# Patient Record
Sex: MALE | Race: OTHER | NOT HISPANIC OR LATINO | URBAN - METROPOLITAN AREA
[De-identification: names, ages, dates, MRNs, and addresses within clinical notes are randomized per-mention and may not be internally consistent; named-entity substitution may affect disease eponyms.]

---

## 2020-01-01 ENCOUNTER — INPATIENT (INPATIENT)
Facility: HOSPITAL | Age: 70
LOS: 26 days | Discharge: HOME | DRG: 207 | End: 2020-05-08
Attending: HOSPITALIST | Admitting: HOSPITALIST
Payer: MEDICARE

## 2020-01-01 ENCOUNTER — INPATIENT (INPATIENT)
Facility: HOSPITAL | Age: 70
LOS: 6 days | Discharge: ANOTHER HOSPITAL | DRG: 949 | End: 2020-05-15
Attending: PHYSICAL MEDICINE & REHABILITATION | Admitting: PHYSICAL MEDICINE & REHABILITATION
Payer: MEDICARE

## 2020-01-01 ENCOUNTER — INPATIENT (INPATIENT)
Facility: HOSPITAL | Age: 70
LOS: 0 days | DRG: 208 | End: 2020-05-16
Attending: INTERNAL MEDICINE | Admitting: FAMILY MEDICINE
Payer: MEDICARE

## 2020-01-01 VITALS
RESPIRATION RATE: 16 BRPM | SYSTOLIC BLOOD PRESSURE: 135 MMHG | TEMPERATURE: 98 F | HEART RATE: 92 BPM | DIASTOLIC BLOOD PRESSURE: 85 MMHG

## 2020-01-01 VITALS
DIASTOLIC BLOOD PRESSURE: 64 MMHG | SYSTOLIC BLOOD PRESSURE: 148 MMHG | RESPIRATION RATE: 18 BRPM | HEART RATE: 75 BPM | TEMPERATURE: 98 F | OXYGEN SATURATION: 96 %

## 2020-01-01 VITALS
HEART RATE: 69 BPM | SYSTOLIC BLOOD PRESSURE: 190 MMHG | RESPIRATION RATE: 17 BRPM | DIASTOLIC BLOOD PRESSURE: 82 MMHG | TEMPERATURE: 98 F

## 2020-01-01 VITALS
TEMPERATURE: 99 F | WEIGHT: 145.06 LBS | DIASTOLIC BLOOD PRESSURE: 78 MMHG | HEART RATE: 89 BPM | HEIGHT: 66 IN | RESPIRATION RATE: 28 BRPM | OXYGEN SATURATION: 99 % | SYSTOLIC BLOOD PRESSURE: 139 MMHG

## 2020-01-01 VITALS — SYSTOLIC BLOOD PRESSURE: 148 MMHG | HEART RATE: 88 BPM | DIASTOLIC BLOOD PRESSURE: 79 MMHG

## 2020-01-01 VITALS — HEART RATE: 36 BPM | SYSTOLIC BLOOD PRESSURE: 76 MMHG | DIASTOLIC BLOOD PRESSURE: 43 MMHG

## 2020-01-01 DIAGNOSIS — J96.91 RESPIRATORY FAILURE, UNSPECIFIED WITH HYPOXIA: ICD-10-CM

## 2020-01-01 DIAGNOSIS — N40.0 BENIGN PROSTATIC HYPERPLASIA WITHOUT LOWER URINARY TRACT SYMPTOMS: ICD-10-CM

## 2020-01-01 DIAGNOSIS — U07.1 COVID-19: ICD-10-CM

## 2020-01-01 DIAGNOSIS — J18.9 PNEUMONIA, UNSPECIFIED ORGANISM: ICD-10-CM

## 2020-01-01 DIAGNOSIS — Z29.9 ENCOUNTER FOR PROPHYLACTIC MEASURES, UNSPECIFIED: ICD-10-CM

## 2020-01-01 DIAGNOSIS — I10 ESSENTIAL (PRIMARY) HYPERTENSION: ICD-10-CM

## 2020-01-01 DIAGNOSIS — E11.9 TYPE 2 DIABETES MELLITUS WITHOUT COMPLICATIONS: ICD-10-CM

## 2020-01-01 LAB
-  AMIKACIN: SIGNIFICANT CHANGE UP
-  AZTREONAM: SIGNIFICANT CHANGE UP
-  CEFEPIME: SIGNIFICANT CHANGE UP
-  CEFTAZIDIME: SIGNIFICANT CHANGE UP
-  CIPROFLOXACIN: SIGNIFICANT CHANGE UP
-  GENTAMICIN: SIGNIFICANT CHANGE UP
-  IMIPENEM: SIGNIFICANT CHANGE UP
-  LEVOFLOXACIN: SIGNIFICANT CHANGE UP
-  MEROPENEM: SIGNIFICANT CHANGE UP
-  PIPERACILLIN/TAZOBACTAM: SIGNIFICANT CHANGE UP
-  TOBRAMYCIN: SIGNIFICANT CHANGE UP
A-TUMOR NECROSIS FACT SERPL-MCNC: <5 PG/ML — SIGNIFICANT CHANGE UP
ALBUMIN SERPL ELPH-MCNC: 2.1 G/DL — LOW (ref 3.3–5.2)
ALBUMIN SERPL ELPH-MCNC: 2.2 G/DL — LOW (ref 3.3–5)
ALBUMIN SERPL ELPH-MCNC: 2.4 G/DL — LOW (ref 3.3–5)
ALBUMIN SERPL ELPH-MCNC: 2.4 G/DL — LOW (ref 3.3–5)
ALBUMIN SERPL ELPH-MCNC: 2.4 G/DL — LOW (ref 3.3–5.2)
ALBUMIN SERPL ELPH-MCNC: 2.5 G/DL — LOW (ref 3.3–5.2)
ALBUMIN SERPL ELPH-MCNC: 2.5 G/DL — LOW (ref 3.3–5.2)
ALBUMIN SERPL ELPH-MCNC: 2.6 G/DL — LOW (ref 3.3–5.2)
ALBUMIN SERPL ELPH-MCNC: 2.7 G/DL — LOW (ref 3.3–5.2)
ALBUMIN SERPL ELPH-MCNC: 2.7 G/DL — LOW (ref 3.3–5.2)
ALBUMIN SERPL ELPH-MCNC: 2.8 G/DL — LOW (ref 3.3–5.2)
ALBUMIN SERPL ELPH-MCNC: 2.9 G/DL — LOW (ref 3.3–5.2)
ALBUMIN SERPL ELPH-MCNC: 2.9 G/DL — LOW (ref 3.3–5.2)
ALBUMIN SERPL ELPH-MCNC: 3 G/DL — LOW (ref 3.3–5.2)
ALBUMIN SERPL ELPH-MCNC: 3 G/DL — LOW (ref 3.3–5.2)
ALP SERPL-CCNC: 133 U/L — HIGH (ref 40–120)
ALP SERPL-CCNC: 135 U/L — HIGH (ref 40–120)
ALP SERPL-CCNC: 135 U/L — HIGH (ref 40–120)
ALP SERPL-CCNC: 137 U/L — HIGH (ref 40–120)
ALP SERPL-CCNC: 147 U/L — HIGH (ref 40–120)
ALP SERPL-CCNC: 152 U/L — HIGH (ref 40–120)
ALP SERPL-CCNC: 152 U/L — HIGH (ref 40–120)
ALP SERPL-CCNC: 161 U/L — HIGH (ref 40–120)
ALP SERPL-CCNC: 163 U/L — HIGH (ref 40–120)
ALP SERPL-CCNC: 176 U/L — HIGH (ref 40–120)
ALP SERPL-CCNC: 184 U/L — HIGH (ref 40–120)
ALP SERPL-CCNC: 185 U/L — HIGH (ref 40–120)
ALP SERPL-CCNC: 187 U/L — HIGH (ref 40–120)
ALP SERPL-CCNC: 188 U/L — HIGH (ref 40–120)
ALP SERPL-CCNC: 192 U/L — HIGH (ref 40–120)
ALP SERPL-CCNC: 197 U/L — HIGH (ref 40–120)
ALP SERPL-CCNC: 254 U/L — HIGH (ref 40–120)
ALT FLD-CCNC: 15 U/L — SIGNIFICANT CHANGE UP
ALT FLD-CCNC: 168 U/L — HIGH
ALT FLD-CCNC: 18 U/L — SIGNIFICANT CHANGE UP
ALT FLD-CCNC: 19 U/L — SIGNIFICANT CHANGE UP
ALT FLD-CCNC: 19 U/L — SIGNIFICANT CHANGE UP
ALT FLD-CCNC: 20 U/L — SIGNIFICANT CHANGE UP
ALT FLD-CCNC: 22 U/L — SIGNIFICANT CHANGE UP
ALT FLD-CCNC: 23 U/L — SIGNIFICANT CHANGE UP
ALT FLD-CCNC: 33 U/L — SIGNIFICANT CHANGE UP
ALT FLD-CCNC: 37 U/L — SIGNIFICANT CHANGE UP (ref 10–45)
ALT FLD-CCNC: 47 U/L — HIGH
ALT FLD-CCNC: 52 U/L — HIGH (ref 10–45)
ALT FLD-CCNC: 58 U/L — HIGH
ALT FLD-CCNC: 58 U/L — HIGH
ALT FLD-CCNC: 62 U/L — HIGH
ALT FLD-CCNC: 75 U/L — HIGH
ALT FLD-CCNC: 80 U/L — HIGH (ref 10–45)
ANION GAP SERPL CALC-SCNC: 11 MMOL/L — SIGNIFICANT CHANGE UP (ref 5–17)
ANION GAP SERPL CALC-SCNC: 11 MMOL/L — SIGNIFICANT CHANGE UP (ref 5–17)
ANION GAP SERPL CALC-SCNC: 12 MMOL/L — SIGNIFICANT CHANGE UP (ref 5–17)
ANION GAP SERPL CALC-SCNC: 13 MMOL/L — SIGNIFICANT CHANGE UP (ref 5–17)
ANION GAP SERPL CALC-SCNC: 14 MMOL/L — SIGNIFICANT CHANGE UP (ref 5–17)
ANION GAP SERPL CALC-SCNC: 15 MMOL/L — SIGNIFICANT CHANGE UP (ref 5–17)
ANION GAP SERPL CALC-SCNC: 18 MMOL/L — HIGH (ref 5–17)
ANION GAP SERPL CALC-SCNC: 19 MMOL/L — HIGH (ref 5–17)
ANION GAP SERPL CALC-SCNC: 4 MMOL/L — LOW (ref 5–17)
ANION GAP SERPL CALC-SCNC: 5 MMOL/L — SIGNIFICANT CHANGE UP (ref 5–17)
ANION GAP SERPL CALC-SCNC: 6 MMOL/L — SIGNIFICANT CHANGE UP (ref 5–17)
ANION GAP SERPL CALC-SCNC: 9 MMOL/L — SIGNIFICANT CHANGE UP (ref 5–17)
ANISOCYTOSIS BLD QL: SLIGHT — SIGNIFICANT CHANGE UP
APTT BLD: 30.4 SEC — SIGNIFICANT CHANGE UP (ref 27.5–36.3)
AST SERPL-CCNC: 160 U/L — HIGH
AST SERPL-CCNC: 19 U/L — SIGNIFICANT CHANGE UP
AST SERPL-CCNC: 21 U/L — SIGNIFICANT CHANGE UP
AST SERPL-CCNC: 23 U/L — SIGNIFICANT CHANGE UP
AST SERPL-CCNC: 25 U/L — SIGNIFICANT CHANGE UP
AST SERPL-CCNC: 25 U/L — SIGNIFICANT CHANGE UP
AST SERPL-CCNC: 27 U/L — SIGNIFICANT CHANGE UP (ref 10–40)
AST SERPL-CCNC: 28 U/L — SIGNIFICANT CHANGE UP
AST SERPL-CCNC: 29 U/L — SIGNIFICANT CHANGE UP
AST SERPL-CCNC: 29 U/L — SIGNIFICANT CHANGE UP
AST SERPL-CCNC: 30 U/L — SIGNIFICANT CHANGE UP (ref 10–40)
AST SERPL-CCNC: 31 U/L — SIGNIFICANT CHANGE UP (ref 10–40)
AST SERPL-CCNC: 43 U/L — HIGH
AST SERPL-CCNC: 51 U/L — HIGH
AST SERPL-CCNC: 59 U/L — HIGH
AST SERPL-CCNC: 63 U/L — HIGH
AST SERPL-CCNC: 79 U/L — HIGH
BASE EXCESS BLDA CALC-SCNC: -1.8 MMOL/L — SIGNIFICANT CHANGE UP (ref -2–2)
BASE EXCESS BLDA CALC-SCNC: 1.2 MMOL/L — SIGNIFICANT CHANGE UP (ref -2–2)
BASE EXCESS BLDA CALC-SCNC: 5.2 MMOL/L — HIGH (ref -3–3)
BASE EXCESS BLDV CALC-SCNC: -2.5 MMOL/L — LOW (ref -2–2)
BASOPHILS # BLD AUTO: 0 K/UL — SIGNIFICANT CHANGE UP (ref 0–0.2)
BASOPHILS # BLD AUTO: 0.01 K/UL — SIGNIFICANT CHANGE UP (ref 0–0.2)
BASOPHILS # BLD AUTO: 0.02 K/UL — SIGNIFICANT CHANGE UP (ref 0–0.2)
BASOPHILS # BLD AUTO: 0.03 K/UL — SIGNIFICANT CHANGE UP (ref 0–0.2)
BASOPHILS # BLD AUTO: 0.03 K/UL — SIGNIFICANT CHANGE UP (ref 0–0.2)
BASOPHILS # BLD AUTO: 0.04 K/UL — SIGNIFICANT CHANGE UP (ref 0–0.2)
BASOPHILS # BLD AUTO: 0.05 K/UL — SIGNIFICANT CHANGE UP (ref 0–0.2)
BASOPHILS # BLD AUTO: 0.05 K/UL — SIGNIFICANT CHANGE UP (ref 0–0.2)
BASOPHILS # BLD AUTO: 0.1 K/UL — SIGNIFICANT CHANGE UP (ref 0–0.2)
BASOPHILS NFR BLD AUTO: 0 % — SIGNIFICANT CHANGE UP (ref 0–2)
BASOPHILS NFR BLD AUTO: 0.1 % — SIGNIFICANT CHANGE UP (ref 0–2)
BASOPHILS NFR BLD AUTO: 0.2 % — SIGNIFICANT CHANGE UP (ref 0–2)
BASOPHILS NFR BLD AUTO: 0.3 % — SIGNIFICANT CHANGE UP (ref 0–2)
BASOPHILS NFR BLD AUTO: 0.4 % — SIGNIFICANT CHANGE UP (ref 0–2)
BASOPHILS NFR BLD AUTO: 0.5 % — SIGNIFICANT CHANGE UP (ref 0–2)
BASOPHILS NFR BLD AUTO: 0.9 % — SIGNIFICANT CHANGE UP (ref 0–2)
BASOPHILS NFR BLD AUTO: 0.9 % — SIGNIFICANT CHANGE UP (ref 0–2)
BILIRUB DIRECT SERPL-MCNC: 0.1 MG/DL — SIGNIFICANT CHANGE UP (ref 0–0.3)
BILIRUB INDIRECT FLD-MCNC: 0.1 MG/DL — LOW (ref 0.2–1)
BILIRUB SERPL-MCNC: 0.2 MG/DL — LOW (ref 0.4–2)
BILIRUB SERPL-MCNC: 0.3 MG/DL — LOW (ref 0.4–2)
BILIRUB SERPL-MCNC: 0.3 MG/DL — SIGNIFICANT CHANGE UP (ref 0.2–1.2)
BILIRUB SERPL-MCNC: 0.4 MG/DL — SIGNIFICANT CHANGE UP (ref 0.4–2)
BILIRUB SERPL-MCNC: 0.5 MG/DL — SIGNIFICANT CHANGE UP (ref 0.2–1.2)
BILIRUB SERPL-MCNC: 0.5 MG/DL — SIGNIFICANT CHANGE UP (ref 0.4–2)
BILIRUB SERPL-MCNC: 0.8 MG/DL — SIGNIFICANT CHANGE UP (ref 0.2–1.2)
BILIRUB SERPL-MCNC: <0.2 MG/DL — LOW (ref 0.4–2)
BLOOD GAS COMMENTS ARTERIAL: SIGNIFICANT CHANGE UP
BUN SERPL-MCNC: 15 MG/DL — SIGNIFICANT CHANGE UP (ref 7–23)
BUN SERPL-MCNC: 15 MG/DL — SIGNIFICANT CHANGE UP (ref 7–23)
BUN SERPL-MCNC: 17 MG/DL — SIGNIFICANT CHANGE UP (ref 7–23)
BUN SERPL-MCNC: 19 MG/DL — SIGNIFICANT CHANGE UP (ref 7–23)
BUN SERPL-MCNC: 19 MG/DL — SIGNIFICANT CHANGE UP (ref 8–20)
BUN SERPL-MCNC: 22 MG/DL — HIGH (ref 8–20)
BUN SERPL-MCNC: 26 MG/DL — HIGH (ref 8–20)
BUN SERPL-MCNC: 29 MG/DL — HIGH (ref 8–20)
BUN SERPL-MCNC: 30 MG/DL — HIGH (ref 8–20)
BUN SERPL-MCNC: 31 MG/DL — HIGH (ref 8–20)
BUN SERPL-MCNC: 33 MG/DL — HIGH (ref 8–20)
BUN SERPL-MCNC: 33 MG/DL — HIGH (ref 8–20)
BUN SERPL-MCNC: 34 MG/DL — HIGH (ref 8–20)
BUN SERPL-MCNC: 38 MG/DL — HIGH (ref 8–20)
BUN SERPL-MCNC: 38 MG/DL — HIGH (ref 8–20)
BUN SERPL-MCNC: 39 MG/DL — HIGH (ref 8–20)
BUN SERPL-MCNC: 40 MG/DL — HIGH (ref 8–20)
BUN SERPL-MCNC: 41 MG/DL — HIGH (ref 8–20)
BUN SERPL-MCNC: 42 MG/DL — HIGH (ref 8–20)
BUN SERPL-MCNC: 44 MG/DL — HIGH (ref 8–20)
BUN SERPL-MCNC: 45 MG/DL — HIGH (ref 8–20)
CALCIUM SERPL-MCNC: 7.1 MG/DL — LOW (ref 8.6–10.2)
CALCIUM SERPL-MCNC: 7.3 MG/DL — LOW (ref 8.6–10.2)
CALCIUM SERPL-MCNC: 7.5 MG/DL — LOW (ref 8.6–10.2)
CALCIUM SERPL-MCNC: 7.6 MG/DL — LOW (ref 8.6–10.2)
CALCIUM SERPL-MCNC: 7.7 MG/DL — LOW (ref 8.6–10.2)
CALCIUM SERPL-MCNC: 8 MG/DL — LOW (ref 8.6–10.2)
CALCIUM SERPL-MCNC: 8 MG/DL — LOW (ref 8.6–10.2)
CALCIUM SERPL-MCNC: 8.1 MG/DL — LOW (ref 8.6–10.2)
CALCIUM SERPL-MCNC: 8.1 MG/DL — LOW (ref 8.6–10.2)
CALCIUM SERPL-MCNC: 8.2 MG/DL — LOW (ref 8.6–10.2)
CALCIUM SERPL-MCNC: 8.2 MG/DL — LOW (ref 8.6–10.2)
CALCIUM SERPL-MCNC: 8.3 MG/DL — LOW (ref 8.4–10.5)
CALCIUM SERPL-MCNC: 8.3 MG/DL — LOW (ref 8.6–10.2)
CALCIUM SERPL-MCNC: 8.3 MG/DL — LOW (ref 8.6–10.2)
CALCIUM SERPL-MCNC: 8.4 MG/DL — LOW (ref 8.6–10.2)
CALCIUM SERPL-MCNC: 8.5 MG/DL — LOW (ref 8.6–10.2)
CALCIUM SERPL-MCNC: 8.5 MG/DL — SIGNIFICANT CHANGE UP (ref 8.4–10.5)
CALCIUM SERPL-MCNC: 8.6 MG/DL — SIGNIFICANT CHANGE UP (ref 8.4–10.5)
CALCIUM SERPL-MCNC: 8.6 MG/DL — SIGNIFICANT CHANGE UP (ref 8.4–10.5)
CALCIUM SERPL-MCNC: 8.6 MG/DL — SIGNIFICANT CHANGE UP (ref 8.6–10.2)
CALCIUM SERPL-MCNC: 8.7 MG/DL — SIGNIFICANT CHANGE UP (ref 8.6–10.2)
CALCIUM SERPL-MCNC: 9 MG/DL — SIGNIFICANT CHANGE UP (ref 8.6–10.2)
CHLORIDE SERPL-SCNC: 101 MMOL/L — SIGNIFICANT CHANGE UP (ref 96–108)
CHLORIDE SERPL-SCNC: 101 MMOL/L — SIGNIFICANT CHANGE UP (ref 96–108)
CHLORIDE SERPL-SCNC: 101 MMOL/L — SIGNIFICANT CHANGE UP (ref 98–107)
CHLORIDE SERPL-SCNC: 101 MMOL/L — SIGNIFICANT CHANGE UP (ref 98–107)
CHLORIDE SERPL-SCNC: 102 MMOL/L — SIGNIFICANT CHANGE UP (ref 98–107)
CHLORIDE SERPL-SCNC: 103 MMOL/L — SIGNIFICANT CHANGE UP (ref 98–107)
CHLORIDE SERPL-SCNC: 104 MMOL/L — SIGNIFICANT CHANGE UP (ref 98–107)
CHLORIDE SERPL-SCNC: 105 MMOL/L — SIGNIFICANT CHANGE UP (ref 98–107)
CHLORIDE SERPL-SCNC: 106 MMOL/L — SIGNIFICANT CHANGE UP (ref 98–107)
CHLORIDE SERPL-SCNC: 107 MMOL/L — SIGNIFICANT CHANGE UP (ref 98–107)
CHLORIDE SERPL-SCNC: 108 MMOL/L — HIGH (ref 98–107)
CHLORIDE SERPL-SCNC: 108 MMOL/L — HIGH (ref 98–107)
CHLORIDE SERPL-SCNC: 93 MMOL/L — LOW (ref 98–107)
CHLORIDE SERPL-SCNC: 94 MMOL/L — LOW (ref 98–107)
CHLORIDE SERPL-SCNC: 95 MMOL/L — LOW (ref 98–107)
CHLORIDE SERPL-SCNC: 96 MMOL/L — SIGNIFICANT CHANGE UP (ref 96–108)
CHLORIDE SERPL-SCNC: 97 MMOL/L — LOW (ref 98–107)
CHLORIDE SERPL-SCNC: 99 MMOL/L — SIGNIFICANT CHANGE UP (ref 96–108)
CHLORIDE SERPL-SCNC: 99 MMOL/L — SIGNIFICANT CHANGE UP (ref 98–107)
CK SERPL-CCNC: 16 U/L — LOW (ref 30–200)
CK SERPL-CCNC: 60 U/L — SIGNIFICANT CHANGE UP (ref 30–200)
CO2 BLDA-SCNC: 31 MMOL/L — HIGH (ref 22–30)
CO2 SERPL-SCNC: 18 MMOL/L — LOW (ref 22–29)
CO2 SERPL-SCNC: 19 MMOL/L — LOW (ref 22–29)
CO2 SERPL-SCNC: 19 MMOL/L — LOW (ref 22–29)
CO2 SERPL-SCNC: 20 MMOL/L — LOW (ref 22–29)
CO2 SERPL-SCNC: 20 MMOL/L — LOW (ref 22–29)
CO2 SERPL-SCNC: 21 MMOL/L — LOW (ref 22–29)
CO2 SERPL-SCNC: 22 MMOL/L — SIGNIFICANT CHANGE UP (ref 22–29)
CO2 SERPL-SCNC: 24 MMOL/L — SIGNIFICANT CHANGE UP (ref 22–29)
CO2 SERPL-SCNC: 26 MMOL/L — SIGNIFICANT CHANGE UP (ref 22–29)
CO2 SERPL-SCNC: 26 MMOL/L — SIGNIFICANT CHANGE UP (ref 22–31)
CO2 SERPL-SCNC: 27 MMOL/L — SIGNIFICANT CHANGE UP (ref 22–29)
CO2 SERPL-SCNC: 28 MMOL/L — SIGNIFICANT CHANGE UP (ref 22–29)
CO2 SERPL-SCNC: 28 MMOL/L — SIGNIFICANT CHANGE UP (ref 22–29)
CO2 SERPL-SCNC: 30 MMOL/L — HIGH (ref 22–29)
CO2 SERPL-SCNC: 31 MMOL/L — HIGH (ref 22–29)
CO2 SERPL-SCNC: 31 MMOL/L — SIGNIFICANT CHANGE UP (ref 22–31)
CO2 SERPL-SCNC: 32 MMOL/L — HIGH (ref 22–31)
CO2 SERPL-SCNC: 32 MMOL/L — HIGH (ref 22–31)
CO2 SERPL-SCNC: 33 MMOL/L — HIGH (ref 22–29)
CO2 SERPL-SCNC: 33 MMOL/L — HIGH (ref 22–29)
CREAT SERPL-MCNC: 0.65 MG/DL — SIGNIFICANT CHANGE UP (ref 0.5–1.3)
CREAT SERPL-MCNC: 0.68 MG/DL — SIGNIFICANT CHANGE UP (ref 0.5–1.3)
CREAT SERPL-MCNC: 0.73 MG/DL — SIGNIFICANT CHANGE UP (ref 0.5–1.3)
CREAT SERPL-MCNC: 0.74 MG/DL — SIGNIFICANT CHANGE UP (ref 0.5–1.3)
CREAT SERPL-MCNC: 0.76 MG/DL — SIGNIFICANT CHANGE UP (ref 0.5–1.3)
CREAT SERPL-MCNC: 0.77 MG/DL — SIGNIFICANT CHANGE UP (ref 0.5–1.3)
CREAT SERPL-MCNC: 0.78 MG/DL — SIGNIFICANT CHANGE UP (ref 0.5–1.3)
CREAT SERPL-MCNC: 0.81 MG/DL — SIGNIFICANT CHANGE UP (ref 0.5–1.3)
CREAT SERPL-MCNC: 0.82 MG/DL — SIGNIFICANT CHANGE UP (ref 0.5–1.3)
CREAT SERPL-MCNC: 0.83 MG/DL — SIGNIFICANT CHANGE UP (ref 0.5–1.3)
CREAT SERPL-MCNC: 0.85 MG/DL — SIGNIFICANT CHANGE UP (ref 0.5–1.3)
CREAT SERPL-MCNC: 0.88 MG/DL — SIGNIFICANT CHANGE UP (ref 0.5–1.3)
CREAT SERPL-MCNC: 0.9 MG/DL — SIGNIFICANT CHANGE UP (ref 0.5–1.3)
CREAT SERPL-MCNC: 0.91 MG/DL — SIGNIFICANT CHANGE UP (ref 0.5–1.3)
CREAT SERPL-MCNC: 0.91 MG/DL — SIGNIFICANT CHANGE UP (ref 0.5–1.3)
CREAT SERPL-MCNC: 0.92 MG/DL — SIGNIFICANT CHANGE UP (ref 0.5–1.3)
CREAT SERPL-MCNC: 0.92 MG/DL — SIGNIFICANT CHANGE UP (ref 0.5–1.3)
CREAT SERPL-MCNC: 0.97 MG/DL — SIGNIFICANT CHANGE UP (ref 0.5–1.3)
CREAT SERPL-MCNC: 1 MG/DL — SIGNIFICANT CHANGE UP (ref 0.5–1.3)
CREAT SERPL-MCNC: 1.03 MG/DL — SIGNIFICANT CHANGE UP (ref 0.5–1.3)
CREAT SERPL-MCNC: 1.04 MG/DL — SIGNIFICANT CHANGE UP (ref 0.5–1.3)
CREAT SERPL-MCNC: 1.09 MG/DL — SIGNIFICANT CHANGE UP (ref 0.5–1.3)
CREAT SERPL-MCNC: 1.1 MG/DL — SIGNIFICANT CHANGE UP (ref 0.5–1.3)
CREAT SERPL-MCNC: 1.12 MG/DL — SIGNIFICANT CHANGE UP (ref 0.5–1.3)
CREAT SERPL-MCNC: 1.13 MG/DL — SIGNIFICANT CHANGE UP (ref 0.5–1.3)
CREAT SERPL-MCNC: 1.18 MG/DL — SIGNIFICANT CHANGE UP (ref 0.5–1.3)
CREAT SERPL-MCNC: 1.21 MG/DL — SIGNIFICANT CHANGE UP (ref 0.5–1.3)
CREAT SERPL-MCNC: 1.22 MG/DL — SIGNIFICANT CHANGE UP (ref 0.5–1.3)
CREAT SERPL-MCNC: 1.26 MG/DL — SIGNIFICANT CHANGE UP (ref 0.5–1.3)
CREAT SERPL-MCNC: 1.31 MG/DL — HIGH (ref 0.5–1.3)
CREAT SERPL-MCNC: 1.33 MG/DL — HIGH (ref 0.5–1.3)
CREAT SERPL-MCNC: 1.66 MG/DL — HIGH (ref 0.5–1.3)
CRP SERPL-MCNC: 0.41 MG/DL — HIGH (ref 0–0.4)
CRP SERPL-MCNC: 0.43 MG/DL — HIGH (ref 0–0.4)
CRP SERPL-MCNC: 0.47 MG/DL — HIGH (ref 0–0.4)
CRP SERPL-MCNC: 0.66 MG/DL — HIGH (ref 0–0.4)
CRP SERPL-MCNC: 0.74 MG/DL — HIGH (ref 0–0.4)
CRP SERPL-MCNC: 0.79 MG/DL — HIGH (ref 0–0.4)
CRP SERPL-MCNC: 1.84 MG/DL — HIGH (ref 0–0.4)
CRP SERPL-MCNC: 1.84 MG/DL — HIGH (ref 0–0.4)
CRP SERPL-MCNC: 14.43 MG/DL — HIGH (ref 0–0.4)
CRP SERPL-MCNC: 18.96 MG/DL — HIGH (ref 0–0.4)
CRP SERPL-MCNC: 2 MG/DL — HIGH (ref 0–0.4)
CRP SERPL-MCNC: 2.04 MG/DL — HIGH (ref 0–0.4)
CRP SERPL-MCNC: 26.57 MG/DL — HIGH (ref 0–0.4)
CRP SERPL-MCNC: 4.41 MG/DL — HIGH (ref 0–0.4)
CRP SERPL-MCNC: 8.85 MG/DL — HIGH (ref 0–0.4)
CRP SERPL-MCNC: 9.42 MG/DL — HIGH (ref 0–0.4)
CRP SERPL-MCNC: <0.4 MG/DL — SIGNIFICANT CHANGE UP (ref 0–0.4)
CULTURE RESULTS: SIGNIFICANT CHANGE UP
CULTURE RESULTS: SIGNIFICANT CHANGE UP
D DIMER BLD IA.RAPID-MCNC: 289 NG/ML DDU — HIGH
D DIMER BLD IA.RAPID-MCNC: 391 NG/ML DDU — HIGH
D DIMER BLD IA.RAPID-MCNC: 416 NG/ML DDU — HIGH
D DIMER BLD IA.RAPID-MCNC: 4397 NG/ML DDU — HIGH
D DIMER BLD IA.RAPID-MCNC: 447 NG/ML DDU — HIGH
D DIMER BLD IA.RAPID-MCNC: 468 NG/ML DDU — HIGH
D DIMER BLD IA.RAPID-MCNC: 525 NG/ML DDU — HIGH
D DIMER BLD IA.RAPID-MCNC: 528 NG/ML DDU — HIGH
D DIMER BLD IA.RAPID-MCNC: 6116 NG/ML DDU — HIGH
D DIMER BLD IA.RAPID-MCNC: 679 NG/ML DDU — HIGH
D DIMER BLD IA.RAPID-MCNC: 725 NG/ML DDU — HIGH
D DIMER BLD IA.RAPID-MCNC: 744 NG/ML DDU — HIGH
D DIMER BLD IA.RAPID-MCNC: 7943 NG/ML DDU — HIGH
D DIMER BLD IA.RAPID-MCNC: HIGH NG/ML DDU
D DIMER BLD IA.RAPID-MCNC: HIGH NG/ML DDU
ELLIPTOCYTES BLD QL SMEAR: SLIGHT — SIGNIFICANT CHANGE UP
EOSINOPHIL # BLD AUTO: 0 K/UL — SIGNIFICANT CHANGE UP (ref 0–0.5)
EOSINOPHIL # BLD AUTO: 0.04 K/UL — SIGNIFICANT CHANGE UP (ref 0–0.5)
EOSINOPHIL # BLD AUTO: 0.05 K/UL — SIGNIFICANT CHANGE UP (ref 0–0.5)
EOSINOPHIL # BLD AUTO: 0.08 K/UL — SIGNIFICANT CHANGE UP (ref 0–0.5)
EOSINOPHIL # BLD AUTO: 0.08 K/UL — SIGNIFICANT CHANGE UP (ref 0–0.5)
EOSINOPHIL # BLD AUTO: 0.1 K/UL — SIGNIFICANT CHANGE UP (ref 0–0.5)
EOSINOPHIL # BLD AUTO: 0.1 K/UL — SIGNIFICANT CHANGE UP (ref 0–0.5)
EOSINOPHIL # BLD AUTO: 0.11 K/UL — SIGNIFICANT CHANGE UP (ref 0–0.5)
EOSINOPHIL # BLD AUTO: 0.12 K/UL — SIGNIFICANT CHANGE UP (ref 0–0.5)
EOSINOPHIL # BLD AUTO: 0.12 K/UL — SIGNIFICANT CHANGE UP (ref 0–0.5)
EOSINOPHIL # BLD AUTO: 0.13 K/UL — SIGNIFICANT CHANGE UP (ref 0–0.5)
EOSINOPHIL # BLD AUTO: 0.16 K/UL — SIGNIFICANT CHANGE UP (ref 0–0.5)
EOSINOPHIL # BLD AUTO: 0.17 K/UL — SIGNIFICANT CHANGE UP (ref 0–0.5)
EOSINOPHIL # BLD AUTO: 0.18 K/UL — SIGNIFICANT CHANGE UP (ref 0–0.5)
EOSINOPHIL # BLD AUTO: 0.2 K/UL — SIGNIFICANT CHANGE UP (ref 0–0.5)
EOSINOPHIL # BLD AUTO: 0.25 K/UL — SIGNIFICANT CHANGE UP (ref 0–0.5)
EOSINOPHIL # BLD AUTO: 0.26 K/UL — SIGNIFICANT CHANGE UP (ref 0–0.5)
EOSINOPHIL # BLD AUTO: 0.38 K/UL — SIGNIFICANT CHANGE UP (ref 0–0.5)
EOSINOPHIL # BLD AUTO: 0.59 K/UL — HIGH (ref 0–0.5)
EOSINOPHIL # BLD AUTO: 0.71 K/UL — HIGH (ref 0–0.5)
EOSINOPHIL # BLD AUTO: 0.79 K/UL — HIGH (ref 0–0.5)
EOSINOPHIL NFR BLD AUTO: 0 % — SIGNIFICANT CHANGE UP (ref 0–6)
EOSINOPHIL NFR BLD AUTO: 0.3 % — SIGNIFICANT CHANGE UP (ref 0–6)
EOSINOPHIL NFR BLD AUTO: 0.6 % — SIGNIFICANT CHANGE UP (ref 0–6)
EOSINOPHIL NFR BLD AUTO: 0.8 % — SIGNIFICANT CHANGE UP (ref 0–6)
EOSINOPHIL NFR BLD AUTO: 0.9 % — SIGNIFICANT CHANGE UP (ref 0–6)
EOSINOPHIL NFR BLD AUTO: 0.9 % — SIGNIFICANT CHANGE UP (ref 0–6)
EOSINOPHIL NFR BLD AUTO: 1 % — SIGNIFICANT CHANGE UP (ref 0–6)
EOSINOPHIL NFR BLD AUTO: 1.1 % — SIGNIFICANT CHANGE UP (ref 0–6)
EOSINOPHIL NFR BLD AUTO: 1.1 % — SIGNIFICANT CHANGE UP (ref 0–6)
EOSINOPHIL NFR BLD AUTO: 1.5 % — SIGNIFICANT CHANGE UP (ref 0–6)
EOSINOPHIL NFR BLD AUTO: 1.7 % — SIGNIFICANT CHANGE UP (ref 0–6)
EOSINOPHIL NFR BLD AUTO: 1.8 % — SIGNIFICANT CHANGE UP (ref 0–6)
EOSINOPHIL NFR BLD AUTO: 1.8 % — SIGNIFICANT CHANGE UP (ref 0–6)
EOSINOPHIL NFR BLD AUTO: 1.9 % — SIGNIFICANT CHANGE UP (ref 0–6)
EOSINOPHIL NFR BLD AUTO: 1.9 % — SIGNIFICANT CHANGE UP (ref 0–6)
EOSINOPHIL NFR BLD AUTO: 10.8 % — HIGH (ref 0–6)
EOSINOPHIL NFR BLD AUTO: 13.6 % — HIGH (ref 0–6)
EOSINOPHIL NFR BLD AUTO: 2.1 % — SIGNIFICANT CHANGE UP (ref 0–6)
EOSINOPHIL NFR BLD AUTO: 2.2 % — SIGNIFICANT CHANGE UP (ref 0–6)
EOSINOPHIL NFR BLD AUTO: 3 % — SIGNIFICANT CHANGE UP (ref 0–6)
EOSINOPHIL NFR BLD AUTO: 3.9 % — SIGNIFICANT CHANGE UP (ref 0–6)
EOSINOPHIL NFR BLD AUTO: 5.1 % — SIGNIFICANT CHANGE UP (ref 0–6)
EOSINOPHIL NFR BLD AUTO: 7.4 % — HIGH (ref 0–6)
ERYTHROCYTE [SEDIMENTATION RATE] IN BLOOD: 21 MM/HR — HIGH (ref 0–20)
ERYTHROCYTE [SEDIMENTATION RATE] IN BLOOD: 23 MM/HR — HIGH (ref 0–20)
ERYTHROCYTE [SEDIMENTATION RATE] IN BLOOD: 27 MM/HR — HIGH (ref 0–20)
ERYTHROCYTE [SEDIMENTATION RATE] IN BLOOD: 28 MM/HR — HIGH (ref 0–20)
ERYTHROCYTE [SEDIMENTATION RATE] IN BLOOD: 31 MM/HR — HIGH (ref 0–20)
ERYTHROCYTE [SEDIMENTATION RATE] IN BLOOD: 36 MM/HR — HIGH (ref 0–20)
ERYTHROCYTE [SEDIMENTATION RATE] IN BLOOD: 39 MM/HR — HIGH (ref 0–20)
ERYTHROCYTE [SEDIMENTATION RATE] IN BLOOD: 43 MM/HR — HIGH (ref 0–20)
ERYTHROCYTE [SEDIMENTATION RATE] IN BLOOD: 54 MM/HR — HIGH (ref 0–20)
ERYTHROCYTE [SEDIMENTATION RATE] IN BLOOD: 55 MM/HR — HIGH (ref 0–20)
ERYTHROCYTE [SEDIMENTATION RATE] IN BLOOD: 55 MM/HR — HIGH (ref 0–20)
ERYTHROCYTE [SEDIMENTATION RATE] IN BLOOD: 56 MM/HR — HIGH (ref 0–20)
FERRITIN SERPL-MCNC: 208 NG/ML — SIGNIFICANT CHANGE UP (ref 30–400)
FERRITIN SERPL-MCNC: 262 NG/ML — SIGNIFICANT CHANGE UP (ref 30–400)
FERRITIN SERPL-MCNC: 282 NG/ML — SIGNIFICANT CHANGE UP (ref 30–400)
FERRITIN SERPL-MCNC: 294 NG/ML — SIGNIFICANT CHANGE UP (ref 30–400)
FERRITIN SERPL-MCNC: 298 NG/ML — SIGNIFICANT CHANGE UP (ref 30–400)
FERRITIN SERPL-MCNC: 316 NG/ML — SIGNIFICANT CHANGE UP (ref 30–400)
FERRITIN SERPL-MCNC: 326 NG/ML — SIGNIFICANT CHANGE UP (ref 30–400)
FERRITIN SERPL-MCNC: 336 NG/ML — SIGNIFICANT CHANGE UP (ref 30–400)
FERRITIN SERPL-MCNC: 370 NG/ML — SIGNIFICANT CHANGE UP (ref 30–400)
FERRITIN SERPL-MCNC: 371 NG/ML — SIGNIFICANT CHANGE UP (ref 30–400)
FERRITIN SERPL-MCNC: 384 NG/ML — SIGNIFICANT CHANGE UP (ref 30–400)
FERRITIN SERPL-MCNC: 384 NG/ML — SIGNIFICANT CHANGE UP (ref 30–400)
FERRITIN SERPL-MCNC: 385 NG/ML — SIGNIFICANT CHANGE UP (ref 30–400)
FERRITIN SERPL-MCNC: 409 NG/ML — HIGH (ref 30–400)
FERRITIN SERPL-MCNC: 464 NG/ML — HIGH (ref 30–400)
FERRITIN SERPL-MCNC: 520 NG/ML — HIGH (ref 30–400)
FIBRINOGEN PPP-MCNC: 676 MG/DL — HIGH (ref 300–520)
GAMMA INTERFERON BACKGROUND BLD IA-ACNC: 0.01 IU/ML — SIGNIFICANT CHANGE UP
GAS PNL BLDA: SIGNIFICANT CHANGE UP
GAS PNL BLDV: SIGNIFICANT CHANGE UP
GIANT PLATELETS BLD QL SMEAR: PRESENT — SIGNIFICANT CHANGE UP
GLUCOSE BLDC GLUCOMTR-MCNC: 106 MG/DL — HIGH (ref 70–99)
GLUCOSE BLDC GLUCOMTR-MCNC: 106 MG/DL — HIGH (ref 70–99)
GLUCOSE BLDC GLUCOMTR-MCNC: 110 MG/DL — HIGH (ref 70–99)
GLUCOSE BLDC GLUCOMTR-MCNC: 110 MG/DL — HIGH (ref 70–99)
GLUCOSE BLDC GLUCOMTR-MCNC: 111 MG/DL — HIGH (ref 70–99)
GLUCOSE BLDC GLUCOMTR-MCNC: 112 MG/DL — HIGH (ref 70–99)
GLUCOSE BLDC GLUCOMTR-MCNC: 114 MG/DL — HIGH (ref 70–99)
GLUCOSE BLDC GLUCOMTR-MCNC: 114 MG/DL — HIGH (ref 70–99)
GLUCOSE BLDC GLUCOMTR-MCNC: 116 MG/DL — HIGH (ref 70–99)
GLUCOSE BLDC GLUCOMTR-MCNC: 119 MG/DL — HIGH (ref 70–99)
GLUCOSE BLDC GLUCOMTR-MCNC: 122 MG/DL — HIGH (ref 70–99)
GLUCOSE BLDC GLUCOMTR-MCNC: 125 MG/DL — HIGH (ref 70–99)
GLUCOSE BLDC GLUCOMTR-MCNC: 126 MG/DL — HIGH (ref 70–99)
GLUCOSE BLDC GLUCOMTR-MCNC: 126 MG/DL — HIGH (ref 70–99)
GLUCOSE BLDC GLUCOMTR-MCNC: 129 MG/DL — HIGH (ref 70–99)
GLUCOSE BLDC GLUCOMTR-MCNC: 130 MG/DL — HIGH (ref 70–99)
GLUCOSE BLDC GLUCOMTR-MCNC: 131 MG/DL — HIGH (ref 70–99)
GLUCOSE BLDC GLUCOMTR-MCNC: 131 MG/DL — HIGH (ref 70–99)
GLUCOSE BLDC GLUCOMTR-MCNC: 133 MG/DL — HIGH (ref 70–99)
GLUCOSE BLDC GLUCOMTR-MCNC: 138 MG/DL — HIGH (ref 70–99)
GLUCOSE BLDC GLUCOMTR-MCNC: 139 MG/DL — HIGH (ref 70–99)
GLUCOSE BLDC GLUCOMTR-MCNC: 141 MG/DL — HIGH (ref 70–99)
GLUCOSE BLDC GLUCOMTR-MCNC: 142 MG/DL — HIGH (ref 70–99)
GLUCOSE BLDC GLUCOMTR-MCNC: 142 MG/DL — HIGH (ref 70–99)
GLUCOSE BLDC GLUCOMTR-MCNC: 144 MG/DL — HIGH (ref 70–99)
GLUCOSE BLDC GLUCOMTR-MCNC: 145 MG/DL — HIGH (ref 70–99)
GLUCOSE BLDC GLUCOMTR-MCNC: 148 MG/DL — HIGH (ref 70–99)
GLUCOSE BLDC GLUCOMTR-MCNC: 149 MG/DL — HIGH (ref 70–99)
GLUCOSE BLDC GLUCOMTR-MCNC: 150 MG/DL — HIGH (ref 70–99)
GLUCOSE BLDC GLUCOMTR-MCNC: 150 MG/DL — HIGH (ref 70–99)
GLUCOSE BLDC GLUCOMTR-MCNC: 151 MG/DL — HIGH (ref 70–99)
GLUCOSE BLDC GLUCOMTR-MCNC: 153 MG/DL — HIGH (ref 70–99)
GLUCOSE BLDC GLUCOMTR-MCNC: 153 MG/DL — HIGH (ref 70–99)
GLUCOSE BLDC GLUCOMTR-MCNC: 159 MG/DL — HIGH (ref 70–99)
GLUCOSE BLDC GLUCOMTR-MCNC: 159 MG/DL — HIGH (ref 70–99)
GLUCOSE BLDC GLUCOMTR-MCNC: 161 MG/DL — HIGH (ref 70–99)
GLUCOSE BLDC GLUCOMTR-MCNC: 161 MG/DL — HIGH (ref 70–99)
GLUCOSE BLDC GLUCOMTR-MCNC: 162 MG/DL — HIGH (ref 70–99)
GLUCOSE BLDC GLUCOMTR-MCNC: 162 MG/DL — HIGH (ref 70–99)
GLUCOSE BLDC GLUCOMTR-MCNC: 163 MG/DL — HIGH (ref 70–99)
GLUCOSE BLDC GLUCOMTR-MCNC: 164 MG/DL — HIGH (ref 70–99)
GLUCOSE BLDC GLUCOMTR-MCNC: 169 MG/DL — HIGH (ref 70–99)
GLUCOSE BLDC GLUCOMTR-MCNC: 170 MG/DL — HIGH (ref 70–99)
GLUCOSE BLDC GLUCOMTR-MCNC: 172 MG/DL — HIGH (ref 70–99)
GLUCOSE BLDC GLUCOMTR-MCNC: 173 MG/DL — HIGH (ref 70–99)
GLUCOSE BLDC GLUCOMTR-MCNC: 174 MG/DL — HIGH (ref 70–99)
GLUCOSE BLDC GLUCOMTR-MCNC: 175 MG/DL — HIGH (ref 70–99)
GLUCOSE BLDC GLUCOMTR-MCNC: 176 MG/DL — HIGH (ref 70–99)
GLUCOSE BLDC GLUCOMTR-MCNC: 178 MG/DL — HIGH (ref 70–99)
GLUCOSE BLDC GLUCOMTR-MCNC: 178 MG/DL — HIGH (ref 70–99)
GLUCOSE BLDC GLUCOMTR-MCNC: 180 MG/DL — HIGH (ref 70–99)
GLUCOSE BLDC GLUCOMTR-MCNC: 181 MG/DL — HIGH (ref 70–99)
GLUCOSE BLDC GLUCOMTR-MCNC: 183 MG/DL — HIGH (ref 70–99)
GLUCOSE BLDC GLUCOMTR-MCNC: 183 MG/DL — HIGH (ref 70–99)
GLUCOSE BLDC GLUCOMTR-MCNC: 184 MG/DL — HIGH (ref 70–99)
GLUCOSE BLDC GLUCOMTR-MCNC: 185 MG/DL — HIGH (ref 70–99)
GLUCOSE BLDC GLUCOMTR-MCNC: 187 MG/DL — HIGH (ref 70–99)
GLUCOSE BLDC GLUCOMTR-MCNC: 188 MG/DL — HIGH (ref 70–99)
GLUCOSE BLDC GLUCOMTR-MCNC: 189 MG/DL — HIGH (ref 70–99)
GLUCOSE BLDC GLUCOMTR-MCNC: 189 MG/DL — HIGH (ref 70–99)
GLUCOSE BLDC GLUCOMTR-MCNC: 190 MG/DL — HIGH (ref 70–99)
GLUCOSE BLDC GLUCOMTR-MCNC: 191 MG/DL — HIGH (ref 70–99)
GLUCOSE BLDC GLUCOMTR-MCNC: 192 MG/DL — HIGH (ref 70–99)
GLUCOSE BLDC GLUCOMTR-MCNC: 198 MG/DL — HIGH (ref 70–99)
GLUCOSE BLDC GLUCOMTR-MCNC: 200 MG/DL — HIGH (ref 70–99)
GLUCOSE BLDC GLUCOMTR-MCNC: 202 MG/DL — HIGH (ref 70–99)
GLUCOSE BLDC GLUCOMTR-MCNC: 205 MG/DL — HIGH (ref 70–99)
GLUCOSE BLDC GLUCOMTR-MCNC: 208 MG/DL — HIGH (ref 70–99)
GLUCOSE BLDC GLUCOMTR-MCNC: 211 MG/DL — HIGH (ref 70–99)
GLUCOSE BLDC GLUCOMTR-MCNC: 211 MG/DL — HIGH (ref 70–99)
GLUCOSE BLDC GLUCOMTR-MCNC: 216 MG/DL — HIGH (ref 70–99)
GLUCOSE BLDC GLUCOMTR-MCNC: 230 MG/DL — HIGH (ref 70–99)
GLUCOSE BLDC GLUCOMTR-MCNC: 233 MG/DL — HIGH (ref 70–99)
GLUCOSE BLDC GLUCOMTR-MCNC: 235 MG/DL — HIGH (ref 70–99)
GLUCOSE BLDC GLUCOMTR-MCNC: 235 MG/DL — HIGH (ref 70–99)
GLUCOSE BLDC GLUCOMTR-MCNC: 236 MG/DL — HIGH (ref 70–99)
GLUCOSE BLDC GLUCOMTR-MCNC: 238 MG/DL — HIGH (ref 70–99)
GLUCOSE BLDC GLUCOMTR-MCNC: 239 MG/DL — HIGH (ref 70–99)
GLUCOSE BLDC GLUCOMTR-MCNC: 241 MG/DL — HIGH (ref 70–99)
GLUCOSE BLDC GLUCOMTR-MCNC: 243 MG/DL — HIGH (ref 70–99)
GLUCOSE BLDC GLUCOMTR-MCNC: 246 MG/DL — HIGH (ref 70–99)
GLUCOSE BLDC GLUCOMTR-MCNC: 251 MG/DL — HIGH (ref 70–99)
GLUCOSE BLDC GLUCOMTR-MCNC: 252 MG/DL — HIGH (ref 70–99)
GLUCOSE BLDC GLUCOMTR-MCNC: 257 MG/DL — HIGH (ref 70–99)
GLUCOSE BLDC GLUCOMTR-MCNC: 260 MG/DL — HIGH (ref 70–99)
GLUCOSE BLDC GLUCOMTR-MCNC: 261 MG/DL — HIGH (ref 70–99)
GLUCOSE BLDC GLUCOMTR-MCNC: 273 MG/DL — HIGH (ref 70–99)
GLUCOSE BLDC GLUCOMTR-MCNC: 276 MG/DL — HIGH (ref 70–99)
GLUCOSE BLDC GLUCOMTR-MCNC: 278 MG/DL — HIGH (ref 70–99)
GLUCOSE BLDC GLUCOMTR-MCNC: 282 MG/DL — HIGH (ref 70–99)
GLUCOSE BLDC GLUCOMTR-MCNC: 299 MG/DL — HIGH (ref 70–99)
GLUCOSE BLDC GLUCOMTR-MCNC: 300 MG/DL — HIGH (ref 70–99)
GLUCOSE BLDC GLUCOMTR-MCNC: 310 MG/DL — HIGH (ref 70–99)
GLUCOSE BLDC GLUCOMTR-MCNC: 310 MG/DL — HIGH (ref 70–99)
GLUCOSE BLDC GLUCOMTR-MCNC: 311 MG/DL — HIGH (ref 70–99)
GLUCOSE BLDC GLUCOMTR-MCNC: 316 MG/DL — HIGH (ref 70–99)
GLUCOSE BLDC GLUCOMTR-MCNC: 335 MG/DL — HIGH (ref 70–99)
GLUCOSE BLDC GLUCOMTR-MCNC: 360 MG/DL — HIGH (ref 70–99)
GLUCOSE BLDC GLUCOMTR-MCNC: 370 MG/DL — HIGH (ref 70–99)
GLUCOSE BLDC GLUCOMTR-MCNC: 371 MG/DL — HIGH (ref 70–99)
GLUCOSE BLDC GLUCOMTR-MCNC: 373 MG/DL — HIGH (ref 70–99)
GLUCOSE BLDC GLUCOMTR-MCNC: 381 MG/DL — HIGH (ref 70–99)
GLUCOSE BLDC GLUCOMTR-MCNC: 397 MG/DL — HIGH (ref 70–99)
GLUCOSE BLDC GLUCOMTR-MCNC: 404 MG/DL — HIGH (ref 70–99)
GLUCOSE BLDC GLUCOMTR-MCNC: 418 MG/DL — HIGH (ref 70–99)
GLUCOSE BLDC GLUCOMTR-MCNC: 47 MG/DL — LOW (ref 70–99)
GLUCOSE BLDC GLUCOMTR-MCNC: 506 MG/DL — CRITICAL HIGH (ref 70–99)
GLUCOSE BLDC GLUCOMTR-MCNC: 56 MG/DL — LOW (ref 70–99)
GLUCOSE BLDC GLUCOMTR-MCNC: 58 MG/DL — LOW (ref 70–99)
GLUCOSE BLDC GLUCOMTR-MCNC: 59 MG/DL — LOW (ref 70–99)
GLUCOSE BLDC GLUCOMTR-MCNC: 63 MG/DL — LOW (ref 70–99)
GLUCOSE BLDC GLUCOMTR-MCNC: 64 MG/DL — LOW (ref 70–99)
GLUCOSE BLDC GLUCOMTR-MCNC: 64 MG/DL — LOW (ref 70–99)
GLUCOSE BLDC GLUCOMTR-MCNC: 67 MG/DL — LOW (ref 70–99)
GLUCOSE BLDC GLUCOMTR-MCNC: 69 MG/DL — LOW (ref 70–99)
GLUCOSE BLDC GLUCOMTR-MCNC: 70 MG/DL — SIGNIFICANT CHANGE UP (ref 70–99)
GLUCOSE BLDC GLUCOMTR-MCNC: 78 MG/DL — SIGNIFICANT CHANGE UP (ref 70–99)
GLUCOSE BLDC GLUCOMTR-MCNC: 79 MG/DL — SIGNIFICANT CHANGE UP (ref 70–99)
GLUCOSE BLDC GLUCOMTR-MCNC: 79 MG/DL — SIGNIFICANT CHANGE UP (ref 70–99)
GLUCOSE BLDC GLUCOMTR-MCNC: 81 MG/DL — SIGNIFICANT CHANGE UP (ref 70–99)
GLUCOSE BLDC GLUCOMTR-MCNC: 82 MG/DL — SIGNIFICANT CHANGE UP (ref 70–99)
GLUCOSE BLDC GLUCOMTR-MCNC: 82 MG/DL — SIGNIFICANT CHANGE UP (ref 70–99)
GLUCOSE BLDC GLUCOMTR-MCNC: 83 MG/DL — SIGNIFICANT CHANGE UP (ref 70–99)
GLUCOSE BLDC GLUCOMTR-MCNC: 84 MG/DL — SIGNIFICANT CHANGE UP (ref 70–99)
GLUCOSE BLDC GLUCOMTR-MCNC: 85 MG/DL — SIGNIFICANT CHANGE UP (ref 70–99)
GLUCOSE BLDC GLUCOMTR-MCNC: 86 MG/DL — SIGNIFICANT CHANGE UP (ref 70–99)
GLUCOSE BLDC GLUCOMTR-MCNC: 87 MG/DL — SIGNIFICANT CHANGE UP (ref 70–99)
GLUCOSE BLDC GLUCOMTR-MCNC: 92 MG/DL — SIGNIFICANT CHANGE UP (ref 70–99)
GLUCOSE BLDC GLUCOMTR-MCNC: 95 MG/DL — SIGNIFICANT CHANGE UP (ref 70–99)
GLUCOSE BLDC GLUCOMTR-MCNC: 96 MG/DL — SIGNIFICANT CHANGE UP (ref 70–99)
GLUCOSE SERPL-MCNC: 113 MG/DL — HIGH (ref 70–99)
GLUCOSE SERPL-MCNC: 119 MG/DL — HIGH (ref 70–99)
GLUCOSE SERPL-MCNC: 121 MG/DL — HIGH (ref 70–99)
GLUCOSE SERPL-MCNC: 127 MG/DL — HIGH (ref 70–99)
GLUCOSE SERPL-MCNC: 133 MG/DL — HIGH (ref 70–99)
GLUCOSE SERPL-MCNC: 135 MG/DL — HIGH (ref 70–99)
GLUCOSE SERPL-MCNC: 138 MG/DL — HIGH (ref 70–99)
GLUCOSE SERPL-MCNC: 142 MG/DL — HIGH (ref 70–99)
GLUCOSE SERPL-MCNC: 149 MG/DL — HIGH (ref 70–99)
GLUCOSE SERPL-MCNC: 156 MG/DL — HIGH (ref 70–99)
GLUCOSE SERPL-MCNC: 157 MG/DL — HIGH (ref 70–99)
GLUCOSE SERPL-MCNC: 172 MG/DL — HIGH (ref 70–99)
GLUCOSE SERPL-MCNC: 182 MG/DL — HIGH (ref 70–99)
GLUCOSE SERPL-MCNC: 184 MG/DL — HIGH (ref 70–99)
GLUCOSE SERPL-MCNC: 186 MG/DL — HIGH (ref 70–99)
GLUCOSE SERPL-MCNC: 195 MG/DL — HIGH (ref 70–99)
GLUCOSE SERPL-MCNC: 198 MG/DL — HIGH (ref 70–99)
GLUCOSE SERPL-MCNC: 210 MG/DL — HIGH (ref 70–99)
GLUCOSE SERPL-MCNC: 211 MG/DL — HIGH (ref 70–99)
GLUCOSE SERPL-MCNC: 262 MG/DL — HIGH (ref 70–99)
GLUCOSE SERPL-MCNC: 286 MG/DL — HIGH (ref 70–99)
GLUCOSE SERPL-MCNC: 345 MG/DL — HIGH (ref 70–99)
GLUCOSE SERPL-MCNC: 363 MG/DL — HIGH (ref 70–99)
GLUCOSE SERPL-MCNC: 368 MG/DL — HIGH (ref 70–99)
GLUCOSE SERPL-MCNC: 442 MG/DL — HIGH (ref 70–99)
GLUCOSE SERPL-MCNC: 59 MG/DL — LOW (ref 70–99)
GLUCOSE SERPL-MCNC: 67 MG/DL — LOW (ref 70–99)
GLUCOSE SERPL-MCNC: 78 MG/DL — SIGNIFICANT CHANGE UP (ref 70–99)
GLUCOSE SERPL-MCNC: 80 MG/DL — SIGNIFICANT CHANGE UP (ref 70–99)
GLUCOSE SERPL-MCNC: 81 MG/DL — SIGNIFICANT CHANGE UP (ref 70–99)
GLUCOSE SERPL-MCNC: 87 MG/DL — SIGNIFICANT CHANGE UP (ref 70–99)
GLUCOSE SERPL-MCNC: 91 MG/DL — SIGNIFICANT CHANGE UP (ref 70–99)
GRAM STN FLD: SIGNIFICANT CHANGE UP
HAV IGM SER-ACNC: SIGNIFICANT CHANGE UP
HBA1C BLD-MCNC: 11.7 % — HIGH (ref 4–5.6)
HBV CORE IGM SER-ACNC: SIGNIFICANT CHANGE UP
HBV SURFACE AG SER-ACNC: SIGNIFICANT CHANGE UP
HCO3 BLDA-SCNC: 23 MMOL/L — SIGNIFICANT CHANGE UP (ref 20–26)
HCO3 BLDA-SCNC: 24 MMOL/L — SIGNIFICANT CHANGE UP (ref 20–26)
HCO3 BLDA-SCNC: 25 MMOL/L — SIGNIFICANT CHANGE UP (ref 20–26)
HCO3 BLDA-SCNC: 26 MMOL/L — SIGNIFICANT CHANGE UP (ref 20–26)
HCO3 BLDA-SCNC: 29 MMOL/L — HIGH (ref 20–26)
HCO3 BLDA-SCNC: 30 MMOL/L — HIGH (ref 20–26)
HCO3 BLDV-SCNC: 22 MMOL/L — SIGNIFICANT CHANGE UP (ref 20–26)
HCT VFR BLD CALC: 27.5 % — LOW (ref 39–50)
HCT VFR BLD CALC: 29.2 % — LOW (ref 39–50)
HCT VFR BLD CALC: 29.4 % — LOW (ref 39–50)
HCT VFR BLD CALC: 30.1 % — LOW (ref 39–50)
HCT VFR BLD CALC: 30.2 % — LOW (ref 39–50)
HCT VFR BLD CALC: 30.6 % — LOW (ref 39–50)
HCT VFR BLD CALC: 30.7 % — LOW (ref 39–50)
HCT VFR BLD CALC: 30.7 % — LOW (ref 39–50)
HCT VFR BLD CALC: 31 % — LOW (ref 39–50)
HCT VFR BLD CALC: 31.1 % — LOW (ref 39–50)
HCT VFR BLD CALC: 32.5 % — LOW (ref 39–50)
HCT VFR BLD CALC: 32.5 % — LOW (ref 39–50)
HCT VFR BLD CALC: 32.9 % — LOW (ref 39–50)
HCT VFR BLD CALC: 32.9 % — LOW (ref 39–50)
HCT VFR BLD CALC: 33 % — LOW (ref 39–50)
HCT VFR BLD CALC: 33.4 % — LOW (ref 39–50)
HCT VFR BLD CALC: 33.5 % — LOW (ref 39–50)
HCT VFR BLD CALC: 33.6 % — LOW (ref 39–50)
HCT VFR BLD CALC: 33.8 % — LOW (ref 39–50)
HCT VFR BLD CALC: 34 % — LOW (ref 39–50)
HCT VFR BLD CALC: 34.4 % — LOW (ref 39–50)
HCT VFR BLD CALC: 35.2 % — LOW (ref 39–50)
HCT VFR BLD CALC: 35.8 % — LOW (ref 39–50)
HCT VFR BLD CALC: 36.3 % — LOW (ref 39–50)
HCT VFR BLD CALC: 36.9 % — LOW (ref 39–50)
HCT VFR BLD CALC: 37.2 % — LOW (ref 39–50)
HCT VFR BLD CALC: 37.6 % — LOW (ref 39–50)
HCT VFR BLD CALC: 37.8 % — LOW (ref 39–50)
HCT VFR BLD CALC: 39.4 % — SIGNIFICANT CHANGE UP (ref 39–50)
HCT VFR BLD CALC: 39.4 % — SIGNIFICANT CHANGE UP (ref 39–50)
HCV AB S/CO SERPL IA: 0.28 S/CO — SIGNIFICANT CHANGE UP (ref 0–0.99)
HCV AB S/CO SERPL IA: 0.43 S/CO — SIGNIFICANT CHANGE UP (ref 0–0.99)
HCV AB SERPL-IMP: SIGNIFICANT CHANGE UP
HCV AB SERPL-IMP: SIGNIFICANT CHANGE UP
HGB BLD-MCNC: 10 G/DL — LOW (ref 13–17)
HGB BLD-MCNC: 10.1 G/DL — LOW (ref 13–17)
HGB BLD-MCNC: 10.2 G/DL — LOW (ref 13–17)
HGB BLD-MCNC: 10.4 G/DL — LOW (ref 13–17)
HGB BLD-MCNC: 10.4 G/DL — LOW (ref 13–17)
HGB BLD-MCNC: 10.5 G/DL — LOW (ref 13–17)
HGB BLD-MCNC: 10.6 G/DL — LOW (ref 13–17)
HGB BLD-MCNC: 10.6 G/DL — LOW (ref 13–17)
HGB BLD-MCNC: 10.8 G/DL — LOW (ref 13–17)
HGB BLD-MCNC: 10.9 G/DL — LOW (ref 13–17)
HGB BLD-MCNC: 11 G/DL — LOW (ref 13–17)
HGB BLD-MCNC: 11 G/DL — LOW (ref 13–17)
HGB BLD-MCNC: 11.3 G/DL — LOW (ref 13–17)
HGB BLD-MCNC: 11.4 G/DL — LOW (ref 13–17)
HGB BLD-MCNC: 11.9 G/DL — LOW (ref 13–17)
HGB BLD-MCNC: 12 G/DL — LOW (ref 13–17)
HGB BLD-MCNC: 12.6 G/DL — LOW (ref 13–17)
HGB BLD-MCNC: 12.9 G/DL — LOW (ref 13–17)
HGB BLD-MCNC: 13.5 G/DL — SIGNIFICANT CHANGE UP (ref 13–17)
HGB BLD-MCNC: 9.1 G/DL — LOW (ref 13–17)
HGB BLD-MCNC: 9.6 G/DL — LOW (ref 13–17)
HGB BLD-MCNC: 9.6 G/DL — LOW (ref 13–17)
HGB BLD-MCNC: 9.7 G/DL — LOW (ref 13–17)
HGB BLD-MCNC: 9.8 G/DL — LOW (ref 13–17)
HOROWITZ INDEX BLDA+IHG-RTO: 0.5 — SIGNIFICANT CHANGE UP
HOROWITZ INDEX BLDA+IHG-RTO: SIGNIFICANT CHANGE UP
HYPOCHROMIA BLD QL: SLIGHT — SIGNIFICANT CHANGE UP
IL10 SERPL-MCNC: 8 PG/ML — SIGNIFICANT CHANGE UP
IL12 SERPL-MCNC: <5 PG/ML — SIGNIFICANT CHANGE UP
IL13 SERPL-MCNC: <5 PG/ML — SIGNIFICANT CHANGE UP
IL2 SERPL-MCNC: 1213 PG/ML — HIGH
IL2 SERPL-MCNC: <5 PG/ML — SIGNIFICANT CHANGE UP
IL4 SERPL-MCNC: <5 PG/ML — SIGNIFICANT CHANGE UP
IL6 SERPL-MCNC: 146 PG/ML — HIGH
IL8 SERPL-MCNC: <5 PG/ML — SIGNIFICANT CHANGE UP
IMM GRANULOCYTES NFR BLD AUTO: 0.2 % — SIGNIFICANT CHANGE UP (ref 0–1.5)
IMM GRANULOCYTES NFR BLD AUTO: 0.3 % — SIGNIFICANT CHANGE UP (ref 0–1.5)
IMM GRANULOCYTES NFR BLD AUTO: 0.4 % — SIGNIFICANT CHANGE UP (ref 0–1.5)
IMM GRANULOCYTES NFR BLD AUTO: 0.5 % — SIGNIFICANT CHANGE UP (ref 0–1.5)
IMM GRANULOCYTES NFR BLD AUTO: 0.6 % — SIGNIFICANT CHANGE UP (ref 0–1.5)
IMM GRANULOCYTES NFR BLD AUTO: 0.6 % — SIGNIFICANT CHANGE UP (ref 0–1.5)
IMM GRANULOCYTES NFR BLD AUTO: 0.7 % — SIGNIFICANT CHANGE UP (ref 0–1.5)
IMM GRANULOCYTES NFR BLD AUTO: 0.8 % — SIGNIFICANT CHANGE UP (ref 0–1.5)
IMM GRANULOCYTES NFR BLD AUTO: 0.9 % — SIGNIFICANT CHANGE UP (ref 0–1.5)
IMM GRANULOCYTES NFR BLD AUTO: 0.9 % — SIGNIFICANT CHANGE UP (ref 0–1.5)
IMM GRANULOCYTES NFR BLD AUTO: 1 % — SIGNIFICANT CHANGE UP (ref 0–1.5)
IMM GRANULOCYTES NFR BLD AUTO: 1.1 % — SIGNIFICANT CHANGE UP (ref 0–1.5)
IMM GRANULOCYTES NFR BLD AUTO: 1.5 % — SIGNIFICANT CHANGE UP (ref 0–1.5)
IMM GRANULOCYTES NFR BLD AUTO: 1.7 % — HIGH (ref 0–1.5)
IMM GRANULOCYTES NFR BLD AUTO: 1.8 % — HIGH (ref 0–1.5)
IMM GRANULOCYTES NFR BLD AUTO: 1.8 % — HIGH (ref 0–1.5)
INR BLD: 1.04 RATIO — SIGNIFICANT CHANGE UP (ref 0.88–1.16)
INTERFERON GAMMA: <5 PG/ML — SIGNIFICANT CHANGE UP
INTERLEUKIN 1 BETA: <5 PG/ML — SIGNIFICANT CHANGE UP
INTERLEUKIN 17: <5 PG/ML — SIGNIFICANT CHANGE UP
INTERLEUKIN 5: <5 PG/ML — SIGNIFICANT CHANGE UP
LACTATE BLDV-MCNC: 1.5 MMOL/L — SIGNIFICANT CHANGE UP (ref 0.5–2)
LDH SERPL L TO P-CCNC: 240 U/L — HIGH (ref 98–192)
LDH SERPL L TO P-CCNC: 262 U/L — HIGH (ref 98–192)
LDH SERPL L TO P-CCNC: 286 U/L — HIGH (ref 98–192)
LDH SERPL L TO P-CCNC: 288 U/L — HIGH (ref 98–192)
LDH SERPL L TO P-CCNC: 290 U/L — HIGH (ref 98–192)
LDH SERPL L TO P-CCNC: 296 U/L — HIGH (ref 98–192)
LDH SERPL L TO P-CCNC: 312 U/L — HIGH (ref 98–192)
LDH SERPL L TO P-CCNC: 313 U/L — HIGH (ref 98–192)
LDH SERPL L TO P-CCNC: 315 U/L — HIGH (ref 98–192)
LDH SERPL L TO P-CCNC: 321 U/L — HIGH (ref 98–192)
LDH SERPL L TO P-CCNC: 322 U/L — HIGH (ref 98–192)
LDH SERPL L TO P-CCNC: 327 U/L — HIGH (ref 98–192)
LDH SERPL L TO P-CCNC: 330 U/L — HIGH (ref 98–192)
LDH SERPL L TO P-CCNC: 351 U/L — HIGH (ref 98–192)
LDH SERPL L TO P-CCNC: 393 U/L — HIGH (ref 50–242)
LYMPHOCYTES # BLD AUTO: 0.18 K/UL — LOW (ref 1–3.3)
LYMPHOCYTES # BLD AUTO: 0.47 K/UL — LOW (ref 1–3.3)
LYMPHOCYTES # BLD AUTO: 0.49 K/UL — LOW (ref 1–3.3)
LYMPHOCYTES # BLD AUTO: 0.5 K/UL — LOW (ref 1–3.3)
LYMPHOCYTES # BLD AUTO: 0.53 K/UL — LOW (ref 1–3.3)
LYMPHOCYTES # BLD AUTO: 0.55 K/UL — LOW (ref 1–3.3)
LYMPHOCYTES # BLD AUTO: 0.55 K/UL — LOW (ref 1–3.3)
LYMPHOCYTES # BLD AUTO: 0.56 K/UL — LOW (ref 1–3.3)
LYMPHOCYTES # BLD AUTO: 0.57 K/UL — LOW (ref 1–3.3)
LYMPHOCYTES # BLD AUTO: 0.6 K/UL — LOW (ref 1–3.3)
LYMPHOCYTES # BLD AUTO: 0.64 K/UL — LOW (ref 1–3.3)
LYMPHOCYTES # BLD AUTO: 0.68 K/UL — LOW (ref 1–3.3)
LYMPHOCYTES # BLD AUTO: 0.7 K/UL — LOW (ref 1–3.3)
LYMPHOCYTES # BLD AUTO: 0.71 K/UL — LOW (ref 1–3.3)
LYMPHOCYTES # BLD AUTO: 0.72 K/UL — LOW (ref 1–3.3)
LYMPHOCYTES # BLD AUTO: 0.76 K/UL — LOW (ref 1–3.3)
LYMPHOCYTES # BLD AUTO: 0.84 K/UL — LOW (ref 1–3.3)
LYMPHOCYTES # BLD AUTO: 0.9 K/UL — LOW (ref 1–3.3)
LYMPHOCYTES # BLD AUTO: 0.91 K/UL — LOW (ref 1–3.3)
LYMPHOCYTES # BLD AUTO: 0.95 K/UL — LOW (ref 1–3.3)
LYMPHOCYTES # BLD AUTO: 1.03 K/UL — SIGNIFICANT CHANGE UP (ref 1–3.3)
LYMPHOCYTES # BLD AUTO: 1.2 K/UL — SIGNIFICANT CHANGE UP (ref 1–3.3)
LYMPHOCYTES # BLD AUTO: 1.43 K/UL — SIGNIFICANT CHANGE UP (ref 1–3.3)
LYMPHOCYTES # BLD AUTO: 1.46 K/UL — SIGNIFICANT CHANGE UP (ref 1–3.3)
LYMPHOCYTES # BLD AUTO: 1.47 K/UL — SIGNIFICANT CHANGE UP (ref 1–3.3)
LYMPHOCYTES # BLD AUTO: 1.7 % — LOW (ref 13–44)
LYMPHOCYTES # BLD AUTO: 10.5 % — LOW (ref 13–44)
LYMPHOCYTES # BLD AUTO: 12.3 % — LOW (ref 13–44)
LYMPHOCYTES # BLD AUTO: 13.4 % — SIGNIFICANT CHANGE UP (ref 13–44)
LYMPHOCYTES # BLD AUTO: 14 % — SIGNIFICANT CHANGE UP (ref 13–44)
LYMPHOCYTES # BLD AUTO: 15 % — SIGNIFICANT CHANGE UP (ref 13–44)
LYMPHOCYTES # BLD AUTO: 15.5 % — SIGNIFICANT CHANGE UP (ref 13–44)
LYMPHOCYTES # BLD AUTO: 19.4 % — SIGNIFICANT CHANGE UP (ref 13–44)
LYMPHOCYTES # BLD AUTO: 22.4 % — SIGNIFICANT CHANGE UP (ref 13–44)
LYMPHOCYTES # BLD AUTO: 3.5 % — LOW (ref 13–44)
LYMPHOCYTES # BLD AUTO: 4.5 % — LOW (ref 13–44)
LYMPHOCYTES # BLD AUTO: 5.1 % — LOW (ref 13–44)
LYMPHOCYTES # BLD AUTO: 5.5 % — LOW (ref 13–44)
LYMPHOCYTES # BLD AUTO: 6.2 % — LOW (ref 13–44)
LYMPHOCYTES # BLD AUTO: 6.3 % — LOW (ref 13–44)
LYMPHOCYTES # BLD AUTO: 6.6 % — LOW (ref 13–44)
LYMPHOCYTES # BLD AUTO: 6.9 % — LOW (ref 13–44)
LYMPHOCYTES # BLD AUTO: 7.3 % — LOW (ref 13–44)
LYMPHOCYTES # BLD AUTO: 7.6 % — LOW (ref 13–44)
LYMPHOCYTES # BLD AUTO: 7.6 % — LOW (ref 13–44)
LYMPHOCYTES # BLD AUTO: 7.7 % — LOW (ref 13–44)
LYMPHOCYTES # BLD AUTO: 7.8 % — LOW (ref 13–44)
LYMPHOCYTES # BLD AUTO: 8.4 % — LOW (ref 13–44)
LYMPHOCYTES # BLD AUTO: 8.4 % — LOW (ref 13–44)
LYMPHOCYTES # BLD AUTO: 8.7 % — LOW (ref 13–44)
LYMPHOCYTES # BLD AUTO: 9.2 % — LOW (ref 13–44)
LYMPHOCYTES # BLD AUTO: 9.7 % — LOW (ref 13–44)
M TB IFN-G BLD-IMP: ABNORMAL
M TB IFN-G CD4+ BCKGRND COR BLD-ACNC: 0.03 IU/ML — SIGNIFICANT CHANGE UP
M TB IFN-G CD4+CD8+ BCKGRND COR BLD-ACNC: 0.02 IU/ML — SIGNIFICANT CHANGE UP
MACROCYTES BLD QL: SLIGHT — SIGNIFICANT CHANGE UP
MAGNESIUM SERPL-MCNC: 1.7 MG/DL — LOW (ref 1.8–2.6)
MAGNESIUM SERPL-MCNC: 1.8 MG/DL — SIGNIFICANT CHANGE UP (ref 1.6–2.6)
MAGNESIUM SERPL-MCNC: 2 MG/DL — SIGNIFICANT CHANGE UP (ref 1.6–2.6)
MAGNESIUM SERPL-MCNC: 2 MG/DL — SIGNIFICANT CHANGE UP (ref 1.8–2.6)
MAGNESIUM SERPL-MCNC: 2.1 MG/DL — SIGNIFICANT CHANGE UP (ref 1.6–2.6)
MAGNESIUM SERPL-MCNC: 2.2 MG/DL — SIGNIFICANT CHANGE UP (ref 1.6–2.6)
MAGNESIUM SERPL-MCNC: 2.3 MG/DL — SIGNIFICANT CHANGE UP (ref 1.6–2.6)
MAGNESIUM SERPL-MCNC: 2.4 MG/DL — SIGNIFICANT CHANGE UP (ref 1.6–2.6)
MAGNESIUM SERPL-MCNC: 2.4 MG/DL — SIGNIFICANT CHANGE UP (ref 1.6–2.6)
MAGNESIUM SERPL-MCNC: 2.5 MG/DL — SIGNIFICANT CHANGE UP (ref 1.8–2.6)
MAGNESIUM SERPL-MCNC: 2.7 MG/DL — HIGH (ref 1.6–2.6)
MANUAL SMEAR VERIFICATION: SIGNIFICANT CHANGE UP
MANUAL SMEAR VERIFICATION: SIGNIFICANT CHANGE UP
MCHC RBC-ENTMCNC: 29.3 PG — SIGNIFICANT CHANGE UP (ref 27–34)
MCHC RBC-ENTMCNC: 29.4 PG — SIGNIFICANT CHANGE UP (ref 27–34)
MCHC RBC-ENTMCNC: 29.4 PG — SIGNIFICANT CHANGE UP (ref 27–34)
MCHC RBC-ENTMCNC: 29.5 PG — SIGNIFICANT CHANGE UP (ref 27–34)
MCHC RBC-ENTMCNC: 29.6 PG — SIGNIFICANT CHANGE UP (ref 27–34)
MCHC RBC-ENTMCNC: 29.7 PG — SIGNIFICANT CHANGE UP (ref 27–34)
MCHC RBC-ENTMCNC: 29.8 PG — SIGNIFICANT CHANGE UP (ref 27–34)
MCHC RBC-ENTMCNC: 29.9 PG — SIGNIFICANT CHANGE UP (ref 27–34)
MCHC RBC-ENTMCNC: 30 PG — SIGNIFICANT CHANGE UP (ref 27–34)
MCHC RBC-ENTMCNC: 30.1 PG — SIGNIFICANT CHANGE UP (ref 27–34)
MCHC RBC-ENTMCNC: 30.3 PG — SIGNIFICANT CHANGE UP (ref 27–34)
MCHC RBC-ENTMCNC: 31.2 GM/DL — LOW (ref 32–36)
MCHC RBC-ENTMCNC: 31.3 GM/DL — LOW (ref 32–36)
MCHC RBC-ENTMCNC: 31.6 GM/DL — LOW (ref 32–36)
MCHC RBC-ENTMCNC: 31.8 GM/DL — LOW (ref 32–36)
MCHC RBC-ENTMCNC: 32 GM/DL — SIGNIFICANT CHANGE UP (ref 32–36)
MCHC RBC-ENTMCNC: 32 GM/DL — SIGNIFICANT CHANGE UP (ref 32–36)
MCHC RBC-ENTMCNC: 32.1 GM/DL — SIGNIFICANT CHANGE UP (ref 32–36)
MCHC RBC-ENTMCNC: 32.1 GM/DL — SIGNIFICANT CHANGE UP (ref 32–36)
MCHC RBC-ENTMCNC: 32.2 GM/DL — SIGNIFICANT CHANGE UP (ref 32–36)
MCHC RBC-ENTMCNC: 32.2 GM/DL — SIGNIFICANT CHANGE UP (ref 32–36)
MCHC RBC-ENTMCNC: 32.3 GM/DL — SIGNIFICANT CHANGE UP (ref 32–36)
MCHC RBC-ENTMCNC: 32.5 GM/DL — SIGNIFICANT CHANGE UP (ref 32–36)
MCHC RBC-ENTMCNC: 32.5 GM/DL — SIGNIFICANT CHANGE UP (ref 32–36)
MCHC RBC-ENTMCNC: 32.6 GM/DL — SIGNIFICANT CHANGE UP (ref 32–36)
MCHC RBC-ENTMCNC: 32.7 GM/DL — SIGNIFICANT CHANGE UP (ref 32–36)
MCHC RBC-ENTMCNC: 32.8 GM/DL — SIGNIFICANT CHANGE UP (ref 32–36)
MCHC RBC-ENTMCNC: 32.8 GM/DL — SIGNIFICANT CHANGE UP (ref 32–36)
MCHC RBC-ENTMCNC: 32.9 GM/DL — SIGNIFICANT CHANGE UP (ref 32–36)
MCHC RBC-ENTMCNC: 32.9 GM/DL — SIGNIFICANT CHANGE UP (ref 32–36)
MCHC RBC-ENTMCNC: 33 GM/DL — SIGNIFICANT CHANGE UP (ref 32–36)
MCHC RBC-ENTMCNC: 33.1 GM/DL — SIGNIFICANT CHANGE UP (ref 32–36)
MCHC RBC-ENTMCNC: 33.3 GM/DL — SIGNIFICANT CHANGE UP (ref 32–36)
MCHC RBC-ENTMCNC: 33.3 GM/DL — SIGNIFICANT CHANGE UP (ref 32–36)
MCHC RBC-ENTMCNC: 33.5 GM/DL — SIGNIFICANT CHANGE UP (ref 32–36)
MCHC RBC-ENTMCNC: 33.6 GM/DL — SIGNIFICANT CHANGE UP (ref 32–36)
MCHC RBC-ENTMCNC: 33.9 GM/DL — SIGNIFICANT CHANGE UP (ref 32–36)
MCHC RBC-ENTMCNC: 34.3 GM/DL — SIGNIFICANT CHANGE UP (ref 32–36)
MCHC RBC-ENTMCNC: 34.3 GM/DL — SIGNIFICANT CHANGE UP (ref 32–36)
MCV RBC AUTO: 86.6 FL — SIGNIFICANT CHANGE UP (ref 80–100)
MCV RBC AUTO: 87.2 FL — SIGNIFICANT CHANGE UP (ref 80–100)
MCV RBC AUTO: 87.9 FL — SIGNIFICANT CHANGE UP (ref 80–100)
MCV RBC AUTO: 88.7 FL — SIGNIFICANT CHANGE UP (ref 80–100)
MCV RBC AUTO: 89.4 FL — SIGNIFICANT CHANGE UP (ref 80–100)
MCV RBC AUTO: 89.8 FL — SIGNIFICANT CHANGE UP (ref 80–100)
MCV RBC AUTO: 90 FL — SIGNIFICANT CHANGE UP (ref 80–100)
MCV RBC AUTO: 90.1 FL — SIGNIFICANT CHANGE UP (ref 80–100)
MCV RBC AUTO: 90.2 FL — SIGNIFICANT CHANGE UP (ref 80–100)
MCV RBC AUTO: 90.7 FL — SIGNIFICANT CHANGE UP (ref 80–100)
MCV RBC AUTO: 90.9 FL — SIGNIFICANT CHANGE UP (ref 80–100)
MCV RBC AUTO: 91.3 FL — SIGNIFICANT CHANGE UP (ref 80–100)
MCV RBC AUTO: 91.5 FL — SIGNIFICANT CHANGE UP (ref 80–100)
MCV RBC AUTO: 91.5 FL — SIGNIFICANT CHANGE UP (ref 80–100)
MCV RBC AUTO: 91.6 FL — SIGNIFICANT CHANGE UP (ref 80–100)
MCV RBC AUTO: 91.7 FL — SIGNIFICANT CHANGE UP (ref 80–100)
MCV RBC AUTO: 91.7 FL — SIGNIFICANT CHANGE UP (ref 80–100)
MCV RBC AUTO: 91.8 FL — SIGNIFICANT CHANGE UP (ref 80–100)
MCV RBC AUTO: 91.8 FL — SIGNIFICANT CHANGE UP (ref 80–100)
MCV RBC AUTO: 91.9 FL — SIGNIFICANT CHANGE UP (ref 80–100)
MCV RBC AUTO: 92.1 FL — SIGNIFICANT CHANGE UP (ref 80–100)
MCV RBC AUTO: 92.4 FL — SIGNIFICANT CHANGE UP (ref 80–100)
MCV RBC AUTO: 92.5 FL — SIGNIFICANT CHANGE UP (ref 80–100)
MCV RBC AUTO: 92.8 FL — SIGNIFICANT CHANGE UP (ref 80–100)
MCV RBC AUTO: 93.3 FL — SIGNIFICANT CHANGE UP (ref 80–100)
MCV RBC AUTO: 93.7 FL — SIGNIFICANT CHANGE UP (ref 80–100)
MCV RBC AUTO: 93.8 FL — SIGNIFICANT CHANGE UP (ref 80–100)
MCV RBC AUTO: 93.9 FL — SIGNIFICANT CHANGE UP (ref 80–100)
MCV RBC AUTO: 94 FL — SIGNIFICANT CHANGE UP (ref 80–100)
MCV RBC AUTO: 94.7 FL — SIGNIFICANT CHANGE UP (ref 80–100)
METHOD TYPE: SIGNIFICANT CHANGE UP
MICROCYTES BLD QL: SLIGHT — SIGNIFICANT CHANGE UP
MONOCYTES # BLD AUTO: 0 K/UL — SIGNIFICANT CHANGE UP (ref 0–0.9)
MONOCYTES # BLD AUTO: 0.21 K/UL — SIGNIFICANT CHANGE UP (ref 0–0.9)
MONOCYTES # BLD AUTO: 0.22 K/UL — SIGNIFICANT CHANGE UP (ref 0–0.9)
MONOCYTES # BLD AUTO: 0.29 K/UL — SIGNIFICANT CHANGE UP (ref 0–0.9)
MONOCYTES # BLD AUTO: 0.37 K/UL — SIGNIFICANT CHANGE UP (ref 0–0.9)
MONOCYTES # BLD AUTO: 0.44 K/UL — SIGNIFICANT CHANGE UP (ref 0–0.9)
MONOCYTES # BLD AUTO: 0.45 K/UL — SIGNIFICANT CHANGE UP (ref 0–0.9)
MONOCYTES # BLD AUTO: 0.46 K/UL — SIGNIFICANT CHANGE UP (ref 0–0.9)
MONOCYTES # BLD AUTO: 0.47 K/UL — SIGNIFICANT CHANGE UP (ref 0–0.9)
MONOCYTES # BLD AUTO: 0.55 K/UL — SIGNIFICANT CHANGE UP (ref 0–0.9)
MONOCYTES # BLD AUTO: 0.59 K/UL — SIGNIFICANT CHANGE UP (ref 0–0.9)
MONOCYTES # BLD AUTO: 0.6 K/UL — SIGNIFICANT CHANGE UP (ref 0–0.9)
MONOCYTES # BLD AUTO: 0.65 K/UL — SIGNIFICANT CHANGE UP (ref 0–0.9)
MONOCYTES # BLD AUTO: 0.66 K/UL — SIGNIFICANT CHANGE UP (ref 0–0.9)
MONOCYTES # BLD AUTO: 0.68 K/UL — SIGNIFICANT CHANGE UP (ref 0–0.9)
MONOCYTES # BLD AUTO: 0.73 K/UL — SIGNIFICANT CHANGE UP (ref 0–0.9)
MONOCYTES # BLD AUTO: 0.8 K/UL — SIGNIFICANT CHANGE UP (ref 0–0.9)
MONOCYTES # BLD AUTO: 0.81 K/UL — SIGNIFICANT CHANGE UP (ref 0–0.9)
MONOCYTES # BLD AUTO: 0.86 K/UL — SIGNIFICANT CHANGE UP (ref 0–0.9)
MONOCYTES # BLD AUTO: 0.9 K/UL — SIGNIFICANT CHANGE UP (ref 0–0.9)
MONOCYTES # BLD AUTO: 0.93 K/UL — HIGH (ref 0–0.9)
MONOCYTES # BLD AUTO: 0.95 K/UL — HIGH (ref 0–0.9)
MONOCYTES # BLD AUTO: 1.08 K/UL — HIGH (ref 0–0.9)
MONOCYTES # BLD AUTO: 1.19 K/UL — HIGH (ref 0–0.9)
MONOCYTES # BLD AUTO: 1.39 K/UL — HIGH (ref 0–0.9)
MONOCYTES # BLD AUTO: 1.63 K/UL — HIGH (ref 0–0.9)
MONOCYTES # BLD AUTO: 1.68 K/UL — HIGH (ref 0–0.9)
MONOCYTES NFR BLD AUTO: 0 % — LOW (ref 2–14)
MONOCYTES NFR BLD AUTO: 10.1 % — SIGNIFICANT CHANGE UP (ref 2–14)
MONOCYTES NFR BLD AUTO: 10.7 % — SIGNIFICANT CHANGE UP (ref 2–14)
MONOCYTES NFR BLD AUTO: 11 % — SIGNIFICANT CHANGE UP (ref 2–14)
MONOCYTES NFR BLD AUTO: 11.3 % — SIGNIFICANT CHANGE UP (ref 2–14)
MONOCYTES NFR BLD AUTO: 13.3 % — SIGNIFICANT CHANGE UP (ref 2–14)
MONOCYTES NFR BLD AUTO: 13.4 % — SIGNIFICANT CHANGE UP (ref 2–14)
MONOCYTES NFR BLD AUTO: 15.6 % — HIGH (ref 2–14)
MONOCYTES NFR BLD AUTO: 16.1 % — HIGH (ref 2–14)
MONOCYTES NFR BLD AUTO: 16.5 % — HIGH (ref 2–14)
MONOCYTES NFR BLD AUTO: 2.4 % — SIGNIFICANT CHANGE UP (ref 2–14)
MONOCYTES NFR BLD AUTO: 2.5 % — SIGNIFICANT CHANGE UP (ref 2–14)
MONOCYTES NFR BLD AUTO: 4.5 % — SIGNIFICANT CHANGE UP (ref 2–14)
MONOCYTES NFR BLD AUTO: 4.5 % — SIGNIFICANT CHANGE UP (ref 2–14)
MONOCYTES NFR BLD AUTO: 5.4 % — SIGNIFICANT CHANGE UP (ref 2–14)
MONOCYTES NFR BLD AUTO: 5.4 % — SIGNIFICANT CHANGE UP (ref 2–14)
MONOCYTES NFR BLD AUTO: 6 % — SIGNIFICANT CHANGE UP (ref 2–14)
MONOCYTES NFR BLD AUTO: 6.1 % — SIGNIFICANT CHANGE UP (ref 2–14)
MONOCYTES NFR BLD AUTO: 6.3 % — SIGNIFICANT CHANGE UP (ref 2–14)
MONOCYTES NFR BLD AUTO: 6.3 % — SIGNIFICANT CHANGE UP (ref 2–14)
MONOCYTES NFR BLD AUTO: 6.4 % — SIGNIFICANT CHANGE UP (ref 2–14)
MONOCYTES NFR BLD AUTO: 7.5 % — SIGNIFICANT CHANGE UP (ref 2–14)
MONOCYTES NFR BLD AUTO: 7.7 % — SIGNIFICANT CHANGE UP (ref 2–14)
MONOCYTES NFR BLD AUTO: 7.9 % — SIGNIFICANT CHANGE UP (ref 2–14)
MONOCYTES NFR BLD AUTO: 8.2 % — SIGNIFICANT CHANGE UP (ref 2–14)
MONOCYTES NFR BLD AUTO: 8.3 % — SIGNIFICANT CHANGE UP (ref 2–14)
MONOCYTES NFR BLD AUTO: 9.5 % — SIGNIFICANT CHANGE UP (ref 2–14)
MYELOCYTES NFR BLD: 0.9 % — HIGH (ref 0–0)
MYELOCYTES NFR BLD: 0.9 % — HIGH (ref 0–0)
NEUTROPHILS # BLD AUTO: 10.03 K/UL — HIGH (ref 1.8–7.4)
NEUTROPHILS # BLD AUTO: 10.1 K/UL — HIGH (ref 1.8–7.4)
NEUTROPHILS # BLD AUTO: 10.29 K/UL — HIGH (ref 1.8–7.4)
NEUTROPHILS # BLD AUTO: 10.53 K/UL — HIGH (ref 1.8–7.4)
NEUTROPHILS # BLD AUTO: 11.98 K/UL — HIGH (ref 1.8–7.4)
NEUTROPHILS # BLD AUTO: 3.16 K/UL — SIGNIFICANT CHANGE UP (ref 1.8–7.4)
NEUTROPHILS # BLD AUTO: 3.54 K/UL — SIGNIFICANT CHANGE UP (ref 1.8–7.4)
NEUTROPHILS # BLD AUTO: 4.25 K/UL — SIGNIFICANT CHANGE UP (ref 1.8–7.4)
NEUTROPHILS # BLD AUTO: 4.36 K/UL — SIGNIFICANT CHANGE UP (ref 1.8–7.4)
NEUTROPHILS # BLD AUTO: 5.02 K/UL — SIGNIFICANT CHANGE UP (ref 1.8–7.4)
NEUTROPHILS # BLD AUTO: 5.16 K/UL — SIGNIFICANT CHANGE UP (ref 1.8–7.4)
NEUTROPHILS # BLD AUTO: 5.36 K/UL — SIGNIFICANT CHANGE UP (ref 1.8–7.4)
NEUTROPHILS # BLD AUTO: 5.94 K/UL — SIGNIFICANT CHANGE UP (ref 1.8–7.4)
NEUTROPHILS # BLD AUTO: 6.02 K/UL — SIGNIFICANT CHANGE UP (ref 1.8–7.4)
NEUTROPHILS # BLD AUTO: 6.04 K/UL — SIGNIFICANT CHANGE UP (ref 1.8–7.4)
NEUTROPHILS # BLD AUTO: 6.13 K/UL — SIGNIFICANT CHANGE UP (ref 1.8–7.4)
NEUTROPHILS # BLD AUTO: 6.37 K/UL — SIGNIFICANT CHANGE UP (ref 1.8–7.4)
NEUTROPHILS # BLD AUTO: 6.59 K/UL — SIGNIFICANT CHANGE UP (ref 1.8–7.4)
NEUTROPHILS # BLD AUTO: 7.16 K/UL — SIGNIFICANT CHANGE UP (ref 1.8–7.4)
NEUTROPHILS # BLD AUTO: 7.5 K/UL — HIGH (ref 1.8–7.4)
NEUTROPHILS # BLD AUTO: 7.51 K/UL — HIGH (ref 1.8–7.4)
NEUTROPHILS # BLD AUTO: 7.52 K/UL — HIGH (ref 1.8–7.4)
NEUTROPHILS # BLD AUTO: 7.57 K/UL — HIGH (ref 1.8–7.4)
NEUTROPHILS # BLD AUTO: 7.58 K/UL — HIGH (ref 1.8–7.4)
NEUTROPHILS # BLD AUTO: 8.02 K/UL — HIGH (ref 1.8–7.4)
NEUTROPHILS # BLD AUTO: 9.11 K/UL — HIGH (ref 1.8–7.4)
NEUTROPHILS # BLD AUTO: 9.56 K/UL — HIGH (ref 1.8–7.4)
NEUTROPHILS NFR BLD AUTO: 48.3 % — SIGNIFICANT CHANGE UP (ref 43–77)
NEUTROPHILS NFR BLD AUTO: 57.6 % — SIGNIFICANT CHANGE UP (ref 43–77)
NEUTROPHILS NFR BLD AUTO: 60.9 % — SIGNIFICANT CHANGE UP (ref 43–77)
NEUTROPHILS NFR BLD AUTO: 64.4 % — SIGNIFICANT CHANGE UP (ref 43–77)
NEUTROPHILS NFR BLD AUTO: 68.5 % — SIGNIFICANT CHANGE UP (ref 43–77)
NEUTROPHILS NFR BLD AUTO: 70.6 % — SIGNIFICANT CHANGE UP (ref 43–77)
NEUTROPHILS NFR BLD AUTO: 74.9 % — SIGNIFICANT CHANGE UP (ref 43–77)
NEUTROPHILS NFR BLD AUTO: 76.2 % — SIGNIFICANT CHANGE UP (ref 43–77)
NEUTROPHILS NFR BLD AUTO: 79.3 % — HIGH (ref 43–77)
NEUTROPHILS NFR BLD AUTO: 81.1 % — HIGH (ref 43–77)
NEUTROPHILS NFR BLD AUTO: 81.7 % — HIGH (ref 43–77)
NEUTROPHILS NFR BLD AUTO: 81.8 % — HIGH (ref 43–77)
NEUTROPHILS NFR BLD AUTO: 82 % — HIGH (ref 43–77)
NEUTROPHILS NFR BLD AUTO: 82.8 % — HIGH (ref 43–77)
NEUTROPHILS NFR BLD AUTO: 83.1 % — HIGH (ref 43–77)
NEUTROPHILS NFR BLD AUTO: 83.3 % — HIGH (ref 43–77)
NEUTROPHILS NFR BLD AUTO: 83.4 % — HIGH (ref 43–77)
NEUTROPHILS NFR BLD AUTO: 83.5 % — HIGH (ref 43–77)
NEUTROPHILS NFR BLD AUTO: 83.8 % — HIGH (ref 43–77)
NEUTROPHILS NFR BLD AUTO: 84.4 % — HIGH (ref 43–77)
NEUTROPHILS NFR BLD AUTO: 84.9 % — HIGH (ref 43–77)
NEUTROPHILS NFR BLD AUTO: 86.6 % — HIGH (ref 43–77)
NEUTROPHILS NFR BLD AUTO: 86.6 % — HIGH (ref 43–77)
NEUTROPHILS NFR BLD AUTO: 87.5 % — HIGH (ref 43–77)
NEUTROPHILS NFR BLD AUTO: 88.1 % — HIGH (ref 43–77)
NEUTROPHILS NFR BLD AUTO: 89.5 % — HIGH (ref 43–77)
NEUTROPHILS NFR BLD AUTO: 93 % — HIGH (ref 43–77)
NEUTS BAND # BLD: 1.7 % — SIGNIFICANT CHANGE UP (ref 0–8)
NRBC # BLD: 0 /100 WBCS — SIGNIFICANT CHANGE UP (ref 0–0)
NT-PROBNP SERPL-SCNC: 2299 PG/ML — HIGH (ref 0–300)
NT-PROBNP SERPL-SCNC: 958 PG/ML — HIGH (ref 0–300)
ORGANISM # SPEC MICROSCOPIC CNT: SIGNIFICANT CHANGE UP
ORGANISM # SPEC MICROSCOPIC CNT: SIGNIFICANT CHANGE UP
PCO2 BLDA: 42 MMHG — SIGNIFICANT CHANGE UP (ref 35–45)
PCO2 BLDA: 43 MMHG — SIGNIFICANT CHANGE UP (ref 35–45)
PCO2 BLDA: 44 MMHG — SIGNIFICANT CHANGE UP (ref 32–46)
PCO2 BLDA: 45 MMHG — SIGNIFICANT CHANGE UP (ref 35–45)
PCO2 BLDA: 46 MMHG — HIGH (ref 35–45)
PCO2 BLDA: 52 MMHG — HIGH (ref 35–45)
PCO2 BLDA: 62 MMHG — HIGH (ref 35–45)
PCO2 BLDV: 40 MMHG — SIGNIFICANT CHANGE UP (ref 35–50)
PH BLDA: 7.26 — LOW (ref 7.35–7.45)
PH BLDA: 7.34 — LOW (ref 7.35–7.45)
PH BLDA: 7.36 — SIGNIFICANT CHANGE UP (ref 7.35–7.45)
PH BLDA: 7.4 — SIGNIFICANT CHANGE UP (ref 7.35–7.45)
PH BLDA: 7.44 — SIGNIFICANT CHANGE UP (ref 7.35–7.45)
PH BLDA: 7.45 — SIGNIFICANT CHANGE UP (ref 7.35–7.45)
PH BLDA: 7.45 — SIGNIFICANT CHANGE UP (ref 7.35–7.45)
PH BLDV: 7.36 — SIGNIFICANT CHANGE UP (ref 7.32–7.43)
PHOSPHATE SERPL-MCNC: 2.3 MG/DL — LOW (ref 2.4–4.7)
PHOSPHATE SERPL-MCNC: 2.8 MG/DL — SIGNIFICANT CHANGE UP (ref 2.4–4.7)
PHOSPHATE SERPL-MCNC: 2.8 MG/DL — SIGNIFICANT CHANGE UP (ref 2.4–4.7)
PHOSPHATE SERPL-MCNC: 2.9 MG/DL — SIGNIFICANT CHANGE UP (ref 2.4–4.7)
PHOSPHATE SERPL-MCNC: 3 MG/DL — SIGNIFICANT CHANGE UP (ref 2.4–4.7)
PHOSPHATE SERPL-MCNC: 3.1 MG/DL — SIGNIFICANT CHANGE UP (ref 2.4–4.7)
PHOSPHATE SERPL-MCNC: 3.2 MG/DL — SIGNIFICANT CHANGE UP (ref 2.4–4.7)
PHOSPHATE SERPL-MCNC: 3.3 MG/DL — SIGNIFICANT CHANGE UP (ref 2.4–4.7)
PHOSPHATE SERPL-MCNC: 3.9 MG/DL — SIGNIFICANT CHANGE UP (ref 2.4–4.7)
PHOSPHATE SERPL-MCNC: 4 MG/DL — SIGNIFICANT CHANGE UP (ref 2.4–4.7)
PHOSPHATE SERPL-MCNC: 4.2 MG/DL — SIGNIFICANT CHANGE UP (ref 2.4–4.7)
PHOSPHATE SERPL-MCNC: 4.2 MG/DL — SIGNIFICANT CHANGE UP (ref 2.4–4.7)
PHOSPHATE SERPL-MCNC: 4.7 MG/DL — SIGNIFICANT CHANGE UP (ref 2.4–4.7)
PHOSPHATE SERPL-MCNC: 5 MG/DL — HIGH (ref 2.4–4.7)
PHOSPHATE SERPL-MCNC: 5 MG/DL — HIGH (ref 2.4–4.7)
PHOSPHATE SERPL-MCNC: 5.2 MG/DL — HIGH (ref 2.4–4.7)
PHOSPHATE SERPL-MCNC: 5.2 MG/DL — HIGH (ref 2.4–4.7)
PHOSPHATE SERPL-MCNC: 5.3 MG/DL — HIGH (ref 2.4–4.7)
PHOSPHATE SERPL-MCNC: 6.6 MG/DL — HIGH (ref 2.4–4.7)
PLAT MORPH BLD: NORMAL — SIGNIFICANT CHANGE UP
PLAT MORPH BLD: NORMAL — SIGNIFICANT CHANGE UP
PLATELET # BLD AUTO: 157 K/UL — SIGNIFICANT CHANGE UP (ref 150–400)
PLATELET # BLD AUTO: 168 K/UL — SIGNIFICANT CHANGE UP (ref 150–400)
PLATELET # BLD AUTO: 170 K/UL — SIGNIFICANT CHANGE UP (ref 150–400)
PLATELET # BLD AUTO: 172 K/UL — SIGNIFICANT CHANGE UP (ref 150–400)
PLATELET # BLD AUTO: 173 K/UL — SIGNIFICANT CHANGE UP (ref 150–400)
PLATELET # BLD AUTO: 173 K/UL — SIGNIFICANT CHANGE UP (ref 150–400)
PLATELET # BLD AUTO: 174 K/UL — SIGNIFICANT CHANGE UP (ref 150–400)
PLATELET # BLD AUTO: 175 K/UL — SIGNIFICANT CHANGE UP (ref 150–400)
PLATELET # BLD AUTO: 176 K/UL — SIGNIFICANT CHANGE UP (ref 150–400)
PLATELET # BLD AUTO: 179 K/UL — SIGNIFICANT CHANGE UP (ref 150–400)
PLATELET # BLD AUTO: 180 K/UL — SIGNIFICANT CHANGE UP (ref 150–400)
PLATELET # BLD AUTO: 181 K/UL — SIGNIFICANT CHANGE UP (ref 150–400)
PLATELET # BLD AUTO: 186 K/UL — SIGNIFICANT CHANGE UP (ref 150–400)
PLATELET # BLD AUTO: 188 K/UL — SIGNIFICANT CHANGE UP (ref 150–400)
PLATELET # BLD AUTO: 198 K/UL — SIGNIFICANT CHANGE UP (ref 150–400)
PLATELET # BLD AUTO: 206 K/UL — SIGNIFICANT CHANGE UP (ref 150–400)
PLATELET # BLD AUTO: 217 K/UL — SIGNIFICANT CHANGE UP (ref 150–400)
PLATELET # BLD AUTO: 222 K/UL — SIGNIFICANT CHANGE UP (ref 150–400)
PLATELET # BLD AUTO: 229 K/UL — SIGNIFICANT CHANGE UP (ref 150–400)
PLATELET # BLD AUTO: 235 K/UL — SIGNIFICANT CHANGE UP (ref 150–400)
PLATELET # BLD AUTO: 254 K/UL — SIGNIFICANT CHANGE UP (ref 150–400)
PLATELET # BLD AUTO: 269 K/UL — SIGNIFICANT CHANGE UP (ref 150–400)
PLATELET # BLD AUTO: 273 K/UL — SIGNIFICANT CHANGE UP (ref 150–400)
PLATELET # BLD AUTO: 274 K/UL — SIGNIFICANT CHANGE UP (ref 150–400)
PLATELET # BLD AUTO: 281 K/UL — SIGNIFICANT CHANGE UP (ref 150–400)
PLATELET # BLD AUTO: 282 K/UL — SIGNIFICANT CHANGE UP (ref 150–400)
PLATELET # BLD AUTO: 286 K/UL — SIGNIFICANT CHANGE UP (ref 150–400)
PLATELET # BLD AUTO: 294 K/UL — SIGNIFICANT CHANGE UP (ref 150–400)
PLATELET # BLD AUTO: 300 K/UL — SIGNIFICANT CHANGE UP (ref 150–400)
PLATELET # BLD AUTO: 312 K/UL — SIGNIFICANT CHANGE UP (ref 150–400)
PLATELET COUNT - ESTIMATE: NORMAL — SIGNIFICANT CHANGE UP
PLATELET COUNT - ESTIMATE: NORMAL — SIGNIFICANT CHANGE UP
PO2 BLDA: 100 MMHG — SIGNIFICANT CHANGE UP (ref 83–108)
PO2 BLDA: 119 MMHG — HIGH (ref 83–108)
PO2 BLDA: 58 MMHG — LOW (ref 83–108)
PO2 BLDA: 66 MMHG — LOW (ref 83–108)
PO2 BLDA: 72 MMHG — LOW (ref 83–108)
PO2 BLDA: 74 MMHG — LOW (ref 83–108)
PO2 BLDA: 75 MMHG — SIGNIFICANT CHANGE UP (ref 74–108)
PO2 BLDV: 50 MMHG — HIGH (ref 25–45)
POIKILOCYTOSIS BLD QL AUTO: SLIGHT — SIGNIFICANT CHANGE UP
POTASSIUM SERPL-MCNC: 3 MMOL/L — LOW (ref 3.5–5.3)
POTASSIUM SERPL-MCNC: 3.7 MMOL/L — SIGNIFICANT CHANGE UP (ref 3.5–5.3)
POTASSIUM SERPL-MCNC: 3.8 MMOL/L — SIGNIFICANT CHANGE UP (ref 3.5–5.3)
POTASSIUM SERPL-MCNC: 3.8 MMOL/L — SIGNIFICANT CHANGE UP (ref 3.5–5.3)
POTASSIUM SERPL-MCNC: 3.9 MMOL/L — SIGNIFICANT CHANGE UP (ref 3.5–5.3)
POTASSIUM SERPL-MCNC: 3.9 MMOL/L — SIGNIFICANT CHANGE UP (ref 3.5–5.3)
POTASSIUM SERPL-MCNC: 4 MMOL/L — SIGNIFICANT CHANGE UP (ref 3.5–5.3)
POTASSIUM SERPL-MCNC: 4 MMOL/L — SIGNIFICANT CHANGE UP (ref 3.5–5.3)
POTASSIUM SERPL-MCNC: 4.1 MMOL/L — SIGNIFICANT CHANGE UP (ref 3.5–5.3)
POTASSIUM SERPL-MCNC: 4.2 MMOL/L — SIGNIFICANT CHANGE UP (ref 3.5–5.3)
POTASSIUM SERPL-MCNC: 4.2 MMOL/L — SIGNIFICANT CHANGE UP (ref 3.5–5.3)
POTASSIUM SERPL-MCNC: 4.3 MMOL/L — SIGNIFICANT CHANGE UP (ref 3.5–5.3)
POTASSIUM SERPL-MCNC: 4.4 MMOL/L — SIGNIFICANT CHANGE UP (ref 3.5–5.3)
POTASSIUM SERPL-MCNC: 4.4 MMOL/L — SIGNIFICANT CHANGE UP (ref 3.5–5.3)
POTASSIUM SERPL-MCNC: 4.5 MMOL/L — SIGNIFICANT CHANGE UP (ref 3.5–5.3)
POTASSIUM SERPL-MCNC: 4.6 MMOL/L — SIGNIFICANT CHANGE UP (ref 3.5–5.3)
POTASSIUM SERPL-MCNC: 4.7 MMOL/L — SIGNIFICANT CHANGE UP (ref 3.5–5.3)
POTASSIUM SERPL-MCNC: 4.8 MMOL/L — SIGNIFICANT CHANGE UP (ref 3.5–5.3)
POTASSIUM SERPL-MCNC: 4.8 MMOL/L — SIGNIFICANT CHANGE UP (ref 3.5–5.3)
POTASSIUM SERPL-MCNC: 4.9 MMOL/L — SIGNIFICANT CHANGE UP (ref 3.5–5.3)
POTASSIUM SERPL-MCNC: 5 MMOL/L — SIGNIFICANT CHANGE UP (ref 3.5–5.3)
POTASSIUM SERPL-MCNC: 5 MMOL/L — SIGNIFICANT CHANGE UP (ref 3.5–5.3)
POTASSIUM SERPL-MCNC: 5.2 MMOL/L — SIGNIFICANT CHANGE UP (ref 3.5–5.3)
POTASSIUM SERPL-MCNC: 5.7 MMOL/L — HIGH (ref 3.5–5.3)
POTASSIUM SERPL-MCNC: 5.7 MMOL/L — HIGH (ref 3.5–5.3)
POTASSIUM SERPL-SCNC: 3 MMOL/L — LOW (ref 3.5–5.3)
POTASSIUM SERPL-SCNC: 3.7 MMOL/L — SIGNIFICANT CHANGE UP (ref 3.5–5.3)
POTASSIUM SERPL-SCNC: 3.8 MMOL/L — SIGNIFICANT CHANGE UP (ref 3.5–5.3)
POTASSIUM SERPL-SCNC: 3.8 MMOL/L — SIGNIFICANT CHANGE UP (ref 3.5–5.3)
POTASSIUM SERPL-SCNC: 3.9 MMOL/L — SIGNIFICANT CHANGE UP (ref 3.5–5.3)
POTASSIUM SERPL-SCNC: 3.9 MMOL/L — SIGNIFICANT CHANGE UP (ref 3.5–5.3)
POTASSIUM SERPL-SCNC: 4 MMOL/L — SIGNIFICANT CHANGE UP (ref 3.5–5.3)
POTASSIUM SERPL-SCNC: 4 MMOL/L — SIGNIFICANT CHANGE UP (ref 3.5–5.3)
POTASSIUM SERPL-SCNC: 4.1 MMOL/L — SIGNIFICANT CHANGE UP (ref 3.5–5.3)
POTASSIUM SERPL-SCNC: 4.2 MMOL/L — SIGNIFICANT CHANGE UP (ref 3.5–5.3)
POTASSIUM SERPL-SCNC: 4.2 MMOL/L — SIGNIFICANT CHANGE UP (ref 3.5–5.3)
POTASSIUM SERPL-SCNC: 4.3 MMOL/L — SIGNIFICANT CHANGE UP (ref 3.5–5.3)
POTASSIUM SERPL-SCNC: 4.4 MMOL/L — SIGNIFICANT CHANGE UP (ref 3.5–5.3)
POTASSIUM SERPL-SCNC: 4.4 MMOL/L — SIGNIFICANT CHANGE UP (ref 3.5–5.3)
POTASSIUM SERPL-SCNC: 4.5 MMOL/L — SIGNIFICANT CHANGE UP (ref 3.5–5.3)
POTASSIUM SERPL-SCNC: 4.6 MMOL/L — SIGNIFICANT CHANGE UP (ref 3.5–5.3)
POTASSIUM SERPL-SCNC: 4.7 MMOL/L — SIGNIFICANT CHANGE UP (ref 3.5–5.3)
POTASSIUM SERPL-SCNC: 4.8 MMOL/L — SIGNIFICANT CHANGE UP (ref 3.5–5.3)
POTASSIUM SERPL-SCNC: 4.8 MMOL/L — SIGNIFICANT CHANGE UP (ref 3.5–5.3)
POTASSIUM SERPL-SCNC: 4.9 MMOL/L — SIGNIFICANT CHANGE UP (ref 3.5–5.3)
POTASSIUM SERPL-SCNC: 5 MMOL/L — SIGNIFICANT CHANGE UP (ref 3.5–5.3)
POTASSIUM SERPL-SCNC: 5 MMOL/L — SIGNIFICANT CHANGE UP (ref 3.5–5.3)
POTASSIUM SERPL-SCNC: 5.2 MMOL/L — SIGNIFICANT CHANGE UP (ref 3.5–5.3)
POTASSIUM SERPL-SCNC: 5.7 MMOL/L — HIGH (ref 3.5–5.3)
POTASSIUM SERPL-SCNC: 5.7 MMOL/L — HIGH (ref 3.5–5.3)
PROCALCITONIN SERPL-MCNC: 0.06 NG/ML — SIGNIFICANT CHANGE UP (ref 0.02–0.1)
PROCALCITONIN SERPL-MCNC: 0.08 NG/ML — SIGNIFICANT CHANGE UP (ref 0.02–0.1)
PROCALCITONIN SERPL-MCNC: 0.09 NG/ML — SIGNIFICANT CHANGE UP (ref 0.02–0.1)
PROCALCITONIN SERPL-MCNC: 0.11 NG/ML — HIGH (ref 0.02–0.1)
PROCALCITONIN SERPL-MCNC: 0.12 NG/ML — HIGH (ref 0.02–0.1)
PROCALCITONIN SERPL-MCNC: 0.12 NG/ML — HIGH (ref 0.02–0.1)
PROCALCITONIN SERPL-MCNC: 0.14 NG/ML — HIGH (ref 0.02–0.1)
PROCALCITONIN SERPL-MCNC: 0.16 NG/ML — HIGH (ref 0.02–0.1)
PROCALCITONIN SERPL-MCNC: 0.16 NG/ML — HIGH (ref 0.02–0.1)
PROCALCITONIN SERPL-MCNC: 0.18 NG/ML — HIGH (ref 0.02–0.1)
PROCALCITONIN SERPL-MCNC: 0.22 NG/ML — HIGH (ref 0.02–0.1)
PROCALCITONIN SERPL-MCNC: 0.26 NG/ML — HIGH (ref 0.02–0.1)
PROCALCITONIN SERPL-MCNC: 0.44 NG/ML — HIGH (ref 0.02–0.1)
PROCALCITONIN SERPL-MCNC: 0.67 NG/ML — HIGH (ref 0.02–0.1)
PROT SERPL-MCNC: 4.8 G/DL — LOW (ref 6.6–8.7)
PROT SERPL-MCNC: 4.8 G/DL — LOW (ref 6.6–8.7)
PROT SERPL-MCNC: 4.9 G/DL — LOW (ref 6.6–8.7)
PROT SERPL-MCNC: 5.6 G/DL — LOW (ref 6.6–8.7)
PROT SERPL-MCNC: 5.8 G/DL — LOW (ref 6.6–8.7)
PROT SERPL-MCNC: 5.9 G/DL — LOW (ref 6–8.3)
PROT SERPL-MCNC: 6 G/DL — LOW (ref 6.6–8.7)
PROT SERPL-MCNC: 6 G/DL — LOW (ref 6.6–8.7)
PROT SERPL-MCNC: 6.3 G/DL — LOW (ref 6.6–8.7)
PROT SERPL-MCNC: 6.7 G/DL — SIGNIFICANT CHANGE UP (ref 6.6–8.7)
PROT SERPL-MCNC: 6.8 G/DL — SIGNIFICANT CHANGE UP (ref 6–8.3)
PROT SERPL-MCNC: 6.9 G/DL — SIGNIFICANT CHANGE UP (ref 6.6–8.7)
PROT SERPL-MCNC: 6.9 G/DL — SIGNIFICANT CHANGE UP (ref 6–8.3)
PROT SERPL-MCNC: 7.1 G/DL — SIGNIFICANT CHANGE UP (ref 6.6–8.7)
PROTHROM AB SERPL-ACNC: 11.8 SEC — SIGNIFICANT CHANGE UP (ref 10–12.9)
QUANT TB PLUS MITOGEN MINUS NIL: 0.37 IU/ML — SIGNIFICANT CHANGE UP
RBC # BLD: 3 M/UL — LOW (ref 4.2–5.8)
RBC # BLD: 3.2 M/UL — LOW (ref 4.2–5.8)
RBC # BLD: 3.24 M/UL — LOW (ref 4.2–5.8)
RBC # BLD: 3.27 M/UL — LOW (ref 4.2–5.8)
RBC # BLD: 3.28 M/UL — LOW (ref 4.2–5.8)
RBC # BLD: 3.29 M/UL — LOW (ref 4.2–5.8)
RBC # BLD: 3.3 M/UL — LOW (ref 4.2–5.8)
RBC # BLD: 3.35 M/UL — LOW (ref 4.2–5.8)
RBC # BLD: 3.4 M/UL — LOW (ref 4.2–5.8)
RBC # BLD: 3.41 M/UL — LOW (ref 4.2–5.8)
RBC # BLD: 3.51 M/UL — LOW (ref 4.2–5.8)
RBC # BLD: 3.53 M/UL — LOW (ref 4.2–5.8)
RBC # BLD: 3.57 M/UL — LOW (ref 4.2–5.8)
RBC # BLD: 3.58 M/UL — LOW (ref 4.2–5.8)
RBC # BLD: 3.59 M/UL — LOW (ref 4.2–5.8)
RBC # BLD: 3.62 M/UL — LOW (ref 4.2–5.8)
RBC # BLD: 3.65 M/UL — LOW (ref 4.2–5.8)
RBC # BLD: 3.68 M/UL — LOW (ref 4.2–5.8)
RBC # BLD: 3.69 M/UL — LOW (ref 4.2–5.8)
RBC # BLD: 3.75 M/UL — LOW (ref 4.2–5.8)
RBC # BLD: 3.79 M/UL — LOW (ref 4.2–5.8)
RBC # BLD: 3.81 M/UL — LOW (ref 4.2–5.8)
RBC # BLD: 3.81 M/UL — LOW (ref 4.2–5.8)
RBC # BLD: 3.99 M/UL — LOW (ref 4.2–5.8)
RBC # BLD: 4.03 M/UL — LOW (ref 4.2–5.8)
RBC # BLD: 4.19 M/UL — LOW (ref 4.2–5.8)
RBC # BLD: 4.23 M/UL — SIGNIFICANT CHANGE UP (ref 4.2–5.8)
RBC # BLD: 4.3 M/UL — SIGNIFICANT CHANGE UP (ref 4.2–5.8)
RBC # BLD: 4.34 M/UL — SIGNIFICANT CHANGE UP (ref 4.2–5.8)
RBC # BLD: 4.52 M/UL — SIGNIFICANT CHANGE UP (ref 4.2–5.8)
RBC # FLD: 12.4 % — SIGNIFICANT CHANGE UP (ref 10.3–14.5)
RBC # FLD: 12.7 % — SIGNIFICANT CHANGE UP (ref 10.3–14.5)
RBC # FLD: 12.7 % — SIGNIFICANT CHANGE UP (ref 10.3–14.5)
RBC # FLD: 12.8 % — SIGNIFICANT CHANGE UP (ref 10.3–14.5)
RBC # FLD: 13.2 % — SIGNIFICANT CHANGE UP (ref 10.3–14.5)
RBC # FLD: 13.3 % — SIGNIFICANT CHANGE UP (ref 10.3–14.5)
RBC # FLD: 13.3 % — SIGNIFICANT CHANGE UP (ref 10.3–14.5)
RBC # FLD: 13.4 % — SIGNIFICANT CHANGE UP (ref 10.3–14.5)
RBC # FLD: 13.5 % — SIGNIFICANT CHANGE UP (ref 10.3–14.5)
RBC # FLD: 13.7 % — SIGNIFICANT CHANGE UP (ref 10.3–14.5)
RBC # FLD: 13.8 % — SIGNIFICANT CHANGE UP (ref 10.3–14.5)
RBC # FLD: 13.9 % — SIGNIFICANT CHANGE UP (ref 10.3–14.5)
RBC # FLD: 14 % — SIGNIFICANT CHANGE UP (ref 10.3–14.5)
RBC # FLD: 14 % — SIGNIFICANT CHANGE UP (ref 10.3–14.5)
RBC # FLD: 14.1 % — SIGNIFICANT CHANGE UP (ref 10.3–14.5)
RBC # FLD: 14.1 % — SIGNIFICANT CHANGE UP (ref 10.3–14.5)
RBC # FLD: 14.2 % — SIGNIFICANT CHANGE UP (ref 10.3–14.5)
RBC # FLD: 14.2 % — SIGNIFICANT CHANGE UP (ref 10.3–14.5)
RBC # FLD: 14.3 % — SIGNIFICANT CHANGE UP (ref 10.3–14.5)
RBC BLD AUTO: NORMAL — SIGNIFICANT CHANGE UP
RBC BLD AUTO: NORMAL — SIGNIFICANT CHANGE UP
SAO2 % BLDA: 92 % — LOW (ref 95–99)
SAO2 % BLDA: 92 % — LOW (ref 95–99)
SAO2 % BLDA: 95 % — SIGNIFICANT CHANGE UP (ref 92–96)
SAO2 % BLDA: 95 % — SIGNIFICANT CHANGE UP (ref 95–99)
SAO2 % BLDA: 96 % — SIGNIFICANT CHANGE UP (ref 95–99)
SAO2 % BLDA: 97 % — SIGNIFICANT CHANGE UP (ref 95–99)
SAO2 % BLDA: 99 % — SIGNIFICANT CHANGE UP (ref 95–99)
SAO2 % BLDV: 83 % — SIGNIFICANT CHANGE UP
SARS-COV-2 RNA SPEC QL NAA+PROBE: DETECTED
SARS-COV-2 RNA SPEC QL NAA+PROBE: SIGNIFICANT CHANGE UP
SMUDGE CELLS # BLD: PRESENT — SIGNIFICANT CHANGE UP
SODIUM SERPL-SCNC: 125 MMOL/L — LOW (ref 135–145)
SODIUM SERPL-SCNC: 131 MMOL/L — LOW (ref 135–145)
SODIUM SERPL-SCNC: 133 MMOL/L — LOW (ref 135–145)
SODIUM SERPL-SCNC: 133 MMOL/L — LOW (ref 135–145)
SODIUM SERPL-SCNC: 134 MMOL/L — LOW (ref 135–145)
SODIUM SERPL-SCNC: 135 MMOL/L — SIGNIFICANT CHANGE UP (ref 135–145)
SODIUM SERPL-SCNC: 137 MMOL/L — SIGNIFICANT CHANGE UP (ref 135–145)
SODIUM SERPL-SCNC: 138 MMOL/L — SIGNIFICANT CHANGE UP (ref 135–145)
SODIUM SERPL-SCNC: 139 MMOL/L — SIGNIFICANT CHANGE UP (ref 135–145)
SODIUM SERPL-SCNC: 140 MMOL/L — SIGNIFICANT CHANGE UP (ref 135–145)
SODIUM SERPL-SCNC: 140 MMOL/L — SIGNIFICANT CHANGE UP (ref 135–145)
SODIUM SERPL-SCNC: 141 MMOL/L — SIGNIFICANT CHANGE UP (ref 135–145)
SODIUM SERPL-SCNC: 142 MMOL/L — SIGNIFICANT CHANGE UP (ref 135–145)
SODIUM SERPL-SCNC: 143 MMOL/L — SIGNIFICANT CHANGE UP (ref 135–145)
SODIUM SERPL-SCNC: 144 MMOL/L — SIGNIFICANT CHANGE UP (ref 135–145)
SODIUM SERPL-SCNC: 144 MMOL/L — SIGNIFICANT CHANGE UP (ref 135–145)
SODIUM SERPL-SCNC: 146 MMOL/L — HIGH (ref 135–145)
SODIUM SERPL-SCNC: 147 MMOL/L — HIGH (ref 135–145)
SODIUM SERPL-SCNC: 148 MMOL/L — HIGH (ref 135–145)
SPECIMEN SOURCE: SIGNIFICANT CHANGE UP
TROPONIN I SERPL-MCNC: <.017 NG/ML — LOW (ref 0.02–0.06)
TROPONIN I SERPL-MCNC: <.017 NG/ML — LOW (ref 0.02–0.06)
TROPONIN T SERPL-MCNC: <0.01 NG/ML — SIGNIFICANT CHANGE UP (ref 0–0.06)
TROPONIN T SERPL-MCNC: <0.01 NG/ML — SIGNIFICANT CHANGE UP (ref 0–0.06)
VARIANT LYMPHS # BLD: 0.9 % — SIGNIFICANT CHANGE UP (ref 0–6)
VARIANT LYMPHS # BLD: 1.8 % — SIGNIFICANT CHANGE UP (ref 0–6)
WBC # BLD: 10.35 K/UL — SIGNIFICANT CHANGE UP (ref 3.8–10.5)
WBC # BLD: 10.44 K/UL — SIGNIFICANT CHANGE UP (ref 3.8–10.5)
WBC # BLD: 10.67 K/UL — HIGH (ref 3.8–10.5)
WBC # BLD: 11.05 K/UL — HIGH (ref 3.8–10.5)
WBC # BLD: 12.19 K/UL — HIGH (ref 3.8–10.5)
WBC # BLD: 12.61 K/UL — HIGH (ref 3.8–10.5)
WBC # BLD: 12.65 K/UL — HIGH (ref 3.8–10.5)
WBC # BLD: 12.72 K/UL — HIGH (ref 3.8–10.5)
WBC # BLD: 13.38 K/UL — HIGH (ref 3.8–10.5)
WBC # BLD: 5.82 K/UL — SIGNIFICANT CHANGE UP (ref 3.8–10.5)
WBC # BLD: 5.83 K/UL — SIGNIFICANT CHANGE UP (ref 3.8–10.5)
WBC # BLD: 6.45 K/UL — SIGNIFICANT CHANGE UP (ref 3.8–10.5)
WBC # BLD: 6.55 K/UL — SIGNIFICANT CHANGE UP (ref 3.8–10.5)
WBC # BLD: 6.7 K/UL — SIGNIFICANT CHANGE UP (ref 3.8–10.5)
WBC # BLD: 7.1 K/UL — SIGNIFICANT CHANGE UP (ref 3.8–10.5)
WBC # BLD: 7.1 K/UL — SIGNIFICANT CHANGE UP (ref 3.8–10.5)
WBC # BLD: 7.35 K/UL — SIGNIFICANT CHANGE UP (ref 3.8–10.5)
WBC # BLD: 7.36 K/UL — SIGNIFICANT CHANGE UP (ref 3.8–10.5)
WBC # BLD: 7.36 K/UL — SIGNIFICANT CHANGE UP (ref 3.8–10.5)
WBC # BLD: 7.38 K/UL — SIGNIFICANT CHANGE UP (ref 3.8–10.5)
WBC # BLD: 7.85 K/UL — SIGNIFICANT CHANGE UP (ref 3.8–10.5)
WBC # BLD: 8 K/UL — SIGNIFICANT CHANGE UP (ref 3.8–10.5)
WBC # BLD: 8.08 K/UL — SIGNIFICANT CHANGE UP (ref 3.8–10.5)
WBC # BLD: 8.59 K/UL — SIGNIFICANT CHANGE UP (ref 3.8–10.5)
WBC # BLD: 8.66 K/UL — SIGNIFICANT CHANGE UP (ref 3.8–10.5)
WBC # BLD: 8.67 K/UL — SIGNIFICANT CHANGE UP (ref 3.8–10.5)
WBC # BLD: 8.86 K/UL — SIGNIFICANT CHANGE UP (ref 3.8–10.5)
WBC # BLD: 9.08 K/UL — SIGNIFICANT CHANGE UP (ref 3.8–10.5)
WBC # BLD: 9.28 K/UL — SIGNIFICANT CHANGE UP (ref 3.8–10.5)
WBC # BLD: 9.7 K/UL — SIGNIFICANT CHANGE UP (ref 3.8–10.5)
WBC # FLD AUTO: 10.35 K/UL — SIGNIFICANT CHANGE UP (ref 3.8–10.5)
WBC # FLD AUTO: 10.44 K/UL — SIGNIFICANT CHANGE UP (ref 3.8–10.5)
WBC # FLD AUTO: 10.67 K/UL — HIGH (ref 3.8–10.5)
WBC # FLD AUTO: 11.05 K/UL — HIGH (ref 3.8–10.5)
WBC # FLD AUTO: 12.19 K/UL — HIGH (ref 3.8–10.5)
WBC # FLD AUTO: 12.61 K/UL — HIGH (ref 3.8–10.5)
WBC # FLD AUTO: 12.65 K/UL — HIGH (ref 3.8–10.5)
WBC # FLD AUTO: 12.72 K/UL — HIGH (ref 3.8–10.5)
WBC # FLD AUTO: 13.38 K/UL — HIGH (ref 3.8–10.5)
WBC # FLD AUTO: 5.82 K/UL — SIGNIFICANT CHANGE UP (ref 3.8–10.5)
WBC # FLD AUTO: 5.83 K/UL — SIGNIFICANT CHANGE UP (ref 3.8–10.5)
WBC # FLD AUTO: 6.45 K/UL — SIGNIFICANT CHANGE UP (ref 3.8–10.5)
WBC # FLD AUTO: 6.55 K/UL — SIGNIFICANT CHANGE UP (ref 3.8–10.5)
WBC # FLD AUTO: 6.7 K/UL — SIGNIFICANT CHANGE UP (ref 3.8–10.5)
WBC # FLD AUTO: 7.1 K/UL — SIGNIFICANT CHANGE UP (ref 3.8–10.5)
WBC # FLD AUTO: 7.1 K/UL — SIGNIFICANT CHANGE UP (ref 3.8–10.5)
WBC # FLD AUTO: 7.35 K/UL — SIGNIFICANT CHANGE UP (ref 3.8–10.5)
WBC # FLD AUTO: 7.36 K/UL — SIGNIFICANT CHANGE UP (ref 3.8–10.5)
WBC # FLD AUTO: 7.36 K/UL — SIGNIFICANT CHANGE UP (ref 3.8–10.5)
WBC # FLD AUTO: 7.38 K/UL — SIGNIFICANT CHANGE UP (ref 3.8–10.5)
WBC # FLD AUTO: 7.85 K/UL — SIGNIFICANT CHANGE UP (ref 3.8–10.5)
WBC # FLD AUTO: 8 K/UL — SIGNIFICANT CHANGE UP (ref 3.8–10.5)
WBC # FLD AUTO: 8.08 K/UL — SIGNIFICANT CHANGE UP (ref 3.8–10.5)
WBC # FLD AUTO: 8.59 K/UL — SIGNIFICANT CHANGE UP (ref 3.8–10.5)
WBC # FLD AUTO: 8.66 K/UL — SIGNIFICANT CHANGE UP (ref 3.8–10.5)
WBC # FLD AUTO: 8.67 K/UL — SIGNIFICANT CHANGE UP (ref 3.8–10.5)
WBC # FLD AUTO: 8.86 K/UL — SIGNIFICANT CHANGE UP (ref 3.8–10.5)
WBC # FLD AUTO: 9.08 K/UL — SIGNIFICANT CHANGE UP (ref 3.8–10.5)
WBC # FLD AUTO: 9.28 K/UL — SIGNIFICANT CHANGE UP (ref 3.8–10.5)
WBC # FLD AUTO: 9.7 K/UL — SIGNIFICANT CHANGE UP (ref 3.8–10.5)

## 2020-01-01 PROCEDURE — 99223 1ST HOSP IP/OBS HIGH 75: CPT

## 2020-01-01 PROCEDURE — 99232 SBSQ HOSP IP/OBS MODERATE 35: CPT

## 2020-01-01 PROCEDURE — 99291 CRITICAL CARE FIRST HOUR: CPT

## 2020-01-01 PROCEDURE — 93970 EXTREMITY STUDY: CPT | Mod: 26

## 2020-01-01 PROCEDURE — 82550 ASSAY OF CK (CPK): CPT

## 2020-01-01 PROCEDURE — 71275 CT ANGIOGRAPHY CHEST: CPT | Mod: 26

## 2020-01-01 PROCEDURE — 85379 FIBRIN DEGRADATION QUANT: CPT

## 2020-01-01 PROCEDURE — 99232 SBSQ HOSP IP/OBS MODERATE 35: CPT | Mod: GC

## 2020-01-01 PROCEDURE — 99222 1ST HOSP IP/OBS MODERATE 55: CPT

## 2020-01-01 PROCEDURE — T1013: CPT

## 2020-01-01 PROCEDURE — 96368 THER/DIAG CONCURRENT INF: CPT

## 2020-01-01 PROCEDURE — 99233 SBSQ HOSP IP/OBS HIGH 50: CPT

## 2020-01-01 PROCEDURE — 97163 PT EVAL HIGH COMPLEX 45 MIN: CPT

## 2020-01-01 PROCEDURE — 83036 HEMOGLOBIN GLYCOSYLATED A1C: CPT

## 2020-01-01 PROCEDURE — 71045 X-RAY EXAM CHEST 1 VIEW: CPT

## 2020-01-01 PROCEDURE — 94002 VENT MGMT INPAT INIT DAY: CPT

## 2020-01-01 PROCEDURE — 94640 AIRWAY INHALATION TREATMENT: CPT

## 2020-01-01 PROCEDURE — 96116 NUBHVL XM PHYS/QHP 1ST HR: CPT

## 2020-01-01 PROCEDURE — 99285 EMERGENCY DEPT VISIT HI MDM: CPT | Mod: 25

## 2020-01-01 PROCEDURE — 85027 COMPLETE CBC AUTOMATED: CPT

## 2020-01-01 PROCEDURE — 93970 EXTREMITY STUDY: CPT

## 2020-01-01 PROCEDURE — 93308 TTE F-UP OR LMTD: CPT | Mod: 26

## 2020-01-01 PROCEDURE — 80048 BASIC METABOLIC PNL TOTAL CA: CPT

## 2020-01-01 PROCEDURE — 99285 EMERGENCY DEPT VISIT HI MDM: CPT

## 2020-01-01 PROCEDURE — 84484 ASSAY OF TROPONIN QUANT: CPT

## 2020-01-01 PROCEDURE — 97116 GAIT TRAINING THERAPY: CPT

## 2020-01-01 PROCEDURE — U0003: CPT

## 2020-01-01 PROCEDURE — 96365 THER/PROPH/DIAG IV INF INIT: CPT

## 2020-01-01 PROCEDURE — 71045 X-RAY EXAM CHEST 1 VIEW: CPT | Mod: 26

## 2020-01-01 PROCEDURE — 83615 LACTATE (LD) (LDH) ENZYME: CPT

## 2020-01-01 PROCEDURE — 99223 1ST HOSP IP/OBS HIGH 75: CPT | Mod: GC

## 2020-01-01 PROCEDURE — 97110 THERAPEUTIC EXERCISES: CPT

## 2020-01-01 PROCEDURE — 76937 US GUIDE VASCULAR ACCESS: CPT | Mod: 26

## 2020-01-01 PROCEDURE — 82962 GLUCOSE BLOOD TEST: CPT

## 2020-01-01 PROCEDURE — 83880 ASSAY OF NATRIURETIC PEPTIDE: CPT

## 2020-01-01 PROCEDURE — 87070 CULTURE OTHR SPECIMN AEROBIC: CPT

## 2020-01-01 PROCEDURE — 85730 THROMBOPLASTIN TIME PARTIAL: CPT

## 2020-01-01 PROCEDURE — 85610 PROTHROMBIN TIME: CPT

## 2020-01-01 PROCEDURE — 93005 ELECTROCARDIOGRAM TRACING: CPT

## 2020-01-01 PROCEDURE — 86803 HEPATITIS C AB TEST: CPT

## 2020-01-01 PROCEDURE — 82330 ASSAY OF CALCIUM: CPT

## 2020-01-01 PROCEDURE — 96367 TX/PROPH/DG ADDL SEQ IV INF: CPT

## 2020-01-01 PROCEDURE — 80074 ACUTE HEPATITIS PANEL: CPT

## 2020-01-01 PROCEDURE — 86480 TB TEST CELL IMMUN MEASURE: CPT

## 2020-01-01 PROCEDURE — 82947 ASSAY GLUCOSE BLOOD QUANT: CPT

## 2020-01-01 PROCEDURE — 36556 INSERT NON-TUNNEL CV CATH: CPT

## 2020-01-01 PROCEDURE — 85014 HEMATOCRIT: CPT

## 2020-01-01 PROCEDURE — 76770 US EXAM ABDO BACK WALL COMP: CPT | Mod: 26

## 2020-01-01 PROCEDURE — 71045 X-RAY EXAM CHEST 1 VIEW: CPT | Mod: 26,77

## 2020-01-01 PROCEDURE — 80053 COMPREHEN METABOLIC PANEL: CPT

## 2020-01-01 PROCEDURE — 71275 CT ANGIOGRAPHY CHEST: CPT

## 2020-01-01 PROCEDURE — 87635 SARS-COV-2 COVID-19 AMP PRB: CPT

## 2020-01-01 PROCEDURE — 84132 ASSAY OF SERUM POTASSIUM: CPT

## 2020-01-01 PROCEDURE — 97167 OT EVAL HIGH COMPLEX 60 MIN: CPT

## 2020-01-01 PROCEDURE — 86140 C-REACTIVE PROTEIN: CPT

## 2020-01-01 PROCEDURE — 92610 EVALUATE SWALLOWING FUNCTION: CPT

## 2020-01-01 PROCEDURE — 94003 VENT MGMT INPAT SUBQ DAY: CPT

## 2020-01-01 PROCEDURE — 94660 CPAP INITIATION&MGMT: CPT

## 2020-01-01 PROCEDURE — 80076 HEPATIC FUNCTION PANEL: CPT

## 2020-01-01 PROCEDURE — 85652 RBC SED RATE AUTOMATED: CPT

## 2020-01-01 PROCEDURE — 82728 ASSAY OF FERRITIN: CPT

## 2020-01-01 PROCEDURE — 82435 ASSAY OF BLOOD CHLORIDE: CPT

## 2020-01-01 PROCEDURE — 85384 FIBRINOGEN ACTIVITY: CPT

## 2020-01-01 PROCEDURE — 97535 SELF CARE MNGMENT TRAINING: CPT

## 2020-01-01 PROCEDURE — 92507 TX SP LANG VOICE COMM INDIV: CPT

## 2020-01-01 PROCEDURE — 76770 US EXAM ABDO BACK WALL COMP: CPT

## 2020-01-01 PROCEDURE — 84100 ASSAY OF PHOSPHORUS: CPT

## 2020-01-01 PROCEDURE — 93308 TTE F-UP OR LMTD: CPT

## 2020-01-01 PROCEDURE — 36620 INSERTION CATHETER ARTERY: CPT

## 2020-01-01 PROCEDURE — 83605 ASSAY OF LACTIC ACID: CPT

## 2020-01-01 PROCEDURE — 99239 HOSP IP/OBS DSCHRG MGMT >30: CPT

## 2020-01-01 PROCEDURE — 92526 ORAL FUNCTION THERAPY: CPT

## 2020-01-01 PROCEDURE — 82803 BLOOD GASES ANY COMBINATION: CPT

## 2020-01-01 PROCEDURE — 93010 ELECTROCARDIOGRAM REPORT: CPT

## 2020-01-01 PROCEDURE — 97530 THERAPEUTIC ACTIVITIES: CPT

## 2020-01-01 PROCEDURE — 36415 COLL VENOUS BLD VENIPUNCTURE: CPT

## 2020-01-01 PROCEDURE — 83735 ASSAY OF MAGNESIUM: CPT

## 2020-01-01 PROCEDURE — 92523 SPEECH SOUND LANG COMPREHEN: CPT

## 2020-01-01 PROCEDURE — 83520 IMMUNOASSAY QUANT NOS NONAB: CPT

## 2020-01-01 PROCEDURE — 87040 BLOOD CULTURE FOR BACTERIA: CPT

## 2020-01-01 PROCEDURE — 84145 PROCALCITONIN (PCT): CPT

## 2020-01-01 PROCEDURE — 84295 ASSAY OF SERUM SODIUM: CPT

## 2020-01-01 PROCEDURE — P9047: CPT

## 2020-01-01 PROCEDURE — 87186 SC STD MICRODIL/AGAR DIL: CPT

## 2020-01-01 RX ORDER — HYDROMORPHONE HYDROCHLORIDE 2 MG/ML
2 INJECTION INTRAMUSCULAR; INTRAVENOUS; SUBCUTANEOUS ONCE
Refills: 0 | Status: DISCONTINUED | OUTPATIENT
Start: 2020-01-01 | End: 2020-01-01

## 2020-01-01 RX ORDER — INSULIN GLARGINE 100 [IU]/ML
25 INJECTION, SOLUTION SUBCUTANEOUS AT BEDTIME
Refills: 0 | Status: DISCONTINUED | OUTPATIENT
Start: 2020-01-01 | End: 2020-01-01

## 2020-01-01 RX ORDER — ACETAMINOPHEN 500 MG
650 TABLET ORAL EVERY 6 HOURS
Refills: 0 | Status: DISCONTINUED | OUTPATIENT
Start: 2020-01-01 | End: 2020-01-01

## 2020-01-01 RX ORDER — HYDRALAZINE HCL 50 MG
10 TABLET ORAL ONCE
Refills: 0 | Status: COMPLETED | OUTPATIENT
Start: 2020-01-01 | End: 2020-01-01

## 2020-01-01 RX ORDER — HYDROMORPHONE HYDROCHLORIDE 2 MG/ML
2 INJECTION INTRAMUSCULAR; INTRAVENOUS; SUBCUTANEOUS
Refills: 0 | Status: DISCONTINUED | OUTPATIENT
Start: 2020-01-01 | End: 2020-01-01

## 2020-01-01 RX ORDER — GLUCAGON INJECTION, SOLUTION 0.5 MG/.1ML
1 INJECTION, SOLUTION SUBCUTANEOUS ONCE
Refills: 0 | Status: DISCONTINUED | OUTPATIENT
Start: 2020-01-01 | End: 2020-01-01

## 2020-01-01 RX ORDER — DEXAMETHASONE 0.5 MG/5ML
4 ELIXIR ORAL ONCE
Refills: 0 | Status: DISCONTINUED | OUTPATIENT
Start: 2020-01-01 | End: 2020-01-01

## 2020-01-01 RX ORDER — MIDAZOLAM HYDROCHLORIDE 1 MG/ML
4 INJECTION, SOLUTION INTRAMUSCULAR; INTRAVENOUS ONCE
Refills: 0 | Status: DISCONTINUED | OUTPATIENT
Start: 2020-01-01 | End: 2020-01-01

## 2020-01-01 RX ORDER — HALOPERIDOL DECANOATE 100 MG/ML
5 INJECTION INTRAMUSCULAR ONCE
Refills: 0 | Status: COMPLETED | OUTPATIENT
Start: 2020-01-01 | End: 2020-01-01

## 2020-01-01 RX ORDER — ALBUTEROL 90 UG/1
1 AEROSOL, METERED ORAL
Qty: 0 | Refills: 0 | DISCHARGE
Start: 2020-01-01

## 2020-01-01 RX ORDER — INSULIN LISPRO 100/ML
3 VIAL (ML) SUBCUTANEOUS
Refills: 0 | Status: DISCONTINUED | OUTPATIENT
Start: 2020-01-01 | End: 2020-01-01

## 2020-01-01 RX ORDER — LISINOPRIL 2.5 MG/1
5 TABLET ORAL
Refills: 0 | Status: DISCONTINUED | OUTPATIENT
Start: 2020-01-01 | End: 2020-01-01

## 2020-01-01 RX ORDER — KETAMINE HYDROCHLORIDE 100 MG/ML
65 INJECTION INTRAMUSCULAR; INTRAVENOUS ONCE
Refills: 0 | Status: DISCONTINUED | OUTPATIENT
Start: 2020-01-01 | End: 2020-01-01

## 2020-01-01 RX ORDER — HYDRALAZINE HCL 50 MG
25 TABLET ORAL THREE TIMES A DAY
Refills: 0 | Status: DISCONTINUED | OUTPATIENT
Start: 2020-01-01 | End: 2020-01-01

## 2020-01-01 RX ORDER — PIPERACILLIN AND TAZOBACTAM 4; .5 G/20ML; G/20ML
3.38 INJECTION, POWDER, LYOPHILIZED, FOR SOLUTION INTRAVENOUS EVERY 8 HOURS
Refills: 0 | Status: DISCONTINUED | OUTPATIENT
Start: 2020-01-01 | End: 2020-01-01

## 2020-01-01 RX ORDER — DEXTROSE 50 % IN WATER 50 %
25 SYRINGE (ML) INTRAVENOUS ONCE
Refills: 0 | Status: DISCONTINUED | OUTPATIENT
Start: 2020-01-01 | End: 2020-01-01

## 2020-01-01 RX ORDER — CHLORHEXIDINE GLUCONATE 213 G/1000ML
15 SOLUTION TOPICAL EVERY 12 HOURS
Refills: 0 | Status: DISCONTINUED | OUTPATIENT
Start: 2020-01-01 | End: 2020-01-01

## 2020-01-01 RX ORDER — FENTANYL CITRATE 50 UG/ML
50 INJECTION INTRAVENOUS
Refills: 0 | Status: DISCONTINUED | OUTPATIENT
Start: 2020-01-01 | End: 2020-01-01

## 2020-01-01 RX ORDER — CLONAZEPAM 1 MG
0.5 TABLET ORAL
Refills: 0 | Status: DISCONTINUED | OUTPATIENT
Start: 2020-01-01 | End: 2020-01-01

## 2020-01-01 RX ORDER — INSULIN LISPRO 100/ML
VIAL (ML) SUBCUTANEOUS
Refills: 0 | Status: DISCONTINUED | OUTPATIENT
Start: 2020-01-01 | End: 2020-01-01

## 2020-01-01 RX ORDER — OLANZAPINE 15 MG/1
10 TABLET, FILM COATED ORAL EVERY 12 HOURS
Refills: 0 | Status: DISCONTINUED | OUTPATIENT
Start: 2020-01-01 | End: 2020-01-01

## 2020-01-01 RX ORDER — POLYETHYLENE GLYCOL 3350 17 G/17G
17 POWDER, FOR SOLUTION ORAL AT BEDTIME
Refills: 0 | Status: DISCONTINUED | OUTPATIENT
Start: 2020-01-01 | End: 2020-01-01

## 2020-01-01 RX ORDER — INSULIN LISPRO 100/ML
5 VIAL (ML) SUBCUTANEOUS
Refills: 0 | Status: DISCONTINUED | OUTPATIENT
Start: 2020-01-01 | End: 2020-01-01

## 2020-01-01 RX ORDER — DEXMEDETOMIDINE HYDROCHLORIDE IN 0.9% SODIUM CHLORIDE 4 UG/ML
0.5 INJECTION INTRAVENOUS
Qty: 200 | Refills: 0 | Status: DISCONTINUED | OUTPATIENT
Start: 2020-01-01 | End: 2020-01-01

## 2020-01-01 RX ORDER — AZITHROMYCIN 500 MG/1
500 TABLET, FILM COATED ORAL ONCE
Refills: 0 | Status: COMPLETED | OUTPATIENT
Start: 2020-01-01 | End: 2020-01-01

## 2020-01-01 RX ORDER — DEXTROSE 50 % IN WATER 50 %
12.5 SYRINGE (ML) INTRAVENOUS ONCE
Refills: 0 | Status: DISCONTINUED | OUTPATIENT
Start: 2020-01-01 | End: 2020-01-01

## 2020-01-01 RX ORDER — GABAPENTIN 400 MG/1
300 CAPSULE ORAL EVERY 8 HOURS
Refills: 0 | Status: DISCONTINUED | OUTPATIENT
Start: 2020-01-01 | End: 2020-01-01

## 2020-01-01 RX ORDER — ACETAMINOPHEN 500 MG
1000 TABLET ORAL EVERY 6 HOURS
Refills: 0 | Status: DISCONTINUED | OUTPATIENT
Start: 2020-01-01 | End: 2020-01-01

## 2020-01-01 RX ORDER — INSULIN LISPRO 100/ML
10 VIAL (ML) SUBCUTANEOUS
Refills: 0 | Status: DISCONTINUED | OUTPATIENT
Start: 2020-01-01 | End: 2020-01-01

## 2020-01-01 RX ORDER — BACITRACIN ZINC NEOMYCIN SULFATE POLYMYXIN B SULFATE 400; 3.5; 5 [IU]/G; MG/G; [IU]/G
1 OINTMENT TOPICAL
Refills: 0 | Status: DISCONTINUED | OUTPATIENT
Start: 2020-01-01 | End: 2020-01-01

## 2020-01-01 RX ORDER — SODIUM CHLORIDE 9 MG/ML
500 INJECTION INTRAMUSCULAR; INTRAVENOUS; SUBCUTANEOUS ONCE
Refills: 0 | Status: COMPLETED | OUTPATIENT
Start: 2020-01-01 | End: 2020-01-01

## 2020-01-01 RX ORDER — ACETAMINOPHEN 500 MG
2 TABLET ORAL
Qty: 0 | Refills: 0 | DISCHARGE
Start: 2020-01-01

## 2020-01-01 RX ORDER — ALPRAZOLAM 0.25 MG
0.5 TABLET ORAL EVERY 6 HOURS
Refills: 0 | Status: DISCONTINUED | OUTPATIENT
Start: 2020-01-01 | End: 2020-01-01

## 2020-01-01 RX ORDER — APIXABAN 2.5 MG/1
5 TABLET, FILM COATED ORAL
Refills: 0 | Status: DISCONTINUED | OUTPATIENT
Start: 2020-01-01 | End: 2020-01-01

## 2020-01-01 RX ORDER — DEXMEDETOMIDINE HYDROCHLORIDE IN 0.9% SODIUM CHLORIDE 4 UG/ML
0.4 INJECTION INTRAVENOUS
Qty: 200 | Refills: 0 | Status: DISCONTINUED | OUTPATIENT
Start: 2020-01-01 | End: 2020-01-01

## 2020-01-01 RX ORDER — POLYETHYLENE GLYCOL 3350 17 G/17G
17 POWDER, FOR SOLUTION ORAL
Qty: 0 | Refills: 0 | DISCHARGE
Start: 2020-01-01

## 2020-01-01 RX ORDER — INSULIN HUMAN 100 [IU]/ML
10 INJECTION, SOLUTION SUBCUTANEOUS ONCE
Refills: 0 | Status: COMPLETED | OUTPATIENT
Start: 2020-01-01 | End: 2020-01-01

## 2020-01-01 RX ORDER — SENNA PLUS 8.6 MG/1
2 TABLET ORAL AT BEDTIME
Refills: 0 | Status: DISCONTINUED | OUTPATIENT
Start: 2020-01-01 | End: 2020-01-01

## 2020-01-01 RX ORDER — MAGNESIUM HYDROXIDE 400 MG/1
30 TABLET, CHEWABLE ORAL DAILY
Refills: 0 | Status: DISCONTINUED | OUTPATIENT
Start: 2020-01-01 | End: 2020-01-01

## 2020-01-01 RX ORDER — SODIUM CHLORIDE 9 MG/ML
1000 INJECTION INTRAMUSCULAR; INTRAVENOUS; SUBCUTANEOUS ONCE
Refills: 0 | Status: DISCONTINUED | OUTPATIENT
Start: 2020-01-01 | End: 2020-01-01

## 2020-01-01 RX ORDER — PANTOPRAZOLE SODIUM 20 MG/1
1 TABLET, DELAYED RELEASE ORAL
Qty: 0 | Refills: 0 | DISCHARGE
Start: 2020-01-01

## 2020-01-01 RX ORDER — AMANTADINE HCL 100 MG
1 CAPSULE ORAL
Qty: 0 | Refills: 0 | DISCHARGE
Start: 2020-01-01

## 2020-01-01 RX ORDER — ENOXAPARIN SODIUM 100 MG/ML
40 INJECTION SUBCUTANEOUS EVERY 12 HOURS
Refills: 0 | Status: DISCONTINUED | OUTPATIENT
Start: 2020-01-01 | End: 2020-01-01

## 2020-01-01 RX ORDER — FUROSEMIDE 40 MG
40 TABLET ORAL
Qty: 0 | Refills: 0 | DISCHARGE
Start: 2020-01-01

## 2020-01-01 RX ORDER — MAGNESIUM SULFATE 500 MG/ML
2 VIAL (ML) INJECTION ONCE
Refills: 0 | Status: COMPLETED | OUTPATIENT
Start: 2020-01-01 | End: 2020-01-01

## 2020-01-01 RX ORDER — INSULIN GLARGINE 100 [IU]/ML
12 INJECTION, SOLUTION SUBCUTANEOUS AT BEDTIME
Refills: 0 | Status: DISCONTINUED | OUTPATIENT
Start: 2020-01-01 | End: 2020-01-01

## 2020-01-01 RX ORDER — ROCURONIUM BROMIDE 10 MG/ML
8 VIAL (ML) INTRAVENOUS
Qty: 500 | Refills: 0 | Status: DISCONTINUED | OUTPATIENT
Start: 2020-01-01 | End: 2020-01-01

## 2020-01-01 RX ORDER — INSULIN LISPRO 100/ML
8 VIAL (ML) SUBCUTANEOUS
Refills: 0 | Status: DISCONTINUED | OUTPATIENT
Start: 2020-01-01 | End: 2020-01-01

## 2020-01-01 RX ORDER — SODIUM CHLORIDE 9 MG/ML
1000 INJECTION, SOLUTION INTRAVENOUS
Refills: 0 | Status: DISCONTINUED | OUTPATIENT
Start: 2020-01-01 | End: 2020-01-01

## 2020-01-01 RX ORDER — VANCOMYCIN HCL 1 G
1000 VIAL (EA) INTRAVENOUS EVERY 12 HOURS
Refills: 0 | Status: DISCONTINUED | OUTPATIENT
Start: 2020-01-01 | End: 2020-01-01

## 2020-01-01 RX ORDER — ATENOLOL 25 MG/1
25 TABLET ORAL DAILY
Refills: 0 | Status: DISCONTINUED | OUTPATIENT
Start: 2020-01-01 | End: 2020-01-01

## 2020-01-01 RX ORDER — MAGNESIUM OXIDE 400 MG ORAL TABLET 241.3 MG
1 TABLET ORAL
Qty: 0 | Refills: 0 | DISCHARGE
Start: 2020-01-01

## 2020-01-01 RX ORDER — AMLODIPINE BESYLATE 2.5 MG/1
5 TABLET ORAL DAILY
Refills: 0 | Status: DISCONTINUED | OUTPATIENT
Start: 2020-01-01 | End: 2020-01-01

## 2020-01-01 RX ORDER — PANTOPRAZOLE SODIUM 20 MG/1
40 TABLET, DELAYED RELEASE ORAL
Refills: 0 | Status: DISCONTINUED | OUTPATIENT
Start: 2020-01-01 | End: 2020-01-01

## 2020-01-01 RX ORDER — PANTOPRAZOLE SODIUM 20 MG/1
40 TABLET, DELAYED RELEASE ORAL DAILY
Refills: 0 | Status: DISCONTINUED | OUTPATIENT
Start: 2020-01-01 | End: 2020-01-01

## 2020-01-01 RX ORDER — HYDRALAZINE HCL 50 MG
25 TABLET ORAL
Refills: 0 | Status: DISCONTINUED | OUTPATIENT
Start: 2020-01-01 | End: 2020-01-01

## 2020-01-01 RX ORDER — ATORVASTATIN CALCIUM 80 MG/1
1 TABLET, FILM COATED ORAL
Qty: 0 | Refills: 0 | DISCHARGE

## 2020-01-01 RX ORDER — MEROPENEM 1 G/30ML
1000 INJECTION INTRAVENOUS EVERY 8 HOURS
Refills: 0 | Status: COMPLETED | OUTPATIENT
Start: 2020-01-01 | End: 2020-01-01

## 2020-01-01 RX ORDER — SODIUM CHLORIDE 9 MG/ML
500 INJECTION, SOLUTION INTRAVENOUS ONCE
Refills: 0 | Status: DISCONTINUED | OUTPATIENT
Start: 2020-01-01 | End: 2020-01-01

## 2020-01-01 RX ORDER — ATORVASTATIN CALCIUM 80 MG/1
40 TABLET, FILM COATED ORAL AT BEDTIME
Refills: 0 | Status: DISCONTINUED | OUTPATIENT
Start: 2020-01-01 | End: 2020-01-01

## 2020-01-01 RX ORDER — HYDROXYCHLOROQUINE SULFATE 200 MG
800 TABLET ORAL EVERY 24 HOURS
Refills: 0 | Status: COMPLETED | OUTPATIENT
Start: 2020-01-01 | End: 2020-01-01

## 2020-01-01 RX ORDER — INSULIN LISPRO 100/ML
4 VIAL (ML) SUBCUTANEOUS EVERY 6 HOURS
Refills: 0 | Status: DISCONTINUED | OUTPATIENT
Start: 2020-01-01 | End: 2020-01-01

## 2020-01-01 RX ORDER — MIDAZOLAM HYDROCHLORIDE 1 MG/ML
0.02 INJECTION, SOLUTION INTRAMUSCULAR; INTRAVENOUS
Qty: 100 | Refills: 0 | Status: DISCONTINUED | OUTPATIENT
Start: 2020-01-01 | End: 2020-01-01

## 2020-01-01 RX ORDER — INSULIN GLARGINE 100 [IU]/ML
20 INJECTION, SOLUTION SUBCUTANEOUS
Qty: 0 | Refills: 0 | DISCHARGE

## 2020-01-01 RX ORDER — SODIUM POLYSTYRENE SULFONATE 4.1 MEQ/G
30 POWDER, FOR SUSPENSION ORAL ONCE
Refills: 0 | Status: COMPLETED | OUTPATIENT
Start: 2020-01-01 | End: 2020-01-01

## 2020-01-01 RX ORDER — NOREPINEPHRINE BITARTRATE/D5W 8 MG/250ML
0.5 PLASTIC BAG, INJECTION (ML) INTRAVENOUS
Qty: 8 | Refills: 0 | Status: DISCONTINUED | OUTPATIENT
Start: 2020-01-01 | End: 2020-01-01

## 2020-01-01 RX ORDER — SODIUM CHLORIDE 9 MG/ML
1000 INJECTION INTRAMUSCULAR; INTRAVENOUS; SUBCUTANEOUS
Refills: 0 | Status: DISCONTINUED | OUTPATIENT
Start: 2020-01-01 | End: 2020-01-01

## 2020-01-01 RX ORDER — MIDAZOLAM HYDROCHLORIDE 1 MG/ML
2 INJECTION, SOLUTION INTRAMUSCULAR; INTRAVENOUS ONCE
Refills: 0 | Status: DISCONTINUED | OUTPATIENT
Start: 2020-01-01 | End: 2020-01-01

## 2020-01-01 RX ORDER — LABETALOL HCL 100 MG
100 TABLET ORAL EVERY 8 HOURS
Refills: 0 | Status: DISCONTINUED | OUTPATIENT
Start: 2020-01-01 | End: 2020-01-01

## 2020-01-01 RX ORDER — FENTANYL CITRATE 50 UG/ML
50 INJECTION INTRAVENOUS ONCE
Refills: 0 | Status: DISCONTINUED | OUTPATIENT
Start: 2020-01-01 | End: 2020-01-01

## 2020-01-01 RX ORDER — OXYCODONE HYDROCHLORIDE 5 MG/1
5 TABLET ORAL EVERY 12 HOURS
Refills: 0 | Status: DISCONTINUED | OUTPATIENT
Start: 2020-01-01 | End: 2020-01-01

## 2020-01-01 RX ORDER — DEXMEDETOMIDINE HYDROCHLORIDE IN 0.9% SODIUM CHLORIDE 4 UG/ML
0.7 INJECTION INTRAVENOUS
Qty: 200 | Refills: 0 | Status: DISCONTINUED | OUTPATIENT
Start: 2020-01-01 | End: 2020-01-01

## 2020-01-01 RX ORDER — LABETALOL HCL 100 MG
100 TABLET ORAL EVERY 12 HOURS
Refills: 0 | Status: DISCONTINUED | OUTPATIENT
Start: 2020-01-01 | End: 2020-01-01

## 2020-01-01 RX ORDER — POLYETHYLENE GLYCOL 3350 17 G/17G
17 POWDER, FOR SOLUTION ORAL DAILY
Refills: 0 | Status: DISCONTINUED | OUTPATIENT
Start: 2020-01-01 | End: 2020-01-01

## 2020-01-01 RX ORDER — LABETALOL HCL 100 MG
10 TABLET ORAL EVERY 8 HOURS
Refills: 0 | Status: DISCONTINUED | OUTPATIENT
Start: 2020-01-01 | End: 2020-01-01

## 2020-01-01 RX ORDER — LABETALOL HCL 100 MG
10 TABLET ORAL ONCE
Refills: 0 | Status: DISCONTINUED | OUTPATIENT
Start: 2020-01-01 | End: 2020-01-01

## 2020-01-01 RX ORDER — ALBUTEROL 90 UG/1
1 AEROSOL, METERED ORAL EVERY 4 HOURS
Refills: 0 | Status: DISCONTINUED | OUTPATIENT
Start: 2020-01-01 | End: 2020-01-01

## 2020-01-01 RX ORDER — CEFTRIAXONE 500 MG/1
1000 INJECTION, POWDER, FOR SOLUTION INTRAMUSCULAR; INTRAVENOUS ONCE
Refills: 0 | Status: COMPLETED | OUTPATIENT
Start: 2020-01-01 | End: 2020-01-01

## 2020-01-01 RX ORDER — OLANZAPINE 15 MG/1
10 TABLET, FILM COATED ORAL AT BEDTIME
Refills: 0 | Status: DISCONTINUED | OUTPATIENT
Start: 2020-01-01 | End: 2020-01-01

## 2020-01-01 RX ORDER — INSULIN GLARGINE 100 [IU]/ML
14 INJECTION, SOLUTION SUBCUTANEOUS AT BEDTIME
Refills: 0 | Status: DISCONTINUED | OUTPATIENT
Start: 2020-01-01 | End: 2020-01-01

## 2020-01-01 RX ORDER — INSULIN GLARGINE 100 [IU]/ML
8 INJECTION, SOLUTION SUBCUTANEOUS EVERY MORNING
Refills: 0 | Status: DISCONTINUED | OUTPATIENT
Start: 2020-01-01 | End: 2020-01-01

## 2020-01-01 RX ORDER — SODIUM CHLORIDE 9 MG/ML
1000 INJECTION INTRAMUSCULAR; INTRAVENOUS; SUBCUTANEOUS ONCE
Refills: 0 | Status: COMPLETED | OUTPATIENT
Start: 2020-01-01 | End: 2020-01-01

## 2020-01-01 RX ORDER — ENOXAPARIN SODIUM 100 MG/ML
40 INJECTION SUBCUTANEOUS
Qty: 0 | Refills: 0 | DISCHARGE
Start: 2020-01-01

## 2020-01-01 RX ORDER — ALTEPLASE 100 MG
100 KIT INTRAVENOUS ONCE
Refills: 0 | Status: COMPLETED | OUTPATIENT
Start: 2020-01-01 | End: 2020-01-01

## 2020-01-01 RX ORDER — SACCHAROMYCES BOULARDII 250 MG
250 POWDER IN PACKET (EA) ORAL
Refills: 0 | Status: DISCONTINUED | OUTPATIENT
Start: 2020-01-01 | End: 2020-01-01

## 2020-01-01 RX ORDER — INSULIN GLARGINE 100 [IU]/ML
10 INJECTION, SOLUTION SUBCUTANEOUS
Qty: 0 | Refills: 0 | DISCHARGE
Start: 2020-01-01

## 2020-01-01 RX ORDER — FENTANYL CITRATE 50 UG/ML
50 INJECTION INTRAVENOUS EVERY 4 HOURS
Refills: 0 | Status: DISCONTINUED | OUTPATIENT
Start: 2020-01-01 | End: 2020-01-01

## 2020-01-01 RX ORDER — GABAPENTIN 400 MG/1
300 CAPSULE ORAL DAILY
Refills: 0 | Status: DISCONTINUED | OUTPATIENT
Start: 2020-01-01 | End: 2020-01-01

## 2020-01-01 RX ORDER — ALBUTEROL 90 UG/1
2 AEROSOL, METERED ORAL ONCE
Refills: 0 | Status: COMPLETED | OUTPATIENT
Start: 2020-01-01 | End: 2020-01-01

## 2020-01-01 RX ORDER — AMANTADINE HCL 100 MG
100 CAPSULE ORAL
Refills: 0 | Status: DISCONTINUED | OUTPATIENT
Start: 2020-01-01 | End: 2020-01-01

## 2020-01-01 RX ORDER — SENNA PLUS 8.6 MG/1
2 TABLET ORAL
Qty: 0 | Refills: 0 | DISCHARGE
Start: 2020-01-01

## 2020-01-01 RX ORDER — POTASSIUM CHLORIDE 20 MEQ
20 PACKET (EA) ORAL
Refills: 0 | Status: COMPLETED | OUTPATIENT
Start: 2020-01-01 | End: 2020-01-01

## 2020-01-01 RX ORDER — HYDRALAZINE HCL 50 MG
1 TABLET ORAL
Qty: 0 | Refills: 0 | DISCHARGE
Start: 2020-01-01

## 2020-01-01 RX ORDER — FUROSEMIDE 40 MG
20 TABLET ORAL ONCE
Refills: 0 | Status: COMPLETED | OUTPATIENT
Start: 2020-01-01 | End: 2020-01-01

## 2020-01-01 RX ORDER — PROPOFOL 10 MG/ML
15 INJECTION, EMULSION INTRAVENOUS
Qty: 1000 | Refills: 0 | Status: DISCONTINUED | OUTPATIENT
Start: 2020-01-01 | End: 2020-01-01

## 2020-01-01 RX ORDER — ALBUMIN HUMAN 25 %
50 VIAL (ML) INTRAVENOUS ONCE
Refills: 0 | Status: COMPLETED | OUTPATIENT
Start: 2020-01-01 | End: 2020-01-01

## 2020-01-01 RX ORDER — HYDRALAZINE HCL 50 MG
10 TABLET ORAL EVERY 6 HOURS
Refills: 0 | Status: DISCONTINUED | OUTPATIENT
Start: 2020-01-01 | End: 2020-01-01

## 2020-01-01 RX ORDER — ACETAMINOPHEN 500 MG
1000 TABLET ORAL ONCE
Refills: 0 | Status: COMPLETED | OUTPATIENT
Start: 2020-01-01 | End: 2020-01-01

## 2020-01-01 RX ORDER — FUROSEMIDE 40 MG
40 TABLET ORAL EVERY 12 HOURS
Refills: 0 | Status: DISCONTINUED | OUTPATIENT
Start: 2020-01-01 | End: 2020-01-01

## 2020-01-01 RX ORDER — KETAMINE HYDROCHLORIDE 100 MG/ML
0.1 INJECTION INTRAMUSCULAR; INTRAVENOUS
Qty: 200 | Refills: 0 | Status: DISCONTINUED | OUTPATIENT
Start: 2020-01-01 | End: 2020-01-01

## 2020-01-01 RX ORDER — BACITRACIN AND POLYMYXIN B SULFATE 500; 10000 [USP'U]/G; [USP'U]/G
1 OINTMENT TOPICAL
Refills: 0 | Status: DISCONTINUED | OUTPATIENT
Start: 2020-01-01 | End: 2020-01-01

## 2020-01-01 RX ORDER — ENOXAPARIN SODIUM 100 MG/ML
40 INJECTION SUBCUTANEOUS DAILY
Refills: 0 | Status: DISCONTINUED | OUTPATIENT
Start: 2020-01-01 | End: 2020-01-01

## 2020-01-01 RX ORDER — INSULIN LISPRO 100/ML
VIAL (ML) SUBCUTANEOUS AT BEDTIME
Refills: 0 | Status: DISCONTINUED | OUTPATIENT
Start: 2020-01-01 | End: 2020-01-01

## 2020-01-01 RX ORDER — KETAMINE HYDROCHLORIDE 100 MG/ML
0.13 INJECTION INTRAMUSCULAR; INTRAVENOUS
Qty: 1000 | Refills: 0 | Status: DISCONTINUED | OUTPATIENT
Start: 2020-01-01 | End: 2020-01-01

## 2020-01-01 RX ORDER — INSULIN LISPRO 100/ML
7 VIAL (ML) SUBCUTANEOUS
Refills: 0 | Status: DISCONTINUED | OUTPATIENT
Start: 2020-01-01 | End: 2020-01-01

## 2020-01-01 RX ORDER — INSULIN LISPRO 100/ML
3 VIAL (ML) SUBCUTANEOUS ONCE
Refills: 0 | Status: COMPLETED | OUTPATIENT
Start: 2020-01-01 | End: 2020-01-01

## 2020-01-01 RX ORDER — OXYCODONE HYDROCHLORIDE 5 MG/1
5 TABLET ORAL EVERY 8 HOURS
Refills: 0 | Status: DISCONTINUED | OUTPATIENT
Start: 2020-01-01 | End: 2020-01-01

## 2020-01-01 RX ORDER — INSULIN LISPRO 100/ML
2 VIAL (ML) SUBCUTANEOUS EVERY 6 HOURS
Refills: 0 | Status: DISCONTINUED | OUTPATIENT
Start: 2020-01-01 | End: 2020-01-01

## 2020-01-01 RX ORDER — SACCHAROMYCES BOULARDII 250 MG
1 POWDER IN PACKET (EA) ORAL
Qty: 0 | Refills: 0 | DISCHARGE
Start: 2020-01-01

## 2020-01-01 RX ORDER — SODIUM CHLORIDE 9 MG/ML
250 INJECTION, SOLUTION INTRAVENOUS ONCE
Refills: 0 | Status: DISCONTINUED | OUTPATIENT
Start: 2020-01-01 | End: 2020-01-01

## 2020-01-01 RX ORDER — ASCORBIC ACID 60 MG
1500 TABLET,CHEWABLE ORAL DAILY
Refills: 0 | Status: DISCONTINUED | OUTPATIENT
Start: 2020-01-01 | End: 2020-01-01

## 2020-01-01 RX ORDER — LISINOPRIL 2.5 MG/1
1 TABLET ORAL
Qty: 0 | Refills: 0 | DISCHARGE
Start: 2020-01-01

## 2020-01-01 RX ORDER — SODIUM CHLORIDE 9 MG/ML
10 INJECTION INTRAMUSCULAR; INTRAVENOUS; SUBCUTANEOUS
Refills: 0 | Status: DISCONTINUED | OUTPATIENT
Start: 2020-01-01 | End: 2020-01-01

## 2020-01-01 RX ORDER — HYDROCHLOROTHIAZIDE 25 MG
25 TABLET ORAL DAILY
Refills: 0 | Status: DISCONTINUED | OUTPATIENT
Start: 2020-01-01 | End: 2020-01-01

## 2020-01-01 RX ORDER — LABETALOL HCL 100 MG
1 TABLET ORAL
Qty: 0 | Refills: 0 | DISCHARGE
Start: 2020-01-01

## 2020-01-01 RX ORDER — FUROSEMIDE 40 MG
40 TABLET ORAL DAILY
Refills: 0 | Status: DISCONTINUED | OUTPATIENT
Start: 2020-01-01 | End: 2020-01-01

## 2020-01-01 RX ORDER — ENOXAPARIN SODIUM 100 MG/ML
40 INJECTION SUBCUTANEOUS
Refills: 0 | Status: DISCONTINUED | OUTPATIENT
Start: 2020-01-01 | End: 2020-01-01

## 2020-01-01 RX ORDER — GABAPENTIN 400 MG/1
1 CAPSULE ORAL
Qty: 0 | Refills: 0 | DISCHARGE
Start: 2020-01-01

## 2020-01-01 RX ORDER — ROCURONIUM BROMIDE 10 MG/ML
50 VIAL (ML) INTRAVENOUS ONCE
Refills: 0 | Status: COMPLETED | OUTPATIENT
Start: 2020-01-01 | End: 2020-01-01

## 2020-01-01 RX ORDER — TAMSULOSIN HYDROCHLORIDE 0.4 MG/1
1 CAPSULE ORAL
Qty: 30 | Refills: 0
Start: 2020-01-01 | End: 2020-06-06

## 2020-01-01 RX ORDER — LABETALOL HCL 100 MG
200 TABLET ORAL THREE TIMES A DAY
Refills: 0 | Status: DISCONTINUED | OUTPATIENT
Start: 2020-01-01 | End: 2020-01-01

## 2020-01-01 RX ORDER — DEXTROSE 50 % IN WATER 50 %
15 SYRINGE (ML) INTRAVENOUS ONCE
Refills: 0 | Status: DISCONTINUED | OUTPATIENT
Start: 2020-01-01 | End: 2020-01-01

## 2020-01-01 RX ORDER — CALCIUM GLUCONATE 100 MG/ML
1 VIAL (ML) INTRAVENOUS ONCE
Refills: 0 | Status: COMPLETED | OUTPATIENT
Start: 2020-01-01 | End: 2020-01-01

## 2020-01-01 RX ORDER — NALOXEGOL OXALATE 12.5 MG/1
25 TABLET, FILM COATED ORAL DAILY
Refills: 0 | Status: DISCONTINUED | OUTPATIENT
Start: 2020-01-01 | End: 2020-01-01

## 2020-01-01 RX ORDER — INSULIN LISPRO 100/ML
VIAL (ML) SUBCUTANEOUS EVERY 6 HOURS
Refills: 0 | Status: DISCONTINUED | OUTPATIENT
Start: 2020-01-01 | End: 2020-01-01

## 2020-01-01 RX ORDER — FUROSEMIDE 40 MG
40 TABLET ORAL ONCE
Refills: 0 | Status: COMPLETED | OUTPATIENT
Start: 2020-01-01 | End: 2020-01-01

## 2020-01-01 RX ORDER — CLONAZEPAM 1 MG
1 TABLET ORAL EVERY 8 HOURS
Refills: 0 | Status: DISCONTINUED | OUTPATIENT
Start: 2020-01-01 | End: 2020-01-01

## 2020-01-01 RX ORDER — INSULIN GLARGINE 100 [IU]/ML
15 INJECTION, SOLUTION SUBCUTANEOUS AT BEDTIME
Refills: 0 | Status: DISCONTINUED | OUTPATIENT
Start: 2020-01-01 | End: 2020-01-01

## 2020-01-01 RX ORDER — INSULIN GLARGINE 100 [IU]/ML
12 INJECTION, SOLUTION SUBCUTANEOUS
Refills: 0 | Status: DISCONTINUED | OUTPATIENT
Start: 2020-01-01 | End: 2020-01-01

## 2020-01-01 RX ORDER — PIPERACILLIN AND TAZOBACTAM 4; .5 G/20ML; G/20ML
3.38 INJECTION, POWDER, LYOPHILIZED, FOR SOLUTION INTRAVENOUS ONCE
Refills: 0 | Status: COMPLETED | OUTPATIENT
Start: 2020-01-01 | End: 2020-01-01

## 2020-01-01 RX ORDER — FENTANYL CITRATE 50 UG/ML
0.5 INJECTION INTRAVENOUS
Qty: 2500 | Refills: 0 | Status: DISCONTINUED | OUTPATIENT
Start: 2020-01-01 | End: 2020-01-01

## 2020-01-01 RX ORDER — POTASSIUM CHLORIDE 20 MEQ
10 PACKET (EA) ORAL
Refills: 0 | Status: DISCONTINUED | OUTPATIENT
Start: 2020-01-01 | End: 2020-01-01

## 2020-01-01 RX ORDER — HYDRALAZINE HCL 50 MG
50 TABLET ORAL EVERY 8 HOURS
Refills: 0 | Status: DISCONTINUED | OUTPATIENT
Start: 2020-01-01 | End: 2020-01-01

## 2020-01-01 RX ORDER — INSULIN GLARGINE 100 [IU]/ML
20 INJECTION, SOLUTION SUBCUTANEOUS AT BEDTIME
Refills: 0 | Status: DISCONTINUED | OUTPATIENT
Start: 2020-01-01 | End: 2020-01-01

## 2020-01-01 RX ORDER — INSULIN GLARGINE 100 [IU]/ML
10 INJECTION, SOLUTION SUBCUTANEOUS AT BEDTIME
Refills: 0 | Status: DISCONTINUED | OUTPATIENT
Start: 2020-01-01 | End: 2020-01-01

## 2020-01-01 RX ORDER — EPINEPHRINE 0.3 MG/.3ML
0.5 INJECTION INTRAMUSCULAR; SUBCUTANEOUS
Qty: 8 | Refills: 0 | Status: DISCONTINUED | OUTPATIENT
Start: 2020-01-01 | End: 2020-01-01

## 2020-01-01 RX ORDER — INSULIN LISPRO 100/ML
6 VIAL (ML) SUBCUTANEOUS EVERY 6 HOURS
Refills: 0 | Status: DISCONTINUED | OUTPATIENT
Start: 2020-01-01 | End: 2020-01-01

## 2020-01-01 RX ORDER — ACETAMINOPHEN 500 MG
650 TABLET ORAL ONCE
Refills: 0 | Status: COMPLETED | OUTPATIENT
Start: 2020-01-01 | End: 2020-01-01

## 2020-01-01 RX ORDER — MAGNESIUM OXIDE 400 MG ORAL TABLET 241.3 MG
400 TABLET ORAL
Refills: 0 | Status: DISCONTINUED | OUTPATIENT
Start: 2020-01-01 | End: 2020-01-01

## 2020-01-01 RX ORDER — CLONAZEPAM 1 MG
0.5 TABLET ORAL EVERY 8 HOURS
Refills: 0 | Status: DISCONTINUED | OUTPATIENT
Start: 2020-01-01 | End: 2020-01-01

## 2020-01-01 RX ORDER — THIAMINE MONONITRATE (VIT B1) 100 MG
200 TABLET ORAL DAILY
Refills: 0 | Status: DISCONTINUED | OUTPATIENT
Start: 2020-01-01 | End: 2020-01-01

## 2020-01-01 RX ORDER — INSULIN GLARGINE 100 [IU]/ML
8 INJECTION, SOLUTION SUBCUTANEOUS
Refills: 0 | Status: DISCONTINUED | OUTPATIENT
Start: 2020-01-01 | End: 2020-01-01

## 2020-01-01 RX ORDER — TOCILIZUMAB 20 MG/ML
400 INJECTION, SOLUTION, CONCENTRATE INTRAVENOUS ONCE
Refills: 0 | Status: COMPLETED | OUTPATIENT
Start: 2020-01-01 | End: 2020-01-01

## 2020-01-01 RX ORDER — METFORMIN HYDROCHLORIDE 850 MG/1
1 TABLET ORAL
Qty: 0 | Refills: 0 | DISCHARGE

## 2020-01-01 RX ORDER — DEXTROSE 50 % IN WATER 50 %
50 SYRINGE (ML) INTRAVENOUS ONCE
Refills: 0 | Status: COMPLETED | OUTPATIENT
Start: 2020-01-01 | End: 2020-01-01

## 2020-01-01 RX ORDER — PETROLATUM,WHITE
1 JELLY (GRAM) TOPICAL THREE TIMES A DAY
Refills: 0 | Status: DISCONTINUED | OUTPATIENT
Start: 2020-01-01 | End: 2020-01-01

## 2020-01-01 RX ORDER — INSULIN GLARGINE 100 [IU]/ML
18 INJECTION, SOLUTION SUBCUTANEOUS AT BEDTIME
Refills: 0 | Status: DISCONTINUED | OUTPATIENT
Start: 2020-01-01 | End: 2020-01-01

## 2020-01-01 RX ORDER — TAMSULOSIN HYDROCHLORIDE 0.4 MG/1
0.4 CAPSULE ORAL AT BEDTIME
Refills: 0 | Status: DISCONTINUED | OUTPATIENT
Start: 2020-01-01 | End: 2020-01-01

## 2020-01-01 RX ORDER — HYDROXYCHLOROQUINE SULFATE 200 MG
400 TABLET ORAL EVERY 24 HOURS
Refills: 0 | Status: COMPLETED | OUTPATIENT
Start: 2020-01-01 | End: 2020-01-01

## 2020-01-01 RX ORDER — FENTANYL CITRATE 50 UG/ML
100 INJECTION INTRAVENOUS ONCE
Refills: 0 | Status: DISCONTINUED | OUTPATIENT
Start: 2020-01-01 | End: 2020-01-01

## 2020-01-01 RX ORDER — CARBAMIDE PEROXIDE 81.86 MG/ML
5 SOLUTION/ DROPS AURICULAR (OTIC)
Refills: 0 | Status: COMPLETED | OUTPATIENT
Start: 2020-01-01 | End: 2020-01-01

## 2020-01-01 RX ORDER — AMLODIPINE BESYLATE 2.5 MG/1
10 TABLET ORAL DAILY
Refills: 0 | Status: DISCONTINUED | OUTPATIENT
Start: 2020-01-01 | End: 2020-01-01

## 2020-01-01 RX ORDER — LISINOPRIL 2.5 MG/1
5 TABLET ORAL DAILY
Refills: 0 | Status: DISCONTINUED | OUTPATIENT
Start: 2020-01-01 | End: 2020-01-01

## 2020-01-01 RX ORDER — DEXTROSE 50 % IN WATER 50 %
25 SYRINGE (ML) INTRAVENOUS ONCE
Refills: 0 | Status: COMPLETED | OUTPATIENT
Start: 2020-01-01 | End: 2020-01-01

## 2020-01-01 RX ORDER — PETROLATUM,WHITE
1 JELLY (GRAM) TOPICAL
Qty: 0 | Refills: 0 | DISCHARGE
Start: 2020-01-01

## 2020-01-01 RX ORDER — INSULIN GLARGINE 100 [IU]/ML
10 INJECTION, SOLUTION SUBCUTANEOUS EVERY MORNING
Refills: 0 | Status: DISCONTINUED | OUTPATIENT
Start: 2020-01-01 | End: 2020-01-01

## 2020-01-01 RX ORDER — LANOLIN ALCOHOL/MO/W.PET/CERES
6 CREAM (GRAM) TOPICAL AT BEDTIME
Refills: 0 | Status: DISCONTINUED | OUTPATIENT
Start: 2020-01-01 | End: 2020-01-01

## 2020-01-01 RX ORDER — EPINEPHRINE 0.3 MG/.3ML
0.4 INJECTION INTRAMUSCULAR; SUBCUTANEOUS ONCE
Refills: 0 | Status: COMPLETED | OUTPATIENT
Start: 2020-01-01 | End: 2020-01-01

## 2020-01-01 RX ORDER — DEXTROSE 50 % IN WATER 50 %
12.5 SYRINGE (ML) INTRAVENOUS ONCE
Refills: 0 | Status: COMPLETED | OUTPATIENT
Start: 2020-01-01 | End: 2020-01-01

## 2020-01-01 RX ORDER — CHLORHEXIDINE GLUCONATE 213 G/1000ML
1 SOLUTION TOPICAL
Refills: 0 | Status: DISCONTINUED | OUTPATIENT
Start: 2020-01-01 | End: 2020-01-01

## 2020-01-01 RX ORDER — TAMSULOSIN HYDROCHLORIDE 0.4 MG/1
1 CAPSULE ORAL
Qty: 0 | Refills: 0 | DISCHARGE
Start: 2020-01-01

## 2020-01-01 RX ORDER — NOREPINEPHRINE BITARTRATE/D5W 8 MG/250ML
0.05 PLASTIC BAG, INJECTION (ML) INTRAVENOUS
Qty: 16 | Refills: 0 | Status: DISCONTINUED | OUTPATIENT
Start: 2020-01-01 | End: 2020-01-01

## 2020-01-01 RX ORDER — OXYCODONE HYDROCHLORIDE 5 MG/1
10 TABLET ORAL EVERY 8 HOURS
Refills: 0 | Status: DISCONTINUED | OUTPATIENT
Start: 2020-01-01 | End: 2020-01-01

## 2020-01-01 RX ORDER — INSULIN GLARGINE 100 [IU]/ML
6 INJECTION, SOLUTION SUBCUTANEOUS AT BEDTIME
Refills: 0 | Status: DISCONTINUED | OUTPATIENT
Start: 2020-01-01 | End: 2020-01-01

## 2020-01-01 RX ORDER — HYDRALAZINE HCL 50 MG
10 TABLET ORAL
Refills: 0 | Status: DISCONTINUED | OUTPATIENT
Start: 2020-01-01 | End: 2020-01-01

## 2020-01-01 RX ORDER — LANOLIN ALCOHOL/MO/W.PET/CERES
5 CREAM (GRAM) TOPICAL AT BEDTIME
Refills: 0 | Status: DISCONTINUED | OUTPATIENT
Start: 2020-01-01 | End: 2020-01-01

## 2020-01-01 RX ORDER — HYDRALAZINE HCL 50 MG
75 TABLET ORAL THREE TIMES A DAY
Refills: 0 | Status: DISCONTINUED | OUTPATIENT
Start: 2020-01-01 | End: 2020-01-01

## 2020-01-01 RX ORDER — HYDROXYCHLOROQUINE SULFATE 200 MG
TABLET ORAL
Refills: 0 | Status: COMPLETED | OUTPATIENT
Start: 2020-01-01 | End: 2020-01-01

## 2020-01-01 RX ORDER — HYDRALAZINE HCL 50 MG
5 TABLET ORAL ONCE
Refills: 0 | Status: COMPLETED | OUTPATIENT
Start: 2020-01-01 | End: 2020-01-01

## 2020-01-01 RX ORDER — GABAPENTIN 400 MG/1
1 CAPSULE ORAL
Qty: 0 | Refills: 0 | DISCHARGE

## 2020-01-01 RX ORDER — NITROGLYCERIN 6.5 MG
0.4 CAPSULE, EXTENDED RELEASE ORAL
Refills: 0 | Status: DISCONTINUED | OUTPATIENT
Start: 2020-01-01 | End: 2020-01-01

## 2020-01-01 RX ORDER — CLONAZEPAM 1 MG
1 TABLET ORAL
Refills: 0 | Status: DISCONTINUED | OUTPATIENT
Start: 2020-01-01 | End: 2020-01-01

## 2020-01-01 RX ADMIN — Medication 4 UNIT(S): at 06:17

## 2020-01-01 RX ADMIN — Medication 1500 MILLIGRAM(S): at 16:29

## 2020-01-01 RX ADMIN — GABAPENTIN 300 MILLIGRAM(S): 400 CAPSULE ORAL at 13:35

## 2020-01-01 RX ADMIN — Medication 4 UNIT(S): at 05:12

## 2020-01-01 RX ADMIN — CHLORHEXIDINE GLUCONATE 1 APPLICATION(S): 213 SOLUTION TOPICAL at 05:11

## 2020-01-01 RX ADMIN — ENOXAPARIN SODIUM 40 MILLIGRAM(S): 100 INJECTION SUBCUTANEOUS at 04:01

## 2020-01-01 RX ADMIN — GABAPENTIN 300 MILLIGRAM(S): 400 CAPSULE ORAL at 22:27

## 2020-01-01 RX ADMIN — SODIUM CHLORIDE 50 MILLILITER(S): 9 INJECTION INTRAMUSCULAR; INTRAVENOUS; SUBCUTANEOUS at 20:59

## 2020-01-01 RX ADMIN — Medication 60 MILLIGRAM(S): at 17:02

## 2020-01-01 RX ADMIN — Medication 4 UNIT(S): at 00:46

## 2020-01-01 RX ADMIN — INSULIN GLARGINE 12 UNIT(S): 100 INJECTION, SOLUTION SUBCUTANEOUS at 22:12

## 2020-01-01 RX ADMIN — Medication 100 MILLIGRAM(S): at 12:05

## 2020-01-01 RX ADMIN — Medication 1 MILLIGRAM(S): at 21:18

## 2020-01-01 RX ADMIN — Medication 60 MILLIGRAM(S): at 22:54

## 2020-01-01 RX ADMIN — ATORVASTATIN CALCIUM 40 MILLIGRAM(S): 80 TABLET, FILM COATED ORAL at 22:42

## 2020-01-01 RX ADMIN — MAGNESIUM OXIDE 400 MG ORAL TABLET 400 MILLIGRAM(S): 241.3 TABLET ORAL at 08:23

## 2020-01-01 RX ADMIN — CHLORHEXIDINE GLUCONATE 15 MILLILITER(S): 213 SOLUTION TOPICAL at 06:15

## 2020-01-01 RX ADMIN — Medication 4 UNIT(S): at 12:43

## 2020-01-01 RX ADMIN — PIPERACILLIN AND TAZOBACTAM 25 GRAM(S): 4; .5 INJECTION, POWDER, LYOPHILIZED, FOR SOLUTION INTRAVENOUS at 13:03

## 2020-01-01 RX ADMIN — Medication 75 MILLIGRAM(S): at 05:48

## 2020-01-01 RX ADMIN — POLYETHYLENE GLYCOL 3350 17 GRAM(S): 17 POWDER, FOR SOLUTION ORAL at 23:04

## 2020-01-01 RX ADMIN — Medication 10 MILLIGRAM(S): at 22:20

## 2020-01-01 RX ADMIN — Medication 8: at 07:28

## 2020-01-01 RX ADMIN — Medication 6 MILLIGRAM(S): at 21:23

## 2020-01-01 RX ADMIN — CHLORHEXIDINE GLUCONATE 1 APPLICATION(S): 213 SOLUTION TOPICAL at 04:31

## 2020-01-01 RX ADMIN — GABAPENTIN 300 MILLIGRAM(S): 400 CAPSULE ORAL at 13:04

## 2020-01-01 RX ADMIN — Medication 0: at 17:35

## 2020-01-01 RX ADMIN — Medication 8: at 12:20

## 2020-01-01 RX ADMIN — POLYETHYLENE GLYCOL 3350 17 GRAM(S): 17 POWDER, FOR SOLUTION ORAL at 11:59

## 2020-01-01 RX ADMIN — Medication 400 MILLIGRAM(S): at 16:53

## 2020-01-01 RX ADMIN — ENOXAPARIN SODIUM 40 MILLIGRAM(S): 100 INJECTION SUBCUTANEOUS at 11:37

## 2020-01-01 RX ADMIN — LISINOPRIL 5 MILLIGRAM(S): 2.5 TABLET ORAL at 17:49

## 2020-01-01 RX ADMIN — GABAPENTIN 300 MILLIGRAM(S): 400 CAPSULE ORAL at 12:24

## 2020-01-01 RX ADMIN — POLYETHYLENE GLYCOL 3350 17 GRAM(S): 17 POWDER, FOR SOLUTION ORAL at 22:15

## 2020-01-01 RX ADMIN — Medication 25 MILLIGRAM(S): at 15:06

## 2020-01-01 RX ADMIN — Medication 50 MILLIGRAM(S): at 12:35

## 2020-01-01 RX ADMIN — BACITRACIN ZINC NEOMYCIN SULFATE POLYMYXIN B SULFATE 1 APPLICATION(S): 400; 3.5; 5 OINTMENT TOPICAL at 16:56

## 2020-01-01 RX ADMIN — BACITRACIN ZINC NEOMYCIN SULFATE POLYMYXIN B SULFATE 1 APPLICATION(S): 400; 3.5; 5 OINTMENT TOPICAL at 17:45

## 2020-01-01 RX ADMIN — PANTOPRAZOLE SODIUM 40 MILLIGRAM(S): 20 TABLET, DELAYED RELEASE ORAL at 12:02

## 2020-01-01 RX ADMIN — Medication 1 MILLIGRAM(S): at 05:17

## 2020-01-01 RX ADMIN — Medication 0.1 MILLIGRAM(S): at 17:48

## 2020-01-01 RX ADMIN — INSULIN GLARGINE 12 UNIT(S): 100 INJECTION, SOLUTION SUBCUTANEOUS at 21:54

## 2020-01-01 RX ADMIN — GABAPENTIN 300 MILLIGRAM(S): 400 CAPSULE ORAL at 21:22

## 2020-01-01 RX ADMIN — GABAPENTIN 300 MILLIGRAM(S): 400 CAPSULE ORAL at 21:39

## 2020-01-01 RX ADMIN — SODIUM CHLORIDE 50 MILLILITER(S): 9 INJECTION INTRAMUSCULAR; INTRAVENOUS; SUBCUTANEOUS at 11:45

## 2020-01-01 RX ADMIN — BACITRACIN ZINC NEOMYCIN SULFATE POLYMYXIN B SULFATE 1 APPLICATION(S): 400; 3.5; 5 OINTMENT TOPICAL at 17:04

## 2020-01-01 RX ADMIN — PANTOPRAZOLE SODIUM 40 MILLIGRAM(S): 20 TABLET, DELAYED RELEASE ORAL at 06:05

## 2020-01-01 RX ADMIN — ENOXAPARIN SODIUM 40 MILLIGRAM(S): 100 INJECTION SUBCUTANEOUS at 05:57

## 2020-01-01 RX ADMIN — PANTOPRAZOLE SODIUM 40 MILLIGRAM(S): 20 TABLET, DELAYED RELEASE ORAL at 08:47

## 2020-01-01 RX ADMIN — POLYETHYLENE GLYCOL 3350 17 GRAM(S): 17 POWDER, FOR SOLUTION ORAL at 21:18

## 2020-01-01 RX ADMIN — Medication 2: at 23:20

## 2020-01-01 RX ADMIN — GABAPENTIN 300 MILLIGRAM(S): 400 CAPSULE ORAL at 21:38

## 2020-01-01 RX ADMIN — Medication 10: at 07:39

## 2020-01-01 RX ADMIN — Medication 65 MILLIGRAM(S): at 16:29

## 2020-01-01 RX ADMIN — OLANZAPINE 10 MILLIGRAM(S): 15 TABLET, FILM COATED ORAL at 17:17

## 2020-01-01 RX ADMIN — Medication 2: at 17:21

## 2020-01-01 RX ADMIN — Medication 60 MILLIGRAM(S): at 00:30

## 2020-01-01 RX ADMIN — Medication 8 UNIT(S): at 12:24

## 2020-01-01 RX ADMIN — INSULIN GLARGINE 10 UNIT(S): 100 INJECTION, SOLUTION SUBCUTANEOUS at 08:52

## 2020-01-01 RX ADMIN — OXYCODONE HYDROCHLORIDE 10 MILLIGRAM(S): 5 TABLET ORAL at 13:34

## 2020-01-01 RX ADMIN — Medication 12: at 16:41

## 2020-01-01 RX ADMIN — Medication 50 MILLIGRAM(S): at 21:56

## 2020-01-01 RX ADMIN — APIXABAN 5 MILLIGRAM(S): 2.5 TABLET, FILM COATED ORAL at 10:06

## 2020-01-01 RX ADMIN — Medication 100 MILLIGRAM(S): at 08:23

## 2020-01-01 RX ADMIN — POLYETHYLENE GLYCOL 3350 17 GRAM(S): 17 POWDER, FOR SOLUTION ORAL at 21:22

## 2020-01-01 RX ADMIN — MEROPENEM 100 MILLIGRAM(S): 1 INJECTION INTRAVENOUS at 04:10

## 2020-01-01 RX ADMIN — PANTOPRAZOLE SODIUM 40 MILLIGRAM(S): 20 TABLET, DELAYED RELEASE ORAL at 11:59

## 2020-01-01 RX ADMIN — Medication 10 UNIT(S): at 07:55

## 2020-01-01 RX ADMIN — SENNA PLUS 2 TABLET(S): 8.6 TABLET ORAL at 21:27

## 2020-01-01 RX ADMIN — Medication 4 UNIT(S): at 23:20

## 2020-01-01 RX ADMIN — GABAPENTIN 300 MILLIGRAM(S): 400 CAPSULE ORAL at 14:34

## 2020-01-01 RX ADMIN — MEROPENEM 100 MILLIGRAM(S): 1 INJECTION INTRAVENOUS at 22:51

## 2020-01-01 RX ADMIN — Medication 2: at 06:03

## 2020-01-01 RX ADMIN — Medication 1 APPLICATION(S): at 13:15

## 2020-01-01 RX ADMIN — Medication 25 MILLIGRAM(S): at 14:32

## 2020-01-01 RX ADMIN — GABAPENTIN 300 MILLIGRAM(S): 400 CAPSULE ORAL at 21:13

## 2020-01-01 RX ADMIN — MIDAZOLAM HYDROCHLORIDE 4 MILLIGRAM(S): 1 INJECTION, SOLUTION INTRAMUSCULAR; INTRAVENOUS at 19:26

## 2020-01-01 RX ADMIN — Medication 100 MILLIGRAM(S): at 21:57

## 2020-01-01 RX ADMIN — MEROPENEM 100 MILLIGRAM(S): 1 INJECTION INTRAVENOUS at 04:11

## 2020-01-01 RX ADMIN — SODIUM CHLORIDE 80 MILLILITER(S): 9 INJECTION INTRAMUSCULAR; INTRAVENOUS; SUBCUTANEOUS at 09:07

## 2020-01-01 RX ADMIN — INSULIN GLARGINE 10 UNIT(S): 100 INJECTION, SOLUTION SUBCUTANEOUS at 23:30

## 2020-01-01 RX ADMIN — Medication 60 MILLIGRAM(S): at 23:11

## 2020-01-01 RX ADMIN — FENTANYL CITRATE 100 MICROGRAM(S): 50 INJECTION INTRAVENOUS at 16:25

## 2020-01-01 RX ADMIN — GABAPENTIN 300 MILLIGRAM(S): 400 CAPSULE ORAL at 23:21

## 2020-01-01 RX ADMIN — CARBAMIDE PEROXIDE 5 DROP(S): 81.86 SOLUTION/ DROPS AURICULAR (OTIC) at 06:57

## 2020-01-01 RX ADMIN — Medication 10 UNIT(S): at 23:34

## 2020-01-01 RX ADMIN — HYDROMORPHONE HYDROCHLORIDE 2 MILLIGRAM(S): 2 INJECTION INTRAMUSCULAR; INTRAVENOUS; SUBCUTANEOUS at 22:24

## 2020-01-01 RX ADMIN — BACITRACIN ZINC NEOMYCIN SULFATE POLYMYXIN B SULFATE 1 APPLICATION(S): 400; 3.5; 5 OINTMENT TOPICAL at 05:16

## 2020-01-01 RX ADMIN — OLANZAPINE 10 MILLIGRAM(S): 15 TABLET, FILM COATED ORAL at 17:30

## 2020-01-01 RX ADMIN — MEROPENEM 100 MILLIGRAM(S): 1 INJECTION INTRAVENOUS at 04:58

## 2020-01-01 RX ADMIN — Medication 0.1 MILLIGRAM(S): at 05:47

## 2020-01-01 RX ADMIN — APIXABAN 5 MILLIGRAM(S): 2.5 TABLET, FILM COATED ORAL at 09:11

## 2020-01-01 RX ADMIN — ENOXAPARIN SODIUM 40 MILLIGRAM(S): 100 INJECTION SUBCUTANEOUS at 06:01

## 2020-01-01 RX ADMIN — ATORVASTATIN CALCIUM 40 MILLIGRAM(S): 80 TABLET, FILM COATED ORAL at 21:12

## 2020-01-01 RX ADMIN — Medication 2: at 12:11

## 2020-01-01 RX ADMIN — NALOXEGOL OXALATE 25 MILLIGRAM(S): 12.5 TABLET, FILM COATED ORAL at 14:23

## 2020-01-01 RX ADMIN — POLYETHYLENE GLYCOL 3350 17 GRAM(S): 17 POWDER, FOR SOLUTION ORAL at 22:13

## 2020-01-01 RX ADMIN — INSULIN HUMAN 10 UNIT(S): 100 INJECTION, SOLUTION SUBCUTANEOUS at 09:14

## 2020-01-01 RX ADMIN — Medication 40 MILLIGRAM(S): at 10:50

## 2020-01-01 RX ADMIN — MEROPENEM 100 MILLIGRAM(S): 1 INJECTION INTRAVENOUS at 21:55

## 2020-01-01 RX ADMIN — Medication 4: at 12:03

## 2020-01-01 RX ADMIN — Medication 2 MILLIGRAM(S): at 16:59

## 2020-01-01 RX ADMIN — Medication 40 MILLIGRAM(S): at 10:34

## 2020-01-01 RX ADMIN — Medication 100 MILLIGRAM(S): at 06:34

## 2020-01-01 RX ADMIN — Medication 10 MILLIGRAM(S): at 14:54

## 2020-01-01 RX ADMIN — BACITRACIN AND POLYMYXIN B SULFATE 1 APPLICATION(S): 500; 10000 OINTMENT TOPICAL at 17:22

## 2020-01-01 RX ADMIN — OXYCODONE HYDROCHLORIDE 10 MILLIGRAM(S): 5 TABLET ORAL at 13:00

## 2020-01-01 RX ADMIN — Medication 5 MILLIGRAM(S): at 20:59

## 2020-01-01 RX ADMIN — BACITRACIN ZINC NEOMYCIN SULFATE POLYMYXIN B SULFATE 1 APPLICATION(S): 400; 3.5; 5 OINTMENT TOPICAL at 05:13

## 2020-01-01 RX ADMIN — GABAPENTIN 300 MILLIGRAM(S): 400 CAPSULE ORAL at 04:01

## 2020-01-01 RX ADMIN — Medication 1 MILLIGRAM(S): at 21:29

## 2020-01-01 RX ADMIN — Medication 100 MILLIGRAM(S): at 09:58

## 2020-01-01 RX ADMIN — Medication 25 MILLIGRAM(S): at 17:45

## 2020-01-01 RX ADMIN — Medication 4 UNIT(S): at 05:41

## 2020-01-01 RX ADMIN — Medication 100 MILLIGRAM(S): at 04:10

## 2020-01-01 RX ADMIN — CARBAMIDE PEROXIDE 5 DROP(S): 81.86 SOLUTION/ DROPS AURICULAR (OTIC) at 05:45

## 2020-01-01 RX ADMIN — PANTOPRAZOLE SODIUM 40 MILLIGRAM(S): 20 TABLET, DELAYED RELEASE ORAL at 05:48

## 2020-01-01 RX ADMIN — Medication 50 GRAM(S): at 09:11

## 2020-01-01 RX ADMIN — Medication 1 MILLIGRAM(S): at 13:34

## 2020-01-01 RX ADMIN — INSULIN GLARGINE 12 UNIT(S): 100 INJECTION, SOLUTION SUBCUTANEOUS at 22:23

## 2020-01-01 RX ADMIN — Medication 4 UNIT(S): at 11:57

## 2020-01-01 RX ADMIN — Medication 6: at 16:55

## 2020-01-01 RX ADMIN — Medication 10 MILLIGRAM(S): at 05:47

## 2020-01-01 RX ADMIN — MEROPENEM 100 MILLIGRAM(S): 1 INJECTION INTRAVENOUS at 12:40

## 2020-01-01 RX ADMIN — CHLORHEXIDINE GLUCONATE 15 MILLILITER(S): 213 SOLUTION TOPICAL at 17:51

## 2020-01-01 RX ADMIN — GABAPENTIN 300 MILLIGRAM(S): 400 CAPSULE ORAL at 16:29

## 2020-01-01 RX ADMIN — Medication 100 MILLIGRAM(S): at 22:28

## 2020-01-01 RX ADMIN — INSULIN GLARGINE 12 UNIT(S): 100 INJECTION, SOLUTION SUBCUTANEOUS at 12:01

## 2020-01-01 RX ADMIN — Medication 75 MILLIGRAM(S): at 21:22

## 2020-01-01 RX ADMIN — ENOXAPARIN SODIUM 40 MILLIGRAM(S): 100 INJECTION SUBCUTANEOUS at 04:22

## 2020-01-01 RX ADMIN — MIDAZOLAM HYDROCHLORIDE 1.32 MG/KG/HR: 1 INJECTION, SOLUTION INTRAMUSCULAR; INTRAVENOUS at 06:26

## 2020-01-01 RX ADMIN — ENOXAPARIN SODIUM 40 MILLIGRAM(S): 100 INJECTION SUBCUTANEOUS at 17:42

## 2020-01-01 RX ADMIN — KETAMINE HYDROCHLORIDE 0.86 MG/KG/HR: 100 INJECTION INTRAMUSCULAR; INTRAVENOUS at 01:46

## 2020-01-01 RX ADMIN — ENOXAPARIN SODIUM 40 MILLIGRAM(S): 100 INJECTION SUBCUTANEOUS at 17:19

## 2020-01-01 RX ADMIN — CHLORHEXIDINE GLUCONATE 1 APPLICATION(S): 213 SOLUTION TOPICAL at 06:22

## 2020-01-01 RX ADMIN — FENTANYL CITRATE 3.29 MICROGRAM(S)/KG/HR: 50 INJECTION INTRAVENOUS at 11:37

## 2020-01-01 RX ADMIN — Medication 6 MILLIGRAM(S): at 21:57

## 2020-01-01 RX ADMIN — Medication 0.1 MILLIGRAM(S): at 20:13

## 2020-01-01 RX ADMIN — CHLORHEXIDINE GLUCONATE 1 APPLICATION(S): 213 SOLUTION TOPICAL at 05:38

## 2020-01-01 RX ADMIN — SENNA PLUS 2 TABLET(S): 8.6 TABLET ORAL at 21:08

## 2020-01-01 RX ADMIN — Medication 65 MILLIGRAM(S): at 05:00

## 2020-01-01 RX ADMIN — APIXABAN 5 MILLIGRAM(S): 2.5 TABLET, FILM COATED ORAL at 21:18

## 2020-01-01 RX ADMIN — ATORVASTATIN CALCIUM 40 MILLIGRAM(S): 80 TABLET, FILM COATED ORAL at 22:07

## 2020-01-01 RX ADMIN — TAMSULOSIN HYDROCHLORIDE 0.4 MILLIGRAM(S): 0.4 CAPSULE ORAL at 21:57

## 2020-01-01 RX ADMIN — GABAPENTIN 300 MILLIGRAM(S): 400 CAPSULE ORAL at 12:08

## 2020-01-01 RX ADMIN — BACITRACIN ZINC NEOMYCIN SULFATE POLYMYXIN B SULFATE 1 APPLICATION(S): 400; 3.5; 5 OINTMENT TOPICAL at 17:16

## 2020-01-01 RX ADMIN — CHLORHEXIDINE GLUCONATE 15 MILLILITER(S): 213 SOLUTION TOPICAL at 16:26

## 2020-01-01 RX ADMIN — TAMSULOSIN HYDROCHLORIDE 0.4 MILLIGRAM(S): 0.4 CAPSULE ORAL at 23:31

## 2020-01-01 RX ADMIN — Medication 1 MILLIGRAM(S): at 13:04

## 2020-01-01 RX ADMIN — APIXABAN 5 MILLIGRAM(S): 2.5 TABLET, FILM COATED ORAL at 10:40

## 2020-01-01 RX ADMIN — Medication 2: at 05:12

## 2020-01-01 RX ADMIN — APIXABAN 5 MILLIGRAM(S): 2.5 TABLET, FILM COATED ORAL at 23:04

## 2020-01-01 RX ADMIN — CHLORHEXIDINE GLUCONATE 15 MILLILITER(S): 213 SOLUTION TOPICAL at 17:08

## 2020-01-01 RX ADMIN — LISINOPRIL 5 MILLIGRAM(S): 2.5 TABLET ORAL at 06:05

## 2020-01-01 RX ADMIN — Medication 6: at 11:38

## 2020-01-01 RX ADMIN — Medication 3 UNIT(S): at 08:03

## 2020-01-01 RX ADMIN — Medication 100 MILLIGRAM(S): at 13:22

## 2020-01-01 RX ADMIN — Medication 20 MILLIGRAM(S): at 09:11

## 2020-01-01 RX ADMIN — CARBAMIDE PEROXIDE 5 DROP(S): 81.86 SOLUTION/ DROPS AURICULAR (OTIC) at 18:46

## 2020-01-01 RX ADMIN — OXYCODONE HYDROCHLORIDE 10 MILLIGRAM(S): 5 TABLET ORAL at 23:01

## 2020-01-01 RX ADMIN — Medication 6: at 21:00

## 2020-01-01 RX ADMIN — APIXABAN 5 MILLIGRAM(S): 2.5 TABLET, FILM COATED ORAL at 08:52

## 2020-01-01 RX ADMIN — Medication 4 UNIT(S): at 05:10

## 2020-01-01 RX ADMIN — Medication 400 MILLIGRAM(S): at 16:40

## 2020-01-01 RX ADMIN — Medication 4 UNIT(S): at 10:50

## 2020-01-01 RX ADMIN — Medication 10 MILLIGRAM(S): at 10:27

## 2020-01-01 RX ADMIN — APIXABAN 5 MILLIGRAM(S): 2.5 TABLET, FILM COATED ORAL at 22:47

## 2020-01-01 RX ADMIN — INSULIN GLARGINE 12 UNIT(S): 100 INJECTION, SOLUTION SUBCUTANEOUS at 22:30

## 2020-01-01 RX ADMIN — DEXMEDETOMIDINE HYDROCHLORIDE IN 0.9% SODIUM CHLORIDE 8.23 MICROGRAM(S)/KG/HR: 4 INJECTION INTRAVENOUS at 00:52

## 2020-01-01 RX ADMIN — OLANZAPINE 10 MILLIGRAM(S): 15 TABLET, FILM COATED ORAL at 04:40

## 2020-01-01 RX ADMIN — AZITHROMYCIN 255 MILLIGRAM(S): 500 TABLET, FILM COATED ORAL at 13:15

## 2020-01-01 RX ADMIN — CHLORHEXIDINE GLUCONATE 1 APPLICATION(S): 213 SOLUTION TOPICAL at 04:56

## 2020-01-01 RX ADMIN — Medication 4 UNIT(S): at 17:22

## 2020-01-01 RX ADMIN — MAGNESIUM OXIDE 400 MG ORAL TABLET 400 MILLIGRAM(S): 241.3 TABLET ORAL at 17:13

## 2020-01-01 RX ADMIN — Medication 2: at 17:34

## 2020-01-01 RX ADMIN — Medication 60 MILLIGRAM(S): at 12:39

## 2020-01-01 RX ADMIN — Medication 10 MILLIGRAM(S): at 16:05

## 2020-01-01 RX ADMIN — CHLORHEXIDINE GLUCONATE 15 MILLILITER(S): 213 SOLUTION TOPICAL at 16:51

## 2020-01-01 RX ADMIN — OXYCODONE HYDROCHLORIDE 10 MILLIGRAM(S): 5 TABLET ORAL at 12:05

## 2020-01-01 RX ADMIN — OLANZAPINE 10 MILLIGRAM(S): 15 TABLET, FILM COATED ORAL at 22:43

## 2020-01-01 RX ADMIN — Medication 2: at 05:13

## 2020-01-01 RX ADMIN — OXYCODONE HYDROCHLORIDE 10 MILLIGRAM(S): 5 TABLET ORAL at 12:44

## 2020-01-01 RX ADMIN — MEROPENEM 100 MILLIGRAM(S): 1 INJECTION INTRAVENOUS at 12:33

## 2020-01-01 RX ADMIN — Medication 4 UNIT(S): at 12:37

## 2020-01-01 RX ADMIN — MAGNESIUM OXIDE 400 MG ORAL TABLET 400 MILLIGRAM(S): 241.3 TABLET ORAL at 17:59

## 2020-01-01 RX ADMIN — APIXABAN 5 MILLIGRAM(S): 2.5 TABLET, FILM COATED ORAL at 21:22

## 2020-01-01 RX ADMIN — Medication 40 MILLIGRAM(S): at 06:33

## 2020-01-01 RX ADMIN — TAMSULOSIN HYDROCHLORIDE 0.4 MILLIGRAM(S): 0.4 CAPSULE ORAL at 22:28

## 2020-01-01 RX ADMIN — APIXABAN 5 MILLIGRAM(S): 2.5 TABLET, FILM COATED ORAL at 22:03

## 2020-01-01 RX ADMIN — Medication 6 MILLIGRAM(S): at 21:45

## 2020-01-01 RX ADMIN — Medication 100 MILLIGRAM(S): at 14:32

## 2020-01-01 RX ADMIN — Medication 6 UNIT(S): at 16:30

## 2020-01-01 RX ADMIN — Medication 60 MILLIGRAM(S): at 05:00

## 2020-01-01 RX ADMIN — ENOXAPARIN SODIUM 40 MILLIGRAM(S): 100 INJECTION SUBCUTANEOUS at 05:01

## 2020-01-01 RX ADMIN — Medication 4 UNIT(S): at 23:38

## 2020-01-01 RX ADMIN — Medication 25 MILLIGRAM(S): at 21:13

## 2020-01-01 RX ADMIN — PANTOPRAZOLE SODIUM 40 MILLIGRAM(S): 20 TABLET, DELAYED RELEASE ORAL at 04:10

## 2020-01-01 RX ADMIN — Medication 100 MILLIGRAM(S): at 21:40

## 2020-01-01 RX ADMIN — Medication 100 MILLIGRAM(S): at 04:03

## 2020-01-01 RX ADMIN — PANTOPRAZOLE SODIUM 40 MILLIGRAM(S): 20 TABLET, DELAYED RELEASE ORAL at 11:58

## 2020-01-01 RX ADMIN — Medication 2: at 11:41

## 2020-01-01 RX ADMIN — INSULIN GLARGINE 12 UNIT(S): 100 INJECTION, SOLUTION SUBCUTANEOUS at 23:56

## 2020-01-01 RX ADMIN — GABAPENTIN 300 MILLIGRAM(S): 400 CAPSULE ORAL at 23:11

## 2020-01-01 RX ADMIN — INSULIN GLARGINE 12 UNIT(S): 100 INJECTION, SOLUTION SUBCUTANEOUS at 22:50

## 2020-01-01 RX ADMIN — GABAPENTIN 300 MILLIGRAM(S): 400 CAPSULE ORAL at 13:34

## 2020-01-01 RX ADMIN — PIPERACILLIN AND TAZOBACTAM 25 GRAM(S): 4; .5 INJECTION, POWDER, LYOPHILIZED, FOR SOLUTION INTRAVENOUS at 05:00

## 2020-01-01 RX ADMIN — Medication 4 UNIT(S): at 11:02

## 2020-01-01 RX ADMIN — APIXABAN 5 MILLIGRAM(S): 2.5 TABLET, FILM COATED ORAL at 22:07

## 2020-01-01 RX ADMIN — GABAPENTIN 300 MILLIGRAM(S): 400 CAPSULE ORAL at 12:00

## 2020-01-01 RX ADMIN — Medication 75 MILLIGRAM(S): at 22:27

## 2020-01-01 RX ADMIN — Medication 0: at 23:29

## 2020-01-01 RX ADMIN — CHLORHEXIDINE GLUCONATE 15 MILLILITER(S): 213 SOLUTION TOPICAL at 05:11

## 2020-01-01 RX ADMIN — Medication 60 MILLIGRAM(S): at 04:22

## 2020-01-01 RX ADMIN — HYDROMORPHONE HYDROCHLORIDE 2 MILLIGRAM(S): 2 INJECTION INTRAMUSCULAR; INTRAVENOUS; SUBCUTANEOUS at 13:10

## 2020-01-01 RX ADMIN — Medication 100 MILLIGRAM(S): at 04:00

## 2020-01-01 RX ADMIN — Medication 1 MILLIGRAM(S): at 22:12

## 2020-01-01 RX ADMIN — INSULIN GLARGINE 18 UNIT(S): 100 INJECTION, SOLUTION SUBCUTANEOUS at 21:22

## 2020-01-01 RX ADMIN — INSULIN GLARGINE 10 UNIT(S): 100 INJECTION, SOLUTION SUBCUTANEOUS at 21:40

## 2020-01-01 RX ADMIN — OLANZAPINE 10 MILLIGRAM(S): 15 TABLET, FILM COATED ORAL at 06:18

## 2020-01-01 RX ADMIN — Medication 100 MILLIGRAM(S): at 23:30

## 2020-01-01 RX ADMIN — BACITRACIN ZINC NEOMYCIN SULFATE POLYMYXIN B SULFATE 1 APPLICATION(S): 400; 3.5; 5 OINTMENT TOPICAL at 06:05

## 2020-01-01 RX ADMIN — Medication 10 UNIT(S): at 16:55

## 2020-01-01 RX ADMIN — Medication 25 MILLIGRAM(S): at 11:46

## 2020-01-01 RX ADMIN — INSULIN GLARGINE 10 UNIT(S): 100 INJECTION, SOLUTION SUBCUTANEOUS at 08:40

## 2020-01-01 RX ADMIN — Medication 650 MILLIGRAM(S): at 06:06

## 2020-01-01 RX ADMIN — PANTOPRAZOLE SODIUM 40 MILLIGRAM(S): 20 TABLET, DELAYED RELEASE ORAL at 11:02

## 2020-01-01 RX ADMIN — Medication 2: at 22:36

## 2020-01-01 RX ADMIN — Medication 4 UNIT(S): at 06:04

## 2020-01-01 RX ADMIN — Medication 1 APPLICATION(S): at 06:57

## 2020-01-01 RX ADMIN — Medication 100 MILLIGRAM(S): at 17:36

## 2020-01-01 RX ADMIN — Medication 25 MILLIGRAM(S): at 05:57

## 2020-01-01 RX ADMIN — MEROPENEM 100 MILLIGRAM(S): 1 INJECTION INTRAVENOUS at 05:48

## 2020-01-01 RX ADMIN — Medication 10 MILLIGRAM(S): at 05:03

## 2020-01-01 RX ADMIN — ENOXAPARIN SODIUM 40 MILLIGRAM(S): 100 INJECTION SUBCUTANEOUS at 17:48

## 2020-01-01 RX ADMIN — INSULIN GLARGINE 10 UNIT(S): 100 INJECTION, SOLUTION SUBCUTANEOUS at 12:13

## 2020-01-01 RX ADMIN — SODIUM CHLORIDE 1000 MILLILITER(S): 9 INJECTION INTRAMUSCULAR; INTRAVENOUS; SUBCUTANEOUS at 12:15

## 2020-01-01 RX ADMIN — Medication 7 UNIT(S): at 08:16

## 2020-01-01 RX ADMIN — Medication 100 MILLIGRAM(S): at 05:57

## 2020-01-01 RX ADMIN — Medication 1 MILLIGRAM(S): at 23:36

## 2020-01-01 RX ADMIN — INSULIN GLARGINE 12 UNIT(S): 100 INJECTION, SOLUTION SUBCUTANEOUS at 00:08

## 2020-01-01 RX ADMIN — Medication 25 MILLIGRAM(S): at 20:57

## 2020-01-01 RX ADMIN — Medication 50 MILLILITER(S): at 17:08

## 2020-01-01 RX ADMIN — MIDAZOLAM HYDROCHLORIDE 4 MILLIGRAM(S): 1 INJECTION, SOLUTION INTRAMUSCULAR; INTRAVENOUS at 16:34

## 2020-01-01 RX ADMIN — INSULIN GLARGINE 25 UNIT(S): 100 INJECTION, SOLUTION SUBCUTANEOUS at 21:55

## 2020-01-01 RX ADMIN — MEROPENEM 100 MILLIGRAM(S): 1 INJECTION INTRAVENOUS at 22:19

## 2020-01-01 RX ADMIN — PANTOPRAZOLE SODIUM 40 MILLIGRAM(S): 20 TABLET, DELAYED RELEASE ORAL at 04:30

## 2020-01-01 RX ADMIN — Medication 0.5 MILLIGRAM(S): at 05:50

## 2020-01-01 RX ADMIN — MEROPENEM 100 MILLIGRAM(S): 1 INJECTION INTRAVENOUS at 14:32

## 2020-01-01 RX ADMIN — Medication 100 MILLIGRAM(S): at 21:45

## 2020-01-01 RX ADMIN — GABAPENTIN 300 MILLIGRAM(S): 400 CAPSULE ORAL at 20:57

## 2020-01-01 RX ADMIN — BACITRACIN ZINC NEOMYCIN SULFATE POLYMYXIN B SULFATE 1 APPLICATION(S): 400; 3.5; 5 OINTMENT TOPICAL at 18:38

## 2020-01-01 RX ADMIN — Medication 10 MILLIGRAM(S): at 12:09

## 2020-01-01 RX ADMIN — GABAPENTIN 300 MILLIGRAM(S): 400 CAPSULE ORAL at 23:29

## 2020-01-01 RX ADMIN — Medication 0: at 11:57

## 2020-01-01 RX ADMIN — ATORVASTATIN CALCIUM 40 MILLIGRAM(S): 80 TABLET, FILM COATED ORAL at 22:48

## 2020-01-01 RX ADMIN — CHLORHEXIDINE GLUCONATE 15 MILLILITER(S): 213 SOLUTION TOPICAL at 04:56

## 2020-01-01 RX ADMIN — POLYETHYLENE GLYCOL 3350 17 GRAM(S): 17 POWDER, FOR SOLUTION ORAL at 21:27

## 2020-01-01 RX ADMIN — Medication 1500 MILLIGRAM(S): at 12:09

## 2020-01-01 RX ADMIN — Medication 10 MILLIGRAM(S): at 19:57

## 2020-01-01 RX ADMIN — Medication 100 MILLIGRAM(S): at 06:05

## 2020-01-01 RX ADMIN — ENOXAPARIN SODIUM 40 MILLIGRAM(S): 100 INJECTION SUBCUTANEOUS at 16:58

## 2020-01-01 RX ADMIN — INSULIN GLARGINE 8 UNIT(S): 100 INJECTION, SOLUTION SUBCUTANEOUS at 08:28

## 2020-01-01 RX ADMIN — SENNA PLUS 2 TABLET(S): 8.6 TABLET ORAL at 22:47

## 2020-01-01 RX ADMIN — OLANZAPINE 10 MILLIGRAM(S): 15 TABLET, FILM COATED ORAL at 16:16

## 2020-01-01 RX ADMIN — Medication 200 MILLIGRAM(S): at 16:29

## 2020-01-01 RX ADMIN — Medication 10: at 08:22

## 2020-01-01 RX ADMIN — GABAPENTIN 300 MILLIGRAM(S): 400 CAPSULE ORAL at 04:10

## 2020-01-01 RX ADMIN — GABAPENTIN 300 MILLIGRAM(S): 400 CAPSULE ORAL at 13:21

## 2020-01-01 RX ADMIN — ENOXAPARIN SODIUM 40 MILLIGRAM(S): 100 INJECTION SUBCUTANEOUS at 06:04

## 2020-01-01 RX ADMIN — PANTOPRAZOLE SODIUM 40 MILLIGRAM(S): 20 TABLET, DELAYED RELEASE ORAL at 17:09

## 2020-01-01 RX ADMIN — Medication 4 UNIT(S): at 17:35

## 2020-01-01 RX ADMIN — Medication 10: at 12:09

## 2020-01-01 RX ADMIN — Medication 100 MILLIGRAM(S): at 21:12

## 2020-01-01 RX ADMIN — INSULIN GLARGINE 25 UNIT(S): 100 INJECTION, SOLUTION SUBCUTANEOUS at 22:23

## 2020-01-01 RX ADMIN — Medication 2: at 12:00

## 2020-01-01 RX ADMIN — PANTOPRAZOLE SODIUM 40 MILLIGRAM(S): 20 TABLET, DELAYED RELEASE ORAL at 09:11

## 2020-01-01 RX ADMIN — Medication 100 GRAM(S): at 09:13

## 2020-01-01 RX ADMIN — Medication 50 MILLIGRAM(S): at 16:22

## 2020-01-01 RX ADMIN — OXYCODONE HYDROCHLORIDE 5 MILLIGRAM(S): 5 TABLET ORAL at 13:13

## 2020-01-01 RX ADMIN — OXYCODONE HYDROCHLORIDE 5 MILLIGRAM(S): 5 TABLET ORAL at 16:26

## 2020-01-01 RX ADMIN — Medication 1 MILLIGRAM(S): at 14:29

## 2020-01-01 RX ADMIN — Medication 1 MILLIGRAM(S): at 00:08

## 2020-01-01 RX ADMIN — SODIUM CHLORIDE 1000 MILLILITER(S): 9 INJECTION INTRAMUSCULAR; INTRAVENOUS; SUBCUTANEOUS at 16:56

## 2020-01-01 RX ADMIN — ENOXAPARIN SODIUM 40 MILLIGRAM(S): 100 INJECTION SUBCUTANEOUS at 17:51

## 2020-01-01 RX ADMIN — Medication 10 UNIT(S): at 16:40

## 2020-01-01 RX ADMIN — Medication 5 UNIT(S): at 07:28

## 2020-01-01 RX ADMIN — GABAPENTIN 300 MILLIGRAM(S): 400 CAPSULE ORAL at 10:46

## 2020-01-01 RX ADMIN — Medication 5 UNIT(S): at 08:25

## 2020-01-01 RX ADMIN — AMLODIPINE BESYLATE 10 MILLIGRAM(S): 2.5 TABLET ORAL at 21:56

## 2020-01-01 RX ADMIN — TOCILIZUMAB 100 MILLIGRAM(S): 20 INJECTION, SOLUTION, CONCENTRATE INTRAVENOUS at 18:30

## 2020-01-01 RX ADMIN — SENNA PLUS 2 TABLET(S): 8.6 TABLET ORAL at 21:18

## 2020-01-01 RX ADMIN — INSULIN GLARGINE 12 UNIT(S): 100 INJECTION, SOLUTION SUBCUTANEOUS at 08:47

## 2020-01-01 RX ADMIN — Medication 100 MILLIGRAM(S): at 13:32

## 2020-01-01 RX ADMIN — Medication 6.32 MICROGRAM(S)/KG/MIN: at 06:25

## 2020-01-01 RX ADMIN — Medication 5 MILLIGRAM(S): at 21:31

## 2020-01-01 RX ADMIN — DEXMEDETOMIDINE HYDROCHLORIDE IN 0.9% SODIUM CHLORIDE 8.23 MICROGRAM(S)/KG/HR: 4 INJECTION INTRAVENOUS at 23:26

## 2020-01-01 RX ADMIN — Medication 4 UNIT(S): at 23:12

## 2020-01-01 RX ADMIN — Medication 6: at 13:22

## 2020-01-01 RX ADMIN — PANTOPRAZOLE SODIUM 40 MILLIGRAM(S): 20 TABLET, DELAYED RELEASE ORAL at 13:04

## 2020-01-01 RX ADMIN — Medication 4: at 17:12

## 2020-01-01 RX ADMIN — ATORVASTATIN CALCIUM 40 MILLIGRAM(S): 80 TABLET, FILM COATED ORAL at 21:27

## 2020-01-01 RX ADMIN — Medication 4 UNIT(S): at 12:00

## 2020-01-01 RX ADMIN — Medication 100 MILLIEQUIVALENT(S): at 08:34

## 2020-01-01 RX ADMIN — Medication 1 APPLICATION(S): at 06:33

## 2020-01-01 RX ADMIN — OXYCODONE HYDROCHLORIDE 5 MILLIGRAM(S): 5 TABLET ORAL at 06:13

## 2020-01-01 RX ADMIN — Medication 50 GRAM(S): at 11:45

## 2020-01-01 RX ADMIN — Medication 100 MILLIGRAM(S): at 21:22

## 2020-01-01 RX ADMIN — Medication 10 UNIT(S): at 20:45

## 2020-01-01 RX ADMIN — BACITRACIN ZINC NEOMYCIN SULFATE POLYMYXIN B SULFATE 1 APPLICATION(S): 400; 3.5; 5 OINTMENT TOPICAL at 04:13

## 2020-01-01 RX ADMIN — APIXABAN 5 MILLIGRAM(S): 2.5 TABLET, FILM COATED ORAL at 08:31

## 2020-01-01 RX ADMIN — ATORVASTATIN CALCIUM 40 MILLIGRAM(S): 80 TABLET, FILM COATED ORAL at 21:55

## 2020-01-01 RX ADMIN — Medication 100 MILLIGRAM(S): at 14:23

## 2020-01-01 RX ADMIN — FENTANYL CITRATE 3.29 MICROGRAM(S)/KG/HR: 50 INJECTION INTRAVENOUS at 23:29

## 2020-01-01 RX ADMIN — CARBAMIDE PEROXIDE 5 DROP(S): 81.86 SOLUTION/ DROPS AURICULAR (OTIC) at 17:59

## 2020-01-01 RX ADMIN — Medication 0.1 MILLIGRAM(S): at 17:49

## 2020-01-01 RX ADMIN — Medication 1 MILLIGRAM(S): at 23:00

## 2020-01-01 RX ADMIN — Medication 10 MILLIGRAM(S): at 18:34

## 2020-01-01 RX ADMIN — Medication 50 MILLILITER(S): at 09:14

## 2020-01-01 RX ADMIN — Medication 2: at 11:04

## 2020-01-01 RX ADMIN — CHLORHEXIDINE GLUCONATE 15 MILLILITER(S): 213 SOLUTION TOPICAL at 05:08

## 2020-01-01 RX ADMIN — Medication 100 MILLIGRAM(S): at 17:59

## 2020-01-01 RX ADMIN — ALBUTEROL 2 PUFF(S): 90 AEROSOL, METERED ORAL at 12:14

## 2020-01-01 RX ADMIN — Medication 1500 MILLIGRAM(S): at 12:19

## 2020-01-01 RX ADMIN — CHLORHEXIDINE GLUCONATE 1 APPLICATION(S): 213 SOLUTION TOPICAL at 05:08

## 2020-01-01 RX ADMIN — LISINOPRIL 5 MILLIGRAM(S): 2.5 TABLET ORAL at 06:59

## 2020-01-01 RX ADMIN — Medication 400 MILLIGRAM(S): at 21:09

## 2020-01-01 RX ADMIN — Medication 100 MILLIGRAM(S): at 04:11

## 2020-01-01 RX ADMIN — GABAPENTIN 300 MILLIGRAM(S): 400 CAPSULE ORAL at 05:34

## 2020-01-01 RX ADMIN — FENTANYL CITRATE 3.29 MICROGRAM(S)/KG/HR: 50 INJECTION INTRAVENOUS at 08:53

## 2020-01-01 RX ADMIN — MIDAZOLAM HYDROCHLORIDE 4 MILLIGRAM(S): 1 INJECTION, SOLUTION INTRAMUSCULAR; INTRAVENOUS at 04:15

## 2020-01-01 RX ADMIN — Medication 100 MILLIGRAM(S): at 13:21

## 2020-01-01 RX ADMIN — MEROPENEM 100 MILLIGRAM(S): 1 INJECTION INTRAVENOUS at 14:14

## 2020-01-01 RX ADMIN — Medication 4 UNIT(S): at 22:36

## 2020-01-01 RX ADMIN — Medication 250 MILLIGRAM(S): at 06:59

## 2020-01-01 RX ADMIN — Medication 4 UNIT(S): at 16:32

## 2020-01-01 RX ADMIN — Medication 100 MILLIGRAM(S): at 12:25

## 2020-01-01 RX ADMIN — Medication 100 MILLIGRAM(S): at 04:01

## 2020-01-01 RX ADMIN — APIXABAN 5 MILLIGRAM(S): 2.5 TABLET, FILM COATED ORAL at 12:42

## 2020-01-01 RX ADMIN — Medication 40 MILLIGRAM(S): at 10:40

## 2020-01-01 RX ADMIN — Medication 2: at 17:49

## 2020-01-01 RX ADMIN — Medication 100 MILLIGRAM(S): at 21:31

## 2020-01-01 RX ADMIN — TAMSULOSIN HYDROCHLORIDE 0.4 MILLIGRAM(S): 0.4 CAPSULE ORAL at 21:46

## 2020-01-01 RX ADMIN — Medication 200 MILLIGRAM(S): at 21:13

## 2020-01-01 RX ADMIN — Medication 10 MILLIGRAM(S): at 16:59

## 2020-01-01 RX ADMIN — POLYETHYLENE GLYCOL 3350 17 GRAM(S): 17 POWDER, FOR SOLUTION ORAL at 22:07

## 2020-01-01 RX ADMIN — OXYCODONE HYDROCHLORIDE 5 MILLIGRAM(S): 5 TABLET ORAL at 22:30

## 2020-01-01 RX ADMIN — CHLORHEXIDINE GLUCONATE 15 MILLILITER(S): 213 SOLUTION TOPICAL at 04:40

## 2020-01-01 RX ADMIN — ENOXAPARIN SODIUM 40 MILLIGRAM(S): 100 INJECTION SUBCUTANEOUS at 17:08

## 2020-01-01 RX ADMIN — Medication 10 MILLIGRAM(S): at 00:30

## 2020-01-01 RX ADMIN — INSULIN GLARGINE 12 UNIT(S): 100 INJECTION, SOLUTION SUBCUTANEOUS at 11:57

## 2020-01-01 RX ADMIN — Medication 0.1 MILLIGRAM(S): at 19:37

## 2020-01-01 RX ADMIN — APIXABAN 5 MILLIGRAM(S): 2.5 TABLET, FILM COATED ORAL at 12:00

## 2020-01-01 RX ADMIN — CARBAMIDE PEROXIDE 5 DROP(S): 81.86 SOLUTION/ DROPS AURICULAR (OTIC) at 06:04

## 2020-01-01 RX ADMIN — OXYCODONE HYDROCHLORIDE 10 MILLIGRAM(S): 5 TABLET ORAL at 05:06

## 2020-01-01 RX ADMIN — HALOPERIDOL DECANOATE 5 MILLIGRAM(S): 100 INJECTION INTRAMUSCULAR at 17:27

## 2020-01-01 RX ADMIN — BACITRACIN ZINC NEOMYCIN SULFATE POLYMYXIN B SULFATE 1 APPLICATION(S): 400; 3.5; 5 OINTMENT TOPICAL at 06:19

## 2020-01-01 RX ADMIN — MAGNESIUM OXIDE 400 MG ORAL TABLET 400 MILLIGRAM(S): 241.3 TABLET ORAL at 09:40

## 2020-01-01 RX ADMIN — INSULIN GLARGINE 8 UNIT(S): 100 INJECTION, SOLUTION SUBCUTANEOUS at 23:01

## 2020-01-01 RX ADMIN — Medication 10 UNIT(S): at 12:20

## 2020-01-01 RX ADMIN — CHLORHEXIDINE GLUCONATE 15 MILLILITER(S): 213 SOLUTION TOPICAL at 05:12

## 2020-01-01 RX ADMIN — INSULIN GLARGINE 15 UNIT(S): 100 INJECTION, SOLUTION SUBCUTANEOUS at 21:56

## 2020-01-01 RX ADMIN — Medication 100 MILLIGRAM(S): at 05:48

## 2020-01-01 RX ADMIN — BACITRACIN ZINC NEOMYCIN SULFATE POLYMYXIN B SULFATE 1 APPLICATION(S): 400; 3.5; 5 OINTMENT TOPICAL at 04:31

## 2020-01-01 RX ADMIN — SODIUM CHLORIDE 50 MILLILITER(S): 9 INJECTION INTRAMUSCULAR; INTRAVENOUS; SUBCUTANEOUS at 04:58

## 2020-01-01 RX ADMIN — BACITRACIN ZINC NEOMYCIN SULFATE POLYMYXIN B SULFATE 1 APPLICATION(S): 400; 3.5; 5 OINTMENT TOPICAL at 16:16

## 2020-01-01 RX ADMIN — Medication 40 MILLIGRAM(S): at 23:55

## 2020-01-01 RX ADMIN — Medication 100 MILLIGRAM(S): at 13:15

## 2020-01-01 RX ADMIN — Medication 75 MILLIGRAM(S): at 12:00

## 2020-01-01 RX ADMIN — Medication 4: at 17:13

## 2020-01-01 RX ADMIN — ENOXAPARIN SODIUM 40 MILLIGRAM(S): 100 INJECTION SUBCUTANEOUS at 17:49

## 2020-01-01 RX ADMIN — GABAPENTIN 300 MILLIGRAM(S): 400 CAPSULE ORAL at 05:07

## 2020-01-01 RX ADMIN — OLANZAPINE 10 MILLIGRAM(S): 15 TABLET, FILM COATED ORAL at 05:06

## 2020-01-01 RX ADMIN — MEROPENEM 100 MILLIGRAM(S): 1 INJECTION INTRAVENOUS at 21:37

## 2020-01-01 RX ADMIN — Medication 4: at 06:17

## 2020-01-01 RX ADMIN — Medication 65 MILLIGRAM(S): at 16:36

## 2020-01-01 RX ADMIN — SODIUM CHLORIDE 80 MILLILITER(S): 9 INJECTION INTRAMUSCULAR; INTRAVENOUS; SUBCUTANEOUS at 04:02

## 2020-01-01 RX ADMIN — Medication 60 MILLIGRAM(S): at 05:35

## 2020-01-01 RX ADMIN — PIPERACILLIN AND TAZOBACTAM 25 GRAM(S): 4; .5 INJECTION, POWDER, LYOPHILIZED, FOR SOLUTION INTRAVENOUS at 06:14

## 2020-01-01 RX ADMIN — DEXMEDETOMIDINE HYDROCHLORIDE IN 0.9% SODIUM CHLORIDE 6.58 MICROGRAM(S)/KG/HR: 4 INJECTION INTRAVENOUS at 20:45

## 2020-01-01 RX ADMIN — Medication 4 UNIT(S): at 17:27

## 2020-01-01 RX ADMIN — Medication 3.08 MICROGRAM(S)/KG/MIN: at 05:37

## 2020-01-01 RX ADMIN — ENOXAPARIN SODIUM 40 MILLIGRAM(S): 100 INJECTION SUBCUTANEOUS at 06:58

## 2020-01-01 RX ADMIN — MAGNESIUM OXIDE 400 MG ORAL TABLET 400 MILLIGRAM(S): 241.3 TABLET ORAL at 17:19

## 2020-01-01 RX ADMIN — ENOXAPARIN SODIUM 40 MILLIGRAM(S): 100 INJECTION SUBCUTANEOUS at 17:13

## 2020-01-01 RX ADMIN — HALOPERIDOL DECANOATE 5 MILLIGRAM(S): 100 INJECTION INTRAMUSCULAR at 10:30

## 2020-01-01 RX ADMIN — Medication 8: at 18:45

## 2020-01-01 RX ADMIN — BACITRACIN ZINC NEOMYCIN SULFATE POLYMYXIN B SULFATE 1 APPLICATION(S): 400; 3.5; 5 OINTMENT TOPICAL at 17:02

## 2020-01-01 RX ADMIN — Medication 125 MILLIGRAM(S): at 18:07

## 2020-01-01 RX ADMIN — Medication 2: at 12:25

## 2020-01-01 RX ADMIN — GABAPENTIN 300 MILLIGRAM(S): 400 CAPSULE ORAL at 13:13

## 2020-01-01 RX ADMIN — Medication 5 MILLIGRAM(S): at 05:47

## 2020-01-01 RX ADMIN — Medication 1 MILLIGRAM(S): at 06:04

## 2020-01-01 RX ADMIN — Medication 6 MILLIGRAM(S): at 22:28

## 2020-01-01 RX ADMIN — HYDROMORPHONE HYDROCHLORIDE 2 MILLIGRAM(S): 2 INJECTION INTRAMUSCULAR; INTRAVENOUS; SUBCUTANEOUS at 10:24

## 2020-01-01 RX ADMIN — SODIUM CHLORIDE 80 MILLILITER(S): 9 INJECTION INTRAMUSCULAR; INTRAVENOUS; SUBCUTANEOUS at 19:04

## 2020-01-01 RX ADMIN — LISINOPRIL 5 MILLIGRAM(S): 2.5 TABLET ORAL at 06:34

## 2020-01-01 RX ADMIN — ATORVASTATIN CALCIUM 40 MILLIGRAM(S): 80 TABLET, FILM COATED ORAL at 21:18

## 2020-01-01 RX ADMIN — INSULIN GLARGINE 14 UNIT(S): 100 INJECTION, SOLUTION SUBCUTANEOUS at 21:45

## 2020-01-01 RX ADMIN — OXYCODONE HYDROCHLORIDE 5 MILLIGRAM(S): 5 TABLET ORAL at 21:12

## 2020-01-01 RX ADMIN — Medication 4: at 05:23

## 2020-01-01 RX ADMIN — CHLORHEXIDINE GLUCONATE 1 APPLICATION(S): 213 SOLUTION TOPICAL at 05:36

## 2020-01-01 RX ADMIN — KETAMINE HYDROCHLORIDE 3.29 MG/KG/HR: 100 INJECTION INTRAMUSCULAR; INTRAVENOUS at 18:19

## 2020-01-01 RX ADMIN — Medication 4 UNIT(S): at 17:05

## 2020-01-01 RX ADMIN — Medication 4 UNIT(S): at 06:52

## 2020-01-01 RX ADMIN — FENTANYL CITRATE 3.29 MICROGRAM(S)/KG/HR: 50 INJECTION INTRAVENOUS at 16:24

## 2020-01-01 RX ADMIN — ENOXAPARIN SODIUM 40 MILLIGRAM(S): 100 INJECTION SUBCUTANEOUS at 17:01

## 2020-01-01 RX ADMIN — GABAPENTIN 300 MILLIGRAM(S): 400 CAPSULE ORAL at 06:58

## 2020-01-01 RX ADMIN — ENOXAPARIN SODIUM 40 MILLIGRAM(S): 100 INJECTION SUBCUTANEOUS at 17:59

## 2020-01-01 RX ADMIN — CHLORHEXIDINE GLUCONATE 15 MILLILITER(S): 213 SOLUTION TOPICAL at 17:14

## 2020-01-01 RX ADMIN — ENOXAPARIN SODIUM 40 MILLIGRAM(S): 100 INJECTION SUBCUTANEOUS at 16:22

## 2020-01-01 RX ADMIN — Medication 100 MILLIGRAM(S): at 12:36

## 2020-01-01 RX ADMIN — PANTOPRAZOLE SODIUM 40 MILLIGRAM(S): 20 TABLET, DELAYED RELEASE ORAL at 12:43

## 2020-01-01 RX ADMIN — SODIUM CHLORIDE 80 MILLILITER(S): 9 INJECTION INTRAMUSCULAR; INTRAVENOUS; SUBCUTANEOUS at 16:09

## 2020-01-01 RX ADMIN — MAGNESIUM OXIDE 400 MG ORAL TABLET 400 MILLIGRAM(S): 241.3 TABLET ORAL at 17:50

## 2020-01-01 RX ADMIN — Medication 1: at 17:58

## 2020-01-01 RX ADMIN — Medication 12.5 GRAM(S): at 06:26

## 2020-01-01 RX ADMIN — MEROPENEM 100 MILLIGRAM(S): 1 INJECTION INTRAVENOUS at 22:52

## 2020-01-01 RX ADMIN — AMLODIPINE BESYLATE 10 MILLIGRAM(S): 2.5 TABLET ORAL at 04:03

## 2020-01-01 RX ADMIN — Medication 10 MILLIGRAM(S): at 22:49

## 2020-01-01 RX ADMIN — MIDAZOLAM HYDROCHLORIDE 2 MILLIGRAM(S): 1 INJECTION, SOLUTION INTRAMUSCULAR; INTRAVENOUS at 16:25

## 2020-01-01 RX ADMIN — Medication 2 GRAM(S): at 13:14

## 2020-01-01 RX ADMIN — Medication 2: at 17:31

## 2020-01-01 RX ADMIN — Medication 50 MILLIGRAM(S): at 06:47

## 2020-01-01 RX ADMIN — APIXABAN 5 MILLIGRAM(S): 2.5 TABLET, FILM COATED ORAL at 09:34

## 2020-01-01 RX ADMIN — GABAPENTIN 300 MILLIGRAM(S): 400 CAPSULE ORAL at 21:31

## 2020-01-01 RX ADMIN — GABAPENTIN 300 MILLIGRAM(S): 400 CAPSULE ORAL at 06:34

## 2020-01-01 RX ADMIN — Medication 5 UNIT(S): at 17:58

## 2020-01-01 RX ADMIN — Medication 1 MILLIGRAM(S): at 12:05

## 2020-01-01 RX ADMIN — MIDAZOLAM HYDROCHLORIDE 1.32 MG/KG/HR: 1 INJECTION, SOLUTION INTRAMUSCULAR; INTRAVENOUS at 04:51

## 2020-01-01 RX ADMIN — GABAPENTIN 300 MILLIGRAM(S): 400 CAPSULE ORAL at 12:43

## 2020-01-01 RX ADMIN — Medication 100 MILLIGRAM(S): at 12:41

## 2020-01-01 RX ADMIN — Medication 60 MILLIGRAM(S): at 09:11

## 2020-01-01 RX ADMIN — GABAPENTIN 300 MILLIGRAM(S): 400 CAPSULE ORAL at 12:11

## 2020-01-01 RX ADMIN — OLANZAPINE 10 MILLIGRAM(S): 15 TABLET, FILM COATED ORAL at 23:25

## 2020-01-01 RX ADMIN — TAMSULOSIN HYDROCHLORIDE 0.4 MILLIGRAM(S): 0.4 CAPSULE ORAL at 21:40

## 2020-01-01 RX ADMIN — Medication 200 MILLIGRAM(S): at 12:09

## 2020-01-01 RX ADMIN — BACITRACIN ZINC NEOMYCIN SULFATE POLYMYXIN B SULFATE 1 APPLICATION(S): 400; 3.5; 5 OINTMENT TOPICAL at 16:20

## 2020-01-01 RX ADMIN — Medication 4 UNIT(S): at 17:31

## 2020-01-01 RX ADMIN — Medication 8 UNIT(S): at 16:29

## 2020-01-01 RX ADMIN — Medication 65 MILLIGRAM(S): at 05:04

## 2020-01-01 RX ADMIN — Medication 4 UNIT(S): at 05:07

## 2020-01-01 RX ADMIN — Medication 1 MILLIGRAM(S): at 05:06

## 2020-01-01 RX ADMIN — Medication 3 UNIT(S): at 23:05

## 2020-01-01 RX ADMIN — Medication 0.1 MILLIGRAM(S): at 04:10

## 2020-01-01 RX ADMIN — PANTOPRAZOLE SODIUM 40 MILLIGRAM(S): 20 TABLET, DELAYED RELEASE ORAL at 06:35

## 2020-01-01 RX ADMIN — Medication 10 MILLIGRAM(S): at 12:35

## 2020-01-01 RX ADMIN — SODIUM CHLORIDE 80 MILLILITER(S): 9 INJECTION, SOLUTION INTRAVENOUS at 18:22

## 2020-01-01 RX ADMIN — ENOXAPARIN SODIUM 40 MILLIGRAM(S): 100 INJECTION SUBCUTANEOUS at 06:02

## 2020-01-01 RX ADMIN — BACITRACIN ZINC NEOMYCIN SULFATE POLYMYXIN B SULFATE 1 APPLICATION(S): 400; 3.5; 5 OINTMENT TOPICAL at 16:26

## 2020-01-01 RX ADMIN — Medication 0.1 MILLIGRAM(S): at 06:46

## 2020-01-01 RX ADMIN — Medication 6: at 05:14

## 2020-01-01 RX ADMIN — GABAPENTIN 300 MILLIGRAM(S): 400 CAPSULE ORAL at 21:56

## 2020-01-01 RX ADMIN — CHLORHEXIDINE GLUCONATE 15 MILLILITER(S): 213 SOLUTION TOPICAL at 17:02

## 2020-01-01 RX ADMIN — PIPERACILLIN AND TAZOBACTAM 25 GRAM(S): 4; .5 INJECTION, POWDER, LYOPHILIZED, FOR SOLUTION INTRAVENOUS at 13:29

## 2020-01-01 RX ADMIN — FENTANYL CITRATE 3.29 MICROGRAM(S)/KG/HR: 50 INJECTION INTRAVENOUS at 05:39

## 2020-01-01 RX ADMIN — Medication 2: at 07:55

## 2020-01-01 RX ADMIN — Medication 100 MILLIGRAM(S): at 23:25

## 2020-01-01 RX ADMIN — APIXABAN 5 MILLIGRAM(S): 2.5 TABLET, FILM COATED ORAL at 21:08

## 2020-01-01 RX ADMIN — Medication 200 MILLIGRAM(S): at 04:23

## 2020-01-01 RX ADMIN — Medication 6 MILLIGRAM(S): at 21:40

## 2020-01-01 RX ADMIN — PANTOPRAZOLE SODIUM 40 MILLIGRAM(S): 20 TABLET, DELAYED RELEASE ORAL at 12:44

## 2020-01-01 RX ADMIN — MEROPENEM 100 MILLIGRAM(S): 1 INJECTION INTRAVENOUS at 20:54

## 2020-01-01 RX ADMIN — MIDAZOLAM HYDROCHLORIDE 1.32 MG/KG/HR: 1 INJECTION, SOLUTION INTRAMUSCULAR; INTRAVENOUS at 16:24

## 2020-01-01 RX ADMIN — Medication 6 UNIT(S): at 12:25

## 2020-01-01 RX ADMIN — ENOXAPARIN SODIUM 40 MILLIGRAM(S): 100 INJECTION SUBCUTANEOUS at 18:38

## 2020-01-01 RX ADMIN — Medication 100 MILLIGRAM(S): at 23:21

## 2020-01-01 RX ADMIN — Medication 4 UNIT(S): at 05:13

## 2020-01-01 RX ADMIN — Medication 2: at 21:54

## 2020-01-01 RX ADMIN — CHLORHEXIDINE GLUCONATE 1 APPLICATION(S): 213 SOLUTION TOPICAL at 04:58

## 2020-01-01 RX ADMIN — Medication 100 MILLIGRAM(S): at 13:13

## 2020-01-01 RX ADMIN — GABAPENTIN 300 MILLIGRAM(S): 400 CAPSULE ORAL at 05:13

## 2020-01-01 RX ADMIN — INSULIN GLARGINE 12 UNIT(S): 100 INJECTION, SOLUTION SUBCUTANEOUS at 10:06

## 2020-01-01 RX ADMIN — Medication 4 UNIT(S): at 22:50

## 2020-01-01 RX ADMIN — Medication 100 MILLIGRAM(S): at 08:16

## 2020-01-01 RX ADMIN — GABAPENTIN 300 MILLIGRAM(S): 400 CAPSULE ORAL at 05:57

## 2020-01-01 RX ADMIN — Medication 0.1 MILLIGRAM(S): at 17:45

## 2020-01-01 RX ADMIN — Medication 0.1 MILLIGRAM(S): at 17:08

## 2020-01-01 RX ADMIN — OLANZAPINE 10 MILLIGRAM(S): 15 TABLET, FILM COATED ORAL at 16:51

## 2020-01-01 RX ADMIN — Medication 4 UNIT(S): at 12:01

## 2020-01-01 RX ADMIN — POLYETHYLENE GLYCOL 3350 17 GRAM(S): 17 POWDER, FOR SOLUTION ORAL at 12:49

## 2020-01-01 RX ADMIN — Medication 1 MILLIGRAM(S): at 13:37

## 2020-01-01 RX ADMIN — Medication 2: at 06:47

## 2020-01-01 RX ADMIN — GABAPENTIN 300 MILLIGRAM(S): 400 CAPSULE ORAL at 15:15

## 2020-01-01 RX ADMIN — OLANZAPINE 10 MILLIGRAM(S): 15 TABLET, FILM COATED ORAL at 20:13

## 2020-01-01 RX ADMIN — ATORVASTATIN CALCIUM 40 MILLIGRAM(S): 80 TABLET, FILM COATED ORAL at 21:22

## 2020-01-01 RX ADMIN — INSULIN GLARGINE 10 UNIT(S): 100 INJECTION, SOLUTION SUBCUTANEOUS at 08:07

## 2020-01-01 RX ADMIN — OXYCODONE HYDROCHLORIDE 10 MILLIGRAM(S): 5 TABLET ORAL at 05:13

## 2020-01-01 RX ADMIN — Medication 4 UNIT(S): at 11:44

## 2020-01-01 RX ADMIN — OXYCODONE HYDROCHLORIDE 10 MILLIGRAM(S): 5 TABLET ORAL at 14:29

## 2020-01-01 RX ADMIN — Medication 50 MILLIGRAM(S): at 13:32

## 2020-01-01 RX ADMIN — PANTOPRAZOLE SODIUM 40 MILLIGRAM(S): 20 TABLET, DELAYED RELEASE ORAL at 11:48

## 2020-01-01 RX ADMIN — POLYETHYLENE GLYCOL 3350 17 GRAM(S): 17 POWDER, FOR SOLUTION ORAL at 22:47

## 2020-01-01 RX ADMIN — LISINOPRIL 5 MILLIGRAM(S): 2.5 TABLET ORAL at 17:42

## 2020-01-01 RX ADMIN — OXYCODONE HYDROCHLORIDE 10 MILLIGRAM(S): 5 TABLET ORAL at 22:28

## 2020-01-01 RX ADMIN — Medication 40 MILLIGRAM(S): at 17:42

## 2020-01-01 RX ADMIN — Medication 0.1 MILLIGRAM(S): at 17:59

## 2020-01-01 RX ADMIN — CHLORHEXIDINE GLUCONATE 15 MILLILITER(S): 213 SOLUTION TOPICAL at 17:31

## 2020-01-01 RX ADMIN — OLANZAPINE 10 MILLIGRAM(S): 15 TABLET, FILM COATED ORAL at 17:05

## 2020-01-01 RX ADMIN — Medication 100 MILLIGRAM(S): at 04:39

## 2020-01-01 RX ADMIN — SODIUM CHLORIDE 50 MILLILITER(S): 9 INJECTION INTRAMUSCULAR; INTRAVENOUS; SUBCUTANEOUS at 21:30

## 2020-01-01 RX ADMIN — Medication 4: at 17:39

## 2020-01-01 RX ADMIN — Medication 1 APPLICATION(S): at 21:27

## 2020-01-01 RX ADMIN — INSULIN GLARGINE 12 UNIT(S): 100 INJECTION, SOLUTION SUBCUTANEOUS at 23:00

## 2020-01-01 RX ADMIN — BACITRACIN ZINC NEOMYCIN SULFATE POLYMYXIN B SULFATE 1 APPLICATION(S): 400; 3.5; 5 OINTMENT TOPICAL at 17:30

## 2020-01-01 RX ADMIN — FENTANYL CITRATE 3.29 MICROGRAM(S)/KG/HR: 50 INJECTION INTRAVENOUS at 23:09

## 2020-01-01 RX ADMIN — Medication 5 UNIT(S): at 12:00

## 2020-01-01 RX ADMIN — Medication 2: at 22:59

## 2020-01-01 RX ADMIN — Medication 4: at 12:43

## 2020-01-01 RX ADMIN — Medication 0.1 MILLIGRAM(S): at 18:38

## 2020-01-01 RX ADMIN — Medication 60 MILLIGRAM(S): at 17:48

## 2020-01-01 RX ADMIN — Medication 1 MILLIGRAM(S): at 05:14

## 2020-01-01 RX ADMIN — GABAPENTIN 300 MILLIGRAM(S): 400 CAPSULE ORAL at 05:14

## 2020-01-01 RX ADMIN — Medication 2: at 05:07

## 2020-01-01 RX ADMIN — Medication 200 MILLIGRAM(S): at 12:21

## 2020-01-01 RX ADMIN — CHLORHEXIDINE GLUCONATE 1 APPLICATION(S): 213 SOLUTION TOPICAL at 05:33

## 2020-01-01 RX ADMIN — KETAMINE HYDROCHLORIDE 0.86 MG/KG/HR: 100 INJECTION INTRAMUSCULAR; INTRAVENOUS at 15:21

## 2020-01-01 RX ADMIN — Medication 4 UNIT(S): at 16:50

## 2020-01-01 RX ADMIN — OXYCODONE HYDROCHLORIDE 5 MILLIGRAM(S): 5 TABLET ORAL at 05:49

## 2020-01-01 RX ADMIN — INSULIN GLARGINE 12 UNIT(S): 100 INJECTION, SOLUTION SUBCUTANEOUS at 21:26

## 2020-01-01 RX ADMIN — Medication 60 MILLIGRAM(S): at 19:00

## 2020-01-01 RX ADMIN — MEROPENEM 100 MILLIGRAM(S): 1 INJECTION INTRAVENOUS at 15:34

## 2020-01-01 RX ADMIN — Medication 10 MILLIGRAM(S): at 22:21

## 2020-01-01 RX ADMIN — MEROPENEM 100 MILLIGRAM(S): 1 INJECTION INTRAVENOUS at 05:15

## 2020-01-01 RX ADMIN — Medication 4: at 17:46

## 2020-01-01 RX ADMIN — SODIUM CHLORIDE 80 MILLILITER(S): 9 INJECTION INTRAMUSCULAR; INTRAVENOUS; SUBCUTANEOUS at 16:29

## 2020-01-01 RX ADMIN — Medication 65 MILLIGRAM(S): at 03:50

## 2020-01-01 RX ADMIN — GABAPENTIN 300 MILLIGRAM(S): 400 CAPSULE ORAL at 13:15

## 2020-01-01 RX ADMIN — Medication 40 MILLIGRAM(S): at 17:35

## 2020-01-01 RX ADMIN — OXYCODONE HYDROCHLORIDE 10 MILLIGRAM(S): 5 TABLET ORAL at 05:16

## 2020-01-01 RX ADMIN — GABAPENTIN 300 MILLIGRAM(S): 400 CAPSULE ORAL at 12:35

## 2020-01-01 RX ADMIN — Medication 1500 MILLIGRAM(S): at 12:24

## 2020-01-01 RX ADMIN — GABAPENTIN 300 MILLIGRAM(S): 400 CAPSULE ORAL at 22:16

## 2020-01-01 RX ADMIN — Medication 4: at 16:32

## 2020-01-01 RX ADMIN — Medication 400 MILLIGRAM(S): at 16:29

## 2020-01-01 RX ADMIN — Medication 10 MILLIGRAM(S): at 13:26

## 2020-01-01 RX ADMIN — ATENOLOL 25 MILLIGRAM(S): 25 TABLET ORAL at 22:54

## 2020-01-01 RX ADMIN — HYDROMORPHONE HYDROCHLORIDE 2 MILLIGRAM(S): 2 INJECTION INTRAMUSCULAR; INTRAVENOUS; SUBCUTANEOUS at 21:25

## 2020-01-01 RX ADMIN — POLYETHYLENE GLYCOL 3350 17 GRAM(S): 17 POWDER, FOR SOLUTION ORAL at 22:43

## 2020-01-01 RX ADMIN — Medication 1 MILLIGRAM(S): at 13:00

## 2020-01-01 RX ADMIN — APIXABAN 5 MILLIGRAM(S): 2.5 TABLET, FILM COATED ORAL at 10:11

## 2020-01-01 RX ADMIN — MEROPENEM 100 MILLIGRAM(S): 1 INJECTION INTRAVENOUS at 15:08

## 2020-01-01 RX ADMIN — MEROPENEM 100 MILLIGRAM(S): 1 INJECTION INTRAVENOUS at 04:23

## 2020-01-01 RX ADMIN — BACITRACIN ZINC NEOMYCIN SULFATE POLYMYXIN B SULFATE 1 APPLICATION(S): 400; 3.5; 5 OINTMENT TOPICAL at 05:09

## 2020-01-01 RX ADMIN — Medication 50 MILLIGRAM(S): at 16:50

## 2020-01-01 RX ADMIN — Medication 0.1 MILLIGRAM(S): at 05:57

## 2020-01-01 RX ADMIN — BACITRACIN AND POLYMYXIN B SULFATE 1 APPLICATION(S): 500; 10000 OINTMENT TOPICAL at 05:38

## 2020-01-01 RX ADMIN — Medication 5 MILLIGRAM(S): at 21:54

## 2020-01-01 RX ADMIN — AZITHROMYCIN 500 MILLIGRAM(S): 500 TABLET, FILM COATED ORAL at 14:15

## 2020-01-01 RX ADMIN — Medication 100 MILLIEQUIVALENT(S): at 10:28

## 2020-01-01 RX ADMIN — OLANZAPINE 10 MILLIGRAM(S): 15 TABLET, FILM COATED ORAL at 22:51

## 2020-01-01 RX ADMIN — Medication 2: at 11:02

## 2020-01-01 RX ADMIN — Medication 250 MILLIGRAM(S): at 17:19

## 2020-01-01 RX ADMIN — Medication 100 MILLIGRAM(S): at 14:16

## 2020-01-01 RX ADMIN — OXYCODONE HYDROCHLORIDE 10 MILLIGRAM(S): 5 TABLET ORAL at 13:37

## 2020-01-01 RX ADMIN — OXYCODONE HYDROCHLORIDE 10 MILLIGRAM(S): 5 TABLET ORAL at 21:18

## 2020-01-01 RX ADMIN — INSULIN GLARGINE 20 UNIT(S): 100 INJECTION, SOLUTION SUBCUTANEOUS at 22:49

## 2020-01-01 RX ADMIN — Medication 400 MILLIGRAM(S): at 16:20

## 2020-01-01 RX ADMIN — INSULIN GLARGINE 10 UNIT(S): 100 INJECTION, SOLUTION SUBCUTANEOUS at 08:24

## 2020-01-01 RX ADMIN — Medication 10 MILLIGRAM(S): at 15:52

## 2020-01-01 RX ADMIN — ENOXAPARIN SODIUM 40 MILLIGRAM(S): 100 INJECTION SUBCUTANEOUS at 12:24

## 2020-01-01 RX ADMIN — Medication 2: at 23:39

## 2020-01-01 RX ADMIN — CHLORHEXIDINE GLUCONATE 1 APPLICATION(S): 213 SOLUTION TOPICAL at 05:12

## 2020-01-01 RX ADMIN — Medication 10 MILLIGRAM(S): at 05:35

## 2020-01-01 RX ADMIN — Medication 100 MILLIGRAM(S): at 15:15

## 2020-01-01 RX ADMIN — Medication 1 APPLICATION(S): at 23:29

## 2020-01-01 RX ADMIN — Medication 100 MILLIGRAM(S): at 20:13

## 2020-01-01 RX ADMIN — GABAPENTIN 300 MILLIGRAM(S): 400 CAPSULE ORAL at 23:14

## 2020-01-01 RX ADMIN — Medication 0.1 MILLIGRAM(S): at 04:01

## 2020-01-01 RX ADMIN — DEXMEDETOMIDINE HYDROCHLORIDE IN 0.9% SODIUM CHLORIDE 6.58 MICROGRAM(S)/KG/HR: 4 INJECTION INTRAVENOUS at 01:43

## 2020-01-01 RX ADMIN — BACITRACIN ZINC NEOMYCIN SULFATE POLYMYXIN B SULFATE 1 APPLICATION(S): 400; 3.5; 5 OINTMENT TOPICAL at 05:15

## 2020-01-01 RX ADMIN — FENTANYL CITRATE 50 MICROGRAM(S): 50 INJECTION INTRAVENOUS at 16:33

## 2020-01-01 RX ADMIN — ENOXAPARIN SODIUM 40 MILLIGRAM(S): 100 INJECTION SUBCUTANEOUS at 19:37

## 2020-01-01 RX ADMIN — ENOXAPARIN SODIUM 40 MILLIGRAM(S): 100 INJECTION SUBCUTANEOUS at 04:11

## 2020-01-01 RX ADMIN — PANTOPRAZOLE SODIUM 40 MILLIGRAM(S): 20 TABLET, DELAYED RELEASE ORAL at 06:47

## 2020-01-01 RX ADMIN — Medication 7 UNIT(S): at 17:30

## 2020-01-01 RX ADMIN — Medication 12: at 12:24

## 2020-01-01 RX ADMIN — BACITRACIN ZINC NEOMYCIN SULFATE POLYMYXIN B SULFATE 1 APPLICATION(S): 400; 3.5; 5 OINTMENT TOPICAL at 05:48

## 2020-01-01 RX ADMIN — Medication 100 MILLIGRAM(S): at 08:00

## 2020-01-01 RX ADMIN — OXYCODONE HYDROCHLORIDE 10 MILLIGRAM(S): 5 TABLET ORAL at 06:19

## 2020-01-01 RX ADMIN — INSULIN GLARGINE 10 UNIT(S): 100 INJECTION, SOLUTION SUBCUTANEOUS at 09:13

## 2020-01-01 RX ADMIN — Medication 4: at 17:26

## 2020-01-01 RX ADMIN — OLANZAPINE 10 MILLIGRAM(S): 15 TABLET, FILM COATED ORAL at 05:07

## 2020-01-01 RX ADMIN — DEXMEDETOMIDINE HYDROCHLORIDE IN 0.9% SODIUM CHLORIDE 8.23 MICROGRAM(S)/KG/HR: 4 INJECTION INTRAVENOUS at 01:26

## 2020-01-01 RX ADMIN — INSULIN GLARGINE 8 UNIT(S): 100 INJECTION, SOLUTION SUBCUTANEOUS at 08:24

## 2020-01-01 RX ADMIN — Medication 6: at 23:11

## 2020-01-01 RX ADMIN — Medication 100 MILLIGRAM(S): at 06:03

## 2020-01-01 RX ADMIN — Medication 0.1 MILLIGRAM(S): at 04:29

## 2020-01-01 RX ADMIN — Medication 1: at 08:02

## 2020-01-01 RX ADMIN — ENOXAPARIN SODIUM 40 MILLIGRAM(S): 100 INJECTION SUBCUTANEOUS at 06:20

## 2020-01-01 RX ADMIN — GABAPENTIN 300 MILLIGRAM(S): 400 CAPSULE ORAL at 04:57

## 2020-01-01 RX ADMIN — CHLORHEXIDINE GLUCONATE 15 MILLILITER(S): 213 SOLUTION TOPICAL at 05:06

## 2020-01-01 RX ADMIN — Medication 4 UNIT(S): at 23:39

## 2020-01-01 RX ADMIN — PANTOPRAZOLE SODIUM 40 MILLIGRAM(S): 20 TABLET, DELAYED RELEASE ORAL at 05:57

## 2020-01-01 RX ADMIN — ATORVASTATIN CALCIUM 40 MILLIGRAM(S): 80 TABLET, FILM COATED ORAL at 21:25

## 2020-01-01 RX ADMIN — GABAPENTIN 300 MILLIGRAM(S): 400 CAPSULE ORAL at 13:26

## 2020-01-01 RX ADMIN — Medication 2: at 17:07

## 2020-01-01 RX ADMIN — Medication 1500 MILLIGRAM(S): at 11:36

## 2020-01-01 RX ADMIN — PANTOPRAZOLE SODIUM 40 MILLIGRAM(S): 20 TABLET, DELAYED RELEASE ORAL at 10:31

## 2020-01-01 RX ADMIN — Medication 60 MILLIGRAM(S): at 05:48

## 2020-01-01 RX ADMIN — Medication 3: at 12:07

## 2020-01-01 RX ADMIN — PANTOPRAZOLE SODIUM 40 MILLIGRAM(S): 20 TABLET, DELAYED RELEASE ORAL at 11:16

## 2020-01-01 RX ADMIN — MAGNESIUM OXIDE 400 MG ORAL TABLET 400 MILLIGRAM(S): 241.3 TABLET ORAL at 17:42

## 2020-01-01 RX ADMIN — Medication 60 MILLIGRAM(S): at 17:35

## 2020-01-01 RX ADMIN — Medication 100 MILLIGRAM(S): at 15:06

## 2020-01-01 RX ADMIN — Medication 6.32 MICROGRAM(S)/KG/MIN: at 17:08

## 2020-01-01 RX ADMIN — APIXABAN 5 MILLIGRAM(S): 2.5 TABLET, FILM COATED ORAL at 23:24

## 2020-01-01 RX ADMIN — INSULIN GLARGINE 6 UNIT(S): 100 INJECTION, SOLUTION SUBCUTANEOUS at 21:36

## 2020-01-01 RX ADMIN — Medication 4 UNIT(S): at 12:11

## 2020-01-01 RX ADMIN — CHLORHEXIDINE GLUCONATE 15 MILLILITER(S): 213 SOLUTION TOPICAL at 16:21

## 2020-01-01 RX ADMIN — GABAPENTIN 300 MILLIGRAM(S): 400 CAPSULE ORAL at 23:24

## 2020-01-01 RX ADMIN — CHLORHEXIDINE GLUCONATE 15 MILLILITER(S): 213 SOLUTION TOPICAL at 05:32

## 2020-01-01 RX ADMIN — Medication 250 MILLIGRAM(S): at 06:35

## 2020-01-01 RX ADMIN — INSULIN GLARGINE 12 UNIT(S): 100 INJECTION, SOLUTION SUBCUTANEOUS at 08:57

## 2020-01-01 RX ADMIN — OLANZAPINE 10 MILLIGRAM(S): 15 TABLET, FILM COATED ORAL at 05:50

## 2020-01-01 RX ADMIN — HYDROMORPHONE HYDROCHLORIDE 2 MILLIGRAM(S): 2 INJECTION INTRAMUSCULAR; INTRAVENOUS; SUBCUTANEOUS at 01:40

## 2020-01-01 RX ADMIN — GABAPENTIN 300 MILLIGRAM(S): 400 CAPSULE ORAL at 06:05

## 2020-01-01 RX ADMIN — BACITRACIN ZINC NEOMYCIN SULFATE POLYMYXIN B SULFATE 1 APPLICATION(S): 400; 3.5; 5 OINTMENT TOPICAL at 20:38

## 2020-01-01 RX ADMIN — Medication 10 MILLIGRAM(S): at 21:00

## 2020-01-01 RX ADMIN — CEFTRIAXONE 1000 MILLIGRAM(S): 500 INJECTION, POWDER, FOR SOLUTION INTRAMUSCULAR; INTRAVENOUS at 12:45

## 2020-01-01 RX ADMIN — Medication 7 UNIT(S): at 12:17

## 2020-01-01 RX ADMIN — Medication 60 MILLIGRAM(S): at 12:46

## 2020-01-01 RX ADMIN — OLANZAPINE 10 MILLIGRAM(S): 15 TABLET, FILM COATED ORAL at 21:12

## 2020-01-01 RX ADMIN — BACITRACIN ZINC NEOMYCIN SULFATE POLYMYXIN B SULFATE 1 APPLICATION(S): 400; 3.5; 5 OINTMENT TOPICAL at 05:06

## 2020-01-01 RX ADMIN — OXYCODONE HYDROCHLORIDE 10 MILLIGRAM(S): 5 TABLET ORAL at 21:27

## 2020-01-01 RX ADMIN — Medication 100 MILLIGRAM(S): at 08:20

## 2020-01-01 RX ADMIN — OLANZAPINE 10 MILLIGRAM(S): 15 TABLET, FILM COATED ORAL at 06:06

## 2020-01-01 RX ADMIN — Medication 0.1 MILLIGRAM(S): at 04:57

## 2020-01-01 RX ADMIN — Medication 2: at 12:50

## 2020-01-01 RX ADMIN — ATORVASTATIN CALCIUM 40 MILLIGRAM(S): 80 TABLET, FILM COATED ORAL at 22:49

## 2020-01-01 RX ADMIN — ENOXAPARIN SODIUM 40 MILLIGRAM(S): 100 INJECTION SUBCUTANEOUS at 12:09

## 2020-01-01 RX ADMIN — Medication 4 UNIT(S): at 11:16

## 2020-01-01 RX ADMIN — Medication 60 MILLIGRAM(S): at 11:19

## 2020-01-01 RX ADMIN — Medication 650 MILLIGRAM(S): at 12:15

## 2020-01-01 RX ADMIN — Medication 20 MILLIGRAM(S): at 09:58

## 2020-01-01 RX ADMIN — Medication 1 MILLIGRAM(S): at 05:08

## 2020-01-01 RX ADMIN — OXYCODONE HYDROCHLORIDE 5 MILLIGRAM(S): 5 TABLET ORAL at 17:14

## 2020-01-01 RX ADMIN — ATORVASTATIN CALCIUM 40 MILLIGRAM(S): 80 TABLET, FILM COATED ORAL at 21:28

## 2020-01-01 RX ADMIN — MAGNESIUM OXIDE 400 MG ORAL TABLET 400 MILLIGRAM(S): 241.3 TABLET ORAL at 17:49

## 2020-01-01 RX ADMIN — GABAPENTIN 300 MILLIGRAM(S): 400 CAPSULE ORAL at 06:22

## 2020-01-01 RX ADMIN — Medication 0.1 MILLIGRAM(S): at 17:13

## 2020-01-01 RX ADMIN — ENOXAPARIN SODIUM 40 MILLIGRAM(S): 100 INJECTION SUBCUTANEOUS at 05:34

## 2020-01-01 RX ADMIN — APIXABAN 5 MILLIGRAM(S): 2.5 TABLET, FILM COATED ORAL at 10:08

## 2020-01-01 RX ADMIN — APIXABAN 5 MILLIGRAM(S): 2.5 TABLET, FILM COATED ORAL at 11:15

## 2020-01-01 RX ADMIN — Medication 25 GRAM(S): at 00:15

## 2020-01-01 RX ADMIN — INSULIN GLARGINE 12 UNIT(S): 100 INJECTION, SOLUTION SUBCUTANEOUS at 08:12

## 2020-01-01 RX ADMIN — PANTOPRAZOLE SODIUM 40 MILLIGRAM(S): 20 TABLET, DELAYED RELEASE ORAL at 10:08

## 2020-01-01 RX ADMIN — PIPERACILLIN AND TAZOBACTAM 25 GRAM(S): 4; .5 INJECTION, POWDER, LYOPHILIZED, FOR SOLUTION INTRAVENOUS at 21:08

## 2020-01-01 RX ADMIN — Medication 4 UNIT(S): at 05:45

## 2020-01-01 RX ADMIN — Medication 2: at 16:57

## 2020-01-01 RX ADMIN — INSULIN GLARGINE 18 UNIT(S): 100 INJECTION, SOLUTION SUBCUTANEOUS at 22:28

## 2020-01-01 RX ADMIN — Medication 4 UNIT(S): at 16:20

## 2020-01-01 RX ADMIN — GABAPENTIN 300 MILLIGRAM(S): 400 CAPSULE ORAL at 05:48

## 2020-01-01 RX ADMIN — OXYCODONE HYDROCHLORIDE 5 MILLIGRAM(S): 5 TABLET ORAL at 22:43

## 2020-01-01 RX ADMIN — Medication 4 UNIT(S): at 17:14

## 2020-01-01 RX ADMIN — Medication 25 MILLIGRAM(S): at 04:57

## 2020-01-01 RX ADMIN — Medication 25 MILLIGRAM(S): at 04:24

## 2020-01-01 RX ADMIN — CHLORHEXIDINE GLUCONATE 15 MILLILITER(S): 213 SOLUTION TOPICAL at 13:39

## 2020-01-01 RX ADMIN — OXYCODONE HYDROCHLORIDE 10 MILLIGRAM(S): 5 TABLET ORAL at 21:21

## 2020-01-01 RX ADMIN — OLANZAPINE 10 MILLIGRAM(S): 15 TABLET, FILM COATED ORAL at 05:16

## 2020-01-01 RX ADMIN — Medication 1 APPLICATION(S): at 21:44

## 2020-01-01 RX ADMIN — Medication 1 MILLIGRAM(S): at 06:16

## 2020-01-01 RX ADMIN — DEXMEDETOMIDINE HYDROCHLORIDE IN 0.9% SODIUM CHLORIDE 6.58 MICROGRAM(S)/KG/HR: 4 INJECTION INTRAVENOUS at 05:01

## 2020-01-01 RX ADMIN — AMLODIPINE BESYLATE 5 MILLIGRAM(S): 2.5 TABLET ORAL at 12:24

## 2020-01-01 RX ADMIN — ENOXAPARIN SODIUM 40 MILLIGRAM(S): 100 INJECTION SUBCUTANEOUS at 05:47

## 2020-01-01 RX ADMIN — Medication 10: at 16:20

## 2020-01-01 RX ADMIN — MEROPENEM 100 MILLIGRAM(S): 1 INJECTION INTRAVENOUS at 13:26

## 2020-01-01 RX ADMIN — SENNA PLUS 2 TABLET(S): 8.6 TABLET ORAL at 23:25

## 2020-01-01 RX ADMIN — FENTANYL CITRATE 3.29 MICROGRAM(S)/KG/HR: 50 INJECTION INTRAVENOUS at 19:57

## 2020-01-01 RX ADMIN — Medication 100 MILLIGRAM(S): at 14:31

## 2020-01-01 RX ADMIN — GABAPENTIN 300 MILLIGRAM(S): 400 CAPSULE ORAL at 06:02

## 2020-01-01 RX ADMIN — Medication 0: at 12:01

## 2020-01-01 RX ADMIN — Medication 40 MILLIGRAM(S): at 10:02

## 2020-01-01 RX ADMIN — ENOXAPARIN SODIUM 40 MILLIGRAM(S): 100 INJECTION SUBCUTANEOUS at 20:14

## 2020-01-01 RX ADMIN — DEXMEDETOMIDINE HYDROCHLORIDE IN 0.9% SODIUM CHLORIDE 6.58 MICROGRAM(S)/KG/HR: 4 INJECTION INTRAVENOUS at 17:18

## 2020-01-01 RX ADMIN — SENNA PLUS 2 TABLET(S): 8.6 TABLET ORAL at 23:04

## 2020-01-01 RX ADMIN — Medication 1 MILLIGRAM(S): at 22:27

## 2020-01-01 RX ADMIN — BACITRACIN ZINC NEOMYCIN SULFATE POLYMYXIN B SULFATE 1 APPLICATION(S): 400; 3.5; 5 OINTMENT TOPICAL at 16:32

## 2020-01-01 RX ADMIN — MIDAZOLAM HYDROCHLORIDE 4 MILLIGRAM(S): 1 INJECTION, SOLUTION INTRAMUSCULAR; INTRAVENOUS at 03:16

## 2020-01-01 RX ADMIN — GABAPENTIN 300 MILLIGRAM(S): 400 CAPSULE ORAL at 22:28

## 2020-01-01 RX ADMIN — BACITRACIN AND POLYMYXIN B SULFATE 1 APPLICATION(S): 500; 10000 OINTMENT TOPICAL at 05:09

## 2020-01-01 RX ADMIN — CHLORHEXIDINE GLUCONATE 1 APPLICATION(S): 213 SOLUTION TOPICAL at 06:03

## 2020-01-01 RX ADMIN — GABAPENTIN 300 MILLIGRAM(S): 400 CAPSULE ORAL at 13:29

## 2020-01-01 RX ADMIN — INSULIN GLARGINE 25 UNIT(S): 100 INJECTION, SOLUTION SUBCUTANEOUS at 20:45

## 2020-01-01 RX ADMIN — GABAPENTIN 300 MILLIGRAM(S): 400 CAPSULE ORAL at 21:44

## 2020-01-01 RX ADMIN — DEXMEDETOMIDINE HYDROCHLORIDE IN 0.9% SODIUM CHLORIDE 8.23 MICROGRAM(S)/KG/HR: 4 INJECTION INTRAVENOUS at 03:28

## 2020-01-01 RX ADMIN — GABAPENTIN 300 MILLIGRAM(S): 400 CAPSULE ORAL at 06:04

## 2020-01-01 RX ADMIN — Medication 800 MILLIGRAM(S): at 16:30

## 2020-01-01 RX ADMIN — Medication 100 MILLIEQUIVALENT(S): at 09:34

## 2020-01-01 RX ADMIN — MEROPENEM 100 MILLIGRAM(S): 1 INJECTION INTRAVENOUS at 04:01

## 2020-01-01 RX ADMIN — Medication 1 MILLIGRAM(S): at 12:43

## 2020-01-01 RX ADMIN — GABAPENTIN 300 MILLIGRAM(S): 400 CAPSULE ORAL at 13:31

## 2020-01-01 RX ADMIN — TAMSULOSIN HYDROCHLORIDE 0.4 MILLIGRAM(S): 0.4 CAPSULE ORAL at 21:23

## 2020-01-01 RX ADMIN — CEFTRIAXONE 100 MILLIGRAM(S): 500 INJECTION, POWDER, FOR SOLUTION INTRAMUSCULAR; INTRAVENOUS at 12:15

## 2020-01-01 RX ADMIN — Medication 6: at 23:22

## 2020-01-01 RX ADMIN — INSULIN GLARGINE 12 UNIT(S): 100 INJECTION, SOLUTION SUBCUTANEOUS at 23:21

## 2020-01-01 RX ADMIN — GABAPENTIN 300 MILLIGRAM(S): 400 CAPSULE ORAL at 13:00

## 2020-01-01 RX ADMIN — Medication 200 MILLIGRAM(S): at 12:24

## 2020-01-01 RX ADMIN — Medication 100 MILLIGRAM(S): at 15:34

## 2020-01-01 RX ADMIN — POLYETHYLENE GLYCOL 3350 17 GRAM(S): 17 POWDER, FOR SOLUTION ORAL at 22:27

## 2020-01-01 RX ADMIN — GABAPENTIN 300 MILLIGRAM(S): 400 CAPSULE ORAL at 05:50

## 2020-01-01 RX ADMIN — Medication 4: at 05:40

## 2020-01-01 RX ADMIN — SENNA PLUS 2 TABLET(S): 8.6 TABLET ORAL at 22:43

## 2020-01-01 RX ADMIN — DEXMEDETOMIDINE HYDROCHLORIDE IN 0.9% SODIUM CHLORIDE 6.58 MICROGRAM(S)/KG/HR: 4 INJECTION INTRAVENOUS at 05:14

## 2020-01-01 RX ADMIN — GABAPENTIN 300 MILLIGRAM(S): 400 CAPSULE ORAL at 04:22

## 2020-01-01 RX ADMIN — MIDAZOLAM HYDROCHLORIDE 1.32 MG/KG/HR: 1 INJECTION, SOLUTION INTRAMUSCULAR; INTRAVENOUS at 14:17

## 2020-01-01 RX ADMIN — AMLODIPINE BESYLATE 10 MILLIGRAM(S): 2.5 TABLET ORAL at 05:03

## 2020-01-01 RX ADMIN — CARBAMIDE PEROXIDE 5 DROP(S): 81.86 SOLUTION/ DROPS AURICULAR (OTIC) at 17:48

## 2020-01-01 RX ADMIN — APIXABAN 5 MILLIGRAM(S): 2.5 TABLET, FILM COATED ORAL at 22:12

## 2020-01-01 RX ADMIN — OXYCODONE HYDROCHLORIDE 5 MILLIGRAM(S): 5 TABLET ORAL at 13:04

## 2020-01-01 RX ADMIN — Medication 6: at 16:28

## 2020-01-01 RX ADMIN — Medication 2: at 05:10

## 2020-01-01 RX ADMIN — Medication 25 MILLIGRAM(S): at 04:10

## 2020-01-01 RX ADMIN — Medication 25 MILLIGRAM(S): at 04:01

## 2020-01-01 RX ADMIN — APIXABAN 5 MILLIGRAM(S): 2.5 TABLET, FILM COATED ORAL at 12:01

## 2020-01-01 RX ADMIN — CHLORHEXIDINE GLUCONATE 1 APPLICATION(S): 213 SOLUTION TOPICAL at 05:17

## 2020-01-01 RX ADMIN — OXYCODONE HYDROCHLORIDE 5 MILLIGRAM(S): 5 TABLET ORAL at 15:00

## 2020-01-01 RX ADMIN — Medication 10: at 21:37

## 2020-01-01 RX ADMIN — DEXMEDETOMIDINE HYDROCHLORIDE IN 0.9% SODIUM CHLORIDE 6.58 MICROGRAM(S)/KG/HR: 4 INJECTION INTRAVENOUS at 16:50

## 2020-01-01 RX ADMIN — Medication 250 MILLIGRAM(S): at 17:42

## 2020-01-01 RX ADMIN — Medication 100 MILLIGRAM(S): at 11:59

## 2020-01-01 RX ADMIN — PANTOPRAZOLE SODIUM 40 MILLIGRAM(S): 20 TABLET, DELAYED RELEASE ORAL at 04:01

## 2020-01-01 RX ADMIN — Medication 4: at 12:00

## 2020-01-01 RX ADMIN — Medication 4 UNIT(S): at 05:14

## 2020-01-01 RX ADMIN — APIXABAN 5 MILLIGRAM(S): 2.5 TABLET, FILM COATED ORAL at 22:42

## 2020-01-01 RX ADMIN — CHLORHEXIDINE GLUCONATE 15 MILLILITER(S): 213 SOLUTION TOPICAL at 06:03

## 2020-01-01 RX ADMIN — SENNA PLUS 2 TABLET(S): 8.6 TABLET ORAL at 22:07

## 2020-01-01 RX ADMIN — MAGNESIUM OXIDE 400 MG ORAL TABLET 400 MILLIGRAM(S): 241.3 TABLET ORAL at 08:00

## 2020-01-01 RX ADMIN — GABAPENTIN 300 MILLIGRAM(S): 400 CAPSULE ORAL at 12:19

## 2020-01-01 RX ADMIN — Medication 100 MILLIGRAM(S): at 12:34

## 2020-01-01 RX ADMIN — Medication 100 MILLIGRAM(S): at 11:46

## 2020-01-01 RX ADMIN — Medication 10 MILLIGRAM(S): at 05:00

## 2020-01-01 RX ADMIN — Medication 0.5 MILLIGRAM(S): at 21:12

## 2020-01-01 RX ADMIN — Medication 4 UNIT(S): at 12:03

## 2020-01-01 RX ADMIN — Medication 65 MILLIGRAM(S): at 05:56

## 2020-01-01 RX ADMIN — Medication 25 MILLIGRAM(S): at 23:23

## 2020-01-01 RX ADMIN — GABAPENTIN 300 MILLIGRAM(S): 400 CAPSULE ORAL at 14:22

## 2020-01-01 RX ADMIN — PIPERACILLIN AND TAZOBACTAM 200 GRAM(S): 4; .5 INJECTION, POWDER, LYOPHILIZED, FOR SOLUTION INTRAVENOUS at 09:59

## 2020-01-01 RX ADMIN — CHLORHEXIDINE GLUCONATE 15 MILLILITER(S): 213 SOLUTION TOPICAL at 17:22

## 2020-01-01 RX ADMIN — OXYCODONE HYDROCHLORIDE 5 MILLIGRAM(S): 5 TABLET ORAL at 06:50

## 2020-01-01 RX ADMIN — MEROPENEM 100 MILLIGRAM(S): 1 INJECTION INTRAVENOUS at 21:30

## 2020-01-01 RX ADMIN — Medication 6: at 17:19

## 2020-01-01 RX ADMIN — INSULIN GLARGINE 8 UNIT(S): 100 INJECTION, SOLUTION SUBCUTANEOUS at 12:21

## 2020-01-01 RX ADMIN — GABAPENTIN 300 MILLIGRAM(S): 400 CAPSULE ORAL at 21:08

## 2020-01-01 RX ADMIN — CHLORHEXIDINE GLUCONATE 15 MILLILITER(S): 213 SOLUTION TOPICAL at 16:29

## 2020-01-01 RX ADMIN — Medication 10 UNIT(S): at 11:38

## 2020-01-01 RX ADMIN — BACITRACIN ZINC NEOMYCIN SULFATE POLYMYXIN B SULFATE 1 APPLICATION(S): 400; 3.5; 5 OINTMENT TOPICAL at 04:02

## 2020-01-01 RX ADMIN — CHLORHEXIDINE GLUCONATE 15 MILLILITER(S): 213 SOLUTION TOPICAL at 04:30

## 2020-01-01 RX ADMIN — MAGNESIUM OXIDE 400 MG ORAL TABLET 400 MILLIGRAM(S): 241.3 TABLET ORAL at 08:16

## 2020-01-01 RX ADMIN — Medication 8 UNIT(S): at 07:40

## 2020-01-01 RX ADMIN — GABAPENTIN 300 MILLIGRAM(S): 400 CAPSULE ORAL at 05:17

## 2020-01-01 RX ADMIN — GABAPENTIN 300 MILLIGRAM(S): 400 CAPSULE ORAL at 10:11

## 2020-01-01 RX ADMIN — Medication 3 UNIT(S): at 11:41

## 2020-01-01 RX ADMIN — Medication 75 MILLIGRAM(S): at 13:21

## 2020-01-01 RX ADMIN — INSULIN GLARGINE 20 UNIT(S): 100 INJECTION, SOLUTION SUBCUTANEOUS at 22:13

## 2020-01-01 RX ADMIN — EPINEPHRINE 0.4 MILLIGRAM(S): 0.3 INJECTION INTRAMUSCULAR; SUBCUTANEOUS at 10:31

## 2020-01-01 RX ADMIN — MIDAZOLAM HYDROCHLORIDE 1.32 MG/KG/HR: 1 INJECTION, SOLUTION INTRAMUSCULAR; INTRAVENOUS at 06:50

## 2020-01-01 RX ADMIN — PIPERACILLIN AND TAZOBACTAM 25 GRAM(S): 4; .5 INJECTION, POWDER, LYOPHILIZED, FOR SOLUTION INTRAVENOUS at 22:52

## 2020-01-01 RX ADMIN — BACITRACIN ZINC NEOMYCIN SULFATE POLYMYXIN B SULFATE 1 APPLICATION(S): 400; 3.5; 5 OINTMENT TOPICAL at 16:52

## 2020-01-01 RX ADMIN — PANTOPRAZOLE SODIUM 40 MILLIGRAM(S): 20 TABLET, DELAYED RELEASE ORAL at 10:26

## 2020-01-01 RX ADMIN — BACITRACIN ZINC NEOMYCIN SULFATE POLYMYXIN B SULFATE 1 APPLICATION(S): 400; 3.5; 5 OINTMENT TOPICAL at 05:07

## 2020-01-01 RX ADMIN — Medication 100 MILLIGRAM(S): at 20:07

## 2020-01-01 RX ADMIN — Medication 25 MILLIGRAM(S): at 21:54

## 2020-01-01 RX ADMIN — ENOXAPARIN SODIUM 40 MILLIGRAM(S): 100 INJECTION SUBCUTANEOUS at 06:33

## 2020-01-01 RX ADMIN — Medication 2: at 11:44

## 2020-01-01 RX ADMIN — Medication 40 MILLIGRAM(S): at 11:51

## 2020-01-01 RX ADMIN — Medication 4: at 23:01

## 2020-01-01 RX ADMIN — PANTOPRAZOLE SODIUM 40 MILLIGRAM(S): 20 TABLET, DELAYED RELEASE ORAL at 04:57

## 2020-01-01 RX ADMIN — GABAPENTIN 300 MILLIGRAM(S): 400 CAPSULE ORAL at 14:16

## 2020-01-01 RX ADMIN — PANTOPRAZOLE SODIUM 40 MILLIGRAM(S): 20 TABLET, DELAYED RELEASE ORAL at 12:47

## 2020-01-01 RX ADMIN — Medication 100 MILLIGRAM(S): at 08:26

## 2020-01-01 RX ADMIN — Medication 3 UNIT(S): at 17:50

## 2020-01-01 RX ADMIN — BACITRACIN ZINC NEOMYCIN SULFATE POLYMYXIN B SULFATE 1 APPLICATION(S): 400; 3.5; 5 OINTMENT TOPICAL at 05:52

## 2020-01-01 RX ADMIN — DEXMEDETOMIDINE HYDROCHLORIDE IN 0.9% SODIUM CHLORIDE 12.3 MICROGRAM(S)/KG/HR: 4 INJECTION INTRAVENOUS at 00:00

## 2020-01-01 RX ADMIN — SENNA PLUS 2 TABLET(S): 8.6 TABLET ORAL at 21:22

## 2020-01-01 RX ADMIN — Medication 75 MILLIGRAM(S): at 06:03

## 2020-01-01 RX ADMIN — BACITRACIN ZINC NEOMYCIN SULFATE POLYMYXIN B SULFATE 1 APPLICATION(S): 400; 3.5; 5 OINTMENT TOPICAL at 16:59

## 2020-01-01 RX ADMIN — Medication 6 MILLIGRAM(S): at 23:31

## 2020-01-01 RX ADMIN — APIXABAN 5 MILLIGRAM(S): 2.5 TABLET, FILM COATED ORAL at 21:27

## 2020-01-01 RX ADMIN — INSULIN GLARGINE 6 UNIT(S): 100 INJECTION, SOLUTION SUBCUTANEOUS at 21:15

## 2020-01-01 RX ADMIN — GABAPENTIN 300 MILLIGRAM(S): 400 CAPSULE ORAL at 15:06

## 2020-01-01 RX ADMIN — CHLORHEXIDINE GLUCONATE 1 APPLICATION(S): 213 SOLUTION TOPICAL at 05:49

## 2020-01-01 RX ADMIN — GABAPENTIN 300 MILLIGRAM(S): 400 CAPSULE ORAL at 11:37

## 2020-01-01 RX ADMIN — Medication 6: at 12:25

## 2020-01-01 RX ADMIN — Medication 4 UNIT(S): at 06:49

## 2020-01-01 RX ADMIN — Medication 6: at 23:57

## 2020-01-01 RX ADMIN — OXYCODONE HYDROCHLORIDE 10 MILLIGRAM(S): 5 TABLET ORAL at 22:13

## 2020-01-01 RX ADMIN — ENOXAPARIN SODIUM 40 MILLIGRAM(S): 100 INJECTION SUBCUTANEOUS at 04:10

## 2020-01-01 RX ADMIN — ENOXAPARIN SODIUM 40 MILLIGRAM(S): 100 INJECTION SUBCUTANEOUS at 17:35

## 2020-01-01 RX ADMIN — GABAPENTIN 300 MILLIGRAM(S): 400 CAPSULE ORAL at 21:54

## 2020-01-01 RX ADMIN — CHLORHEXIDINE GLUCONATE 15 MILLILITER(S): 213 SOLUTION TOPICAL at 16:16

## 2020-01-01 RX ADMIN — Medication 4 UNIT(S): at 23:34

## 2020-01-01 RX ADMIN — Medication 1 APPLICATION(S): at 14:16

## 2020-01-01 RX ADMIN — LISINOPRIL 5 MILLIGRAM(S): 2.5 TABLET ORAL at 17:19

## 2020-01-01 RX ADMIN — PANTOPRAZOLE SODIUM 40 MILLIGRAM(S): 20 TABLET, DELAYED RELEASE ORAL at 06:03

## 2020-01-01 RX ADMIN — Medication 4 UNIT(S): at 17:51

## 2020-01-01 RX ADMIN — Medication 2: at 05:44

## 2020-01-01 RX ADMIN — SENNA PLUS 2 TABLET(S): 8.6 TABLET ORAL at 22:13

## 2020-01-01 RX ADMIN — Medication 5 UNIT(S): at 17:11

## 2020-01-01 RX ADMIN — CHLORHEXIDINE GLUCONATE 15 MILLILITER(S): 213 SOLUTION TOPICAL at 05:38

## 2020-01-01 RX ADMIN — Medication 1 APPLICATION(S): at 06:04

## 2020-01-01 RX ADMIN — GABAPENTIN 300 MILLIGRAM(S): 400 CAPSULE ORAL at 06:03

## 2020-01-01 RX ADMIN — INSULIN GLARGINE 12 UNIT(S): 100 INJECTION, SOLUTION SUBCUTANEOUS at 08:27

## 2020-01-01 RX ADMIN — Medication 2: at 08:16

## 2020-01-01 RX ADMIN — Medication 50 MILLILITER(S): at 23:30

## 2020-01-01 RX ADMIN — POLYETHYLENE GLYCOL 3350 17 GRAM(S): 17 POWDER, FOR SOLUTION ORAL at 12:17

## 2020-01-01 RX ADMIN — APIXABAN 5 MILLIGRAM(S): 2.5 TABLET, FILM COATED ORAL at 08:40

## 2020-01-01 RX ADMIN — KETAMINE HYDROCHLORIDE 0.86 MG/KG/HR: 100 INJECTION INTRAMUSCULAR; INTRAVENOUS at 00:44

## 2020-01-01 RX ADMIN — GABAPENTIN 300 MILLIGRAM(S): 400 CAPSULE ORAL at 11:02

## 2020-01-01 RX ADMIN — Medication 4 UNIT(S): at 23:02

## 2020-01-01 RX ADMIN — Medication 8: at 16:40

## 2020-01-01 RX ADMIN — APIXABAN 5 MILLIGRAM(S): 2.5 TABLET, FILM COATED ORAL at 10:23

## 2020-01-01 RX ADMIN — SODIUM POLYSTYRENE SULFONATE 30 GRAM(S): 4.1 POWDER, FOR SUSPENSION ORAL at 15:20

## 2020-01-01 RX ADMIN — GABAPENTIN 300 MILLIGRAM(S): 400 CAPSULE ORAL at 06:47

## 2020-01-01 RX ADMIN — MAGNESIUM OXIDE 400 MG ORAL TABLET 400 MILLIGRAM(S): 241.3 TABLET ORAL at 08:20

## 2020-01-01 RX ADMIN — BACITRACIN ZINC NEOMYCIN SULFATE POLYMYXIN B SULFATE 1 APPLICATION(S): 400; 3.5; 5 OINTMENT TOPICAL at 05:53

## 2020-01-01 RX ADMIN — Medication 4 UNIT(S): at 23:32

## 2020-01-01 RX ADMIN — AMLODIPINE BESYLATE 5 MILLIGRAM(S): 2.5 TABLET ORAL at 05:56

## 2020-01-01 RX ADMIN — APIXABAN 5 MILLIGRAM(S): 2.5 TABLET, FILM COATED ORAL at 22:27

## 2020-01-01 RX ADMIN — OLANZAPINE 10 MILLIGRAM(S): 15 TABLET, FILM COATED ORAL at 16:34

## 2020-01-01 RX ADMIN — OLANZAPINE 10 MILLIGRAM(S): 15 TABLET, FILM COATED ORAL at 16:20

## 2020-01-01 RX ADMIN — ALTEPLASE 50 MILLIGRAM(S): KIT at 02:07

## 2020-01-01 RX ADMIN — GABAPENTIN 300 MILLIGRAM(S): 400 CAPSULE ORAL at 12:10

## 2020-01-01 RX ADMIN — Medication 100 MILLIGRAM(S): at 13:29

## 2020-01-01 RX ADMIN — SENNA PLUS 2 TABLET(S): 8.6 TABLET ORAL at 22:28

## 2020-01-01 RX ADMIN — PANTOPRAZOLE SODIUM 40 MILLIGRAM(S): 20 TABLET, DELAYED RELEASE ORAL at 12:40

## 2020-01-01 RX ADMIN — ENOXAPARIN SODIUM 40 MILLIGRAM(S): 100 INJECTION SUBCUTANEOUS at 12:18

## 2020-01-01 RX ADMIN — Medication 100 MILLIGRAM(S): at 04:57

## 2020-01-01 RX ADMIN — Medication 10 MILLIGRAM(S): at 13:03

## 2020-01-01 RX ADMIN — Medication 1 MILLIGRAM(S): at 21:22

## 2020-01-01 RX ADMIN — Medication 100 MILLIGRAM(S): at 12:17

## 2020-01-01 RX ADMIN — Medication 60 MILLIGRAM(S): at 23:21

## 2020-01-01 RX ADMIN — Medication 0.1 MILLIGRAM(S): at 06:02

## 2020-01-01 RX ADMIN — Medication 100 MILLIGRAM(S): at 06:13

## 2020-01-01 RX ADMIN — BACITRACIN ZINC NEOMYCIN SULFATE POLYMYXIN B SULFATE 1 APPLICATION(S): 400; 3.5; 5 OINTMENT TOPICAL at 17:14

## 2020-01-01 RX ADMIN — Medication 2: at 17:50

## 2020-01-01 RX ADMIN — PANTOPRAZOLE SODIUM 40 MILLIGRAM(S): 20 TABLET, DELAYED RELEASE ORAL at 06:59

## 2020-01-01 RX ADMIN — MAGNESIUM OXIDE 400 MG ORAL TABLET 400 MILLIGRAM(S): 241.3 TABLET ORAL at 08:26

## 2020-01-01 RX ADMIN — Medication 25 MILLIGRAM(S): at 21:31

## 2020-01-01 RX ADMIN — Medication 100 MILLIGRAM(S): at 20:58

## 2020-01-01 RX ADMIN — Medication 10 MILLIGRAM(S): at 12:40

## 2020-01-01 RX ADMIN — Medication 3 UNIT(S): at 12:07

## 2020-01-01 RX ADMIN — Medication 10 MILLIGRAM(S): at 22:57

## 2020-01-01 RX ADMIN — Medication 2: at 17:54

## 2020-01-01 RX ADMIN — POLYETHYLENE GLYCOL 3350 17 GRAM(S): 17 POWDER, FOR SOLUTION ORAL at 14:16

## 2020-01-01 RX ADMIN — POLYETHYLENE GLYCOL 3350 17 GRAM(S): 17 POWDER, FOR SOLUTION ORAL at 17:45

## 2020-01-01 RX ADMIN — SODIUM CHLORIDE 1000 MILLILITER(S): 9 INJECTION INTRAMUSCULAR; INTRAVENOUS; SUBCUTANEOUS at 13:15

## 2020-01-01 RX ADMIN — GABAPENTIN 300 MILLIGRAM(S): 400 CAPSULE ORAL at 21:12

## 2020-01-01 RX ADMIN — SENNA PLUS 2 TABLET(S): 8.6 TABLET ORAL at 21:12

## 2020-01-01 RX ADMIN — Medication 6 MILLIGRAM(S): at 23:21

## 2020-01-01 RX ADMIN — FENTANYL CITRATE 3.29 MICROGRAM(S)/KG/HR: 50 INJECTION INTRAVENOUS at 06:26

## 2020-01-01 RX ADMIN — Medication 4: at 16:51

## 2020-01-01 RX ADMIN — Medication 4 UNIT(S): at 05:23

## 2020-01-01 RX ADMIN — SODIUM CHLORIDE 80 MILLILITER(S): 9 INJECTION INTRAMUSCULAR; INTRAVENOUS; SUBCUTANEOUS at 12:29

## 2020-01-01 RX ADMIN — CHLORHEXIDINE GLUCONATE 15 MILLILITER(S): 213 SOLUTION TOPICAL at 17:13

## 2020-01-01 RX ADMIN — PANTOPRAZOLE SODIUM 40 MILLIGRAM(S): 20 TABLET, DELAYED RELEASE ORAL at 10:50

## 2020-01-01 RX ADMIN — Medication 0.5 MILLIGRAM(S): at 14:23

## 2020-01-01 RX ADMIN — CHLORHEXIDINE GLUCONATE 15 MILLILITER(S): 213 SOLUTION TOPICAL at 16:50

## 2020-01-01 RX ADMIN — FENTANYL CITRATE 3.29 MICROGRAM(S)/KG/HR: 50 INJECTION INTRAVENOUS at 00:01

## 2020-01-01 RX ADMIN — Medication 100 MILLIGRAM(S): at 06:47

## 2020-01-01 RX ADMIN — Medication 60 MILLIGRAM(S): at 11:43

## 2020-01-01 RX ADMIN — MIDAZOLAM HYDROCHLORIDE 4 MILLIGRAM(S): 1 INJECTION, SOLUTION INTRAMUSCULAR; INTRAVENOUS at 16:12

## 2020-01-01 RX ADMIN — POLYETHYLENE GLYCOL 3350 17 GRAM(S): 17 POWDER, FOR SOLUTION ORAL at 21:13

## 2020-01-01 RX ADMIN — Medication 100 MILLIGRAM(S): at 06:58

## 2020-01-01 RX ADMIN — OLANZAPINE 10 MILLIGRAM(S): 15 TABLET, FILM COATED ORAL at 21:08

## 2020-01-01 RX ADMIN — APIXABAN 5 MILLIGRAM(S): 2.5 TABLET, FILM COATED ORAL at 21:12

## 2020-01-01 RX ADMIN — Medication 2: at 23:31

## 2020-01-01 RX ADMIN — Medication 100 MILLIGRAM(S): at 21:54

## 2020-01-01 RX ADMIN — Medication 50 GRAM(S): at 12:14

## 2020-01-01 RX ADMIN — Medication 200 MILLIGRAM(S): at 11:37

## 2020-01-01 RX ADMIN — OXYCODONE HYDROCHLORIDE 5 MILLIGRAM(S): 5 TABLET ORAL at 06:05

## 2020-01-01 RX ADMIN — Medication 65 MILLIGRAM(S): at 16:56

## 2020-01-01 RX ADMIN — ATENOLOL 25 MILLIGRAM(S): 25 TABLET ORAL at 05:03

## 2020-01-01 RX ADMIN — Medication 100 MILLIGRAM(S): at 06:04

## 2020-04-11 NOTE — H&P ADULT - ATTENDING COMMENTS
Patient is seen and evaluated, case discussed with resident, agree with assessment and plan  Patient came in with sob and fever for 8 days symptoms were worsening  COVID came positive, hypoxemic in the ER with O2 requirement of 6liters with nasal cannula  B/l crackles, no wheezing on exam  O2 titrate as require  Plaquenil, iv steriods, inhalor, prone positioning and IS.   IV fluids and bmp monitoring for hyponatremia, monitor bmp  Acute renal failure: not sure the baseline creatinine, iv fluids and will monitor bmp, i/o and daily weight.

## 2020-04-11 NOTE — ED ADULT NURSE REASSESSMENT NOTE - REASSESS COMMUNICATION
ED  to bedside, pt requesting to ambulate to bathroom to have a BM. pt made aware his O2 levels remain low 88-90%, increased to 8L NC. pt reports he feels okay at this time to ambulate, O2 tank provided. pt educated to use help button if he begins to feel SOB, dizzy, lightheaded. pt agrees.

## 2020-04-11 NOTE — H&P ADULT - HISTORY OF PRESENT ILLNESS
71 yo M w/ PMH of HTN and DM on insulin and Metformin, and HLD presented with complaint of SOB, HA, mild CP, and weakness. He states he had a nonproductive cough, w/ congestion and feeling cold for raghav last 8 days. He also complained of a fever but did not check his temperature. Upon arrival to the ED his SpO2 was 84% on RA with RR of 28 which improved to 99% on NRB. He was then downtitrated to 6L NC and is saturating around 90-93%. He states since he has been in the ED and put on the oxygen he feels a lot better.   Currently he denies any Dizziness, HA, CP, SOB, Abd pain, N/V/D/C, dysuria, recent travel or sick contacts.

## 2020-04-11 NOTE — ED PROVIDER NOTE - PHYSICAL EXAMINATION
pt stable on NRB, desats to 84 % on RA, will witch to nasal cannula btu no tachypnea or accessory muscle use

## 2020-04-11 NOTE — ED ADULT NURSE NOTE - CHPI ED NUR SYMPTOMS POS
COUGH I will SWITCH the dose or number of times a day I take the medications listed below when I get home from the hospital:    valsartan 40 mg oral tablet  -- 1 tab(s) by mouth 2 times a day

## 2020-04-11 NOTE — H&P ADULT - NSICDXPASTMEDICALHX_GEN_ALL_CORE_FT
PAST MEDICAL HISTORY:  Essential hypertension     Hyperlipidemia     Type 2 diabetes mellitus without complication, with long-term current use of insulin

## 2020-04-11 NOTE — ED PROVIDER NOTE - OBJECTIVE STATEMENT
69 y/o M with h/o htn, dm on metformin, insulin p/w cough, cold, congestion for 8 days and was tested + COVID at his clinic and now getting worse and has worsening cough and sob. Pt also had sub fever, chills, no nausea, vomiting, diarrhea. Pt is non smoker and no prior h/o asthma

## 2020-04-11 NOTE — H&P ADULT - ASSESSMENT
69 yo M w/ PMH of ?HTN and DM on insulin and Metformin, and HLD presented with complaint of SOB, HA, mild CP, and weakness being admitted for acute hypoxic respiratory failure 2/2 COVID pneumonia.  Admit to  bed  Activity: as tolerated  Diet: DASH w/ consistent carb    Acute Hypoxic Respiratory Failure 2/2 COVID Pneumonia  COVID19 positive  Currently on 6L NC w/ SpO2 ranging from 89-93%  Inflammatory markers elevated  CXR: consistent with b/l pneumonia  Plaquenil 800mg QD x1 day, then 400mg QD x4 days  c/w albuterol prn  c/w vitamin C 500mg TID  c/w Thiamines 100mg QD  c/w tessalon perles PRN  if patient's hypoxic status further deteriorates, will have low threshold to escalate care.     Hyperkalemia  K+: 5.7  ECG: no acute changes of hyperkalemia  Trop: neg  s/p Kayaxelate  pending repeat BMP    Normovolemic Hyponatremia  Na: 125, corrected Na 2/2 hyperglycemia: 130  s/p 1L NS  pending repeat BMP    PseudoHypocalcemia  Ca: 8.1  Corrected Ca for low albumin: 9.1    Type II Diabetes Mellitus  Insulin SS and accuchecks ACHS + QHS  c/w Lantus 20U QHS    MARIYA vs CKD?  Elevated BUN/Cr, ratio >20, unknown baseline  s/p hydration  Will trend BMP    Prophylactic Measure  Lovenox 40mg QD 69 yo M w/ PMH of ?HTN and DM on insulin and Metformin, and HLD presented with complaint of SOB, HA, mild CP, and weakness being admitted for acute hypoxic respiratory failure 2/2 COVID pneumonia.  Admit to  bed  Activity: as tolerated  Diet: DASH w/ consistent carb    Acute Hypoxic Respiratory Failure 2/2 COVID Pneumonia  COVID19 positive  Currently on 6L NC w/ SpO2 ranging from 89-93%  Inflammatory markers elevated  CXR: consistent with b/l pneumonia  Plaquenil 800mg QD x1 day, then 400mg QD x4 days  c/w albuterol prn  c/w vitamin C 500mg TID  c/w Thiamines 100mg QD  c/w tessalon perles PRN  if patient's hypoxic status further deteriorates, will have low threshold to escalate care.   Prone positioning, incentive spirometry.     Hyperkalemia  K+: 5.7  ECG: no acute changes of hyperkalemia  Trop: neg  s/p Kayaxelate  pending repeat BMP, will monitor bmp.       Euvolemic Hyponatremia  Na: 125, corrected Na 2/2 hyperglycemia: 130  s/p 1L NS  pending repeat BMP    PseudoHypocalcemia  Ca: 8.1  Corrected Ca for low albumin: 9.1    Type II Diabetes Mellitus  Insulin SS and accuchecks ACHS + QHS  c/w Lantus 20U QHS    MARIYA vs CKD?  Elevated BUN/Cr, ratio >20, unknown baseline  s/p hydration  Will trend BMP    Prophylactic Measure  Lovenox 40mg QD

## 2020-04-11 NOTE — ED PROVIDER NOTE - PROGRESS NOTE DETAILS
pt admitted for ccovid pna with hypoxia, hyperglycmeia and hyponatremia, will given fluids, kalyexalate as well, admission accepted by DR. Laws

## 2020-04-11 NOTE — H&P ADULT - NSHPPHYSICALEXAM_GEN_ALL_CORE
Vitals  T(C): 37 (04-11-20 @ 10:40), Max: 37 (04-11-20 @ 10:40)  HR: 89 (04-11-20 @ 10:40) (89 - 89)  BP: 139/78 (04-11-20 @ 10:40) (139/78 - 139/78)  RR: 26 (04-11-20 @ 11:46) (26 - 28)  SpO2: 93% (04-11-20 @ 11:46) (84% - 99%)  Wt(kg): --    Physical Exam:   GENERAL: NAD, well-groomed, well-developed  HEAD:  Atraumatic, Normocephalic  EYES: EOMI, conjunctiva and sclera clear  NECK: Supple, No JVD, Normal thyroid  NERVOUS SYSTEM:  Alert & Oriented X3, Good concentration; Motor Strength 5/5 B/L upper and lower extremities;   RESP: good air movement, mild diffuse inspiratory crackles  CVS: Regular rate and rhythm; No murmurs appreciated  GI: Soft, Nontender, Nondistended; Bowel sounds present  EXTREMITIES:  2+ Peripheral Pulses, No clubbing, cyanosis, or edema  LYMPH: No cervical or supraclavicular lymphadenopathy noted  SKIN: No rashes or lesions Vitals  T(C): 37 (04-11-20 @ 10:40), Max: 37 (04-11-20 @ 10:40)  HR: 89 (04-11-20 @ 10:40) (89 - 89)  BP: 139/78 (04-11-20 @ 10:40) (139/78 - 139/78)  RR: 26 (04-11-20 @ 11:46) (26 - 28)  SpO2: 93% (04-11-20 @ 11:46) (84% - 99%)    Physical Exam:   GENERAL: NAD, well-groomed, well-developed  HEAD:  Atraumatic, Normocephalic  EYES: EOMI, conjunctiva and sclera clear  NECK: Supple, No JVD, Normal thyroid  NERVOUS SYSTEM:  Alert & Oriented X3, no focal deficit   RESP: good air movement, mild diffuse inspiratory crackles  CVS: Regular rate and rhythm; No murmurs appreciated  GI: Soft, Nontender, Nondistended; Bowel sounds present  EXTREMITIES:  2+ Peripheral Pulses, No edema  SKIN: No rashes or lesions

## 2020-04-11 NOTE — ED ADULT NURSE NOTE - OBJECTIVE STATEMENT
pt awake, alert and oriented x3 c/o cough x 8 days.  Pt states tested positive for covid.  respirations tachpneic, unlabored.

## 2020-04-11 NOTE — ED ADULT NURSE REASSESSMENT NOTE - REASSESS COMMUNICATION
pt return from the bathroom with desat to 77%, not improving on NC, placed in prone position on 100% NRB mask.

## 2020-04-11 NOTE — ED PROVIDER NOTE - CARE PLAN
Principal Discharge DX:	Multifocal pneumonia  Secondary Diagnosis:	Hypoxia  Secondary Diagnosis:	Hyponatremia  Secondary Diagnosis:	Hyperglycemia  Secondary Diagnosis:	Hyperkalemia

## 2020-04-11 NOTE — H&P ADULT - NSHPLABSRESULTS_GEN_ALL_CORE
CBC Full  -  ( 11 Apr 2020 12:40 )  WBC Count : 7.85 K/uL  RBC Count : 4.19 M/uL  Hemoglobin : 12.6 g/dL  Hematocrit : 37.8 %  Platelet Count - Automated : 222 K/uL  Mean Cell Volume : 90.2 fl  Mean Cell Hemoglobin : 30.1 pg  Mean Cell Hemoglobin Concentration : 33.3 gm/dL  Auto Neutrophil # : 6.37 K/uL  Auto Lymphocyte # : 0.76 K/uL  Auto Monocyte # : 0.65 K/uL  Auto Eosinophil # : 0.00 K/uL  Auto Basophil # : 0.01 K/uL  Auto Neutrophil % : 81.1 %  Auto Lymphocyte % : 9.7 %  Auto Monocyte % : 8.3 %  Auto Eosinophil % : 0.0 %  Auto Basophil % : 0.1 %  04-11    125<L>  |  93<L>  |  41.0<H>  ----------------------------<  442<H>  5.7<H>   |  20.0<L>  |  1.66<H>    Ca    8.1<L>      11 Apr 2020 12:40    TPro  6.7  /  Alb  2.7<L>  /  TBili  0.3<L>  /  DBili  x   /  AST  28  /  ALT  18  /  AlkPhos  152<H>  04-11    Alkaline Phosphatase, Serum: 152 U/L (04.11.20 @ 12:40)  C-Reactive Protein, Serum: 14.43 mg/dL (04.11.20 @ 12:40)  Ferritin, Serum: 464 ng/mL (04.11.20 @ 12:40)  Lactate Dehydrogenase, Serum: 321 U/L (04.11.20 @ 12:40)  Procalcitonin, Serum: 0.44: Hemolyzed. Interpret with caution (04.11.20 @ 12:40)  Serum Pro-Brain Natriuretic Peptide: 958 pg/mL (04.11.20 @ 12:40)    Blood Gas Profile - Venous (04.11.20 @ 12:39)    pH, Venous: 7.36    pCO2, Venous: 40 mmHg    pO2, Venous: 50 mmHg    HCO3, Venous: 22 mmol/L    Base Excess, Venous: -2.5 mmol/L    Oxygen Saturation, Venous: 83 %    Blood Gas Source Venous: Venous    Radiology  < from: Xray Chest 1 View-PORTABLE IMMEDIATE (04.11.20 @ 13:17) >    FINDINGS:    Heart: Heart size is prominent    Mediastinum: Mediastinal contours are normal. There are no enlarged mediastinal or hilar nodes.    Lungs: Bilateral interstitial infiltrates are seen in the mid and lower lung fields.    Pulmonary vascularity: Pulmonary vascularity is normal.    Osseous structures: The osseous structures are intact.    Soft tissues:There are no soft tissue abnormalities    Pleura: There are no pleural effusions.    IMPRESSION:    Bilateral interstitial infiltrates in the mid and lower lung fields.

## 2020-04-11 NOTE — ED ADULT TRIAGE NOTE - CHIEF COMPLAINT QUOTE
pt comes to ed from home for shortness of breath/fevers. saw doctor thursday was not tested. room air sat 83%on. patient on NRB 99%.

## 2020-04-11 NOTE — H&P ADULT - NSHPREVIEWOFSYSTEMS_GEN_ALL_CORE
denies any Dizziness, HA, CP, SOB, Abd pain, N/V/D/C, dysuria  all systems reviewed negative except mentioned above

## 2020-04-12 NOTE — PROGRESS NOTE ADULT - SUBJECTIVE AND OBJECTIVE BOX
BECKI DAVIES    [id number]    70y      Male    Patient is a 70y old  Male who presents with a chief complaint of SOB (11 Apr 2020 16:00)      INTERVAL HPI/OVERNIGHT EVENTS:    Patient is still SOB, now on rebreather, no fever spikes, denies nausea, vomiting, dizziness     REVIEW OF SYSTEMS:    CONSTITUTIONAL: No fever, has fatigue  RESPIRATORY: has cough and shortness of breath  CARDIOVASCULAR: No chest pain, palpitations  GASTROINTESTINAL: No abdominal, No nausea, vomiting  NEUROLOGICAL: No headaches,  loss of strength.  MISCELLANEOUS: No joint swelling or pain       Vital Signs Last 24 Hrs  T(C): 36.4 (12 Apr 2020 09:16), Max: 36.8 (12 Apr 2020 05:04)  T(F): 97.6 (12 Apr 2020 09:16), Max: 98.2 (12 Apr 2020 05:04)  HR: 82 (12 Apr 2020 12:07) (65 - 82)  BP: 175/82 (12 Apr 2020 12:07) (154/62 - 175/82)  RR: 20 (12 Apr 2020 09:16) (18 - 34)  SpO2: 94% (12 Apr 2020 16:13) (77% - 96%)    PHYSICAL EXAM:    GENERAL: Elderly male looking mild respiratory distress   HEENT: PERRL, +EOMI  NECK: soft, Supple, No JVD,   CHEST/LUNG: Decrease air entry bilaterally; b/l crackles, No wheezing  HEART: S1S2+, Regular rate and rhythm; No murmurs  ABDOMEN: Soft, Nontender, Nondistended; Bowel sounds present  EXTREMITIES:  1+ Peripheral Pulses, No edema  SKIN: No rashes or lesions  NEURO: AAOX3, no focal deficits, no motor r sensory loss  PSYCH: normal mood      LABS:                        13.5   8.08  )-----------( 269      ( 12 Apr 2020 14:10 )             39.4     04-12    131<L>  |  95<L>  |  33.0<H>  ----------------------------<  368<H>  3.9   |  18.0<L>  |  1.13    Ca    8.2<L>      12 Apr 2020 14:10  Phos  2.8     04-11  Mg     2.7     04-11    TPro  7.1  /  Alb  2.8<L>  /  TBili  0.3<L>  /  DBili  x   /  AST  25  /  ALT  19  /  AlkPhos  147<H>  04-12    PT/INR - ( 11 Apr 2020 12:40 )   PT: 11.8 sec;   INR: 1.04 ratio         PTT - ( 11 Apr 2020 12:40 )  PTT:30.4 sec        I&O's Summary      MEDICATIONS  (STANDING):  ascorbic acid 1500 milliGRAM(s) Oral daily  atorvastatin 40 milliGRAM(s) Oral at bedtime  dextrose 5%. 1000 milliLiter(s) (50 mL/Hr) IV Continuous <Continuous>  dextrose 50% Injectable 12.5 Gram(s) IV Push once  dextrose 50% Injectable 25 Gram(s) IV Push once  dextrose 50% Injectable 25 Gram(s) IV Push once  enoxaparin Injectable 40 milliGRAM(s) SubCutaneous daily  gabapentin 300 milliGRAM(s) Oral daily  hydroxychloroquine 400 milliGRAM(s) Oral every 24 hours  hydroxychloroquine   Oral   insulin glargine Injectable (LANTUS) 20 Unit(s) SubCutaneous at bedtime  insulin lispro (HumaLOG) corrective regimen sliding scale   SubCutaneous three times a day before meals  methylPREDNISolone sodium succinate Injectable 65 milliGRAM(s) IV Push two times a day  sodium chloride 0.9%. 1000 milliLiter(s) (80 mL/Hr) IV Continuous <Continuous>  thiamine 200 milliGRAM(s) Oral daily    MEDICATIONS  (PRN):  acetaminophen   Tablet .. 650 milliGRAM(s) Oral every 6 hours PRN Temp greater or equal to 38C (100.4F)  ALBUTerol    90 MICROgram(s) HFA Inhaler 1 Puff(s) Inhalation every 4 hours PRN Shortness of Breath and/or Wheezing  benzonatate 100 milliGRAM(s) Oral three times a day PRN Cough  dextrose 40% Gel 15 Gram(s) Oral once PRN Blood Glucose LESS THAN 70 milliGRAM(s)/deciliter  glucagon  Injectable 1 milliGRAM(s) IntraMuscular once PRN Glucose LESS THAN 70 milligrams/deciliter

## 2020-04-12 NOTE — PROGRESS NOTE ADULT - ASSESSMENT
71 yo M w/ PMH of ?HTN and DM on insulin and Metformin, and HLD presented with complaint of SOB, HA, mild CP, and weakness being admitted for acute hypoxic respiratory failure 2/2 COVID pneumonia.  Admit to  bed  Activity: as tolerated  Diet: DASH w/ consistent carb    Acute Hypoxic Respiratory Failure 2/2 COVID Pneumonia  COVID19 positive, Currently on 6L NC w/ SpO2 ranging from 89-93%, Inflammatory markers elevated  CXR: consistent with b/l pneumonia, Plaquenil 800mg QD x1 day, then 400mg QD x4 days  c/w albuterol prn, c/w vitamin C 500mg TID  c/w Thiamines 100mg QD, c/w tessalon perles PRN  if patient's hypoxic status further deteriorates, will have low threshold to escalate care.   Prone positioning, incentive spirometry, will provide chest PT, will monitor closely as he is on non rebreather now.      Hyperkalemia  K+: 5.7, ECG: no acute changes of hyperkalemia  Trop: neg  s/p Kayaxelate, potassium is better now  pending repeat BMP, will monitor bmp.       Euvolemic Hyponatremia  Na: 125, corrected Na 2/2 hyperglycemia: 130, now 131, will monitor BMP.     PseudoHypocalcemia  Ca: 8.1  Corrected Ca for low albumin: 9.1    Type II Diabetes Mellitus  Insulin SS and accuchecks ACHS + QHS  c/w Lantus 20U QHS, hb a1c is 11.6, simone give prandial insulin as his FS are elevated.     MARIYA vs CKD?  Elevated BUN/Cr, ratio >20, unknown baseline  s/p hydration, improving  Will trend BMP    Prophylactic Measure  Lovenox 40mg QD

## 2020-04-13 NOTE — PROGRESS NOTE ADULT - ASSESSMENT
71 yo M w/ PMH of ?HTN and DM on insulin and Metformin, and HLD presented with complaint of SOB, HA, mild CP, and weakness being admitted for acute hypoxic respiratory failure 2/2 COVID pneumonia.      Acute Hypoxic Respiratory Failure 2/2 COVID Pneumonia:   COVID19 positive, Currently on 6L NC w/ SpO2 ranging from 89-93%, Inflammatory markers elevated  CXR: consistent with b/l pneumonia, Plaquenil 800mg QD x1 day, then 400mg QD x4 days  c/w albuterol prn, c/w vitamin C 500mg TID  c/w Thiamines 100mg QD, c/w tessalon perles PRN  if patient's hypoxic status further deteriorates, will have low threshold to escalate care.   Prone positioning, incentive spirometry, chest PT, will monitor closely as he is on non rebreather now, will continue with current treatment, crp is 8 now trending down, will monitor    Hyperkalemia  K+: 5.7, ECG: no acute changes of hyperkalemia  Trop: neg  s/p Kayaxelate, potassium is better now, will monitor bmp.       Euvolemic Hyponatremia  Na: 125, corrected Na 2/2 hyperglycemia: 130, now 134, will monitor BMP.     PseudoHypocalcemia  Ca: 8.1  Corrected Ca for low albumin: 9.1    Type II Diabetes Mellitus  Insulin SS and accuchecks ACHS + QHS  c/w Lantus 20U QHS, hb a1c is 11.6, got prandial insulin, still high FS, will increase prandial to 8 units three times and Lantus to increase to 25units     MARIYA vs CKD?  Elevated BUN/Cr, ratio >20, unknown baseline  s/p hydration, improving, looks like much better, close to baseline   Will trend BMP    Prophylactic Measure  Lovenox 40mg QD

## 2020-04-13 NOTE — PROGRESS NOTE ADULT - SUBJECTIVE AND OBJECTIVE BOX
BECKI DAVIES    [id number]    70y      Male    Patient is a 70y old  Male who presents with a chief complaint of SOB (12 Apr 2020 16:29)      INTERVAL HPI/OVERNIGHT EVENTS:      Patient is still SOB, now on rebreather, not worsening, no fever spikes, denies nausea, vomiting, dizziness     REVIEW OF SYSTEMS:    CONSTITUTIONAL: No fever, has fatigue  RESPIRATORY: has cough and shortness of breath, not worsening  CARDIOVASCULAR: No chest pain, palpitations  GASTROINTESTINAL: No abdominal, No nausea, vomiting  NEUROLOGICAL: No headaches,  loss of strength.  MISCELLANEOUS: No joint swelling or pain        Vital Signs Last 24 Hrs  T(C): 36.4 (13 Apr 2020 07:50), Max: 36.4 (13 Apr 2020 07:50)  T(F): 97.5 (13 Apr 2020 07:50), Max: 97.5 (13 Apr 2020 07:50)  HR: 75 (13 Apr 2020 07:50) (75 - 82)  BP: 201/84 (13 Apr 2020 12:22) (155/60 - 201/84)  SpO2: 94% (13 Apr 2020 12:22) (92% - 99%)    PHYSICAL EXAM:    GENERAL: Elderly male looking mild respiratory distress   HEENT: PERRL, +EOMI  NECK: soft, Supple, No JVD,   CHEST/LUNG: Decrease air entry bilaterally; b/l crackles, No wheezing  HEART: S1S2+, Regular rate and rhythm; No murmurs  ABDOMEN: Soft, Nontender, Nondistended; Bowel sounds present  EXTREMITIES:  1+ Peripheral Pulses, No edema  SKIN: No rashes or lesions  NEURO: AAOX3, no focal deficits, no motor r sensory loss  PSYCH: normal mood      LABS:                        12.6   12.72 )-----------( 300      ( 13 Apr 2020 09:56 )             37.2     04-13    134<L>  |  97<L>  |  38.0<H>  ----------------------------<  363<H>  4.8   |  22.0  |  1.10    Ca    8.5<L>      13 Apr 2020 09:47  Phos  2.8     04-11  Mg     2.7     04-11    TPro  6.9  /  Alb  2.7<L>  /  TBili  0.3<L>  /  DBili  x   /  AST  19  /  ALT  15  /  AlkPhos  135<H>  04-13            I&O's Summary      MEDICATIONS  (STANDING):  amLODIPine   Tablet 5 milliGRAM(s) Oral daily  ascorbic acid 1500 milliGRAM(s) Oral daily  atorvastatin 40 milliGRAM(s) Oral at bedtime  dextrose 5%. 1000 milliLiter(s) (50 mL/Hr) IV Continuous <Continuous>  dextrose 50% Injectable 12.5 Gram(s) IV Push once  dextrose 50% Injectable 25 Gram(s) IV Push once  dextrose 50% Injectable 25 Gram(s) IV Push once  enoxaparin Injectable 40 milliGRAM(s) SubCutaneous daily  gabapentin 300 milliGRAM(s) Oral daily  hydroxychloroquine 400 milliGRAM(s) Oral every 24 hours  hydroxychloroquine   Oral   insulin glargine Injectable (LANTUS) 25 Unit(s) SubCutaneous at bedtime  insulin lispro (HumaLOG) corrective regimen sliding scale   SubCutaneous three times a day before meals  insulin lispro Injectable (HumaLOG) 8 Unit(s) SubCutaneous three times a day with meals  methylPREDNISolone sodium succinate Injectable 65 milliGRAM(s) IV Push two times a day  sodium chloride 0.9%. 1000 milliLiter(s) (80 mL/Hr) IV Continuous <Continuous>  thiamine 200 milliGRAM(s) Oral daily    MEDICATIONS  (PRN):  acetaminophen   Tablet .. 650 milliGRAM(s) Oral every 6 hours PRN Temp greater or equal to 38C (100.4F)  ALBUTerol    90 MICROgram(s) HFA Inhaler 1 Puff(s) Inhalation every 4 hours PRN Shortness of Breath and/or Wheezing  benzonatate 100 milliGRAM(s) Oral three times a day PRN Cough  dextrose 40% Gel 15 Gram(s) Oral once PRN Blood Glucose LESS THAN 70 milliGRAM(s)/deciliter  glucagon  Injectable 1 milliGRAM(s) IntraMuscular once PRN Glucose LESS THAN 70 milligrams/deciliter

## 2020-04-14 NOTE — PROGRESS NOTE ADULT - SUBJECTIVE AND OBJECTIVE BOX
Interval history: 70 year old male with CC of SOB    Subjective: Patient seen and evaluated at bedside, patient has no acute complaints at this time. ROS is neg. Patient currently weaned from NRB to venti mask 92%     PHYSICAL EXAM:    Vital Signs Last 24 Hrs  T(C): 36.5 (14 Apr 2020 07:46), Max: 36.5 (14 Apr 2020 07:46)  T(F): 97.7 (14 Apr 2020 07:46), Max: 97.7 (14 Apr 2020 07:46)  HR: 87 (14 Apr 2020 07:46) (78 - 87)  BP: 179/94 (14 Apr 2020 07:46) (144/82 - 179/94)  BP(mean): --  RR: --  SpO2: 79% (14 Apr 2020 11:02) (79% - 98%)    GENERAL: Pt lying comfortably, NAD.  ENMT: PERRL, +EOMI.  NECK: soft, Supple, No JVD,   CHEST/LUNG: Diminished air flow bilaterally ; No wheezing, crackles in bases.  HEART: S1S2+, Regular rate and rhythm; No murmurs.  ABDOMEN: Soft, Nontender, Nondistended; Bowel sounds present.  MUSCULOSKELETAL: Normal range of motion.  SKIN: warm and dry  NEURO: AAOX3  PSYCH: normal mood. Interval history: 70 year old male with CC of SOB    Subjective: Patient seen and evaluated at bedside, patient has no acute complaints at this time. ROS is neg. Patient currently weaned from NRB to venti mask 92%     PHYSICAL EXAM:    Vital Signs Last 24 Hrs  T(C): 36.5 (14 Apr 2020 07:46), Max: 36.5 (14 Apr 2020 07:46)  T(F): 97.7 (14 Apr 2020 07:46), Max: 97.7 (14 Apr 2020 07:46)  HR: 87 (14 Apr 2020 07:46) (78 - 87)  BP: 179/94 (14 Apr 2020 07:46) (144/82 - 179/94)  SpO2: 79% (14 Apr 2020 11:02) (79% - 98%)    GENERAL: Elderly male looking comfortable  HEENT: PERRL, +EOMI  NECK: soft, Supple, No JVD,   CHEST/LUNG: Decrease air entry bilaterally; fine crackles, No wheezing  HEART: S1S2+, Regular rate and rhythm; No murmurs  ABDOMEN: Soft, Nontender, Nondistended; Bowel sounds present  EXTREMITIES:  1+ Peripheral Pulses, No edema  SKIN: No rashes or lesions  NEURO: AAOX3, no focal deficits, no motor r sensory loss  PSYCH: normal mood

## 2020-04-14 NOTE — DIETITIAN INITIAL EVALUATION ADULT. - OTHER INFO
71yo male admitted with acute resp. failure 2/2 COVID PNA, hx HTN, DM (HgbA1c 11.7%). Pt with hyperkalemia on admission (resolved), euvolemic hyponatremia, MARIYA/CKD and c/o weakness. Pt requiring NRB at this time.

## 2020-04-14 NOTE — PROGRESS NOTE ADULT - ATTENDING COMMENTS
Patient is seen and evaluated, case discussed with PA, agree with assessment and plan  Patient feel some improvement of SOB, saturating well on nonrebreather  Decrease air entry b/l, fine crackles  Will wean down Supplemental oxygen form non rebreather to Ventimask  Increase Norvasc dose to 10mg daily   Insulin dose adjusted

## 2020-04-14 NOTE — PROGRESS NOTE ADULT - ASSESSMENT
69 yo M w/ PMH of ?HTN and DM on insulin and Metformin, and HLD presented with complaint of SOB, HA, mild CP, and weakness being admitted for acute hypoxic respiratory failure 2/2 COVID pneumonia.      Acute Hypoxic Respiratory Failure 2/2 COVID Pneumonia:   COVID19 positive  Patient was weaned off NRB to VM currently on 92%   Wean O2 as tolerated  Inflammatory markers elevated, cont to monitor   CXR: consistent with b/l pneumonia  Plaquenil 800mg QD x1 day, then 400mg QD x4 days  c/w albuterol prn, c/w vitamin C 500mg TID  c/w Thiamines 100mg QD, c/w tessalon perles PRN  If patient's hypoxic status further deteriorates, will have low threshold to escalate care.   Prone positioning, incentive spirometry, chest PT      Hyperkalemia  Resolved      Euvolemic Hyponatremia  Now 134, will monitor BMP.     PseudoHypocalcemia  Ca: 8.5, continue to monitor     Type II Diabetes Mellitus  Insulin SS and accuchecks ACHS + QHS  Fasting blood sugar still elevated, diabetes education recommends increase to one dose of Lantus 35 units and not give 8 units in AM tomorrow      MARIYA vs CKD?  Elevated BUN/Cr, ratio >20, unknown baseline  s/p hydration, improving, looks like much better, close to baseline   Will trend BMP    Hypertension   BP still elevated  Will increase Norvasc to 10mg     Prophylactic Measure  Lovenox 40mg QD

## 2020-04-15 NOTE — PROGRESS NOTE ADULT - SUBJECTIVE AND OBJECTIVE BOX
BECKI DAVIES    [id number]    70y      Male    Patient is a 70y old  Male who presents with a chief complaint of SOB (14 Apr 2020 17:17)      INTERVAL HPI/OVERNIGHT EVENTS:    Patient is feeling better, his SOB is improving, he is being weaned off from Venti mask to nasal cannula, has no fever spikes, denies nausea, vomiting, dizziness     REVIEW OF SYSTEMS:    CONSTITUTIONAL: No fever, has fatigue  RESPIRATORY: Improving cough and shortness of breath.   CARDIOVASCULAR: No chest pain, palpitations  GASTROINTESTINAL: No abdominal, No nausea, vomiting  NEUROLOGICAL: No headaches,  loss of strength.  MISCELLANEOUS: No joint swelling or pain       Vital Signs Last 24 Hrs  T(C): 36.6 (15 Apr 2020 07:47), Max: 37.1 (14 Apr 2020 20:36)  T(F): 97.8 (15 Apr 2020 07:47), Max: 98.7 (14 Apr 2020 20:36)  HR: 93 (15 Apr 2020 07:47) (80 - 93)  BP: 132/68 (15 Apr 2020 08:06) (132/68 - 190/91)  SpO2: 94% (15 Apr 2020 14:15) (90% - 97%)    PHYSICAL EXAM:    GENERAL: Elderly male looking mild respiratory distress   HEENT: PERRL, +EOMI  NECK: soft, Supple, No JVD,   CHEST/LUNG: Decrease air entry bilaterally; No wheezing  HEART: S1S2+, Regular rate and rhythm; No murmurs  ABDOMEN: Soft, Nontender, Nondistended; Bowel sounds present  EXTREMITIES:  1+ Peripheral Pulses, No edema  SKIN: No rashes or lesions  NEURO: AAOX3, no focal deficits, no motor r sensory loss  PSYCH: normal mood      LABS:                        12.9   13.38 )-----------( 286      ( 15 Apr 2020 06:53 )             37.6     04-15    137  |  102  |  29.0<H>  ----------------------------<  138<H>  4.3   |  22.0  |  0.78    Ca    8.4<L>      15 Apr 2020 06:53              I&O's Summary    14 Apr 2020 07:01  -  15 Apr 2020 07:00  --------------------------------------------------------  IN: 0 mL / OUT: 500 mL / NET: -500 mL        MEDICATIONS  (STANDING):  amLODIPine   Tablet 10 milliGRAM(s) Oral daily  ascorbic acid 1500 milliGRAM(s) Oral daily  ATENolol  Tablet 25 milliGRAM(s) Oral daily  atorvastatin 40 milliGRAM(s) Oral at bedtime  dextrose 5%. 1000 milliLiter(s) (50 mL/Hr) IV Continuous <Continuous>  dextrose 50% Injectable 12.5 Gram(s) IV Push once  dextrose 50% Injectable 25 Gram(s) IV Push once  dextrose 50% Injectable 25 Gram(s) IV Push once  enoxaparin Injectable 40 milliGRAM(s) SubCutaneous daily  gabapentin 300 milliGRAM(s) Oral daily  hydroxychloroquine 400 milliGRAM(s) Oral every 24 hours  hydroxychloroquine   Oral   insulin glargine Injectable (LANTUS) 25 Unit(s) SubCutaneous at bedtime  insulin glargine Injectable (LANTUS) 8 Unit(s) SubCutaneous every morning  insulin lispro (HumaLOG) corrective regimen sliding scale   SubCutaneous three times a day before meals  insulin lispro Injectable (HumaLOG) 10 Unit(s) SubCutaneous four times a day with meals  sodium chloride 0.9%. 1000 milliLiter(s) (80 mL/Hr) IV Continuous <Continuous>  thiamine 200 milliGRAM(s) Oral daily    MEDICATIONS  (PRN):  acetaminophen   Tablet .. 650 milliGRAM(s) Oral every 6 hours PRN Temp greater or equal to 38C (100.4F)  ALBUTerol    90 MICROgram(s) HFA Inhaler 1 Puff(s) Inhalation every 4 hours PRN Shortness of Breath and/or Wheezing  benzonatate 100 milliGRAM(s) Oral three times a day PRN Cough  dextrose 40% Gel 15 Gram(s) Oral once PRN Blood Glucose LESS THAN 70 milliGRAM(s)/deciliter  glucagon  Injectable 1 milliGRAM(s) IntraMuscular once PRN Glucose LESS THAN 70 milligrams/deciliter

## 2020-04-15 NOTE — PROGRESS NOTE ADULT - ASSESSMENT
71 yo M w/ PMH of ?HTN and DM on insulin and Metformin, and HLD presented with complaint of SOB, HA, mild CP, and weakness being admitted for acute hypoxic respiratory failure 2/2 COVID pneumonia.      Acute Hypoxic Respiratory Failure 2/2 COVID Pneumonia:   COVID19 positive  Patient was weaned off NRB to VM currently on 92%   Wean O2 as tolerated  Inflammatory markers elevated, cont to monitor   CXR: consistent with b/l pneumonia  Plaquenil 800mg QD x1 day, then 400mg QD X4 days  c/w albuterol prn, c/w vitamin C 500mg TID  c/w Thiamines 100mg QD, c/w tessalon perles PRN  Prone positioning, incentive spirometry, chest PT  Patient is being weaned down from the supplemental oxygen currently on Venti mask and being changed to Nasal cannula       Hyperkalemia  Resolved      Euvolemic Hyponatremia  Now 134, will monitor BMP.     PseudoHypocalcemia  Ca: 8.5, continue to monitor     Type II Diabetes Mellitus  Insulin SS and accuchecks ACHS + QHS  Fasting blood sugar still elevated, diabetes education recommends increased to one dose of Lantus 35 units    MARIYA vs CKD?  Elevated BUN/Cr, ratio >20, unknown baseline  s/p hydration, improving, looks like much better, close to baseline   Will trend BMP    Hypertension   BP still elevated  Will increase Norvasc to 10mg     Prophylactic Measure  Lovenox 40mg QD

## 2020-04-16 NOTE — CONSULT NOTE ADULT - ASSESSMENT
69 yo M w/ PMH of HTN and DM on insulin and Metformin, and HLD presented with complaint of SOB, HA, mild CP, and weakness. He states he had a nonproductive cough, w/ congestion and feeling cold for raghav last 8 days. He also complained of a fever but did not check his temperature. Upon arrival to the ED his SpO2 was 84% on RA with RR of 28 which improved to 99% on NRB. He was then downtitrated to 6L NC and is saturating around 90-93%. He states since he has been in the ED and put on the oxygen he feels a lot better.   Currently he denies any Dizziness, HA, CP, SOB, Abd pain, N/V/D/C, dysuria, recent travel or sick contacts. (11 Apr 2020 16:00)    His Chest Xray showed bilateral PNA.  patient was admitted for management of acute hypoxic respiratory failure 2/2 COVID pneumonia.  He was treated with a course of Plaquinel 4/11 - 4/15     patient's course continue to deteriorate.  On 4/16/2020, he was intubated for worsening of resp failure.       Impression:  COVID-19 Pneumonia  multifocal Pneumonia  acute hypoxic respiratory failure  cough  shortness of breath  s/p intubation      Plan:  -  completed course of Hydroxychloroquine 4/15/2020  - s/p steroids 4/11 - 4/15     Continue supportive care measures  - s/p intubation on 4/16/2020  - Continue mechanical ventilation  - continue Oxygenation     - GIVE ACTEMRA x 1 dose 4/16       continue to trend inflammatory markers.   If procalcitonin starts to rise, may need to consider treating for secondary bacterial infections    - trend CBC with diff, CMP with LFT's  - trend Inflammatory markers (ESR, CRP, Ferritin, LDH,  procalcitonin)  q48h.  D dimer, lactate, troponin, CK, PT/PTT x 1      Will follow with you.

## 2020-04-16 NOTE — PROCEDURE NOTE - NSPROCDETAILS_GEN_ALL_CORE
connected to ventilator/patient pre-oxygenated, tube inserted, placement confirmed
sterile technique, catheter placed/lumen(s) aspirated and flushed/sterile dressing applied/ultrasound guidance/guidewire recovered
positive blood return obtained via catheter/Seldinger technique/connected to a pressurized flush line/ultrasound guidance/location identified, draped/prepped, sterile technique used, needle inserted/introduced/sutured in place/all materials/supplies accounted for at end of procedure/hemostasis with direct pressure, dressing applied

## 2020-04-16 NOTE — PROGRESS NOTE ADULT - ASSESSMENT
Assessment:  69 yo M w/ PMH of ?HTN and DM on insulin and Metformin, and HLD presented with complaint of SOB, HA, mild CP, and weakness being admitted for acute hypoxic respiratory failure 2/2 COVID pneumonia.    NEUROLOGIC  Mental status:   Sedation: sedation to vent synchrony  versed and dilaudid drips  cont gabapentin    RESPIRATORY  Acute respiratory failure with: hypoxemia  [] ARDS  Pplat goal <30; Check for AutoPEEP  Ideal body weight:  Ventilation settings:    CARDIOVASCULAR  Shock: septic   MAP goal>65, low dose vasopressor as needed  cont atenolol  atorvastatin    GASTROINTESTINAL:   Tube feeds:  hold if paralyzed/proned or possible extubation.  GI prophylaxis: [] pantoprazole  Bowel regimen[] Miralax   monitor for transaminitis while on plaquenil  trend triglycerides if on propofol    RENAL  goal I/O even to neg 1L  lasix prn  preciado for accurate UOP    ENDOCRINE  insulin regimen:  stress-dose steroids  Goal FSG < 180    HEMATOLOGY  chemical VTE prophylaxis: [] enoxaparin     INFECTIOUS DISEASES:  SARS-CoV-2 infection/COVID-19  s/p Hydroxychloroquine  Bacterial super infection: [] Empiric antibiotics: (day)  trend Inflammatory markers  ID recommendations appreciated    DISPOSITION:   [] ICU Assessment:  69 yo M w/ PMH of ?HTN and DM on insulin and Metformin, and HLD presented with complaint of SOB, HA, mild CP, and weakness being admitted for acute hypoxic respiratory failure 2/2 COVID pneumonia.    NEUROLOGIC  Mental status:   Sedation: sedation to vent synchrony  versed and dilaudid drips  cont gabapentin    RESPIRATORY  Acute respiratory failure with: hypoxemia  [] ARDS  Pplat goal <30; Check for AutoPEEP  Ideal body weight: 64kg  Ventilation settings: 20/400/70/18    CARDIOVASCULAR  Shock: septic   MAP goal>65, low dose vasopressor as needed  cont atenolol  atorvastatin    GASTROINTESTINAL:   Tube feeds:  hold if paralyzed/proned or possible extubation.  GI prophylaxis: [] pantoprazole  Bowel regimen[] Miralax   monitor for transaminitis while on plaquenil  trend triglycerides if on propofol    RENAL  goal I/O even to neg 1L  lasix prn  preciado for accurate UOP    ENDOCRINE  insulin regimen:  Goal FSG < 180    HEMATOLOGY  chemical VTE prophylaxis: [] enoxaparin     INFECTIOUS DISEASES:  SARS-CoV-2 infection/COVID-19  s/p Hydroxychloroquine  Bacterial super infection: [] Empiric antibiotics: (day)  trend Inflammatory markers  ID recommendations appreciated  give tocilizumab    DISPOSITION:   [] ICU

## 2020-04-16 NOTE — PROGRESS NOTE ADULT - ATTENDING COMMENTS
I spent 40 minutes of critical care time examining patient, reviewing vitals, labs, medications, imaging and discussing with the team goals of care to prevent life-threatening in this patient who is at high risk for deterioration or death due to:  covid    Family was contacted as documented in event note

## 2020-04-16 NOTE — PROGRESS NOTE ADULT - SUBJECTIVE AND OBJECTIVE BOX
Chief complaint:   Patient is a 70y old  Male who presents with a chief complaint of SOB (15 Apr 2020 14:46)    HPI:  71 yo M w/ PMH of HTN and DM on insulin and Metformin, and HLD presented with complaint of SOB, HA, mild CP, and weakness. He states he had a nonproductive cough, w/ congestion and feeling cold for raghav last 8 days. He also complained of a fever but did not check his temperature. Upon arrival to the ED his SpO2 was 84% on RA with RR of 28 which improved to 99% on NRB. He was then downtitrated to 6L NC and is saturating around 90-93%. He states since he has been in the ED and put on the oxygen he feels a lot better.   Currently he denies any Dizziness, HA, CP, SOB, Abd pain, N/V/D/C, dysuria, recent travel or sick contacts. (11 Apr 2020 16:00)    24hr EVENTS:  RRT called   6L yesterday and upgraded to NRB today    ROS: [x ]  Unable to assess due to mental status   All other systems negative    -----------------------------------------------------------------------------------------------------------------------------------------------------------------------------------  ICU Vital Signs Last 24 Hrs  T(C): 36.5 (16 Apr 2020 08:14), Max: 37.1 (15 Apr 2020 23:30)  T(F): 97.7 (16 Apr 2020 08:14), Max: 98.8 (15 Apr 2020 23:30)  HR: 65 (16 Apr 2020 08:14) (64 - 94)  BP: 180/83 (16 Apr 2020 08:14) (118/76 - 180/83)  BP(mean): --  ABP: --  ABP(mean): --  RR: 18 (16 Apr 2020 08:14) (18 - 20)  SpO2: 92% (16 Apr 2020 08:14) (82% - 96%)      I&O's Summary    15 Apr 2020 07:01  -  16 Apr 2020 07:00  --------------------------------------------------------  IN: 560 mL / OUT: 300 mL / NET: 260 mL        MEDICATIONS  (STANDING):  amLODIPine   Tablet 10 milliGRAM(s) Oral daily  ascorbic acid 1500 milliGRAM(s) Oral daily  ATENolol  Tablet 25 milliGRAM(s) Oral daily  atorvastatin 40 milliGRAM(s) Oral at bedtime  dextrose 5%. 1000 milliLiter(s) (50 mL/Hr) IV Continuous <Continuous>  dextrose 50% Injectable 12.5 Gram(s) IV Push once  dextrose 50% Injectable 25 Gram(s) IV Push once  dextrose 50% Injectable 25 Gram(s) IV Push once  enoxaparin Injectable 40 milliGRAM(s) SubCutaneous daily  gabapentin 300 milliGRAM(s) Oral daily  insulin glargine Injectable (LANTUS) 10 Unit(s) SubCutaneous at bedtime  insulin lispro (HumaLOG) corrective regimen sliding scale   SubCutaneous three times a day before meals  sodium chloride 0.9%. 1000 milliLiter(s) (80 mL/Hr) IV Continuous <Continuous>  thiamine 200 milliGRAM(s) Oral daily      NEUROIMAGING:   Recent imaging studies were reviewed.    LAB RESULTS:                          12.9   13.38 )-----------( 286      ( 15 Apr 2020 06:53 )             37.6     04-15    137  |  102  |  29.0<H>  ----------------------------<  138<H>  4.3   |  22.0  |  0.78    Ca    8.4<L>      15 Apr 2020 06:53    -----------------------------------------------------------------------------------------------------------------------------------------------------------------------------------    PHYSICAL EXAM:  General: Calm, intubated, sedated for vent synchrony  HEENT: MMM  Neuro:  -Mental status- No acute distress  -CN- PERRL 3mm, EOMI, tongue midline, face symmetric    CV: RRR  Pulm: diminished to auscultation  Abd: Soft, nontender, nondistended  Ext: moderate edema in lower ext  Skin: warm, dry

## 2020-04-16 NOTE — PROGRESS NOTE ADULT - SUBJECTIVE AND OBJECTIVE BOX
BECKI DAVIES    [id number]    70y      Male    Patient is a 70y old  Male who presents with a chief complaint of SOB (16 Apr 2020 13:20)      INTERVAL HPI/OVERNIGHT EVENTS:    Patient stilled required Nonrebreather, has some SOB, he has no fever spikes, denies nausea, vomiting, dizziness, RRT was called later and he was intuated as he was desaturating.     REVIEW OF SYSTEMS:    CONSTITUTIONAL: No fever, has fatigue  RESPIRATORY: has cough and shortness of breath.   CARDIOVASCULAR: No chest pain, palpitations  GASTROINTESTINAL: No abdominal, No nausea, vomiting  NEUROLOGICAL: No headaches,  loss of strength.  MISCELLANEOUS: No joint swelling or pain        Vital Signs Last 24 Hrs  T(C): 36.7 (16 Apr 2020 13:45), Max: 37.1 (15 Apr 2020 23:30)  T(F): 98.1 (16 Apr 2020 13:45), Max: 98.8 (15 Apr 2020 23:30)  HR: 74 (16 Apr 2020 13:45) (64 - 94)  BP: 120/72 (16 Apr 2020 13:45) (118/76 - 180/83)  BP(mean): 87 (16 Apr 2020 13:45) (87 - 87)  RR: 20 (16 Apr 2020 13:45) (18 - 20)  SpO2: 100% (16 Apr 2020 13:45) (82% - 100%)    PHYSICAL EXAM:    GENERAL: Elderly male looking mild respiratory distress   HEENT: PERRL, +EOMI  NECK: soft, Supple, No JVD,   CHEST/LUNG: Decrease air entry bilaterally; crackles, No wheezing  HEART: S1S2+, Regular rate and rhythm; No murmurs  ABDOMEN: Soft, Nontender, Nondistended; Bowel sounds present  EXTREMITIES:  1+ Peripheral Pulses, No edema  SKIN: No rashes or lesions  NEURO: AAOX3, no focal deficits, no motor r sensory loss  PSYCH: normal mood      LABS:                        12.9   13.38 )-----------( 286      ( 15 Apr 2020 06:53 )             37.6     04-15    137  |  102  |  29.0<H>  ----------------------------<  138<H>  4.3   |  22.0  |  0.78    Ca    8.4<L>      15 Apr 2020 06:53              I&O's Summary    15 Apr 2020 07:01  -  16 Apr 2020 07:00  --------------------------------------------------------  IN: 560 mL / OUT: 300 mL / NET: 260 mL        MEDICATIONS  (STANDING):  atorvastatin 40 milliGRAM(s) Oral at bedtime  chlorhexidine 4% Liquid 1 Application(s) Topical <User Schedule>  dextrose 5%. 1000 milliLiter(s) (50 mL/Hr) IV Continuous <Continuous>  dextrose 50% Injectable 12.5 Gram(s) IV Push once  dextrose 50% Injectable 25 Gram(s) IV Push once  dextrose 50% Injectable 25 Gram(s) IV Push once  enoxaparin Injectable 40 milliGRAM(s) SubCutaneous daily  fentaNYL    Injectable 100 MICROGram(s) IV Push once  fentaNYL   Infusion. 0.5 MICROgram(s)/kG/Hr (3.29 mL/Hr) IV Continuous <Continuous>  gabapentin 300 milliGRAM(s) Oral daily  insulin glargine Injectable (LANTUS) 10 Unit(s) SubCutaneous at bedtime  insulin lispro (HumaLOG) corrective regimen sliding scale   SubCutaneous three times a day before meals  midazolam Infusion 0.02 mG/kG/Hr (1.32 mL/Hr) IV Continuous <Continuous>  midazolam Injectable 2 milliGRAM(s) IV Push once  pantoprazole  Injectable 40 milliGRAM(s) IV Push daily  polyethylene glycol 3350 17 Gram(s) Oral at bedtime  senna 2 Tablet(s) Oral at bedtime  sodium chloride 0.9%. 1000 milliLiter(s) (80 mL/Hr) IV Continuous <Continuous>    MEDICATIONS  (PRN):  acetaminophen   Tablet .. 650 milliGRAM(s) Oral every 6 hours PRN Temp greater or equal to 38C (100.4F)  ALBUTerol    90 MICROgram(s) HFA Inhaler 1 Puff(s) Inhalation every 4 hours PRN Shortness of Breath and/or Wheezing  dextrose 40% Gel 15 Gram(s) Oral once PRN Blood Glucose LESS THAN 70 milliGRAM(s)/deciliter  glucagon  Injectable 1 milliGRAM(s) IntraMuscular once PRN Glucose LESS THAN 70 milligrams/deciliter  sodium chloride 0.9% lock flush 10 milliLiter(s) IV Push every 1 hour PRN Pre/post blood products, medications, blood draw, and to maintain line patency

## 2020-04-16 NOTE — PROGRESS NOTE ADULT - ASSESSMENT
69 yo M w/ PMH of ?HTN and DM on insulin and Metformin, and HLD presented with complaint of SOB, HA, mild CP, and weakness being admitted for acute hypoxic respiratory failure 2/2 COVID pneumonia.      Acute Hypoxic Respiratory Failure 2/2 COVID Pneumonia:   COVID19 positive,   Patient still require Nonbreather, unable to wean down   Inflammatory markers elevated, cont to monitor  CXR: consistent with b/l pneumonia  Plaquenil 800mg QD x1 day, then 400mg QD X4 days  c/w albuterol prn, c/w vitamin C 500mg TID  c/w Thiamines 100mg QD, c/w tessalon perles PRN  Prone positioning, incentive spirometry, chest PT,   Patient was desaturating on no nonbreather and has not been much improvement, RRT was called and patient was intubated and was transferred to ICU, updated the situation of patient to his wife.     Hyperkalemia  Resolved      Euvolemic Hyponatremia  Now 134, will monitor BMP.     PseudoHypocalcemia  Ca: 8.5, continue to monitor     Type II Diabetes Mellitus  Insulin SS and accuchecks ACHS + QHS  Fasting blood sugar still elevated, diabetes education recommends increased to one dose of Lantus 35 units    MARIYA vs CKD?  Elevated BUN/Cr, ratio >20, unknown baseline  s/p hydration, improving, looks like much better, close to baseline   Will trend BMP    Hypertension   BP still elevated  Will increase Norvasc to 10mg     Prophylactic Measure  Lovenox 40mg QD

## 2020-04-16 NOTE — CONSULT NOTE ADULT - SUBJECTIVE AND OBJECTIVE BOX
[facility]  INFECTIOUS DISEASES AND INTERNAL MEDICINE at [place]  =======================================================  Natan Huynh MD  Diplomates American Board of Internal Medicine and Infectious Diseases  Telephone [phone]  Fax            [phone]  =======================================================    [id number]  BECKI DAVIES   HPI:  69 yo M w/ PMH of HTN and DM on insulin and Metformin, and HLD presented with complaint of SOB, HA, mild CP, and weakness. He states he had a nonproductive cough, w/ congestion and feeling cold for raghav last 8 days. He also complained of a fever but did not check his temperature. Upon arrival to the ED his SpO2 was 84% on RA with RR of 28 which improved to 99% on NRB. He was then downtitrated to 6L NC and is saturating around 90-93%. He states since he has been in the ED and put on the oxygen he feels a lot better.   Currently he denies any Dizziness, HA, CP, SOB, Abd pain, N/V/D/C, dysuria, recent travel or sick contacts. (11 Apr 2020 16:00)    His Chest Xray showed bilateral PNA.  patient was admitted for management of acute hypoxic respiratory failure 2/2 COVID pneumonia.  He was treated with a course of Plaquinel 4/11 - 4/15     patient's course continue to deteriorate.  On 4/16/2020, he was intubated for worsening of resp failure.       I have personally reviewed the labs and data; pertinent labs and data are listed in this note; please see below.   =======================================================  Past Medical & Surgical Hx:  =====================  PAST MEDICAL & SURGICAL HISTORY:  Hyperlipidemia  Type 2 diabetes mellitus without complication, with long-term current use of insulin  Essential hypertension  No significant past surgical history    Problem List:  ==========  HEALTH ISSUES - PROBLEM Dx:        Social Hx:  =======  no toxic habits currently    FAMILY HISTORY:  No pertinent family history in first degree relatives  no significant family history of immunosuppressive disorders in mother or father   =======================================================  REVIEW OF SYSTEMS:  Limited due to medical condition    =======================================================  Allergies  No Known Allergies    Antibiotics:    Other medications:  atorvastatin 40 milliGRAM(s) Oral at bedtime  chlorhexidine 4% Liquid 1 Application(s) Topical <User Schedule>  dextrose 5%. 1000 milliLiter(s) IV Continuous <Continuous>  dextrose 50% Injectable 12.5 Gram(s) IV Push once  dextrose 50% Injectable 25 Gram(s) IV Push once  dextrose 50% Injectable 25 Gram(s) IV Push once  enoxaparin Injectable 40 milliGRAM(s) SubCutaneous daily  fentaNYL   Infusion. 0.5 MICROgram(s)/kG/Hr IV Continuous <Continuous>  gabapentin 300 milliGRAM(s) Oral daily  insulin glargine Injectable (LANTUS) 10 Unit(s) SubCutaneous at bedtime  insulin lispro (HumaLOG) corrective regimen sliding scale   SubCutaneous three times a day before meals  midazolam Infusion 0.02 mG/kG/Hr IV Continuous <Continuous>  pantoprazole  Injectable 40 milliGRAM(s) IV Push daily  polyethylene glycol 3350 17 Gram(s) Oral at bedtime  senna 2 Tablet(s) Oral at bedtime  sodium chloride 0.9%. 1000 milliLiter(s) IV Continuous <Continuous>  tocilizumab IVPB 400 milliGRAM(s) IV Intermittent once   azithromycin  IVPB   255 mL/Hr IV Intermittent (04-11-20 @ 13:15)    cefTRIAXone   IVPB   100 mL/Hr IV Intermittent (04-11-20 @ 12:15)    hydroxychloroquine   800 milliGRAM(s) Oral (04-11-20 @ 16:30)    hydroxychloroquine   400 milliGRAM(s) Oral (04-12-20 @ 16:20)   400 milliGRAM(s) Oral (04-13-20 @ 16:29)   400 milliGRAM(s) Oral (04-14-20 @ 16:53)   400 milliGRAM(s) Oral (04-15-20 @ 16:40)      ======================================================  Physical Exam:  ============  T(F): 98.1 (16 Apr 2020 13:45), Max: 98.8 (15 Apr 2020 23:30)  HR: 72 (16 Apr 2020 16:00)  BP: 147/56 (16 Apr 2020 16:00)  RR: 15 (16 Apr 2020 16:00)  SpO2: 96% (16 Apr 2020 16:00) (82% - 100%)  temp max in last 48H T(F): , Max: 98.8 (04-15-20 @ 23:30)    General:  sedated; intubated  Eye: Pupils are equal, round and reactive to light, Extraocular movements are intact, Normal conjunctiva.  HENT: Normocephalic, Oral mucosa is moist, No pharyngeal erythema, No sinus tenderness.  ETT  in place  Neck: Supple, No lymphadenopathy.  Respiratory: Lungs with poor anterior aeration   Cardiovascular: Normal rate, Regular rhythm,   Gastrointestinal: Soft, Non-tender, Non-distended, Normal bowel sounds.  Genitourinary: No costovertebral angle tenderness.  Lymphatics: No lymphadenopathy neck,   Musculoskeletal: Normal range of motion, Normal strength.  Integumentary: No rash.  Neurologic: sedate    =======================================================  Labs:                        12.9   13.38 )-----------( 286      ( 15 Apr 2020 06:53 )             37.6      04-15    137  |  102  |  29.0<H>  ----------------------------<  138<H>  4.3   |  22.0  |  0.78    Ca    8.4<L>      15 Apr 2020 06:53        Culture - Blood (collected 04-11-20 @ 12:44)  Source: .Blood  Final Report (04-16-20 @ 13:23):    No growth at 5 days.      Creatinine, Serum: 0.78 mg/dL (04-15-20 @ 06:53)  Creatinine, Serum: 1.10 mg/dL (04-13-20 @ 09:47)  Creatinine, Serum: 1.13 mg/dL (04-12-20 @ 14:10)  Creatinine, Serum: 1.26 mg/dL (04-11-20 @ 23:12)    C-Reactive Protein, Serum: 1.84 mg/dL (04-15-20 @ 06:53)  C-Reactive Protein, Serum: 8.85 mg/dL (04-13-20 @ 09:47)  C-Reactive Protein, Serum: 18.96 mg/dL (04-12-20 @ 14:10)  C-Reactive Protein, Serum: 14.43 mg/dL (04-11-20 @ 12:40)    Procalcitonin, Serum: 0.11 ng/mL (04-15-20 @ 06:53)  Procalcitonin, Serum: 0.44 ng/mL (04-11-20 @ 12:40)      COVID-19 PCR: Detected (04-11-20 @ 12:42)       < from: Xray Chest 1 View- PORTABLE-Urgent (04.16.20 @ 16:05) >     EXAM:  XR CHEST PORTABLE URGENT 1V                          PROCEDURE DATE:  04/16/2020          INTERPRETATION:  Clinical indication:  left IJ    Technique: XR CHEST URGENT portable AP    Comparison:4/16/2020    Findings:  Lines: ET tube with itsdistal tip approximately 4.8 cm above the meg. Left IJ catheter has been placed in the interim with its distal tip in the region of the SVC.    Heart/Mediastinum/Lungs/Other: The heart size is normal.The lungs demonstrate diffuse patchy bilateral airspace disease. There are no pleural effusions. No pneumothorax.    Impression:    As above.                AUTUMN CALDWELL M.D. ATTENDING RADIOLOGIST  This document has been electronically signed. Apr 16 2020  4:05PM        < end of copied text >

## 2020-04-17 NOTE — PROGRESS NOTE ADULT - SUBJECTIVE AND OBJECTIVE BOX
[employer] sent patient, Alexandria Capps, Twenty single-dose pens of Trulicity 0/81 mg / 0.5 mL via Pharmacy Assistance Program. Letter sent to patient and order routed to provider. Chief complaint:   Patient is a 70y old  Male who presents with a chief complaint of SOB (16 Apr 2020 16:38)    HPI:  69 yo M w/ PMH of HTN and DM on insulin and Metformin, and HLD presented with complaint of SOB, HA, mild CP, and weakness. He states he had a nonproductive cough, w/ congestion and feeling cold for raghav last 8 days. He also complained of a fever but did not check his temperature. Upon arrival to the ED his SpO2 was 84% on RA with RR of 28 which improved to 99% on NRB. He was then downtitrated to 6L NC and is saturating around 90-93%. He states since he has been in the ED and put on the oxygen he feels a lot better.   Currently he denies any Dizziness, HA, CP, SOB, Abd pain, N/V/D/C, dysuria, recent travel or sick contacts. (11 Apr 2020 16:00)        24hr EVENTS:      ROS: [ ]  Unable to assess due to mental status   All other systems negative    -----------------------------------------------------------------------------------------------------------------------------------------------------------------------------------  ICU Vital Signs Last 24 Hrs  T(C): 37.3 (16 Apr 2020 20:32), Max: 37.4 (16 Apr 2020 17:03)  T(F): 99.1 (16 Apr 2020 20:32), Max: 99.3 (16 Apr 2020 17:03)  HR: 49 (17 Apr 2020 07:30) (49 - 74)  BP: 147/56 (16 Apr 2020 16:00) (120/72 - 147/56)  BP(mean): 84 (16 Apr 2020 16:00) (84 - 87)  ABP: 97/48 (17 Apr 2020 07:30) (97/48 - 136/64)  ABP(mean): 65 (17 Apr 2020 07:30) (64 - 65)  RR: 20 (17 Apr 2020 07:30) (15 - 20)  SpO2: 95% (17 Apr 2020 07:30) (92% - 100%)      I&O's Summary    16 Apr 2020 07:01  -  17 Apr 2020 07:00  --------------------------------------------------------  IN: 1953.2 mL / OUT: 750 mL / NET: 1203.2 mL    17 Apr 2020 07:01  -  17 Apr 2020 08:27  --------------------------------------------------------  IN: 100 mL / OUT: 50 mL / NET: 50 mL        MEDICATIONS  (STANDING):  atorvastatin 40 milliGRAM(s) Oral at bedtime  chlorhexidine 0.12% Liquid 15 milliLiter(s) Oral Mucosa every 12 hours  chlorhexidine 4% Liquid 1 Application(s) Topical <User Schedule>  dextrose 5%. 1000 milliLiter(s) (50 mL/Hr) IV Continuous <Continuous>  dextrose 50% Injectable 12.5 Gram(s) IV Push once  dextrose 50% Injectable 25 Gram(s) IV Push once  dextrose 50% Injectable 25 Gram(s) IV Push once  enoxaparin Injectable 40 milliGRAM(s) SubCutaneous daily  fentaNYL   Infusion. 0.5 MICROgram(s)/kG/Hr (3.29 mL/Hr) IV Continuous <Continuous>  gabapentin 300 milliGRAM(s) Oral daily  insulin glargine Injectable (LANTUS) 10 Unit(s) SubCutaneous at bedtime  insulin lispro (HumaLOG) corrective regimen sliding scale   SubCutaneous every 6 hours  midazolam Infusion 0.02 mG/kG/Hr (1.32 mL/Hr) IV Continuous <Continuous>  norepinephrine Infusion 0.05 MICROgram(s)/kG/Min (3.08 mL/Hr) IV Continuous <Continuous>  pantoprazole  Injectable 40 milliGRAM(s) IV Push daily  polyethylene glycol 3350 17 Gram(s) Oral at bedtime  senna 2 Tablet(s) Oral at bedtime  sodium chloride 0.9%. 1000 milliLiter(s) (80 mL/Hr) IV Continuous <Continuous>      RESPIRATORY:  Mode: AC/ CMV (Assist Control/ Continuous Mandatory Ventilation)  RR (machine): 20  TV (machine): 400  FiO2: 50  PEEP: 16  ITime: 0.6  MAP: 20  PIP: 35        NEUROIMAGING:   Recent imaging studies were reviewed.    LAB RESULTS:                          11.3   8.59  )-----------( 174      ( 17 Apr 2020 04:52 )             33.6           04-17    135  |  104  |  34.0<H>  ----------------------------<  80  4.5   |  19.0<L>  |  1.04    Ca    7.1<L>      17 Apr 2020 04:52  Phos  5.0     04-17  Mg     1.8     04-17            ABG - ( 17 Apr 2020 05:10 )  pH, Arterial: 7.30  pH, Blood: x     /  pCO2: 44    /  pO2: 86    / HCO3: 20    / Base Excess: -4.9  /  SaO2: 96                    -----------------------------------------------------------------------------------------------------------------------------------------------------------------------------------    PHYSICAL EXAM:  General: Calm, intubated, sedated for vent synchrony  HEENT: MARY  Neuro:  -Mental status- No acute distress  -CN- PERRL 3mm, EOMI, tongue midline, face symmetric    CV: RRR  Pulm: diminished to auscultation  Abd: Soft, nontender, nondistended  Ext: moderate edema in lower ext  Skin: warm, dry Chief complaint:   Patient is a 70y old  Male who presents with a chief complaint of SOB (16 Apr 2020 16:38)    HPI:  71 yo M w/ PMH of HTN and DM on insulin and Metformin, and HLD presented with complaint of SOB, HA, mild CP, and weakness. He states he had a nonproductive cough, w/ congestion and feeling cold for raghav last 8 days. He also complained of a fever but did not check his temperature. Upon arrival to the ED his SpO2 was 84% on RA with RR of 28 which improved to 99% on NRB. He was then downtitrated to 6L NC and is saturating around 90-93%. He states since he has been in the ED and put on the oxygen he feels a lot better.   Currently he denies any Dizziness, HA, CP, SOB, Abd pain, N/V/D/C, dysuria, recent travel or sick contacts. (11 Apr 2020 16:00)    24hr EVENTS:  elevated D-dimer  hypoglycemia    ROS: [ ]  Unable to assess due to mental status   All other systems negative    -----------------------------------------------------------------------------------------------------------------------------------------------------------------------------------  ICU Vital Signs Last 24 Hrs  T(C): 37.3 (16 Apr 2020 20:32), Max: 37.4 (16 Apr 2020 17:03)  T(F): 99.1 (16 Apr 2020 20:32), Max: 99.3 (16 Apr 2020 17:03)  HR: 49 (17 Apr 2020 07:30) (49 - 74)  BP: 147/56 (16 Apr 2020 16:00) (120/72 - 147/56)  BP(mean): 84 (16 Apr 2020 16:00) (84 - 87)  ABP: 97/48 (17 Apr 2020 07:30) (97/48 - 136/64)  ABP(mean): 65 (17 Apr 2020 07:30) (64 - 65)  RR: 20 (17 Apr 2020 07:30) (15 - 20)  SpO2: 95% (17 Apr 2020 07:30) (92% - 100%)      I&O's Summary    16 Apr 2020 07:01  -  17 Apr 2020 07:00  --------------------------------------------------------  IN: 1953.2 mL / OUT: 750 mL / NET: 1203.2 mL    17 Apr 2020 07:01  -  17 Apr 2020 08:27  --------------------------------------------------------  IN: 100 mL / OUT: 50 mL / NET: 50 mL        MEDICATIONS  (STANDING):  atorvastatin 40 milliGRAM(s) Oral at bedtime  chlorhexidine 0.12% Liquid 15 milliLiter(s) Oral Mucosa every 12 hours  chlorhexidine 4% Liquid 1 Application(s) Topical <User Schedule>  dextrose 5%. 1000 milliLiter(s) (50 mL/Hr) IV Continuous <Continuous>  dextrose 50% Injectable 12.5 Gram(s) IV Push once  dextrose 50% Injectable 25 Gram(s) IV Push once  dextrose 50% Injectable 25 Gram(s) IV Push once  enoxaparin Injectable 40 milliGRAM(s) SubCutaneous daily  fentaNYL   Infusion. 0.5 MICROgram(s)/kG/Hr (3.29 mL/Hr) IV Continuous <Continuous>  gabapentin 300 milliGRAM(s) Oral daily  insulin glargine Injectable (LANTUS) 10 Unit(s) SubCutaneous at bedtime  insulin lispro (HumaLOG) corrective regimen sliding scale   SubCutaneous every 6 hours  midazolam Infusion 0.02 mG/kG/Hr (1.32 mL/Hr) IV Continuous <Continuous>  norepinephrine Infusion 0.05 MICROgram(s)/kG/Min (3.08 mL/Hr) IV Continuous <Continuous>  pantoprazole  Injectable 40 milliGRAM(s) IV Push daily  polyethylene glycol 3350 17 Gram(s) Oral at bedtime  senna 2 Tablet(s) Oral at bedtime  sodium chloride 0.9%. 1000 milliLiter(s) (80 mL/Hr) IV Continuous <Continuous>      RESPIRATORY:  Mode: AC/ CMV (Assist Control/ Continuous Mandatory Ventilation)  RR (machine): 20  TV (machine): 400  FiO2: 50  PEEP: 16  ITime: 0.6  MAP: 20  PIP: 35        NEUROIMAGING:   Recent imaging studies were reviewed.    LAB RESULTS:                          11.3   8.59  )-----------( 174      ( 17 Apr 2020 04:52 )             33.6           04-17    135  |  104  |  34.0<H>  ----------------------------<  80  4.5   |  19.0<L>  |  1.04    Ca    7.1<L>      17 Apr 2020 04:52  Phos  5.0     04-17  Mg     1.8     04-17            ABG - ( 17 Apr 2020 05:10 )  pH, Arterial: 7.30  pH, Blood: x     /  pCO2: 44    /  pO2: 86    / HCO3: 20    / Base Excess: -4.9  /  SaO2: 96                    -----------------------------------------------------------------------------------------------------------------------------------------------------------------------------------    PHYSICAL EXAM:  General: Calm, intubated, sedated for vent synchrony  HEENT: MMM  Neuro:  -Mental status- No acute distress  -CN- PERRL 3mm, EOMI, tongue midline, face symmetric    CV: RRR  Pulm: diminished to auscultation  Abd: Soft, nontender, nondistended  Ext: moderate edema in lower ext  Skin: warm, dry

## 2020-04-17 NOTE — PROGRESS NOTE ADULT - ASSESSMENT
71 yo M w/ PMH of HTN and DM on insulin and Metformin, and HLD presented with complaint of SOB, HA, mild CP, and weakness. He states he had a nonproductive cough, w/ congestion and feeling cold for raghav last 8 days. He also complained of a fever but did not check his temperature. Upon arrival to the ED his SpO2 was 84% on RA with RR of 28 which improved to 99% on NRB. He was then downtitrated to 6L NC and is saturating around 90-93%. He states since he has been in the ED and put on the oxygen he feels a lot better.   Currently he denies any Dizziness, HA, CP, SOB, Abd pain, N/V/D/C, dysuria, recent travel or sick contacts. (11 Apr 2020 16:00)    His Chest Xray showed bilateral PNA.  patient was admitted for management of acute hypoxic respiratory failure 2/2 COVID pneumonia.  He was treated with a course of Plaquinel 4/11 - 4/15     patient's course continue to deteriorate.  On 4/16/2020, he was intubated for worsening of resp failure.       Impression:  COVID-19 Pneumonia  multifocal Pneumonia  acute hypoxic respiratory failure  cough  shortness of breath  s/p intubation      Plan:  -  completed course of Hydroxychloroquine 4/15/2020  - s/p steroids 4/11 - 4/15     Continue supportive care measures  - s/p intubation on 4/16/2020  - Continue mechanical ventilation  - continue Oxygenation     - GIVE ACTEMRA x 1 dose 4/16     ELEVATED D-dimers  - on Eliquis    continue to trend inflammatory markers.   If procalcitonin starts to rise, may need to consider treating for secondary bacterial infections    - trend CBC with diff, CMP with LFT's  - trend Inflammatory markers (ESR, CRP, Ferritin, LDH,  procalcitonin)  q48h.  D dimer, lactate, troponin, CK, PT/PTT x 1      Will follow with you.

## 2020-04-17 NOTE — PROGRESS NOTE ADULT - SUBJECTIVE AND OBJECTIVE BOX
[facility]  INFECTIOUS DISEASES AND INTERNAL MEDICINE at [place]  =======================================================  Natan Huynh MD  Diplomates American Board of Internal Medicine and Infectious Diseases  Telephone [phone]  Fax            [phone]  =======================================================    [id number]  [employer] [facility]   follow up: COVID 19 pneumonia      =======================================================      REVIEW OF SYSTEMS:  Limited due to medical condition    =======================================================  Allergies  No Known Allergies    ======================================================  Physical Exam:  ============  (see vitals section below)    General:  sedated; intubated  Eye: Pupils are equal, round and reactive to light, Extraocular movements are intact, Normal conjunctiva.  HENT: Normocephalic, Oral mucosa is moist, No pharyngeal erythema, No sinus tenderness.  ETT  in place  Neck: Supple, No lymphadenopathy.  Respiratory: Lungs with poor anterior aeration   Cardiovascular: Normal rate, Regular rhythm,   Gastrointestinal: Soft, Non-tender, Non-distended, Normal bowel sounds.  Genitourinary: No costovertebral angle tenderness.  Lymphatics: No lymphadenopathy neck,   Musculoskeletal: Normal range of motion, Normal strength.  Integumentary: No rash.  Neurologic: sedate    =======================================================  Vitals:  ============  T(F): 97.6 (17 Apr 2020 12:00), Max: 99.3 (16 Apr 2020 17:03)  HR: 63 (17 Apr 2020 12:00)  BP: 147/56 (16 Apr 2020 16:00)  RR: 20 (17 Apr 2020 12:00)  SpO2: 94% (17 Apr 2020 12:00) (92% - 100%)  temp max in last 48H T(F): , Max: 99.3 (04-16-20 @ 17:03)    =======================================================  Current Antibiotics:    Other medications:  apixaban 5 milliGRAM(s) Oral <User Schedule>  atorvastatin 40 milliGRAM(s) Oral at bedtime  chlorhexidine 0.12% Liquid 15 milliLiter(s) Oral Mucosa every 12 hours  chlorhexidine 4% Liquid 1 Application(s) Topical <User Schedule>  dextrose 5%. 1000 milliLiter(s) IV Continuous <Continuous>  dextrose 50% Injectable 12.5 Gram(s) IV Push once  dextrose 50% Injectable 25 Gram(s) IV Push once  dextrose 50% Injectable 25 Gram(s) IV Push once  fentaNYL   Infusion. 0.5 MICROgram(s)/kG/Hr IV Continuous <Continuous>  gabapentin 300 milliGRAM(s) Oral daily  insulin lispro (HumaLOG) corrective regimen sliding scale   SubCutaneous every 6 hours  midazolam Infusion 0.02 mG/kG/Hr IV Continuous <Continuous>  norepinephrine Infusion 0.05 MICROgram(s)/kG/Min IV Continuous <Continuous>  pantoprazole  Injectable 40 milliGRAM(s) IV Push daily  polyethylene glycol 3350 17 Gram(s) Oral at bedtime  senna 2 Tablet(s) Oral at bedtime  sodium chloride 0.9%. 1000 milliLiter(s) IV Continuous <Continuous>    =======================================================  Labs:                        11.3   8.59  )-----------( 174      ( 17 Apr 2020 04:52 )             33.6      04-17    135  |  104  |  34.0<H>  ----------------------------<  80  4.5   |  19.0<L>  |  1.04    Ca    7.1<L>      17 Apr 2020 04:52  Phos  5.0     04-17  Mg     1.8     04-17        Culture - Blood (collected 04-11-20 @ 12:44)  Source: .Blood  Final Report (04-16-20 @ 13:23):    No growth at 5 days.      Creatinine, Serum: 1.04 mg/dL (04-17-20 @ 04:52)  Creatinine, Serum: 0.78 mg/dL (04-15-20 @ 06:53)  Creatinine, Serum: 1.10 mg/dL (04-13-20 @ 09:47)  Creatinine, Serum: 1.13 mg/dL (04-12-20 @ 14:10)    Procalcitonin, Serum: 0.67 ng/mL (04-17-20 @ 04:52)  Procalcitonin, Serum: 0.11 ng/mL (04-15-20 @ 06:53)  Procalcitonin, Serum: 0.44 ng/mL (04-11-20 @ 12:40)    D-Dimer Assay, Quantitative: 77445 ng/mL DDU (04-17-20 @ 04:52)  D-Dimer Assay, Quantitative: 4397 ng/mL DDU (04-13-20 @ 09:53)  D-Dimer Assay, Quantitative: 447 ng/mL DDU (04-11-20 @ 12:40)    Ferritin, Serum: 384 ng/mL (04-17-20 @ 04:52)  Ferritin, Serum: 409 ng/mL (04-15-20 @ 06:53)  Ferritin, Serum: 520 ng/mL (04-13-20 @ 09:47)  Ferritin, Serum: 464 ng/mL (04-11-20 @ 12:40)    C-Reactive Protein, Serum: 9.42 mg/dL (04-17-20 @ 04:52)  C-Reactive Protein, Serum: 1.84 mg/dL (04-15-20 @ 06:53)  C-Reactive Protein, Serum: 8.85 mg/dL (04-13-20 @ 09:47)  C-Reactive Protein, Serum: 18.96 mg/dL (04-12-20 @ 14:10)  C-Reactive Protein, Serum: 14.43 mg/dL (04-11-20 @ 12:40)    WBC Count: 8.59 K/uL (04-17-20 @ 04:52)  WBC Count: 13.38 K/uL (04-15-20 @ 06:53)  WBC Count: 12.72 K/uL (04-13-20 @ 09:56)  WBC Count: 8.08 K/uL (04-12-20 @ 14:10)    COVID-19 PCR: Detected (04-11-20 @ 12:42)

## 2020-04-17 NOTE — PROGRESS NOTE ADULT - ASSESSMENT
Assessment:  71 yo M w/ PMH of ?HTN and DM on insulin and Metformin, and HLD presented with complaint of SOB, HA, mild CP, and weakness being admitted for acute hypoxic respiratory failure 2/2 COVID pneumonia.    NEUROLOGIC  Mental status:   Sedation: sedation to vent synchrony  versed and dilaudid drips  cont gabapentin    RESPIRATORY  Acute respiratory failure with: hypoxemia  [] ARDS  Pplat goal <30; Check for AutoPEEP  Ideal body weight: 64kg  Ventilation settings: 20/400/70/18    CARDIOVASCULAR  Shock: septic   MAP goal>65, low dose vasopressor as needed  cont atenolol  atorvastatin    GASTROINTESTINAL:   Tube feeds:  hold if paralyzed/proned or possible extubation.  GI prophylaxis: [] pantoprazole  Bowel regimen[] Miralax   monitor for transaminitis while on plaquenil  trend triglycerides if on propofol    RENAL  goal I/O even to neg 1L  lasix prn  preciado for accurate UOP    ENDOCRINE  insulin regimen:  Goal FSG < 180    HEMATOLOGY  chemical VTE prophylaxis: [] enoxaparin     INFECTIOUS DISEASES:  SARS-CoV-2 infection/COVID-19  s/p Hydroxychloroquine  Bacterial super infection: [] Empiric antibiotics: (day)  trend Inflammatory markers  ID recommendations appreciated  give tocilizumab    DISPOSITION:   [] ICU Assessment:  71 yo M w/ PMH of ?HTN and DM on insulin and Metformin, and HLD presented with complaint of SOB, HA, mild CP, and weakness being admitted for acute hypoxic respiratory failure 2/2 COVID pneumonia.    Changes today:   lasix    NEUROLOGIC  Mental status:   Sedation: sedation to vent synchrony  versed and fentanyl drips  cont gabapentin    RESPIRATORY  Acute respiratory failure with: hypoxemia  [] ARDS  Pplat goal <30; Check for AutoPEEP  Ideal body weight: 64kg  Ventilation settings: 20/400/70/18    CARDIOVASCULAR  Shock: septic   MAP goal>65, low dose vasopressor as needed  cont atenolol  atorvastatin    GASTROINTESTINAL:   Tube feeds:  hold if paralyzed/proned or possible extubation.  GI prophylaxis: [] pantoprazole  Bowel regimen[] Miralax   monitor for transaminitis while on plaquenil  trend triglycerides if on propofol    RENAL  goal I/O even to neg 1L  lasix prn  preciado for accurate UOP    ENDOCRINE  insulin regimen: stop lantus  ISS  Goal FSG < 180    HEMATOLOGY  chemical VTE prophylaxis: [] enoxaparin     INFECTIOUS DISEASES:  SARS-CoV-2 infection/COVID-19  s/p Hydroxychloroquine  Bacterial super infection: [] Empiric antibiotics: (day)  trend Inflammatory markers  ID recommendations appreciated  s/p tocilizumab    DISPOSITION:   [] ICU Assessment:  71 yo M w/ PMH of ?HTN and DM on insulin and Metformin, and HLD presented with complaint of SOB, HA, mild CP, and weakness being admitted for acute hypoxic respiratory failure 2/2 COVID pneumonia.    Changes today:   lasix    NEUROLOGIC  Mental status:   Sedation: sedation to vent synchrony  versed and fentanyl drips  cont gabapentin    RESPIRATORY  Acute respiratory failure with: hypoxemia  [] ARDS  Pplat goal <30; Check for AutoPEEP  Ideal body weight: 64kg  Ventilation settings: 20/400/70/18    CARDIOVASCULAR  Shock: septic   MAP goal>65, low dose vasopressor as needed  cont atenolol  atorvastatin    GASTROINTESTINAL:   Tube feeds:  hold if paralyzed/proned or possible extubation.  GI prophylaxis: [] pantoprazole  Bowel regimen[] Miralax   monitor for transaminitis while on plaquenil  trend triglycerides if on propofol    RENAL  goal I/O even to neg 1L  lasix prn  preciado for accurate UOP    ENDOCRINE  insulin regimen: stop lantus  ISS  Goal FSG < 180    HEMATOLOGY  chemical VTE prophylaxis: [] eliquis 5mg bid    INFECTIOUS DISEASES:  SARS-CoV-2 infection/COVID-19  s/p Hydroxychloroquine  Bacterial super infection: [] Empiric antibiotics: (day)  trend Inflammatory markers  ID recommendations appreciated  s/p tocilizumab    DISPOSITION:   [] ICU

## 2020-04-18 NOTE — PROGRESS NOTE ADULT - ASSESSMENT
71 yo M w/ PMH of HTN and DM on insulin and Metformin, and HLD presented with complaint of SOB, HA, mild CP, and weakness. He states he had a nonproductive cough, w/ congestion and feeling cold for raghav last 8 days. He also complained of a fever but did not check his temperature. Upon arrival to the ED his SpO2 was 84% on RA with RR of 28 which improved to 99% on NRB. He was then downtitrated to 6L NC and is saturating around 90-93%. He states since he has been in the ED and put on the oxygen he feels a lot better.   Currently he denies any Dizziness, HA, CP, SOB, Abd pain, N/V/D/C, dysuria, recent travel or sick contacts. (11 Apr 2020 16:00)    His Chest Xray showed bilateral PNA.  patient was admitted for management of acute hypoxic respiratory failure 2/2 COVID pneumonia.  He was treated with a course of Plaquinel 4/11 - 4/15     patient's course continue to deteriorate.  On 4/16/2020, he was intubated for worsening of resp failure.       Impression:  COVID-19 Pneumonia  multifocal Pneumonia  acute hypoxic respiratory failure  cough  shortness of breath  s/p intubation      Plan:  -  completed course of Hydroxychloroquine 4/15/2020  - s/p steroids 4/11 - 4/15  - GIVE ACTEMRA x 1 dose 4/16      Continue supportive care measures  - s/p intubation on 4/16/2020  - Continue mechanical ventilation  - continue Oxygenation         ELEVATED D-dimers  - on Eliquis    continue to trend inflammatory markers.   If procalcitonin starts to rise, may need to consider treating for secondary bacterial infections    - trend CBC with diff, CMP with LFT's  - trend Inflammatory markers (ESR, CRP, Ferritin, LDH,  procalcitonin)  q48h.  D dimer, lactate, troponin, CK, PT/PTT x 1      Will follow with you.

## 2020-04-18 NOTE — PROGRESS NOTE ADULT - ASSESSMENT
Assessment:  71 yo M w/ PMH of ?HTN and DM on insulin and Metformin, and HLD presented with complaint of SOB, HA, mild CP, and weakness being admitted for acute hypoxic respiratory failure 2/2 COVID pneumonia.    Changes today:   lasix    NEUROLOGIC  Mental status:   Sedation: sedation to vent synchrony  versed and fentanyl drips  cont gabapentin    RESPIRATORY  Acute respiratory failure with: hypoxemia  [] ARDS  Pplat goal <30; Check for AutoPEEP  Ideal body weight: 64kg  Ventilation settings: 20/400/70/18    CARDIOVASCULAR  Shock: septic   MAP goal>65, low dose vasopressor as needed  cont atenolol  atorvastatin    GASTROINTESTINAL:   Tube feeds:  hold if paralyzed/proned or possible extubation.  GI prophylaxis: [] pantoprazole  Bowel regimen[] Miralax   monitor for transaminitis while on plaquenil  trend triglycerides if on propofol    RENAL  goal I/O even to neg 1L  lasix prn  preciado for accurate UOP    ENDOCRINE  insulin regimen: stop lantus  ISS  Goal FSG < 180    HEMATOLOGY  chemical VTE prophylaxis: [] eliquis 5mg bid    INFECTIOUS DISEASES:  SARS-CoV-2 infection/COVID-19  s/p Hydroxychloroquine  Bacterial super infection: [] Empiric antibiotics: (day)  trend Inflammatory markers  ID recommendations appreciated  s/p tocilizumab    DISPOSITION:   [] ICU Assessment:  71 yo M w/ PMH of ?HTN and DM on insulin and Metformin, and HLD presented with complaint of SOB, HA, mild CP, and weakness being admitted for acute hypoxic respiratory failure 2/2 COVID pneumonia.    Changes today:   DC homa and levo  lasix 40    NEUROLOGIC  Mental status:   Sedation: sedation to vent synchrony  versed and fentanyl drips  cont gabapentin    RESPIRATORY  Acute respiratory failure with: hypoxemia  [] ARDS  Pplat goal <30; Check for AutoPEEP  Ideal body weight: 64kg  Ventilation settings: 30/380/40/14    CARDIOVASCULAR  Shock: septic   MAP goal>65, low dose vasopressor as needed  cont atenolol  atorvastatin- check LFTs    GASTROINTESTINAL:   Tube feeds:  hold if paralyzed/proned or possible extubation.  GI prophylaxis: [] pantoprazole  Bowel regimen[] Miralax   monitor for transaminitis while on plaquenil  trend triglycerides if on propofol    RENAL  goal I/O even to neg 1L  lasix prn  preciado for accurate UOP    ENDOCRINE  insulin regimen: stopped lantus  ISS  Goal FSG < 180    HEMATOLOGY  chemical VTE prophylaxis: [] eliquis 5mg bid    INFECTIOUS DISEASES:  SARS-CoV-2 infection/COVID-19  s/p Hydroxychloroquine  trend Inflammatory markers  ID recommendations appreciated  s/p tocilizumab    DISPOSITION:   [] ICU

## 2020-04-18 NOTE — PROGRESS NOTE ADULT - SUBJECTIVE AND OBJECTIVE BOX
Chief complaint:   Patient is a 70y old  Male who presents with a chief complaint of SOB (17 Apr 2020 08:26)    HPI:  71 yo M w/ PMH of HTN and DM on insulin and Metformin, and HLD presented with complaint of SOB, HA, mild CP, and weakness. He states he had a nonproductive cough, w/ congestion and feeling cold for raghav last 8 days. He also complained of a fever but did not check his temperature. Upon arrival to the ED his SpO2 was 84% on RA with RR of 28 which improved to 99% on NRB. He was then downtitrated to 6L NC and is saturating around 90-93%. He states since he has been in the ED and put on the oxygen he feels a lot better.   Currently he denies any Dizziness, HA, CP, SOB, Abd pain, N/V/D/C, dysuria, recent travel or sick contacts. (11 Apr 2020 16:00)        24hr EVENTS:      ROS: [ ]  Unable to assess due to mental status   All other systems negative    -----------------------------------------------------------------------------------------------------------------------------------------------------------------------------------  ICU Vital Signs Last 24 Hrs  T(C): 35.1 (18 Apr 2020 04:00), Max: 36.4 (17 Apr 2020 12:00)  T(F): 95.1 (18 Apr 2020 04:00), Max: 97.6 (17 Apr 2020 12:00)  HR: 59 (18 Apr 2020 08:11) (48 - 64)  BP: --  BP(mean): --  ABP: 118/60 (18 Apr 2020 08:11) (108/51 - 156/64)  ABP(mean): 81 (18 Apr 2020 08:11) (70 - 97)  RR: 28 (18 Apr 2020 08:11) (20 - 28)  SpO2: 100% (18 Apr 2020 08:11) (94% - 100%)      I&O's Summary    17 Apr 2020 07:01  -  18 Apr 2020 07:00  --------------------------------------------------------  IN: 1419.8 mL / OUT: 1115 mL / NET: 304.8 mL    18 Apr 2020 07:01  -  18 Apr 2020 08:28  --------------------------------------------------------  IN: 0 mL / OUT: 125 mL / NET: -125 mL        MEDICATIONS  (STANDING):  apixaban 5 milliGRAM(s) Oral <User Schedule>  atorvastatin 40 milliGRAM(s) Oral at bedtime  chlorhexidine 0.12% Liquid 15 milliLiter(s) Oral Mucosa every 12 hours  chlorhexidine 4% Liquid 1 Application(s) Topical <User Schedule>  dextrose 5%. 1000 milliLiter(s) (50 mL/Hr) IV Continuous <Continuous>  dextrose 50% Injectable 12.5 Gram(s) IV Push once  dextrose 50% Injectable 25 Gram(s) IV Push once  dextrose 50% Injectable 25 Gram(s) IV Push once  fentaNYL   Infusion. 0.5 MICROgram(s)/kG/Hr (3.29 mL/Hr) IV Continuous <Continuous>  gabapentin 300 milliGRAM(s) Oral daily  insulin lispro (HumaLOG) corrective regimen sliding scale   SubCutaneous every 6 hours  midazolam Infusion 0.02 mG/kG/Hr (1.32 mL/Hr) IV Continuous <Continuous>  multiple electrolytes Injection Type 1 1000 milliLiter(s) (80 mL/Hr) IV Continuous <Continuous>  norepinephrine Infusion 0.05 MICROgram(s)/kG/Min (3.08 mL/Hr) IV Continuous <Continuous>  pantoprazole  Injectable 40 milliGRAM(s) IV Push daily  polyethylene glycol 3350 17 Gram(s) Oral at bedtime  rocuronium Infusion 8 MICROgram(s)/kG/Min (6.32 mL/Hr) IV Continuous <Continuous>  senna 2 Tablet(s) Oral at bedtime      RESPIRATORY:  Mode: AC/ CMV (Assist Control/ Continuous Mandatory Ventilation)  RR (machine): -28  TV (machine): 380  FiO2: 40  PEEP: 14  ITime: 0.6  MAP: 34  PIP: 46        NEUROIMAGING:   Recent imaging studies were reviewed.    LAB RESULTS:                          12.6   8.67  )-----------( 175      ( 18 Apr 2020 04:58 )             39.4           04-18    140  |  107  |  44.0<H>  ----------------------------<  149<H>  4.7   |  19.0<L>  |  1.33<H>    Ca    7.5<L>      18 Apr 2020 04:58  Phos  5.3     04-18  Mg     2.1     04-18            ABG - ( 18 Apr 2020 04:21 )  pH, Arterial: 7.17  pH, Blood: x     /  pCO2: 60    /  pO2: 132   / HCO3: 18    / Base Excess: -7.5  /  SaO2: 99                    -----------------------------------------------------------------------------------------------------------------------------------------------------------------------------------    PHYSICAL EXAM:  General: Calm, intubated, sedated for vent synchrony  HEENT: MMM  Neuro:  -Mental status- No acute distress  -CN- PERRL 3mm, EOMI, tongue midline, face symmetric    CV: RRR  Pulm: diminished to auscultation  Abd: Soft, nontender, nondistended  Ext: moderate edema in lower ext  Skin: warm, dry Chief complaint:   Patient is a 70y old  Male who presents with a chief complaint of SOB (17 Apr 2020 08:26)    HPI:  71 yo M w/ PMH of HTN and DM on insulin and Metformin, and HLD presented with complaint of SOB, HA, mild CP, and weakness. He states he had a nonproductive cough, w/ congestion and feeling cold for raghav last 8 days. He also complained of a fever but did not check his temperature. Upon arrival to the ED his SpO2 was 84% on RA with RR of 28 which improved to 99% on NRB. He was then downtitrated to 6L NC and is saturating around 90-93%. He states since he has been in the ED and put on the oxygen he feels a lot better.   Currently he denies any Dizziness, HA, CP, SOB, Abd pain, N/V/D/C, dysuria, recent travel or sick contacts. (11 Apr 2020 16:00)    24hr EVENTS:  proned    ROS: [ ]  Unable to assess due to mental status   All other systems negative    -----------------------------------------------------------------------------------------------------------------------------------------------------------------------------------  ICU Vital Signs Last 24 Hrs  T(C): 35.1 (18 Apr 2020 04:00), Max: 36.4 (17 Apr 2020 12:00)  T(F): 95.1 (18 Apr 2020 04:00), Max: 97.6 (17 Apr 2020 12:00)  HR: 59 (18 Apr 2020 08:11) (48 - 64)  BP: --  BP(mean): --  ABP: 118/60 (18 Apr 2020 08:11) (108/51 - 156/64)  ABP(mean): 81 (18 Apr 2020 08:11) (70 - 97)  RR: 28 (18 Apr 2020 08:11) (20 - 28)  SpO2: 100% (18 Apr 2020 08:11) (94% - 100%)      I&O's Summary    17 Apr 2020 07:01  -  18 Apr 2020 07:00  --------------------------------------------------------  IN: 1419.8 mL / OUT: 1115 mL / NET: 304.8 mL    18 Apr 2020 07:01  -  18 Apr 2020 08:28  --------------------------------------------------------  IN: 0 mL / OUT: 125 mL / NET: -125 mL        MEDICATIONS  (STANDING):  apixaban 5 milliGRAM(s) Oral <User Schedule>  atorvastatin 40 milliGRAM(s) Oral at bedtime  chlorhexidine 0.12% Liquid 15 milliLiter(s) Oral Mucosa every 12 hours  chlorhexidine 4% Liquid 1 Application(s) Topical <User Schedule>  dextrose 5%. 1000 milliLiter(s) (50 mL/Hr) IV Continuous <Continuous>  dextrose 50% Injectable 12.5 Gram(s) IV Push once  dextrose 50% Injectable 25 Gram(s) IV Push once  dextrose 50% Injectable 25 Gram(s) IV Push once  fentaNYL   Infusion. 0.5 MICROgram(s)/kG/Hr (3.29 mL/Hr) IV Continuous <Continuous>  gabapentin 300 milliGRAM(s) Oral daily  insulin lispro (HumaLOG) corrective regimen sliding scale   SubCutaneous every 6 hours  midazolam Infusion 0.02 mG/kG/Hr (1.32 mL/Hr) IV Continuous <Continuous>  multiple electrolytes Injection Type 1 1000 milliLiter(s) (80 mL/Hr) IV Continuous <Continuous>  norepinephrine Infusion 0.05 MICROgram(s)/kG/Min (3.08 mL/Hr) IV Continuous <Continuous>  pantoprazole  Injectable 40 milliGRAM(s) IV Push daily  polyethylene glycol 3350 17 Gram(s) Oral at bedtime  rocuronium Infusion 8 MICROgram(s)/kG/Min (6.32 mL/Hr) IV Continuous <Continuous>  senna 2 Tablet(s) Oral at bedtime      RESPIRATORY:  Mode: AC/ CMV (Assist Control/ Continuous Mandatory Ventilation)  RR (machine): -28  TV (machine): 380  FiO2: 40  PEEP: 14  ITime: 0.6  MAP: 34  PIP: 46        NEUROIMAGING:   Recent imaging studies were reviewed.    LAB RESULTS:                          12.6   8.67  )-----------( 175      ( 18 Apr 2020 04:58 )             39.4           04-18    140  |  107  |  44.0<H>  ----------------------------<  149<H>  4.7   |  19.0<L>  |  1.33<H>    Ca    7.5<L>      18 Apr 2020 04:58  Phos  5.3     04-18  Mg     2.1     04-18            ABG - ( 18 Apr 2020 04:21 )  pH, Arterial: 7.17  pH, Blood: x     /  pCO2: 60    /  pO2: 132   / HCO3: 18    / Base Excess: -7.5  /  SaO2: 99            -----------------------------------------------------------------------------------------------------------------------------------------------------------------------------------    PHYSICAL EXAM:  General: Calm, intubated, sedated for vent synchrony  HEENT: MMM  Neuro:  -Mental status- No acute distress  -CN- PERRL 3mm, EOMI, tongue midline, face symmetric    CV: RRR  Pulm: diminished to auscultation  Abd: Soft, nontender, nondistended  Ext: moderate edema in lower ext  Skin: warm, dry

## 2020-04-18 NOTE — PROGRESS NOTE ADULT - SUBJECTIVE AND OBJECTIVE BOX
[facility]  INFECTIOUS DISEASES AND INTERNAL MEDICINE at [place]  =======================================================  Natan Huynh MD  Diplomates American Board of Internal Medicine and Infectious Diseases  Telephone [phone]  Fax            [phone]  =======================================================    [id number]  [facility] [place]   follow up: COVID 19 pneumonia    remains intubated    =======================================================      REVIEW OF SYSTEMS:  Limited due to medical condition    =======================================================  Allergies  No Known Allergies    ======================================================  Physical Exam:  ============  (see vitals section below)    General:  sedated; intubated  Eye: Pupils are equal, round and reactive to light, Extraocular movements are intact, Normal conjunctiva.  HENT: Normocephalic, Oral mucosa is moist, No pharyngeal erythema, No sinus tenderness.  ETT  in place  Neck: Supple, No lymphadenopathy.  Respiratory: Lungs with poor anterior aeration   Cardiovascular: Normal rate, Regular rhythm,   Gastrointestinal: Soft, Non-tender, Non-distended, Normal bowel sounds.  Genitourinary: No costovertebral angle tenderness.  Lymphatics: No lymphadenopathy neck,   Musculoskeletal: Normal range of motion, Normal strength.  Integumentary: No rash.  Neurologic: sedate    =======================================================  Vitals:  ============  T(F): 95 (18 Apr 2020 08:11), Max: 97.6 (17 Apr 2020 12:00)  HR: 59 (18 Apr 2020 08:11)  BP: --  RR: 28 (18 Apr 2020 08:11)  SpO2: 100% (18 Apr 2020 08:11) (94% - 100%)  temp max in last 48H T(F): , Max: 99.3 (04-16-20 @ 17:03)    =======================================================  Current Antibiotics:    Other medications:  apixaban 5 milliGRAM(s) Oral <User Schedule>  atorvastatin 40 milliGRAM(s) Oral at bedtime  chlorhexidine 0.12% Liquid 15 milliLiter(s) Oral Mucosa every 12 hours  chlorhexidine 4% Liquid 1 Application(s) Topical <User Schedule>  dextrose 5%. 1000 milliLiter(s) IV Continuous <Continuous>  dextrose 50% Injectable 12.5 Gram(s) IV Push once  dextrose 50% Injectable 25 Gram(s) IV Push once  dextrose 50% Injectable 25 Gram(s) IV Push once  fentaNYL   Infusion. 0.5 MICROgram(s)/kG/Hr IV Continuous <Continuous>  gabapentin 300 milliGRAM(s) Oral daily  insulin lispro (HumaLOG) corrective regimen sliding scale   SubCutaneous every 6 hours  midazolam Infusion 0.02 mG/kG/Hr IV Continuous <Continuous>  multiple electrolytes Injection Type 1 1000 milliLiter(s) IV Continuous <Continuous>  norepinephrine Infusion 0.05 MICROgram(s)/kG/Min IV Continuous <Continuous>  pantoprazole  Injectable 40 milliGRAM(s) IV Push daily  polyethylene glycol 3350 17 Gram(s) Oral at bedtime  rocuronium Infusion 8 MICROgram(s)/kG/Min IV Continuous <Continuous>  senna 2 Tablet(s) Oral at bedtime      =======================================================  Labs:                        12.6   8.67  )-----------( 175      ( 18 Apr 2020 04:58 )             39.4      04-18    140  |  107  |  44.0<H>  ----------------------------<  149<H>  4.7   |  19.0<L>  |  1.33<H>    Ca    7.5<L>      18 Apr 2020 04:58  Phos  5.3     04-18  Mg     2.1     04-18        Culture - Blood (collected 04-11-20 @ 12:44)  Source: .Blood  Final Report (04-16-20 @ 13:23):    No growth at 5 days.      Creatinine, Serum: 1.33 mg/dL (04-18-20 @ 04:58)  Creatinine, Serum: 1.04 mg/dL (04-17-20 @ 04:52)  Creatinine, Serum: 0.78 mg/dL (04-15-20 @ 06:53)    Procalcitonin, Serum: 0.67 ng/mL (04-17-20 @ 04:52)  Procalcitonin, Serum: 0.11 ng/mL (04-15-20 @ 06:53)  Procalcitonin, Serum: 0.44 ng/mL (04-11-20 @ 12:40)    D-Dimer Assay, Quantitative: 31145 ng/mL DDU (04-17-20 @ 04:52)  D-Dimer Assay, Quantitative: 4397 ng/mL DDU (04-13-20 @ 09:53)  D-Dimer Assay, Quantitative: 447 ng/mL DDU (04-11-20 @ 12:40)    Ferritin, Serum: 384 ng/mL (04-17-20 @ 04:52)  Ferritin, Serum: 409 ng/mL (04-15-20 @ 06:53)  Ferritin, Serum: 520 ng/mL (04-13-20 @ 09:47)  Ferritin, Serum: 464 ng/mL (04-11-20 @ 12:40)    C-Reactive Protein, Serum: 9.42 mg/dL (04-17-20 @ 04:52)  C-Reactive Protein, Serum: 1.84 mg/dL (04-15-20 @ 06:53)  C-Reactive Protein, Serum: 8.85 mg/dL (04-13-20 @ 09:47)  C-Reactive Protein, Serum: 18.96 mg/dL (04-12-20 @ 14:10)  C-Reactive Protein, Serum: 14.43 mg/dL (04-11-20 @ 12:40)    WBC Count: 8.67 K/uL (04-18-20 @ 04:58)  WBC Count: 8.59 K/uL (04-17-20 @ 04:52)  WBC Count: 13.38 K/uL (04-15-20 @ 06:53)      COVID-19 PCR: Detected (04-11-20 @ 12:42)

## 2020-04-19 NOTE — PROGRESS NOTE ADULT - SUBJECTIVE AND OBJECTIVE BOX
71 yo M w/ PMH of HTN and DM on insulin and Metformin, and HLD presented with complaint of SOB, HA, mild CP, and weakness. He states he had a nonproductive cough, w/ congestion and feeling cold for raghav last 8 days. He also complained of a fever but did not check his temperature. Upon arrival to the ED his SpO2 was 84% on RA with RR of 28 which improved to 99% on NRB. He was then downtitrated to 6L NC and is saturating around 90-93%. He states since he has been in the ED and put on the oxygen he feels a lot better.   Currently he denies any Dizziness, HA, CP, SOB, Abd pain, N/V/D/C, dysuria, recent travel or sick contacts. (11 Apr 2020 16:00)    24hr EVENTS:  none reported.    Vitals, labs, imaging reviewed.    PHYSICAL EXAM:  General: Calm, intubated, sedated for vent synchrony  CV: RRR  Pulm: diminished to auscultation  Abd: Soft, nontender, nondistended  Ext: moderate edema in lower ext  Skin: warm, dry

## 2020-04-19 NOTE — CHART NOTE - NSCHARTNOTEFT_GEN_A_CORE
Contacted patient's grandson, Edy, and updated him on pt's current condition and status and all questions were addressed.

## 2020-04-19 NOTE — PROGRESS NOTE ADULT - ASSESSMENT
Assessment:  69 yo M w/ acute hypoxemicc respiratory failure 2/2 COVID pneumonia.  ARDS.  Cytokine migdalia/MAC; s/p Tocilizumab 4/16 and Methylprednisolone.  MARIYA. ATN + pre-renal.  Hyperglycemia, h/o DM.    Changes today:   send hepatitis/cytokine panel, QTFN  -start Lantus 10 qhs  -stop IVF    NEUROLOGIC  Sedation: sedation to vent synchrony  versed and fentanyl drips  cont home gabapentin    RESPIRATORY  Acute respiratory failure with: hypoxemia  [] ARDS  Pplat goal <30; Check for AutoPEEP  Ideal body weight: 64kg  Ventilation settings: 30/380/40/14    CARDIOVASCULAR  MAP goal>65, low dose vasopressor as needed  atorvastatin    GASTROINTESTINAL:   Tube feeds:  hold if paralyzed/proned or possible extubation.  GI prophylaxis: [] pantoprazole  Bowel regimen[] Miralax   monitor for transaminitis while on plaquenil  trend triglycerides if on propofol    RENAL  goal I/O even to neg 1L  lasix prn  preciado for accurate UOP    ENDOCRINE  insulin regimen: stopped lantus  ISS  Goal FSG < 180    HEMATOLOGY  chemical VTE prophylaxis: [] eliquis 5mg bid    INFECTIOUS DISEASES:  SARS-CoV-2 infection/COVID-19  s/p Hydroxychloroquine  trend Inflammatory markers  ID recommendations appreciated  s/p tocilizumab    DISPOSITION:   [] ICU

## 2020-04-20 NOTE — PROGRESS NOTE ADULT - ASSESSMENT
Assessment:  69 yo M w/ acute hypoxemicc respiratory failure 2/2 COVID pneumonia. Intubated 4/16.  ARDS.  Cytokine migdalia/MAC; s/p Tocilizumab 4/16 and Methylprednisolone.  MARIYA. ATN + pre-renal.  Hyperglycemia, h/o DM.    Changes today:   -start Oxy 5 q8hrs; decrease Fentanyl  -d/c Versed    NEUROLOGIC  Sedation: sedation to vent synchrony  as above  cont home gabapentin    RESPIRATORY  Pplat goal <30; Check for AutoPEEP  Ideal body weight: 64kg    CARDIOVASCULAR  MAP goal>65, low dose vasopressor as needed  atorvastatin    GASTROINTESTINAL:   Tube feeds:  hold if paralyzed/proned or possible extubation.  GI prophylaxis: [] pantoprazole  Bowel regimen[] Miralax   monitor for transaminitis while on plaquenil  trend triglycerides if on propofol    RENAL  goal I/O even to neg 1L  lasix prn  preciado for accurate UOP    ENDOCRINE  ISS  Goal FSG < 180    HEMATOLOGY  chemical VTE prophylaxis: [] eliquis 5mg bid    INFECTIOUS DISEASES:  SARS-CoV-2 infection/COVID-19  s/p Hydroxychloroquine  trend Inflammatory markers  ID recommendations appreciated  s/p tocilizumab    DISPOSITION:   [] ICU

## 2020-04-20 NOTE — PROGRESS NOTE ADULT - ASSESSMENT
71 yo M w/ PMH of HTN and DM on insulin and Metformin, and HLD presented with complaint of SOB, HA, mild CP, and weakness. He states he had a nonproductive cough, w/ congestion and feeling cold for raghav last 8 days. He also complained of a fever but did not check his temperature. Upon arrival to the ED his SpO2 was 84% on RA with RR of 28 which improved to 99% on NRB. He was then downtitrated to 6L NC and is saturating around 90-93%. He states since he has been in the ED and put on the oxygen he feels a lot better.   Currently he denies any Dizziness, HA, CP, SOB, Abd pain, N/V/D/C, dysuria, recent travel or sick contacts. (11 Apr 2020 16:00)    His Chest Xray showed bilateral PNA.  patient was admitted for management of acute hypoxic respiratory failure 2/2 COVID pneumonia.  He was treated with a course of Plaquinel 4/11 - 4/15     patient's course continue to deteriorate.  On 4/16/2020, he was intubated for worsening of resp failure.       Impression:  COVID-19 Pneumonia  multifocal Pneumonia  acute hypoxic respiratory failure  cough  shortness of breath  s/p intubation      Plan:  -  completed course of Hydroxychloroquine 4/15/2020  - s/p steroids 4/11 - 4/15  - s/p ACTEMRA x 1 dose 4/16      Continue supportive care measures  - s/p intubation on 4/16/2020  - Continue mechanical ventilation  - continue Oxygenation     ELEVATED D-dimers  - on Eliquis       - trend CBC with diff, CMP with LFT's  - trend Inflammatory markers (ESR, CRP, Ferritin, LDH,  procalcitonin)  q48h.  D dimer, lactate, troponin, CK, PT/PTT x 1      Will follow with you.

## 2020-04-20 NOTE — PROGRESS NOTE ADULT - SUBJECTIVE AND OBJECTIVE BOX
69 yo M w/ PMH of HTN and DM on insulin and Metformin, and HLD presented with complaint of SOB, HA, mild CP, and weakness. He states he had a nonproductive cough, w/ congestion and feeling cold for raghav last 8 days. He also complained of a fever but did not check his temperature. Upon arrival to the ED his SpO2 was 84% on RA with RR of 28 which improved to 99% on NRB. He was then downtitrated to 6L NC and is saturating around 90-93%. He states since he has been in the ED and put on the oxygen he feels a lot better.   Currently he denies any Dizziness, HA, CP, SOB, Abd pain, N/V/D/C, dysuria, recent travel or sick contacts. (11 Apr 2020 16:00)    24hr EVENTS:  none reported.    Vitals, labs, imaging reviewed.    PHYSICAL EXAM:  General: Calm, intubated, sedated for vent synchrony  CV: RRR  Pulm: diminished to auscultation  Abd: Soft, nontender, nondistended  Ext: moderate edema in lower ext  Skin: warm, dry

## 2020-04-20 NOTE — PROGRESS NOTE ADULT - SUBJECTIVE AND OBJECTIVE BOX
[facility]  INFECTIOUS DISEASES AND INTERNAL MEDICINE at [place]  =======================================================  Natan Huynh MD  Diplomates American Board of Internal Medicine and Infectious Diseases  Telephone [phone]  Fax            [phone]  =======================================================    [id number]  [facility] [place]   follow up: COVID 19 pneumonia    remains intubated    =======================================================      REVIEW OF SYSTEMS:  Limited due to medical condition    =======================================================  Allergies  No Known Allergies    ======================================================  Physical Exam:  ============  (see vitals section below)    General:  sedated; intubated  Eye: Pupils are equal, round and reactive to light, Extraocular movements are intact, Normal conjunctiva.  HENT: Normocephalic, Oral mucosa is moist, No pharyngeal erythema, No sinus tenderness.  ETT  in place  Neck: Supple, No lymphadenopathy.  Respiratory: Lungs with poor anterior aeration   Cardiovascular: Normal rate, Regular rhythm,   Gastrointestinal: Soft, Non-tender, Non-distended, Normal bowel sounds.  Genitourinary: No costovertebral angle tenderness.  Lymphatics: No lymphadenopathy neck,   Musculoskeletal: Normal range of motion, Normal strength.  Integumentary: No rash.  Neurologic: sedate    =======================================================  Vitals:  ============  T(F): 97.2 (20 Apr 2020 12:00), Max: 97.2 (20 Apr 2020 12:00)  HR: 59 (20 Apr 2020 12:00)  BP: --  RR: 28 (20 Apr 2020 12:00)  SpO2: 95% (20 Apr 2020 12:00) (94% - 97%)  temp max in last 48H T(F): , Max: 98.8 (04-19-20 @ 04:00)    =======================================================  Current Antibiotics:    Other medications:  apixaban 5 milliGRAM(s) Oral <User Schedule>  atorvastatin 40 milliGRAM(s) Oral at bedtime  bacitracin/polymyxin B Ointment 1 Application(s) Topical two times a day  chlorhexidine 0.12% Liquid 15 milliLiter(s) Oral Mucosa every 12 hours  chlorhexidine 4% Liquid 1 Application(s) Topical <User Schedule>  clonazePAM  Tablet 1 milliGRAM(s) Oral two times a day  dextrose 5%. 1000 milliLiter(s) IV Continuous <Continuous>  dextrose 50% Injectable 12.5 Gram(s) IV Push once  dextrose 50% Injectable 25 Gram(s) IV Push once  dextrose 50% Injectable 25 Gram(s) IV Push once  fentaNYL   Infusion. 0.5 MICROgram(s)/kG/Hr IV Continuous <Continuous>  gabapentin 300 milliGRAM(s) Oral daily  insulin glargine Injectable (LANTUS) 10 Unit(s) SubCutaneous every morning  insulin lispro (HumaLOG) corrective regimen sliding scale   SubCutaneous every 6 hours  insulin lispro Injectable (HumaLOG) 4 Unit(s) SubCutaneous every 6 hours  OLANZapine 10 milliGRAM(s) Oral at bedtime  oxyCODONE    IR 5 milliGRAM(s) Oral every 8 hours  pantoprazole  Injectable 40 milliGRAM(s) IV Push daily  polyethylene glycol 3350 17 Gram(s) Oral at bedtime  senna 2 Tablet(s) Oral at bedtime      =======================================================  Labs:                        10.9   7.35  )-----------( 168      ( 20 Apr 2020 05:47 )             32.5      04-20    140  |  107  |  41.0<H>  ----------------------------<  156<H>  4.1   |  22.0  |  1.00    Ca    7.6<L>      20 Apr 2020 05:47  Phos  2.3     04-20  Mg     2.2     04-20    TPro  4.9<L>  /  Alb  2.1<L>  /  TBili  0.2<L>  /  DBili  0.1  /  AST  29  /  ALT  33  /  AlkPhos  187<H>  04-19      Culture - Blood (collected 04-11-20 @ 12:44)  Source: .Blood  Final Report (04-16-20 @ 13:23):    No growth at 5 days.      Creatinine, Serum: 1.00 mg/dL (04-20-20 @ 05:47)  Creatinine, Serum: 1.31 mg/dL (04-19-20 @ 05:13)  Creatinine, Serum: 1.33 mg/dL (04-18-20 @ 04:58)  Creatinine, Serum: 1.04 mg/dL (04-17-20 @ 04:52)    Procalcitonin, Serum: 0.26 ng/mL (04-19-20 @ 05:13)  Procalcitonin, Serum: 0.67 ng/mL (04-17-20 @ 04:52)  Procalcitonin, Serum: 0.11 ng/mL (04-15-20 @ 06:53)  Procalcitonin, Serum: 0.44 ng/mL (04-11-20 @ 12:40)    D-Dimer Assay, Quantitative: 43356 ng/mL DDU (04-19-20 @ 05:13)  D-Dimer Assay, Quantitative: 29746 ng/mL DDU (04-17-20 @ 04:52)  D-Dimer Assay, Quantitative: 4397 ng/mL DDU (04-13-20 @ 09:53)  D-Dimer Assay, Quantitative: 447 ng/mL DDU (04-11-20 @ 12:40)    Ferritin, Serum: 385 ng/mL (04-19-20 @ 05:13)  Ferritin, Serum: 384 ng/mL (04-17-20 @ 04:52)  Ferritin, Serum: 409 ng/mL (04-15-20 @ 06:53)  Ferritin, Serum: 520 ng/mL (04-13-20 @ 09:47)  Ferritin, Serum: 464 ng/mL (04-11-20 @ 12:40)    C-Reactive Protein, Serum: 2.04 mg/dL (04-19-20 @ 05:13)  C-Reactive Protein, Serum: 9.42 mg/dL (04-17-20 @ 04:52)  C-Reactive Protein, Serum: 1.84 mg/dL (04-15-20 @ 06:53)  C-Reactive Protein, Serum: 8.85 mg/dL (04-13-20 @ 09:47)  C-Reactive Protein, Serum: 18.96 mg/dL (04-12-20 @ 14:10)  C-Reactive Protein, Serum: 14.43 mg/dL (04-11-20 @ 12:40)    WBC Count: 7.35 K/uL (04-20-20 @ 05:47)  WBC Count: 6.45 K/uL (04-19-20 @ 05:13)  WBC Count: 8.67 K/uL (04-18-20 @ 04:58)  WBC Count: 8.59 K/uL (04-17-20 @ 04:52)      COVID-19 PCR: Detected (04-11-20 @ 12:42)

## 2020-04-21 NOTE — PROGRESS NOTE ADULT - ASSESSMENT
69 yo M w/ PMH of HTN and DM on insulin and Metformin, and HLD presented with complaint of SOB, HA, mild CP, and weakness. He states he had a nonproductive cough, w/ congestion and feeling cold for raghav last 8 days. He also complained of a fever but did not check his temperature. Upon arrival to the ED his SpO2 was 84% on RA with RR of 28 which improved to 99% on NRB. He was then downtitrated to 6L NC and is saturating around 90-93%. He states since he has been in the ED and put on the oxygen he feels a lot better.   Currently he denies any Dizziness, HA, CP, SOB, Abd pain, N/V/D/C, dysuria, recent travel or sick contacts. (11 Apr 2020 16:00)    His Chest Xray showed bilateral PNA.  patient was admitted for management of acute hypoxic respiratory failure 2/2 COVID pneumonia.  He was treated with a course of Plaquinel 4/11 - 4/15     patient's course continue to deteriorate.  On 4/16/2020, he was intubated for worsening of resp failure.     Impression:  COVID-19 Pneumonia  multifocal Pneumonia  acute hypoxic respiratory failure  cough  shortness of breath  s/p intubation      Plan:  -  completed course of Hydroxychloroquine 4/15/2020  - s/p steroids 4/11 - 4/15  - s/p ACTEMRA x 1 dose 4/16      Continue supportive care measures  - s/p intubation on 4/16/2020  - Continue mechanical ventilation  - continue Oxygenation     ELEVATED D-dimers  - on Eliquis     - trend CBC with diff, CMP with LFT's  - trend Inflammatory markers (ESR, CRP, Ferritin, LDH,  procalcitonin)  q48h.  D dimer, lactate, troponin, CK, PT/PTT x 1      Will follow with you.

## 2020-04-21 NOTE — CHART NOTE - NSCHARTNOTEFT_GEN_A_CORE
Source: Patient [ ]  Family [ ]   other [x ]    Current Diet: Diet, NPO with Tube Feed:   Tube Feeding Modality: Nasogastric  Glucerna 1.5 Rodger  Total Volume for 24 Hours (mL): 1000  Bolus  Total Volume of Bolus (mL):  250  Total # of Feeds: 4  Tube Feed Frequency: Every 6 hours   Tube Feed Start Time: 10:00  Bolus Feed Rate (mL per Hour): 250   Bolus Feed Duration (in Hours): 1 (04-19-20 @ 05:04)    Enteral /Parenteral Nutrition:     Current Weight:   (4/11) 145#    % Weight Change no new wt to assess, will continue to monitor.  Aware 2+ mild generalized edema and 3+ moderate B/L arm edema noted per documentation.    Pertinent Medications: MEDICATIONS  (STANDING):  apixaban 5 milliGRAM(s) Oral <User Schedule>  atorvastatin 40 milliGRAM(s) Oral at bedtime  bacitracin/polymyxin B Ointment 1 Application(s) Topical two times a day  chlorhexidine 0.12% Liquid 15 milliLiter(s) Oral Mucosa every 12 hours  chlorhexidine 4% Liquid 1 Application(s) Topical <User Schedule>  clonazePAM  Tablet 1 milliGRAM(s) Oral two times a day  dextrose 5%. 1000 milliLiter(s) (50 mL/Hr) IV Continuous <Continuous>  dextrose 50% Injectable 12.5 Gram(s) IV Push once  dextrose 50% Injectable 25 Gram(s) IV Push once  dextrose 50% Injectable 25 Gram(s) IV Push once  fentaNYL   Infusion. 0.5 MICROgram(s)/kG/Hr (3.29 mL/Hr) IV Continuous <Continuous>  gabapentin 300 milliGRAM(s) Oral daily  insulin glargine Injectable (LANTUS) 10 Unit(s) SubCutaneous every morning  insulin lispro (HumaLOG) corrective regimen sliding scale   SubCutaneous every 6 hours  insulin lispro Injectable (HumaLOG) 4 Unit(s) SubCutaneous every 6 hours  ketamine Infusion 0.1 mG/kG/Hr (3.29 mL/Hr) IV Continuous <Continuous>  OLANZapine 10 milliGRAM(s) Oral at bedtime  oxyCODONE    IR 5 milliGRAM(s) Oral every 8 hours  pantoprazole  Injectable 40 milliGRAM(s) IV Push daily  polyethylene glycol 3350 17 Gram(s) Oral at bedtime  senna 2 Tablet(s) Oral at bedtime    MEDICATIONS  (PRN):  acetaminophen   Tablet .. 650 milliGRAM(s) Oral every 6 hours PRN Temp greater or equal to 38C (100.4F)  ALBUTerol    90 MICROgram(s) HFA Inhaler 1 Puff(s) Inhalation every 4 hours PRN Shortness of Breath and/or Wheezing  dextrose 40% Gel 15 Gram(s) Oral once PRN Blood Glucose LESS THAN 70 milliGRAM(s)/deciliter  fentaNYL    Injectable 50 MICROGram(s) IV Push every 2 hours PRN sedation  fentaNYL    Injectable 50 MICROGram(s) IV Push every 1 hour PRN Severe Pain (7 - 10)  glucagon  Injectable 1 milliGRAM(s) IntraMuscular once PRN Glucose LESS THAN 70 milligrams/deciliter  magnesium hydroxide Suspension 30 milliLiter(s) Oral daily PRN Constipation  sodium chloride 0.9% lock flush 10 milliLiter(s) IV Push every 1 hour PRN Pre/post blood products, medications, blood draw, and to maintain line patency    Pertinent Labs: CBC Full  -  ( 21 Apr 2020 04:34 )  WBC Count : 7.36 K/uL  RBC Count : 3.69 M/uL  Hemoglobin : 11.0 g/dL  Hematocrit : 33.0 %  Platelet Count - Automated : 173 K/uL  Mean Cell Volume : 89.4 fl  Mean Cell Hemoglobin : 29.8 pg  Mean Cell Hemoglobin Concentration : 33.3 gm/dL  Auto Neutrophil # : 6.02 K/uL  Auto Lymphocyte # : 0.68 K/uL  Auto Monocyte # : 0.46 K/uL  Auto Eosinophil # : 0.13 K/uL  Auto Basophil # : 0.01 K/uL  Auto Neutrophil % : 81.8 %  Auto Lymphocyte % : 9.2 %  Auto Monocyte % : 6.3 %  Auto Eosinophil % : 1.8 %  Auto Basophil % : 0.1 %  04-21 Na143 mmol/L Glu 157 mg/dL<H> K+ 4.0 mmol/L Cr  0.90 mg/dL BUN 40.0 mg/dL<H> Phos 3.1 mg/dL     Skin: no skin breakdown noted    Current Nutrition Diagnosis: Pt remains at nutrition risk secondary to increased nutrient needs related to increased physiologic demand with healing as evidenced by pt with acute hypoxic respiratory failure due to COVID-19 requiring intubation s/p rapid response (4/16).  Pt remains intubated/sedated, receiving bolus enteral nutrition regimen.    Recommendations:   1. Continue with bolus regimen of Glucerna 1.5 @ 250ml q6 hrs daily, as tolerated (1000ml, 1500kcal, 82g protein 760ml free water)  2. Monitor H/H, glu, trend CRP levels   3. Monitor daily wts     Monitoring and Evaluation:   [ ] PO intake [ x] Tolerance to diet prescription [X] Weights  [X] Follow up per protocol [X] Labs:

## 2020-04-21 NOTE — CHART NOTE - NSCHARTNOTEFT_GEN_A_CORE
Call placed to pts family to provide update on pt status. I spoke with pts grandson Edy and provided him with the updates. All questions answered.

## 2020-04-21 NOTE — PROGRESS NOTE ADULT - SUBJECTIVE AND OBJECTIVE BOX
[facility]  INFECTIOUS DISEASES AND INTERNAL MEDICINE at [place]  =======================================================  Natan Huynh MD  Diplomates American Board of Internal Medicine and Infectious Diseases  Telephone [phone]  Fax            [phone]  =======================================================    [id number]  [facility] [place]   follow up: COVID 19 pneumonia    remains intubated    =======================================================      REVIEW OF SYSTEMS:  Limited due to medical condition    =======================================================  Allergies  No Known Allergies    ======================================================  Physical Exam:  ============  (see vitals section below)    General:  sedated; intubated  Eye: Pupils are equal, round and reactive to light, Extraocular movements are intact, Normal conjunctiva.  HENT: Normocephalic, Oral mucosa is moist, No pharyngeal erythema, No sinus tenderness.  ETT  in place  Neck: Supple, No lymphadenopathy.  Respiratory: Lungs with poor anterior aeration   Cardiovascular: Normal rate, Regular rhythm,   Gastrointestinal: Soft, Non-tender, Non-distended, Normal bowel sounds.  Genitourinary: No costovertebral angle tenderness.  Lymphatics: No lymphadenopathy neck,   Musculoskeletal: Normal range of motion, Normal strength.  Integumentary: No rash.  Neurologic: sedate    =======================================================  Vitals:  ============  T(F): 98.9 (21 Apr 2020 13:21), Max: 98.9 (21 Apr 2020 13:21)  HR: 78 (21 Apr 2020 12:00)  BP: 121/64 (21 Apr 2020 12:00)  RR: 28 (21 Apr 2020 12:00)  SpO2: 98% (21 Apr 2020 12:00) (90% - 98%)  temp max in last 48H T(F): , Max: 98.9 (04-21-20 @ 13:21)    =======================================================  Current Antibiotics:    Other medications:  apixaban 5 milliGRAM(s) Oral <User Schedule>  atorvastatin 40 milliGRAM(s) Oral at bedtime  bacitracin/polymyxin B Ointment 1 Application(s) Topical two times a day  chlorhexidine 0.12% Liquid 15 milliLiter(s) Oral Mucosa every 12 hours  chlorhexidine 4% Liquid 1 Application(s) Topical <User Schedule>  clonazePAM  Tablet 1 milliGRAM(s) Oral every 8 hours  dextrose 5%. 1000 milliLiter(s) IV Continuous <Continuous>  dextrose 50% Injectable 12.5 Gram(s) IV Push once  dextrose 50% Injectable 25 Gram(s) IV Push once  dextrose 50% Injectable 25 Gram(s) IV Push once  fentaNYL   Infusion. 0.5 MICROgram(s)/kG/Hr IV Continuous <Continuous>  gabapentin 300 milliGRAM(s) Oral daily  insulin glargine Injectable (LANTUS) 10 Unit(s) SubCutaneous every morning  insulin lispro (HumaLOG) corrective regimen sliding scale   SubCutaneous every 6 hours  insulin lispro Injectable (HumaLOG) 4 Unit(s) SubCutaneous every 6 hours  ketamine Infusion 0.1 mG/kG/Hr IV Continuous <Continuous>  OLANZapine 10 milliGRAM(s) Oral every 12 hours  oxyCODONE    IR 10 milliGRAM(s) Oral every 8 hours  pantoprazole  Injectable 40 milliGRAM(s) IV Push daily  polyethylene glycol 3350 17 Gram(s) Oral at bedtime  senna 2 Tablet(s) Oral at bedtime      =======================================================  Labs:                        11.0   7.36  )-----------( 173      ( 21 Apr 2020 04:34 )             33.0      04-21    143  |  107  |  40.0<H>  ----------------------------<  157<H>  4.0   |  24.0  |  0.90    Ca    8.1<L>      21 Apr 2020 04:34  Phos  3.1     04-21  Mg     2.1     04-21        Culture - Blood (collected 04-11-20 @ 12:44)  Source: .Blood  Final Report (04-16-20 @ 13:23):    No growth at 5 days.      Creatinine, Serum: 0.90 mg/dL (04-21-20 @ 04:34)  Creatinine, Serum: 1.00 mg/dL (04-20-20 @ 05:47)  Creatinine, Serum: 1.31 mg/dL (04-19-20 @ 05:13)  Creatinine, Serum: 1.33 mg/dL (04-18-20 @ 04:58)  Creatinine, Serum: 1.04 mg/dL (04-17-20 @ 04:52)    Procalcitonin, Serum: 0.14 ng/mL (04-21-20 @ 04:34)  Procalcitonin, Serum: 0.26 ng/mL (04-19-20 @ 05:13)  Procalcitonin, Serum: 0.67 ng/mL (04-17-20 @ 04:52)  Procalcitonin, Serum: 0.11 ng/mL (04-15-20 @ 06:53)  Procalcitonin, Serum: 0.44 ng/mL (04-11-20 @ 12:40)    D-Dimer Assay, Quantitative: 7943 ng/mL DDU (04-21-20 @ 04:34)  D-Dimer Assay, Quantitative: 61283 ng/mL DDU (04-19-20 @ 05:13)  D-Dimer Assay, Quantitative: 45428 ng/mL DDU (04-17-20 @ 04:52)  D-Dimer Assay, Quantitative: 4397 ng/mL DDU (04-13-20 @ 09:53)    Ferritin, Serum: 384 ng/mL (04-21-20 @ 04:34)  Ferritin, Serum: 385 ng/mL (04-19-20 @ 05:13)  Ferritin, Serum: 384 ng/mL (04-17-20 @ 04:52)  Ferritin, Serum: 409 ng/mL (04-15-20 @ 06:53)  Ferritin, Serum: 520 ng/mL (04-13-20 @ 09:47)  Ferritin, Serum: 464 ng/mL (04-11-20 @ 12:40)    C-Reactive Protein, Serum: 0.43 mg/dL (04-21-20 @ 04:34)  C-Reactive Protein, Serum: 2.04 mg/dL (04-19-20 @ 05:13)  C-Reactive Protein, Serum: 9.42 mg/dL (04-17-20 @ 04:52)  C-Reactive Protein, Serum: 1.84 mg/dL (04-15-20 @ 06:53)  C-Reactive Protein, Serum: 8.85 mg/dL (04-13-20 @ 09:47)  C-Reactive Protein, Serum: 18.96 mg/dL (04-12-20 @ 14:10)  C-Reactive Protein, Serum: 14.43 mg/dL (04-11-20 @ 12:40)    WBC Count: 7.36 K/uL (04-21-20 @ 04:34)  WBC Count: 7.35 K/uL (04-20-20 @ 05:47)  WBC Count: 6.45 K/uL (04-19-20 @ 05:13)  WBC Count: 8.67 K/uL (04-18-20 @ 04:58)  WBC Count: 8.59 K/uL (04-17-20 @ 04:52)      COVID-19 PCR: Detected (04-11-20 @ 12:42)

## 2020-04-21 NOTE — PROGRESS NOTE ADULT - ASSESSMENT
Assessment:  71 yo M w/ acute hypoxemicc respiratory failure 2/2 COVID pneumonia. Intubated 4/16.  ARDS.  Cytokine migdalia/MAC; s/p Tocilizumab 4/16 and Methylprednisolone.  MARIYA. ATN + pre-renal.  Hyperglycemia, h/o DM.    Changes today:   -increase Oxy 10 q8hrs; noted to have episode of agitation, trial of Haldol    NEUROLOGIC  Sedation: sedation to vent synchrony  as above  cont home gabapentin    RESPIRATORY  Pplat goal <30; Check for AutoPEEP  Ideal body weight: 64kg    CARDIOVASCULAR  MAP goal>65, low dose vasopressor as needed  atorvastatin    GASTROINTESTINAL:   Tube feeds:  hold if paralyzed/proned or possible extubation.  GI prophylaxis: [] pantoprazole  Bowel regimen[] Miralax   monitor for transaminitis while on plaquenil  trend triglycerides if on propofol    RENAL  goal I/O even to neg 1L  lasix prn  preciado for accurate UOP    ENDOCRINE  ISS  Goal FSG < 180    HEMATOLOGY  chemical VTE prophylaxis: [] eliquis 5mg bid    INFECTIOUS DISEASES:  SARS-CoV-2 infection/COVID-19  s/p Hydroxychloroquine  trend Inflammatory markers  ID recommendations appreciated  s/p tocilizumab    DISPOSITION:   [] ICU

## 2020-04-22 NOTE — PROGRESS NOTE ADULT - ASSESSMENT
Assessment:  71 yo M w/ acute hypoxemicc respiratory failure 2/2 COVID pneumonia. Intubated 4/16.  ARDS.  Cytokine migdalia/MAC; s/p Tocilizumab 4/16 and Methylprednisolone.  MARIYA. ATN + pre-renal.  Hyperglycemia, h/o DM.    Changes today:   -send inflamm markers in am, will consider steroids if uptrending    NEUROLOGIC  Sedation: sedation to vent synchrony  as above  cont home gabapentin    RESPIRATORY  Pplat goal <30; Check for AutoPEEP  Ideal body weight: 64kg    CARDIOVASCULAR  MAP goal>65, low dose vasopressor as needed  atorvastatin    GASTROINTESTINAL:   Tube feeds:  hold if paralyzed/proned or possible extubation.  GI prophylaxis: [] pantoprazole  Bowel regimen[] Miralax   monitor for transaminitis while on plaquenil  trend triglycerides if on propofol    RENAL  goal I/O even to neg 1L  lasix prn  preciado for accurate UOP    ENDOCRINE  ISS  Goal FSG < 180    HEMATOLOGY  chemical VTE prophylaxis: [] eliquis 5mg bid    INFECTIOUS DISEASES:  SARS-CoV-2 infection/COVID-19  s/p Hydroxychloroquine  trend Inflammatory markers  ID recommendations appreciated  s/p tocilizumab    DISPOSITION:   [] ICU

## 2020-04-22 NOTE — CHART NOTE - NSCHARTNOTEFT_GEN_A_CORE
Family member spoken to: Edy (Grandson)  Telephone # called: [phone]  When: 04-22-20 @ 15:42    I spoke to the above named family member of BECKI DAVIES, updated them on the patient's condition, and answered all their questions. He wishes to see about arranging a Facetime call with the patient's mother (in the morning would be best) so that she can talk to him. I told him we would get back to him with the details.

## 2020-04-22 NOTE — PROGRESS NOTE ADULT - SUBJECTIVE AND OBJECTIVE BOX
[facility]  INFECTIOUS DISEASES AND INTERNAL MEDICINE at [place]  =======================================================  Natan Huynh MD  Diplomates American Board of Internal Medicine and Infectious Diseases  Telephone [phone]  Fax            [phone]  =======================================================    [id number]  [facility] [place]   follow up: COVID 19 pneumonia    remains intubated    =======================================================      REVIEW OF SYSTEMS:  Limited due to medical condition    =======================================================  Allergies  No Known Allergies    ======================================================  Physical Exam:  ============  (see vitals section below)    General:  sedated; intubated  Eye: Pupils are equal, round and reactive to light, Extraocular movements are intact, Normal conjunctiva.  HENT: Normocephalic, Oral mucosa is moist, No pharyngeal erythema, No sinus tenderness.  ETT  in place  Neck: Supple, No lymphadenopathy.  Respiratory: Lungs with poor anterior aeration   Cardiovascular: Normal rate, Regular rhythm,   Gastrointestinal: Soft, Non-tender, Non-distended, Normal bowel sounds.  Genitourinary: No costovertebral angle tenderness.  Lymphatics: No lymphadenopathy neck,   Musculoskeletal: Normal range of motion, Normal strength.  Integumentary: No rash.  Neurologic: sedated    =======================================================  Vitals:  ============  T(F): 95.2 (22 Apr 2020 08:00), Max: 98.9 (21 Apr 2020 13:21)  HR: 61 (22 Apr 2020 10:00)  BP: 120/70 (22 Apr 2020 10:00)  RR: 28 (22 Apr 2020 10:00)  SpO2: 97% (22 Apr 2020 10:00) (92% - 98%)  temp max in last 48H T(F): , Max: 98.9 (04-21-20 @ 13:21)    =======================================================  Current Antibiotics:    Other medications:  apixaban 5 milliGRAM(s) Oral <User Schedule>  atorvastatin 40 milliGRAM(s) Oral at bedtime  chlorhexidine 0.12% Liquid 15 milliLiter(s) Oral Mucosa every 12 hours  chlorhexidine 4% Liquid 1 Application(s) Topical <User Schedule>  clonazePAM  Tablet 1 milliGRAM(s) Oral every 8 hours  dextrose 5%. 1000 milliLiter(s) IV Continuous <Continuous>  dextrose 50% Injectable 12.5 Gram(s) IV Push once  dextrose 50% Injectable 25 Gram(s) IV Push once  dextrose 50% Injectable 25 Gram(s) IV Push once  fentaNYL   Infusion. 0.5 MICROgram(s)/kG/Hr IV Continuous <Continuous>  gabapentin 300 milliGRAM(s) Oral daily  insulin glargine Injectable (LANTUS) 10 Unit(s) SubCutaneous every morning  insulin lispro (HumaLOG) corrective regimen sliding scale   SubCutaneous every 6 hours  insulin lispro Injectable (HumaLOG) 4 Unit(s) SubCutaneous every 6 hours  ketamine Infusion. 0.13 mG/kG/Hr IV Continuous <Continuous>  neomycin/bacitracin/polymyxin Topical Ointment 1 Application(s) Topical two times a day  OLANZapine 10 milliGRAM(s) Oral every 12 hours  oxyCODONE    IR 10 milliGRAM(s) Oral every 8 hours  pantoprazole  Injectable 40 milliGRAM(s) IV Push daily  polyethylene glycol 3350 17 Gram(s) Oral at bedtime  senna 2 Tablet(s) Oral at bedtime      =======================================================  Labs:                        10.8   7.10  )-----------( 170      ( 22 Apr 2020 04:43 )             33.4      04-22    144  |  108<H>  |  40.0<H>  ----------------------------<  186<H>  4.5   |  22.0  |  0.88    Ca    8.4<L>      22 Apr 2020 04:43  Phos  4.2     04-22  Mg     2.2     04-22        Culture - Blood (collected 04-11-20 @ 12:44)  Source: .Blood  Final Report (04-16-20 @ 13:23):    No growth at 5 days.      Creatinine, Serum: 0.88 mg/dL (04-22-20 @ 04:43)  Creatinine, Serum: 0.90 mg/dL (04-21-20 @ 04:34)  Creatinine, Serum: 1.00 mg/dL (04-20-20 @ 05:47)  Creatinine, Serum: 1.31 mg/dL (04-19-20 @ 05:13)  Creatinine, Serum: 1.33 mg/dL (04-18-20 @ 04:58)    Procalcitonin, Serum: 0.14 ng/mL (04-21-20 @ 04:34)  Procalcitonin, Serum: 0.26 ng/mL (04-19-20 @ 05:13)  Procalcitonin, Serum: 0.67 ng/mL (04-17-20 @ 04:52)  Procalcitonin, Serum: 0.11 ng/mL (04-15-20 @ 06:53)  Procalcitonin, Serum: 0.44 ng/mL (04-11-20 @ 12:40)    D-Dimer Assay, Quantitative: 7943 ng/mL DDU (04-21-20 @ 04:34)  D-Dimer Assay, Quantitative: 19137 ng/mL DDU (04-19-20 @ 05:13)  D-Dimer Assay, Quantitative: 36455 ng/mL DDU (04-17-20 @ 04:52)  D-Dimer Assay, Quantitative: 4397 ng/mL DDU (04-13-20 @ 09:53)    Ferritin, Serum: 384 ng/mL (04-21-20 @ 04:34)  Ferritin, Serum: 385 ng/mL (04-19-20 @ 05:13)  Ferritin, Serum: 384 ng/mL (04-17-20 @ 04:52)  Ferritin, Serum: 409 ng/mL (04-15-20 @ 06:53)  Ferritin, Serum: 520 ng/mL (04-13-20 @ 09:47)  Ferritin, Serum: 464 ng/mL (04-11-20 @ 12:40)    C-Reactive Protein, Serum: 0.43 mg/dL (04-21-20 @ 04:34)  C-Reactive Protein, Serum: 2.04 mg/dL (04-19-20 @ 05:13)  C-Reactive Protein, Serum: 9.42 mg/dL (04-17-20 @ 04:52)  C-Reactive Protein, Serum: 1.84 mg/dL (04-15-20 @ 06:53)  C-Reactive Protein, Serum: 8.85 mg/dL (04-13-20 @ 09:47)  C-Reactive Protein, Serum: 18.96 mg/dL (04-12-20 @ 14:10)  C-Reactive Protein, Serum: 14.43 mg/dL (04-11-20 @ 12:40)    WBC Count: 7.10 K/uL (04-22-20 @ 04:43)  WBC Count: 7.36 K/uL (04-21-20 @ 04:34)  WBC Count: 7.35 K/uL (04-20-20 @ 05:47)  WBC Count: 6.45 K/uL (04-19-20 @ 05:13)  WBC Count: 8.67 K/uL (04-18-20 @ 04:58)      COVID-19 PCR: Detected (04-11-20 @ 12:42)

## 2020-04-22 NOTE — PROGRESS NOTE ADULT - ASSESSMENT
71 yo M w/ PMH of HTN and DM on insulin and Metformin, and HLD presented with complaint of SOB, HA, mild CP, and weakness. He states he had a nonproductive cough, w/ congestion and feeling cold for raghav last 8 days. He also complained of a fever but did not check his temperature. Upon arrival to the ED his SpO2 was 84% on RA with RR of 28 which improved to 99% on NRB. He was then downtitrated to 6L NC and is saturating around 90-93%. He states since he has been in the ED and put on the oxygen he feels a lot better.   Currently he denies any Dizziness, HA, CP, SOB, Abd pain, N/V/D/C, dysuria, recent travel or sick contacts. (11 Apr 2020 16:00)    His Chest Xray showed bilateral PNA.  patient was admitted for management of acute hypoxic respiratory failure 2/2 COVID pneumonia.  He was treated with a course of Plaquinel 4/11 - 4/15     patient's course continue to deteriorate.  On 4/16/2020, he was intubated for worsening of resp failure.     Impression:  COVID-19 Pneumonia  multifocal Pneumonia  acute hypoxic respiratory failure  cough  shortness of breath  s/p intubation      Plan:  -  completed course of Hydroxychloroquine 4/15/2020  - s/p steroids 4/11 - 4/15  - s/p ACTEMRA x 1 dose 4/16      Continue supportive care measures  - s/p intubation on 4/16/2020  - Continue mechanical ventilation  - continue Oxygenation     ELEVATED D-dimers  - on Eliquis       Inflammatory markers downtrending    - trend CBC with diff, CMP with LFT's  - trend Inflammatory markers (ESR, CRP, Ferritin, LDH,  procalcitonin)  q48h.  D dimer, lactate, troponin, CK, PT/PTT x 1      Will follow with you.

## 2020-04-23 NOTE — PROGRESS NOTE ADULT - ASSESSMENT
Assessment:  71 yo M w/ acute hypoxemicc respiratory failure 2/2 COVID pneumonia. Intubated 4/16.  ARDS.  Cytokine migdalia/MAC; s/p Tocilizumab 4/16 and Methylprednisolone.  MARIYA. ATN + pre-renal.  Hyperglycemia, h/o DM.    Changes today:   -send inflamm markers in am, will consider steroids if uptrending    NEUROLOGIC  Sedation: sedation to vent synchrony  as above  cont home gabapentin    RESPIRATORY  Pplat goal <30; Check for AutoPEEP  Ideal body weight: 64kg    CARDIOVASCULAR  MAP goal>65, low dose vasopressor as needed  atorvastatin    GASTROINTESTINAL:   Tube feeds:  hold if paralyzed/proned or possible extubation.  GI prophylaxis: [] pantoprazole  Bowel regimen[] Miralax   monitor for transaminitis while on plaquenil  trend triglycerides if on propofol    RENAL  goal I/O even to neg 1L  lasix prn  preciado for accurate UOP    ENDOCRINE  ISS  Goal FSG < 180    HEMATOLOGY  chemical VTE prophylaxis: [] eliquis 5mg bid    INFECTIOUS DISEASES:  SARS-CoV-2 infection/COVID-19  s/p Hydroxychloroquine  trend Inflammatory markers  ID recommendations appreciated  s/p tocilizumab    DISPOSITION:   [] ICU Assessment:  69 yo M w/ acute hypoxemicc respiratory failure 2/2 COVID pneumonia. Intubated 4/16.  ARDS.  Cytokine migdalia/MAC; s/p Tocilizumab 4/16 and Methylprednisolone.  MARIYA. ATN + pre-renal.  Hyperglycemia, h/o DM.    Changes today:   stop ketamine  inc GBP to 300 tid  give lasix 40IV     NEUROLOGIC  Sedation: sedation to vent synchrony  as above  inc gabapentin  cont oxy, olanzapine, klonopin for sedation weaning  cont fentanyl and versed gtt    RESPIRATORY  Pplat goal <30; Check for AutoPEEP  Ideal body weight: 64kg  28/380/40/10    CARDIOVASCULAR  MAP goal>65, low dose vasopressor as needed  atorvastatin    GASTROINTESTINAL:   Tube feeds:  hold if paralyzed/proned or possible extubation.  GI prophylaxis: [] pantoprazole  Bowel regimen[] Miralax   monitor for transaminitis while on plaquenil    RENAL  goal I/O even to neg 1L  lasix prn  preciado for accurate UOP    ENDOCRINE  ISS  Goal FSG < 180  lispro 4units q6hr  lantus 10units qday    HEMATOLOGY  chemical VTE prophylaxis: [] eliquis 5mg bid    INFECTIOUS DISEASES:  SARS-CoV-2 infection/COVID-19  s/p Hydroxychloroquine  trend Inflammatory markers  ID recommendations appreciated  s/p tocilizumab    DISPOSITION:   [] ICU

## 2020-04-23 NOTE — PROGRESS NOTE ADULT - ASSESSMENT
71 yo M w/ PMH of HTN and DM on insulin and Metformin, and HLD presented with complaint of SOB, HA, mild CP, and weakness. He states he had a nonproductive cough, w/ congestion and feeling cold for raghva last 8 days. He also complained of a fever but did not check his temperature. Upon arrival to the ED his SpO2 was 84% on RA with RR of 28 which improved to 99% on NRB. He was then downtitrated to 6L NC and is saturating around 90-93%. He states since he has been in the ED and put on the oxygen he feels a lot better.   Currently he denies any Dizziness, HA, CP, SOB, Abd pain, N/V/D/C, dysuria, recent travel or sick contacts. (11 Apr 2020 16:00)    His Chest Xray showed bilateral PNA.  patient was admitted for management of acute hypoxic respiratory failure 2/2 COVID pneumonia.  He was treated with a course of Plaquinel 4/11 - 4/15     patient's course continue to deteriorate.  On 4/16/2020, he was intubated for worsening of resp failure.     Impression:  COVID-19 Pneumonia  multifocal Pneumonia  acute hypoxic respiratory failure  cough  shortness of breath  s/p intubation      Plan:  -  completed course of Hydroxychloroquine 4/15/2020  - s/p steroids 4/11 - 4/15  - s/p ACTEMRA x 1 dose 4/16      Continue supportive care measures  - s/p intubation on 4/16/2020  - Continue mechanical ventilation  - continue Oxygenation     ELEVATED D-dimers  - on Eliquis       Inflammatory markers downtrending    - trend CBC with diff, CMP with LFT's  - trend Inflammatory markers (ESR, CRP, Ferritin, LDH,  procalcitonin)  q48h.  D dimer, lactate, troponin, CK, PT/PTT x 1      Will follow with you.

## 2020-04-23 NOTE — CHART NOTE - NSCHARTNOTEFT_GEN_A_CORE
Family member spoken to: Amy (Spouse)  When: 04-23-20 @ 14:30    I spoke to the above named family member of BECKI JOIEJULYNIRALI, updated them on the patient's condition, and answered all their questions.    used: Audrey Breaux

## 2020-04-23 NOTE — PROGRESS NOTE ADULT - SUBJECTIVE AND OBJECTIVE BOX
Chief complaint:   Patient is a 70y old  Male who presents with a chief complaint of SOB (22 Apr 2020 15:47)    HPI:  69 yo M w/ PMH of HTN and DM on insulin and Metformin, and HLD presented with complaint of SOB, HA, mild CP, and weakness. He states he had a nonproductive cough, w/ congestion and feeling cold for raghav last 8 days. He also complained of a fever but did not check his temperature. Upon arrival to the ED his SpO2 was 84% on RA with RR of 28 which improved to 99% on NRB. He was then downtitrated to 6L NC and is saturating around 90-93%. He states since he has been in the ED and put on the oxygen he feels a lot better.   Currently he denies any Dizziness, HA, CP, SOB, Abd pain, N/V/D/C, dysuria, recent travel or sick contacts. (11 Apr 2020 16:00)        24hr EVENTS:      ROS: [ ]  Unable to assess due to mental status   All other systems negative    -----------------------------------------------------------------------------------------------------------------------------------------------------------------------------------  ICU Vital Signs Last 24 Hrs  T(C): 36.5 (23 Apr 2020 08:00), Max: 37.2 (23 Apr 2020 04:00)  T(F): 97.7 (23 Apr 2020 08:00), Max: 99 (23 Apr 2020 04:00)  HR: 67 (23 Apr 2020 08:00) (59 - 105)  BP: 123/59 (22 Apr 2020 16:00) (114/92 - 123/59)  BP(mean): 74 (22 Apr 2020 16:00) (74 - 82)  ABP: 137/62 (23 Apr 2020 08:00) (91/42 - 137/62)  ABP(mean): 86 (23 Apr 2020 08:00) (60 - 95)  RR: 27 (23 Apr 2020 08:00) (27 - 28)  SpO2: 97% (23 Apr 2020 08:00) (94% - 98%)      I&O's Summary    22 Apr 2020 07:01  -  23 Apr 2020 07:00  --------------------------------------------------------  IN: 1768.2 mL / OUT: 1045 mL / NET: 723.2 mL    23 Apr 2020 07:01  -  23 Apr 2020 08:17  --------------------------------------------------------  IN: 0 mL / OUT: 125 mL / NET: -125 mL        MEDICATIONS  (STANDING):  apixaban 5 milliGRAM(s) Oral <User Schedule>  atorvastatin 40 milliGRAM(s) Oral at bedtime  calcium gluconate IVPB 1 Gram(s) IV Intermittent once  chlorhexidine 0.12% Liquid 15 milliLiter(s) Oral Mucosa every 12 hours  chlorhexidine 4% Liquid 1 Application(s) Topical <User Schedule>  clonazePAM  Tablet 1 milliGRAM(s) Oral every 8 hours  dextrose 5%. 1000 milliLiter(s) (50 mL/Hr) IV Continuous <Continuous>  dextrose 50% Injectable 12.5 Gram(s) IV Push once  dextrose 50% Injectable 25 Gram(s) IV Push once  dextrose 50% Injectable 25 Gram(s) IV Push once  dextrose 50% Injectable 50 milliLiter(s) IV Push once  fentaNYL   Infusion. 0.5 MICROgram(s)/kG/Hr (3.29 mL/Hr) IV Continuous <Continuous>  gabapentin 300 milliGRAM(s) Oral daily  insulin glargine Injectable (LANTUS) 10 Unit(s) SubCutaneous every morning  insulin lispro (HumaLOG) corrective regimen sliding scale   SubCutaneous every 6 hours  insulin lispro Injectable (HumaLOG) 4 Unit(s) SubCutaneous every 6 hours  insulin regular  human recombinant 10 Unit(s) IV Push once  ketamine Infusion. 0.13 mG/kG/Hr (0.86 mL/Hr) IV Continuous <Continuous>  midazolam Infusion 0.02 mG/kG/Hr (1.32 mL/Hr) IV Continuous <Continuous>  neomycin/bacitracin/polymyxin Topical Ointment 1 Application(s) Topical two times a day  OLANZapine 10 milliGRAM(s) Oral every 12 hours  oxyCODONE    IR 10 milliGRAM(s) Oral every 8 hours  pantoprazole  Injectable 40 milliGRAM(s) IV Push daily  polyethylene glycol 3350 17 Gram(s) Oral at bedtime  senna 2 Tablet(s) Oral at bedtime      RESPIRATORY:  Mode: AC/ CMV (Assist Control/ Continuous Mandatory Ventilation)  RR (machine): 28  TV (machine): 380  FiO2: 50  PEEP: 10  ITime: 0.6  MAP: 24  PIP: 48        NEUROIMAGING:   Recent imaging studies were reviewed.    LAB RESULTS:                          11.3   11.05 )-----------( 188      ( 23 Apr 2020 04:18 )             34.4           04-23    141  |  106  |  41.0<H>  ----------------------------<  184<H>  5.7<H>   |  26.0  |  1.21    Ca    8.5<L>      23 Apr 2020 04:18  Phos  5.2     04-23  Mg     2.3     04-23            ABG - ( 23 Apr 2020 04:06 )  pH, Arterial: 7.38  pH, Blood: x     /  pCO2: 48    /  pO2: 132   / HCO3: 27    / Base Excess: 2.7   /  SaO2: 99                    -----------------------------------------------------------------------------------------------------------------------------------------------------------------------------------    PHYSICAL EXAM:  General: Calm, intubated, sedated for vent synchrony  HEENT: MMM  Neuro:  -Mental status- No acute distress  -CN- PERRL 3mm, EOMI, tongue midline, face symmetric    CV: RRR  Pulm: diminished to auscultation  Abd: Soft, nontender, nondistended  Ext: moderate edema in lower ext  Skin: warm, dry Chief complaint:   Patient is a 70y old  Male who presents with a chief complaint of SOB (22 Apr 2020 15:47)    HPI:  71 yo M w/ PMH of HTN and DM on insulin and Metformin, and HLD presented with complaint of SOB, HA, mild CP, and weakness. He states he had a nonproductive cough, w/ congestion and feeling cold for raghav last 8 days. He also complained of a fever but did not check his temperature. Upon arrival to the ED his SpO2 was 84% on RA with RR of 28 which improved to 99% on NRB. He was then downtitrated to 6L NC and is saturating around 90-93%. He states since he has been in the ED and put on the oxygen he feels a lot better.   Currently he denies any Dizziness, HA, CP, SOB, Abd pain, N/V/D/C, dysuria, recent travel or sick contacts. (11 Apr 2020 16:00)    24hr EVENTS:  no acute issues  inflammatory markers trending down    ROS: [ ]  Unable to assess due to mental status   All other systems negative    -----------------------------------------------------------------------------------------------------------------------------------------------------------------------------------  ICU Vital Signs Last 24 Hrs  T(C): 36.5 (23 Apr 2020 08:00), Max: 37.2 (23 Apr 2020 04:00)  T(F): 97.7 (23 Apr 2020 08:00), Max: 99 (23 Apr 2020 04:00)  HR: 67 (23 Apr 2020 08:00) (59 - 105)  BP: 123/59 (22 Apr 2020 16:00) (114/92 - 123/59)  BP(mean): 74 (22 Apr 2020 16:00) (74 - 82)  ABP: 137/62 (23 Apr 2020 08:00) (91/42 - 137/62)  ABP(mean): 86 (23 Apr 2020 08:00) (60 - 95)  RR: 27 (23 Apr 2020 08:00) (27 - 28)  SpO2: 97% (23 Apr 2020 08:00) (94% - 98%)      I&O's Summary    22 Apr 2020 07:01  -  23 Apr 2020 07:00  --------------------------------------------------------  IN: 1768.2 mL / OUT: 1045 mL / NET: 723.2 mL    23 Apr 2020 07:01  -  23 Apr 2020 08:17  --------------------------------------------------------  IN: 0 mL / OUT: 125 mL / NET: -125 mL        MEDICATIONS  (STANDING):  apixaban 5 milliGRAM(s) Oral <User Schedule>  atorvastatin 40 milliGRAM(s) Oral at bedtime  calcium gluconate IVPB 1 Gram(s) IV Intermittent once  chlorhexidine 0.12% Liquid 15 milliLiter(s) Oral Mucosa every 12 hours  chlorhexidine 4% Liquid 1 Application(s) Topical <User Schedule>  clonazePAM  Tablet 1 milliGRAM(s) Oral every 8 hours  dextrose 5%. 1000 milliLiter(s) (50 mL/Hr) IV Continuous <Continuous>  dextrose 50% Injectable 12.5 Gram(s) IV Push once  dextrose 50% Injectable 25 Gram(s) IV Push once  dextrose 50% Injectable 25 Gram(s) IV Push once  dextrose 50% Injectable 50 milliLiter(s) IV Push once  fentaNYL   Infusion. 0.5 MICROgram(s)/kG/Hr (3.29 mL/Hr) IV Continuous <Continuous>  gabapentin 300 milliGRAM(s) Oral daily  insulin glargine Injectable (LANTUS) 10 Unit(s) SubCutaneous every morning  insulin lispro (HumaLOG) corrective regimen sliding scale   SubCutaneous every 6 hours  insulin lispro Injectable (HumaLOG) 4 Unit(s) SubCutaneous every 6 hours  insulin regular  human recombinant 10 Unit(s) IV Push once  ketamine Infusion. 0.13 mG/kG/Hr (0.86 mL/Hr) IV Continuous <Continuous>  midazolam Infusion 0.02 mG/kG/Hr (1.32 mL/Hr) IV Continuous <Continuous>  neomycin/bacitracin/polymyxin Topical Ointment 1 Application(s) Topical two times a day  OLANZapine 10 milliGRAM(s) Oral every 12 hours  oxyCODONE    IR 10 milliGRAM(s) Oral every 8 hours  pantoprazole  Injectable 40 milliGRAM(s) IV Push daily  polyethylene glycol 3350 17 Gram(s) Oral at bedtime  senna 2 Tablet(s) Oral at bedtime      RESPIRATORY:  Mode: AC/ CMV (Assist Control/ Continuous Mandatory Ventilation)  RR (machine): 28  TV (machine): 380  FiO2: 50  PEEP: 10  ITime: 0.6  MAP: 24  PIP: 48        NEUROIMAGING:   Recent imaging studies were reviewed.    LAB RESULTS:                          11.3   11.05 )-----------( 188      ( 23 Apr 2020 04:18 )             34.4           04-23    141  |  106  |  41.0<H>  ----------------------------<  184<H>  5.7<H>   |  26.0  |  1.21    Ca    8.5<L>      23 Apr 2020 04:18  Phos  5.2     04-23  Mg     2.3     04-23        ABG - ( 23 Apr 2020 04:06 )  pH, Arterial: 7.38  pH, Blood: x     /  pCO2: 48    /  pO2: 132   / HCO3: 27    / Base Excess: 2.7   /  SaO2: 99            -----------------------------------------------------------------------------------------------------------------------------------------------------------------------------------    PHYSICAL EXAM:  General: Calm, intubated, sedated for vent synchrony  HEENT: MMM  Neuro:  -Mental status- No acute distress  -CN- PERRL 3mm, EOMI, tongue midline, face symmetric    CV: RRR  Pulm: diminished to auscultation  Abd: Soft, nontender, nondistended  Ext: moderate edema in lower ext  Skin: warm, dry

## 2020-04-23 NOTE — PROGRESS NOTE ADULT - SUBJECTIVE AND OBJECTIVE BOX
[facility]  INFECTIOUS DISEASES AND INTERNAL MEDICINE at [place]  =======================================================  Natan Huynh MD  Diplomates American Board of Internal Medicine and Infectious Diseases  Telephone [phone]  Fax            [phone]  =======================================================    [id number]  [facility] [place]   follow up: COVID 19 pneumonia    remains intubated    =======================================================      REVIEW OF SYSTEMS:  Limited due to medical condition    =======================================================  Allergies  No Known Allergies    ======================================================  Physical Exam:  ============  (see vitals section below)    General:  sedated; intubated  Eye: Pupils are equal, round and reactive to light, Extraocular movements are intact, Normal conjunctiva.  HENT: Normocephalic, Oral mucosa is moist, No pharyngeal erythema, No sinus tenderness.  ETT  in place  Neck: Supple, No lymphadenopathy.  Respiratory: Lungs with poor anterior aeration   Cardiovascular: Normal rate, Regular rhythm,   Gastrointestinal: Soft, Non-tender, Non-distended, Normal bowel sounds.  Genitourinary: No costovertebral angle tenderness.  Lymphatics: No lymphadenopathy neck,   Musculoskeletal: Normal range of motion, Normal strength.  Integumentary: No rash.  Neurologic: sedated    =======================================================    Vitals:  ============  T(F): 97.7 (23 Apr 2020 08:00), Max: 99 (23 Apr 2020 04:00)  HR: 64 (23 Apr 2020 09:05)  BP: 123/59 (22 Apr 2020 16:00)  RR: 27 (23 Apr 2020 08:00)  SpO2: 97% (23 Apr 2020 09:05) (94% - 98%)  temp max in last 48H T(F): , Max: 99 (04-23-20 @ 04:00)    =======================================================  Current Antibiotics:    Other medications:  apixaban 5 milliGRAM(s) Oral <User Schedule>  atorvastatin 40 milliGRAM(s) Oral at bedtime  chlorhexidine 0.12% Liquid 15 milliLiter(s) Oral Mucosa every 12 hours  chlorhexidine 4% Liquid 1 Application(s) Topical <User Schedule>  clonazePAM  Tablet 1 milliGRAM(s) Oral every 8 hours  dextrose 5%. 1000 milliLiter(s) IV Continuous <Continuous>  dextrose 50% Injectable 12.5 Gram(s) IV Push once  dextrose 50% Injectable 25 Gram(s) IV Push once  dextrose 50% Injectable 25 Gram(s) IV Push once  fentaNYL   Infusion. 0.5 MICROgram(s)/kG/Hr IV Continuous <Continuous>  gabapentin 300 milliGRAM(s) Oral every 8 hours  insulin glargine Injectable (LANTUS) 10 Unit(s) SubCutaneous every morning  insulin lispro (HumaLOG) corrective regimen sliding scale   SubCutaneous every 6 hours  insulin lispro Injectable (HumaLOG) 4 Unit(s) SubCutaneous every 6 hours  midazolam Infusion 0.02 mG/kG/Hr IV Continuous <Continuous>  neomycin/bacitracin/polymyxin Topical Ointment 1 Application(s) Topical two times a day  OLANZapine 10 milliGRAM(s) Oral every 12 hours  oxyCODONE    IR 10 milliGRAM(s) Oral every 8 hours  pantoprazole  Injectable 40 milliGRAM(s) IV Push daily  polyethylene glycol 3350 17 Gram(s) Oral at bedtime  senna 2 Tablet(s) Oral at bedtime      =======================================================  Labs:                        11.3   11.05 )-----------( 188      ( 23 Apr 2020 04:18 )             34.4      04-23    141  |  106  |  41.0<H>  ----------------------------<  184<H>  5.7<H>   |  26.0  |  1.21    Ca    8.5<L>      23 Apr 2020 04:18  Phos  5.2     04-23  Mg     2.3     04-23      Culture - Blood (collected 04-11-20 @ 12:44)  Source: .Blood  Final Report (04-16-20 @ 13:23):    No growth at 5 days.      Creatinine, Serum: 1.21 mg/dL (04-23-20 @ 04:18)  Creatinine, Serum: 0.88 mg/dL (04-22-20 @ 04:43)  Creatinine, Serum: 0.90 mg/dL (04-21-20 @ 04:34)  Creatinine, Serum: 1.00 mg/dL (04-20-20 @ 05:47)  Creatinine, Serum: 1.31 mg/dL (04-19-20 @ 05:13)    Procalcitonin, Serum: 0.12 ng/mL (04-23-20 @ 04:18)  Procalcitonin, Serum: 0.14 ng/mL (04-21-20 @ 04:34)  Procalcitonin, Serum: 0.26 ng/mL (04-19-20 @ 05:13)  Procalcitonin, Serum: 0.67 ng/mL (04-17-20 @ 04:52)  Procalcitonin, Serum: 0.11 ng/mL (04-15-20 @ 06:53)  Procalcitonin, Serum: 0.44 ng/mL (04-11-20 @ 12:40)    D-Dimer Assay, Quantitative: 6116 ng/mL DDU (04-23-20 @ 04:18)  D-Dimer Assay, Quantitative: 7943 ng/mL DDU (04-21-20 @ 04:34)  D-Dimer Assay, Quantitative: 47099 ng/mL DDU (04-19-20 @ 05:13)  D-Dimer Assay, Quantitative: 75615 ng/mL DDU (04-17-20 @ 04:52)    Ferritin, Serum: 370 ng/mL (04-23-20 @ 04:18)  Ferritin, Serum: 384 ng/mL (04-21-20 @ 04:34)  Ferritin, Serum: 385 ng/mL (04-19-20 @ 05:13)  Ferritin, Serum: 384 ng/mL (04-17-20 @ 04:52)  Ferritin, Serum: 409 ng/mL (04-15-20 @ 06:53)  Ferritin, Serum: 520 ng/mL (04-13-20 @ 09:47)  Ferritin, Serum: 464 ng/mL (04-11-20 @ 12:40)    C-Reactive Protein, Serum: 0.47 mg/dL (04-23-20 @ 04:18)  C-Reactive Protein, Serum: 0.43 mg/dL (04-21-20 @ 04:34)  C-Reactive Protein, Serum: 2.04 mg/dL (04-19-20 @ 05:13)  C-Reactive Protein, Serum: 9.42 mg/dL (04-17-20 @ 04:52)  C-Reactive Protein, Serum: 1.84 mg/dL (04-15-20 @ 06:53)  C-Reactive Protein, Serum: 8.85 mg/dL (04-13-20 @ 09:47)  C-Reactive Protein, Serum: 18.96 mg/dL (04-12-20 @ 14:10)  C-Reactive Protein, Serum: 14.43 mg/dL (04-11-20 @ 12:40)    WBC Count: 11.05 K/uL (04-23-20 @ 04:18)  WBC Count: 7.10 K/uL (04-22-20 @ 04:43)  WBC Count: 7.36 K/uL (04-21-20 @ 04:34)  WBC Count: 7.35 K/uL (04-20-20 @ 05:47)  WBC Count: 6.45 K/uL (04-19-20 @ 05:13)      COVID-19 PCR: Detected (04-11-20 @ 12:42)    Lactate Dehydrogenase, Serum: 296 U/L (04-23-20 @ 04:18)  Lactate Dehydrogenase, Serum: 315 U/L (04-21-20 @ 04:34)  Lactate Dehydrogenase, Serum: 327 U/L (04-19-20 @ 05:13)  Lactate Dehydrogenase, Serum: 322 U/L (04-17-20 @ 04:52)  Lactate Dehydrogenase, Serum: 351 U/L (04-15-20 @ 06:53)  Lactate Dehydrogenase, Serum: 321 U/L (04-11-20 @ 12:40)    Alkaline Phosphatase, Serum: 187 U/L (04-19-20 @ 05:13)    Alanine Aminotransferase (ALT/SGPT): 33 U/L (04-19-20 @ 05:13)    Aspartate Aminotransferase (AST/SGOT): 29 U/L (04-19-20 @ 05:13)    Bilirubin Total, Serum: 0.2 mg/dL (04-19-20 @ 05:13)    Bilirubin Direct, Serum: 0.1 mg/dL (04-19-20 @ 05:13)

## 2020-04-24 NOTE — PROGRESS NOTE ADULT - ASSESSMENT
69 yo M w/ PMH of HTN and DM on insulin and Metformin, and HLD presented with complaint of SOB, HA, mild CP, and weakness. He states he had a nonproductive cough, w/ congestion and feeling cold for raghav last 8 days. He also complained of a fever but did not check his temperature. Upon arrival to the ED his SpO2 was 84% on RA with RR of 28 which improved to 99% on NRB. He was then downtitrated to 6L NC and is saturating around 90-93%. He states since he has been in the ED and put on the oxygen he feels a lot better.   Currently he denies any Dizziness, HA, CP, SOB, Abd pain, N/V/D/C, dysuria, recent travel or sick contacts. (11 Apr 2020 16:00)    His Chest Xray showed bilateral PNA.  patient was admitted for management of acute hypoxic respiratory failure 2/2 COVID pneumonia.  He was treated with a course of Plaquinel 4/11 - 4/15     patient's course continue to deteriorate.  On 4/16/2020, he was intubated for worsening of resp failure.     Impression:  COVID-19 Pneumonia  multifocal Pneumonia  acute hypoxic respiratory failure  cough  shortness of breath  s/p intubation      Plan:  -  completed course of Hydroxychloroquine 4/15/2020  - s/p steroids 4/11 - 4/15  - s/p ACTEMRA x 1 dose 4/16      Continue supportive care measures  - s/p intubation on 4/16/2020  - Continue mechanical ventilation  - continue Oxygenation     ELEVATED D-dimers  - on Eliquis       Inflammatory markers downtrending    - trend CBC with diff, CMP with LFT's  - trend Inflammatory markers (ESR, CRP, Ferritin, LDH,  procalcitonin)  q48h.  D dimer, lactate, troponin, CK, PT/PTT x 1      Will follow with you.

## 2020-04-24 NOTE — PROGRESS NOTE ADULT - SUBJECTIVE AND OBJECTIVE BOX
[facility]  INFECTIOUS DISEASES AND INTERNAL MEDICINE at [place]  =======================================================  Natan Huynh MD  Diplomates American Board of Internal Medicine and Infectious Diseases  Telephone [phone]  Fax            [phone]  =======================================================    [id number]  [facility] [place]   follow up: COVID 19 pneumonia    remains intubated    =======================================================      REVIEW OF SYSTEMS:  Limited due to medical condition    =======================================================  Allergies  No Known Allergies    ======================================================  Physical Exam:  ============  (see vitals section below)    General:  sedated; intubated  Eye: Pupils are equal, round and reactive to light, Extraocular movements are intact, Normal conjunctiva.  HENT: Normocephalic, Oral mucosa is moist, No pharyngeal erythema, No sinus tenderness.  ETT  in place  Neck: Supple, No lymphadenopathy.  Respiratory: Lungs with fair air entry  Cardiovascular: Normal rate, Regular rhythm,   Gastrointestinal: Soft, Non-tender, Non-distended, Normal bowel sounds.  Genitourinary: No costovertebral angle tenderness.  Lymphatics: No lymphadenopathy neck,   Musculoskeletal: Normal range of motion, Normal strength.  Integumentary: No rash.  Neurologic: sedated    =======================================================  Vitals:  ============  T(F): 97.3 (24 Apr 2020 08:00), Max: 97.3 (24 Apr 2020 08:00)  HR: 77 (24 Apr 2020 09:25)  BP: --  RR: 28 (24 Apr 2020 08:00)  SpO2: 93% (24 Apr 2020 09:25) (91% - 96%)  temp max in last 48H T(F): , Max: 99 (04-23-20 @ 04:00)    =======================================================  Current Antibiotics:    Other medications:  apixaban 5 milliGRAM(s) Oral <User Schedule>  chlorhexidine 0.12% Liquid 15 milliLiter(s) Oral Mucosa every 12 hours  chlorhexidine 4% Liquid 1 Application(s) Topical <User Schedule>  clonazePAM  Tablet 1 milliGRAM(s) Oral every 8 hours  dextrose 5%. 1000 milliLiter(s) IV Continuous <Continuous>  dextrose 50% Injectable 12.5 Gram(s) IV Push once  dextrose 50% Injectable 25 Gram(s) IV Push once  dextrose 50% Injectable 25 Gram(s) IV Push once  fentaNYL   Infusion. 0.5 MICROgram(s)/kG/Hr IV Continuous <Continuous>  gabapentin 300 milliGRAM(s) Oral every 8 hours  insulin glargine Injectable (LANTUS) 8 Unit(s) SubCutaneous two times a day  insulin lispro (HumaLOG) corrective regimen sliding scale   SubCutaneous every 6 hours  insulin lispro Injectable (HumaLOG) 4 Unit(s) SubCutaneous every 6 hours  midazolam Infusion 0.02 mG/kG/Hr IV Continuous <Continuous>  neomycin/bacitracin/polymyxin Topical Ointment 1 Application(s) Topical two times a day  OLANZapine 10 milliGRAM(s) Oral every 12 hours  oxyCODONE    IR 10 milliGRAM(s) Oral every 8 hours  pantoprazole  Injectable 40 milliGRAM(s) IV Push daily  polyethylene glycol 3350 17 Gram(s) Oral at bedtime  senna 2 Tablet(s) Oral at bedtime      =======================================================  Labs:                        12.0   12.19 )-----------( 198      ( 24 Apr 2020 03:02 )             36.9      04-24    142  |  105  |  44.0<H>  ----------------------------<  286<H>  5.2   |  24.0  |  0.91    Ca    8.4<L>      24 Apr 2020 03:02  Phos  4.7     04-24  Mg     2.2     04-24    TPro  5.6<L>  /  Alb  2.6<L>  /  TBili  <0.2<L>  /  DBili  x   /  AST  51<H>  /  ALT  47<H>  /  AlkPhos  254<H>  04-24      Culture - Blood (collected 04-11-20 @ 12:44)  Source: .Blood  Final Report (04-16-20 @ 13:23):    No growth at 5 days.      Creatinine, Serum: 0.91 mg/dL (04-24-20 @ 03:02)  Creatinine, Serum: 1.09 mg/dL (04-23-20 @ 14:02)  Creatinine, Serum: 1.21 mg/dL (04-23-20 @ 04:18)  Creatinine, Serum: 0.88 mg/dL (04-22-20 @ 04:43)  Creatinine, Serum: 0.90 mg/dL (04-21-20 @ 04:34)  Creatinine, Serum: 1.00 mg/dL (04-20-20 @ 05:47)    Procalcitonin, Serum: 0.12 ng/mL (04-23-20 @ 04:18)  Procalcitonin, Serum: 0.14 ng/mL (04-21-20 @ 04:34)  Procalcitonin, Serum: 0.26 ng/mL (04-19-20 @ 05:13)  Procalcitonin, Serum: 0.67 ng/mL (04-17-20 @ 04:52)  Procalcitonin, Serum: 0.11 ng/mL (04-15-20 @ 06:53)  Procalcitonin, Serum: 0.44 ng/mL (04-11-20 @ 12:40)    D-Dimer Assay, Quantitative: 6116 ng/mL DDU (04-23-20 @ 04:18)  D-Dimer Assay, Quantitative: 7943 ng/mL DDU (04-21-20 @ 04:34)  D-Dimer Assay, Quantitative: 81070 ng/mL DDU (04-19-20 @ 05:13)  D-Dimer Assay, Quantitative: 17213 ng/mL DDU (04-17-20 @ 04:52)    Ferritin, Serum: 370 ng/mL (04-23-20 @ 04:18)  Ferritin, Serum: 384 ng/mL (04-21-20 @ 04:34)  Ferritin, Serum: 385 ng/mL (04-19-20 @ 05:13)  Ferritin, Serum: 384 ng/mL (04-17-20 @ 04:52)  Ferritin, Serum: 409 ng/mL (04-15-20 @ 06:53)  Ferritin, Serum: 520 ng/mL (04-13-20 @ 09:47)  Ferritin, Serum: 464 ng/mL (04-11-20 @ 12:40)    C-Reactive Protein, Serum: 0.47 mg/dL (04-23-20 @ 04:18)  C-Reactive Protein, Serum: 0.43 mg/dL (04-21-20 @ 04:34)  C-Reactive Protein, Serum: 2.04 mg/dL (04-19-20 @ 05:13)  C-Reactive Protein, Serum: 9.42 mg/dL (04-17-20 @ 04:52)  C-Reactive Protein, Serum: 1.84 mg/dL (04-15-20 @ 06:53)  C-Reactive Protein, Serum: 8.85 mg/dL (04-13-20 @ 09:47)  C-Reactive Protein, Serum: 18.96 mg/dL (04-12-20 @ 14:10)  C-Reactive Protein, Serum: 14.43 mg/dL (04-11-20 @ 12:40)    WBC Count: 12.19 K/uL (04-24-20 @ 03:02)  WBC Count: 11.05 K/uL (04-23-20 @ 04:18)  WBC Count: 7.10 K/uL (04-22-20 @ 04:43)  WBC Count: 7.36 K/uL (04-21-20 @ 04:34)  WBC Count: 7.35 K/uL (04-20-20 @ 05:47)      COVID-19 PCR: Detected (04-11-20 @ 12:42)    Lactate Dehydrogenase, Serum: 296 U/L (04-23-20 @ 04:18)  Lactate Dehydrogenase, Serum: 315 U/L (04-21-20 @ 04:34)  Lactate Dehydrogenase, Serum: 327 U/L (04-19-20 @ 05:13)  Lactate Dehydrogenase, Serum: 322 U/L (04-17-20 @ 04:52)  Lactate Dehydrogenase, Serum: 351 U/L (04-15-20 @ 06:53)  Lactate Dehydrogenase, Serum: 321 U/L (04-11-20 @ 12:40)    Alkaline Phosphatase, Serum: 254 U/L (04-24-20 @ 03:02)  Alkaline Phosphatase, Serum: 187 U/L (04-19-20 @ 05:13)    Alanine Aminotransferase (ALT/SGPT): 47 U/L (04-24-20 @ 03:02)  Alanine Aminotransferase (ALT/SGPT): 33 U/L (04-19-20 @ 05:13)    Aspartate Aminotransferase (AST/SGOT): 51 U/L (04-24-20 @ 03:02)  Aspartate Aminotransferase (AST/SGOT): 29 U/L (04-19-20 @ 05:13)    Bilirubin Total, Serum: <0.2 mg/dL (04-24-20 @ 03:02)  Bilirubin Total, Serum: 0.2 mg/dL (04-19-20 @ 05:13)    Bilirubin Direct, Serum: 0.1 mg/dL (04-19-20 @ 05:13)

## 2020-04-24 NOTE — PROGRESS NOTE ADULT - SUBJECTIVE AND OBJECTIVE BOX
Chief complaint:   Patient is a 70y old  Male who presents with a chief complaint of SOB (23 Apr 2020 08:17)    HPI:  69 yo M w/ PMH of HTN and DM on insulin and Metformin, and HLD presented with complaint of SOB, HA, mild CP, and weakness. He states he had a nonproductive cough, w/ congestion and feeling cold for arghav last 8 days. He also complained of a fever but did not check his temperature. Upon arrival to the ED his SpO2 was 84% on RA with RR of 28 which improved to 99% on NRB. He was then downtitrated to 6L NC and is saturating around 90-93%. He states since he has been in the ED and put on the oxygen he feels a lot better.   Currently he denies any Dizziness, HA, CP, SOB, Abd pain, N/V/D/C, dysuria, recent travel or sick contacts. (11 Apr 2020 16:00)        24hr EVENTS:      ROS: [ ]  Unable to assess due to mental status   All other systems negative    -----------------------------------------------------------------------------------------------------------------------------------------------------------------------------------  ICU Vital Signs Last 24 Hrs  T(C): 36.2 (24 Apr 2020 04:00), Max: 36.6 (23 Apr 2020 07:44)  T(F): 97.1 (24 Apr 2020 04:00), Max: 97.9 (23 Apr 2020 07:44)  HR: 81 (24 Apr 2020 04:00) (63 - 82)  BP: --  BP(mean): --  ABP: 138/54 (24 Apr 2020 04:00) (101/49 - 165/66)  ABP(mean): 78 (24 Apr 2020 04:00) (67 - 100)  RR: 28 (24 Apr 2020 04:00) (27 - 28)  SpO2: 91% (24 Apr 2020 04:00) (91% - 97%)      I&O's Summary    23 Apr 2020 07:01  -  24 Apr 2020 07:00  --------------------------------------------------------  IN: 1483 mL / OUT: 1923 mL / NET: -440 mL        MEDICATIONS  (STANDING):  apixaban 5 milliGRAM(s) Oral <User Schedule>  atorvastatin 40 milliGRAM(s) Oral at bedtime  chlorhexidine 0.12% Liquid 15 milliLiter(s) Oral Mucosa every 12 hours  chlorhexidine 4% Liquid 1 Application(s) Topical <User Schedule>  clonazePAM  Tablet 1 milliGRAM(s) Oral every 8 hours  dextrose 5%. 1000 milliLiter(s) (50 mL/Hr) IV Continuous <Continuous>  dextrose 50% Injectable 12.5 Gram(s) IV Push once  dextrose 50% Injectable 25 Gram(s) IV Push once  dextrose 50% Injectable 25 Gram(s) IV Push once  fentaNYL   Infusion. 0.5 MICROgram(s)/kG/Hr (3.29 mL/Hr) IV Continuous <Continuous>  gabapentin 300 milliGRAM(s) Oral every 8 hours  insulin glargine Injectable (LANTUS) 10 Unit(s) SubCutaneous every morning  insulin lispro (HumaLOG) corrective regimen sliding scale   SubCutaneous every 6 hours  insulin lispro Injectable (HumaLOG) 4 Unit(s) SubCutaneous every 6 hours  midazolam Infusion 0.02 mG/kG/Hr (1.32 mL/Hr) IV Continuous <Continuous>  neomycin/bacitracin/polymyxin Topical Ointment 1 Application(s) Topical two times a day  OLANZapine 10 milliGRAM(s) Oral every 12 hours  oxyCODONE    IR 10 milliGRAM(s) Oral every 8 hours  pantoprazole  Injectable 40 milliGRAM(s) IV Push daily  polyethylene glycol 3350 17 Gram(s) Oral at bedtime  senna 2 Tablet(s) Oral at bedtime      RESPIRATORY:  Mode: AC/ CMV (Assist Control/ Continuous Mandatory Ventilation)  RR (machine): 28  TV (machine): 380  FiO2: 40  PEEP: 10  ITime: 0.6  MAP: 19  PIP: 40        NEUROIMAGING:   Recent imaging studies were reviewed.    LAB RESULTS:                          12.0   12.19 )-----------( 198      ( 24 Apr 2020 03:02 )             36.9           04-24    142  |  105  |  44.0<H>  ----------------------------<  286<H>  5.2   |  24.0  |  0.91    Ca    8.4<L>      24 Apr 2020 03:02  Phos  4.7     04-24  Mg     2.2     04-24    TPro  5.6<L>  /  Alb  2.6<L>  /  TBili  <0.2<L>  /  DBili  x   /  AST  51<H>  /  ALT  47<H>  /  AlkPhos  254<H>  04-24          ABG - ( 24 Apr 2020 03:01 )  pH, Arterial: 7.35  pH, Blood: x     /  pCO2: 54    /  pO2: 65    / HCO3: 27    / Base Excess: 2.9   /  SaO2: 92                    -----------------------------------------------------------------------------------------------------------------------------------------------------------------------------------    PHYSICAL EXAM:  General: Calm, intubated, sedated for vent synchrony  HEENT: MMM  Neuro:  -Mental status- No acute distress  -CN- PERRL 3mm, EOMI, tongue midline, face symmetric    CV: RRR  Pulm: diminished to auscultation  Abd: Soft, nontender, nondistended  Ext: moderate edema in lower ext  Skin: warm, dry Chief complaint:   Patient is a 70y old  Male who presents with a chief complaint of SOB (23 Apr 2020 08:17)    HPI:  71 yo M w/ PMH of HTN and DM on insulin and Metformin, and HLD presented with complaint of SOB, HA, mild CP, and weakness. He states he had a nonproductive cough, w/ congestion and feeling cold for raghav last 8 days. He also complained of a fever but did not check his temperature. Upon arrival to the ED his SpO2 was 84% on RA with RR of 28 which improved to 99% on NRB. He was then downtitrated to 6L NC and is saturating around 90-93%. He states since he has been in the ED and put on the oxygen he feels a lot better.   Currently he denies any Dizziness, HA, CP, SOB, Abd pain, N/V/D/C, dysuria, recent travel or sick contacts. (11 Apr 2020 16:00)        24hr EVENTS:  hyperglycemia      ROS: [ ]  Unable to assess due to mental status   All other systems negative    -----------------------------------------------------------------------------------------------------------------------------------------------------------------------------------  ICU Vital Signs Last 24 Hrs  T(C): 36.2 (24 Apr 2020 04:00), Max: 36.6 (23 Apr 2020 07:44)  T(F): 97.1 (24 Apr 2020 04:00), Max: 97.9 (23 Apr 2020 07:44)  HR: 81 (24 Apr 2020 04:00) (63 - 82)  BP: --  BP(mean): --  ABP: 138/54 (24 Apr 2020 04:00) (101/49 - 165/66)  ABP(mean): 78 (24 Apr 2020 04:00) (67 - 100)  RR: 28 (24 Apr 2020 04:00) (27 - 28)  SpO2: 91% (24 Apr 2020 04:00) (91% - 97%)      I&O's Summary    23 Apr 2020 07:01  -  24 Apr 2020 07:00  --------------------------------------------------------  IN: 1483 mL / OUT: 1923 mL / NET: -440 mL        MEDICATIONS  (STANDING):  apixaban 5 milliGRAM(s) Oral <User Schedule>  atorvastatin 40 milliGRAM(s) Oral at bedtime  chlorhexidine 0.12% Liquid 15 milliLiter(s) Oral Mucosa every 12 hours  chlorhexidine 4% Liquid 1 Application(s) Topical <User Schedule>  clonazePAM  Tablet 1 milliGRAM(s) Oral every 8 hours  dextrose 5%. 1000 milliLiter(s) (50 mL/Hr) IV Continuous <Continuous>  dextrose 50% Injectable 12.5 Gram(s) IV Push once  dextrose 50% Injectable 25 Gram(s) IV Push once  dextrose 50% Injectable 25 Gram(s) IV Push once  fentaNYL   Infusion. 0.5 MICROgram(s)/kG/Hr (3.29 mL/Hr) IV Continuous <Continuous>  gabapentin 300 milliGRAM(s) Oral every 8 hours  insulin glargine Injectable (LANTUS) 10 Unit(s) SubCutaneous every morning  insulin lispro (HumaLOG) corrective regimen sliding scale   SubCutaneous every 6 hours  insulin lispro Injectable (HumaLOG) 4 Unit(s) SubCutaneous every 6 hours  midazolam Infusion 0.02 mG/kG/Hr (1.32 mL/Hr) IV Continuous <Continuous>  neomycin/bacitracin/polymyxin Topical Ointment 1 Application(s) Topical two times a day  OLANZapine 10 milliGRAM(s) Oral every 12 hours  oxyCODONE    IR 10 milliGRAM(s) Oral every 8 hours  pantoprazole  Injectable 40 milliGRAM(s) IV Push daily  polyethylene glycol 3350 17 Gram(s) Oral at bedtime  senna 2 Tablet(s) Oral at bedtime      RESPIRATORY:  Mode: AC/ CMV (Assist Control/ Continuous Mandatory Ventilation)  RR (machine): 28  TV (machine): 380  FiO2: 40  PEEP: 10  ITime: 0.6  MAP: 19  PIP: 40        NEUROIMAGING:   Recent imaging studies were reviewed.    LAB RESULTS:                          12.0   12.19 )-----------( 198      ( 24 Apr 2020 03:02 )             36.9           04-24    142  |  105  |  44.0<H>  ----------------------------<  286<H>  5.2   |  24.0  |  0.91    Ca    8.4<L>      24 Apr 2020 03:02  Phos  4.7     04-24  Mg     2.2     04-24    TPro  5.6<L>  /  Alb  2.6<L>  /  TBili  <0.2<L>  /  DBili  x   /  AST  51<H>  /  ALT  47<H>  /  AlkPhos  254<H>  04-24          ABG - ( 24 Apr 2020 03:01 )  pH, Arterial: 7.35  pH, Blood: x     /  pCO2: 54    /  pO2: 65    / HCO3: 27    / Base Excess: 2.9   /  SaO2: 92                    -----------------------------------------------------------------------------------------------------------------------------------------------------------------------------------    PHYSICAL EXAM:  General: Calm, intubated, sedated for vent synchrony  HEENT: MMM  Neuro:  -Mental status- No acute distress  -CN- PERRL 3mm, EOMI, tongue midline, face symmetric    CV: RRR  Pulm: diminished to auscultation  Abd: Soft, nontender, nondistended  Ext: moderate edema in lower ext  Skin: warm, dry

## 2020-04-24 NOTE — PROGRESS NOTE ADULT - ASSESSMENT
Assessment:  71 yo M w/ acute hypoxemicc respiratory failure 2/2 COVID pneumonia. Intubated 4/16.  ARDS.  Cytokine migdalia/MAC; s/p Tocilizumab 4/16 and Methylprednisolone.  MARIYA. ATN + pre-renal.  Hyperglycemia, h/o DM.    Changes today:   stop ketamine  inc GBP to 300 tid  give lasix 40IV     NEUROLOGIC  Sedation: sedation to vent synchrony  as above  inc gabapentin  cont oxy, olanzapine, klonopin for sedation weaning  cont fentanyl and versed gtt    RESPIRATORY  Pplat goal <30; Check for AutoPEEP  Ideal body weight: 64kg  28/380/40/10    CARDIOVASCULAR  MAP goal>65, low dose vasopressor as needed  atorvastatin    GASTROINTESTINAL:   Tube feeds:  hold if paralyzed/proned or possible extubation.  GI prophylaxis: [] pantoprazole  Bowel regimen[] Miralax   monitor for transaminitis while on plaquenil    RENAL  goal I/O even to neg 1L  lasix prn  preciado for accurate UOP    ENDOCRINE  ISS  Goal FSG < 180  lispro 4units q6hr  lantus 10units qday    HEMATOLOGY  chemical VTE prophylaxis: [] eliquis 5mg bid    INFECTIOUS DISEASES:  SARS-CoV-2 infection/COVID-19  s/p Hydroxychloroquine  trend Inflammatory markers  ID recommendations appreciated  s/p tocilizumab    DISPOSITION:   [] ICU Assessment:  69 yo M w/ acute hypoxemicc respiratory failure 2/2 COVID pneumonia. Intubated 4/16.  ARDS.  Cytokine migdalia/MAC; s/p Tocilizumab 4/16 and Methylprednisolone.  MARIYA. ATN + pre-renal.  Hyperglycemia, h/o DM.    Changes today:   consider proning  inc lantus 8 bid      NEUROLOGIC  Sedation: sedation to vent synchrony  as above  inc gabapentin  cont oxy, olanzapine, klonopin for sedation weaning  cont fentanyl and versed gtt  GBP to 300 tid    RESPIRATORY  Pplat goal <30; Check for AutoPEEP  Ideal body weight: 64kg  28/380/50/12    CARDIOVASCULAR  MAP goal>65, low dose vasopressor as needed  hold atorvastatin with transaminitis    GASTROINTESTINAL:   Tube feeds:  hold if paralyzed/proned or possible extubation.  GI prophylaxis: [] pantoprazole  Bowel regimen[] Miralax   monitor for transaminitis while on plaquenil    RENAL  goal I/O even to neg 1L  lasix prn  preciado for accurate UOP    ENDOCRINE  ISS  Goal FSG < 180  lispro 4units q6hr  lantus 8units bid    HEMATOLOGY  chemical VTE prophylaxis: [] eliquis 5mg bid    INFECTIOUS DISEASES:  SARS-CoV-2 infection/COVID-19  s/p Hydroxychloroquine  trend Inflammatory markers  ID recommendations appreciated  s/p tocilizumab    DISPOSITION:   [] ICU

## 2020-04-25 NOTE — PROGRESS NOTE ADULT - SUBJECTIVE AND OBJECTIVE BOX
Chief complaint:   Patient is a 70y old  Male who presents with a chief complaint of SOB (24 Apr 2020 07:39)    HPI:  69 yo M w/ PMH of HTN and DM on insulin and Metformin, and HLD presented with complaint of SOB, HA, mild CP, and weakness. He states he had a nonproductive cough, w/ congestion and feeling cold for raghav last 8 days. He also complained of a fever but did not check his temperature. Upon arrival to the ED his SpO2 was 84% on RA with RR of 28 which improved to 99% on NRB. He was then downtitrated to 6L NC and is saturating around 90-93%. He states since he has been in the ED and put on the oxygen he feels a lot better.   Currently he denies any Dizziness, HA, CP, SOB, Abd pain, N/V/D/C, dysuria, recent travel or sick contacts. (11 Apr 2020 16:00)        24hr EVENTS:      ROS: [ ]  Unable to assess due to mental status   All other systems negative    -----------------------------------------------------------------------------------------------------------------------------------------------------------------------------------  ICU Vital Signs Last 24 Hrs  T(C): 35.3 (25 Apr 2020 07:18), Max: 36.3 (24 Apr 2020 08:00)  T(F): 95.5 (25 Apr 2020 07:18), Max: 97.3 (24 Apr 2020 08:00)  HR: 78 (25 Apr 2020 04:00) (76 - 88)  BP: 137/84 (25 Apr 2020 00:00) (137/84 - 180/94)  BP(mean): 97 (25 Apr 2020 00:00) (97 - 115)  ABP: 182/83 (25 Apr 2020 04:00) (127/50 - 182/83)  ABP(mean): 122 (25 Apr 2020 04:00) (76 - 122)  RR: 28 (25 Apr 2020 04:00) (28 - 28)  SpO2: 100% (25 Apr 2020 04:00) (92% - 100%)      I&O's Summary    24 Apr 2020 07:01  -  25 Apr 2020 07:00  --------------------------------------------------------  IN: 1814.6 mL / OUT: 655 mL / NET: 1159.6 mL        MEDICATIONS  (STANDING):  apixaban 5 milliGRAM(s) Oral <User Schedule>  chlorhexidine 0.12% Liquid 15 milliLiter(s) Oral Mucosa every 12 hours  chlorhexidine 4% Liquid 1 Application(s) Topical <User Schedule>  clonazePAM  Tablet 1 milliGRAM(s) Oral every 8 hours  dextrose 5%. 1000 milliLiter(s) (50 mL/Hr) IV Continuous <Continuous>  dextrose 50% Injectable 12.5 Gram(s) IV Push once  dextrose 50% Injectable 25 Gram(s) IV Push once  dextrose 50% Injectable 25 Gram(s) IV Push once  fentaNYL   Infusion. 0.5 MICROgram(s)/kG/Hr (3.29 mL/Hr) IV Continuous <Continuous>  gabapentin 300 milliGRAM(s) Oral every 8 hours  insulin glargine Injectable (LANTUS) 8 Unit(s) SubCutaneous two times a day  insulin lispro (HumaLOG) corrective regimen sliding scale   SubCutaneous every 6 hours  insulin lispro Injectable (HumaLOG) 4 Unit(s) SubCutaneous every 6 hours  lactated ringers Bolus 250 milliLiter(s) IV Bolus once  lactated ringers Bolus 500 milliLiter(s) IV Bolus once  midazolam Infusion 0.02 mG/kG/Hr (1.32 mL/Hr) IV Continuous <Continuous>  neomycin/bacitracin/polymyxin Topical Ointment 1 Application(s) Topical two times a day  OLANZapine 10 milliGRAM(s) Oral every 12 hours  oxyCODONE    IR 10 milliGRAM(s) Oral every 8 hours  pantoprazole  Injectable 40 milliGRAM(s) IV Push daily  polyethylene glycol 3350 17 Gram(s) Oral at bedtime  rocuronium Infusion 8 MICROgram(s)/kG/Min (6.32 mL/Hr) IV Continuous <Continuous>  senna 2 Tablet(s) Oral at bedtime      RESPIRATORY:  Mode: AC/ CMV (Assist Control/ Continuous Mandatory Ventilation)  RR (machine): 28  TV (machine): 380  FiO2: 60  PEEP: 14  ITime: 0.6  MAP: 23  PIP: 42        NEUROIMAGING:   Recent imaging studies were reviewed.    LAB RESULTS:                          12.0   12.19 )-----------( 198      ( 24 Apr 2020 03:02 )             36.9           04-24    142  |  105  |  44.0<H>  ----------------------------<  286<H>  5.2   |  24.0  |  0.91    Ca    8.4<L>      24 Apr 2020 03:02  Phos  4.7     04-24  Mg     2.2     04-24    TPro  5.6<L>  /  Alb  2.6<L>  /  TBili  <0.2<L>  /  DBili  x   /  AST  51<H>  /  ALT  47<H>  /  AlkPhos  254<H>  04-24          ABG - ( 25 Apr 2020 04:56 )  pH, Arterial: 7.42  pH, Blood: x     /  pCO2: 47    /  pO2: 226   / HCO3: 29    / Base Excess: 4.9   /  SaO2: >100                  -----------------------------------------------------------------------------------------------------------------------------------------------------------------------------------    PHYSICAL EXAM:  General: Calm, intubated, sedated for vent synchrony  HEENT: MMM  Neuro:  -Mental status- No acute distress  -CN- PERRL 3mm, EOMI, tongue midline, face symmetric    CV: RRR  Pulm: diminished to auscultation  Abd: Soft, nontender, nondistended  Ext: moderate edema in lower ext  Skin: warm, dry Chief complaint:   Patient is a 70y old  Male who presents with a chief complaint of SOB (24 Apr 2020 07:39)    HPI:  71 yo M w/ PMH of HTN and DM on insulin and Metformin, and HLD presented with complaint of SOB, HA, mild CP, and weakness. He states he had a nonproductive cough, w/ congestion and feeling cold for raghav last 8 days. He also complained of a fever but did not check his temperature. Upon arrival to the ED his SpO2 was 84% on RA with RR of 28 which improved to 99% on NRB. He was then downtitrated to 6L NC and is saturating around 90-93%. He states since he has been in the ED and put on the oxygen he feels a lot better.   Currently he denies any Dizziness, HA, CP, SOB, Abd pain, N/V/D/C, dysuria, recent travel or sick contacts. (11 Apr 2020 16:00)    24hr EVENTS:  proned  IVF given with low UOP    ROS: [ ]  Unable to assess due to mental status   All other systems negative    -----------------------------------------------------------------------------------------------------------------------------------------------------------------------------------  ICU Vital Signs Last 24 Hrs  T(C): 35.3 (25 Apr 2020 07:18), Max: 36.3 (24 Apr 2020 08:00)  T(F): 95.5 (25 Apr 2020 07:18), Max: 97.3 (24 Apr 2020 08:00)  HR: 78 (25 Apr 2020 04:00) (76 - 88)  BP: 137/84 (25 Apr 2020 00:00) (137/84 - 180/94)  BP(mean): 97 (25 Apr 2020 00:00) (97 - 115)  ABP: 182/83 (25 Apr 2020 04:00) (127/50 - 182/83)  ABP(mean): 122 (25 Apr 2020 04:00) (76 - 122)  RR: 28 (25 Apr 2020 04:00) (28 - 28)  SpO2: 100% (25 Apr 2020 04:00) (92% - 100%)      I&O's Summary    24 Apr 2020 07:01  -  25 Apr 2020 07:00  --------------------------------------------------------  IN: 1814.6 mL / OUT: 655 mL / NET: 1159.6 mL        MEDICATIONS  (STANDING):  apixaban 5 milliGRAM(s) Oral <User Schedule>  chlorhexidine 0.12% Liquid 15 milliLiter(s) Oral Mucosa every 12 hours  chlorhexidine 4% Liquid 1 Application(s) Topical <User Schedule>  clonazePAM  Tablet 1 milliGRAM(s) Oral every 8 hours  dextrose 5%. 1000 milliLiter(s) (50 mL/Hr) IV Continuous <Continuous>  dextrose 50% Injectable 12.5 Gram(s) IV Push once  dextrose 50% Injectable 25 Gram(s) IV Push once  dextrose 50% Injectable 25 Gram(s) IV Push once  fentaNYL   Infusion. 0.5 MICROgram(s)/kG/Hr (3.29 mL/Hr) IV Continuous <Continuous>  gabapentin 300 milliGRAM(s) Oral every 8 hours  insulin glargine Injectable (LANTUS) 8 Unit(s) SubCutaneous two times a day  insulin lispro (HumaLOG) corrective regimen sliding scale   SubCutaneous every 6 hours  insulin lispro Injectable (HumaLOG) 4 Unit(s) SubCutaneous every 6 hours  lactated ringers Bolus 250 milliLiter(s) IV Bolus once  lactated ringers Bolus 500 milliLiter(s) IV Bolus once  midazolam Infusion 0.02 mG/kG/Hr (1.32 mL/Hr) IV Continuous <Continuous>  neomycin/bacitracin/polymyxin Topical Ointment 1 Application(s) Topical two times a day  OLANZapine 10 milliGRAM(s) Oral every 12 hours  oxyCODONE    IR 10 milliGRAM(s) Oral every 8 hours  pantoprazole  Injectable 40 milliGRAM(s) IV Push daily  polyethylene glycol 3350 17 Gram(s) Oral at bedtime  rocuronium Infusion 8 MICROgram(s)/kG/Min (6.32 mL/Hr) IV Continuous <Continuous>  senna 2 Tablet(s) Oral at bedtime      RESPIRATORY:  Mode: AC/ CMV (Assist Control/ Continuous Mandatory Ventilation)  RR (machine): 28  TV (machine): 380  FiO2: 60  PEEP: 14  ITime: 0.6  MAP: 23  PIP: 42        NEUROIMAGING:   Recent imaging studies were reviewed.    LAB RESULTS:                          12.0   12.19 )-----------( 198      ( 24 Apr 2020 03:02 )             36.9           04-24    142  |  105  |  44.0<H>  ----------------------------<  286<H>  5.2   |  24.0  |  0.91    Ca    8.4<L>      24 Apr 2020 03:02  Phos  4.7     04-24  Mg     2.2     04-24    TPro  5.6<L>  /  Alb  2.6<L>  /  TBili  <0.2<L>  /  DBili  x   /  AST  51<H>  /  ALT  47<H>  /  AlkPhos  254<H>  04-24      ABG - ( 25 Apr 2020 04:56 )  pH, Arterial: 7.42  pH, Blood: x     /  pCO2: 47    /  pO2: 226   / HCO3: 29    / Base Excess: 4.9   /  SaO2: >100            -----------------------------------------------------------------------------------------------------------------------------------------------------------------------------------    PHYSICAL EXAM:  General: Calm, intubated, sedated for vent synchrony  HEENT: MMM  Neuro:  -Mental status- No acute distress  -CN- PERRL 3mm, EOMI, tongue midline, face symmetric    CV: RRR  Pulm: diminished to auscultation  Abd: Soft, nontender, nondistended  Ext: moderate edema in lower ext  Skin: warm, dry Chief complaint:   Patient is a 70y old  Male who presents with a chief complaint of SOB (24 Apr 2020 07:39)    HPI:  71 yo M w/ PMH of HTN and DM on insulin and Metformin, and HLD presented with complaint of SOB, HA, mild CP, and weakness. He states he had a nonproductive cough, w/ congestion and feeling cold for raghav last 8 days. He also complained of a fever but did not check his temperature. Upon arrival to the ED his SpO2 was 84% on RA with RR of 28 which improved to 99% on NRB. He was then downtitrated to 6L NC and is saturating around 90-93%. He states since he has been in the ED and put on the oxygen he feels a lot better.   Currently he denies any Dizziness, HA, CP, SOB, Abd pain, N/V/D/C, dysuria, recent travel or sick contacts. (11 Apr 2020 16:00)    24hr EVENTS:  proned  IVF given with low UOP  hypertensive    ROS: [ ]  Unable to assess due to mental status   All other systems negative    -----------------------------------------------------------------------------------------------------------------------------------------------------------------------------------  ICU Vital Signs Last 24 Hrs  T(C): 35.3 (25 Apr 2020 07:18), Max: 36.3 (24 Apr 2020 08:00)  T(F): 95.5 (25 Apr 2020 07:18), Max: 97.3 (24 Apr 2020 08:00)  HR: 78 (25 Apr 2020 04:00) (76 - 88)  BP: 137/84 (25 Apr 2020 00:00) (137/84 - 180/94)  BP(mean): 97 (25 Apr 2020 00:00) (97 - 115)  ABP: 182/83 (25 Apr 2020 04:00) (127/50 - 182/83)  ABP(mean): 122 (25 Apr 2020 04:00) (76 - 122)  RR: 28 (25 Apr 2020 04:00) (28 - 28)  SpO2: 100% (25 Apr 2020 04:00) (92% - 100%)      I&O's Summary    24 Apr 2020 07:01  -  25 Apr 2020 07:00  --------------------------------------------------------  IN: 1814.6 mL / OUT: 655 mL / NET: 1159.6 mL        MEDICATIONS  (STANDING):  apixaban 5 milliGRAM(s) Oral <User Schedule>  chlorhexidine 0.12% Liquid 15 milliLiter(s) Oral Mucosa every 12 hours  chlorhexidine 4% Liquid 1 Application(s) Topical <User Schedule>  clonazePAM  Tablet 1 milliGRAM(s) Oral every 8 hours  dextrose 5%. 1000 milliLiter(s) (50 mL/Hr) IV Continuous <Continuous>  dextrose 50% Injectable 12.5 Gram(s) IV Push once  dextrose 50% Injectable 25 Gram(s) IV Push once  dextrose 50% Injectable 25 Gram(s) IV Push once  fentaNYL   Infusion. 0.5 MICROgram(s)/kG/Hr (3.29 mL/Hr) IV Continuous <Continuous>  gabapentin 300 milliGRAM(s) Oral every 8 hours  insulin glargine Injectable (LANTUS) 8 Unit(s) SubCutaneous two times a day  insulin lispro (HumaLOG) corrective regimen sliding scale   SubCutaneous every 6 hours  insulin lispro Injectable (HumaLOG) 4 Unit(s) SubCutaneous every 6 hours  lactated ringers Bolus 250 milliLiter(s) IV Bolus once  lactated ringers Bolus 500 milliLiter(s) IV Bolus once  midazolam Infusion 0.02 mG/kG/Hr (1.32 mL/Hr) IV Continuous <Continuous>  neomycin/bacitracin/polymyxin Topical Ointment 1 Application(s) Topical two times a day  OLANZapine 10 milliGRAM(s) Oral every 12 hours  oxyCODONE    IR 10 milliGRAM(s) Oral every 8 hours  pantoprazole  Injectable 40 milliGRAM(s) IV Push daily  polyethylene glycol 3350 17 Gram(s) Oral at bedtime  rocuronium Infusion 8 MICROgram(s)/kG/Min (6.32 mL/Hr) IV Continuous <Continuous>  senna 2 Tablet(s) Oral at bedtime      RESPIRATORY:  Mode: AC/ CMV (Assist Control/ Continuous Mandatory Ventilation)  RR (machine): 28  TV (machine): 380  FiO2: 60  PEEP: 14  ITime: 0.6  MAP: 23  PIP: 42        NEUROIMAGING:   Recent imaging studies were reviewed.    LAB RESULTS:                          12.0   12.19 )-----------( 198      ( 24 Apr 2020 03:02 )             36.9           04-24    142  |  105  |  44.0<H>  ----------------------------<  286<H>  5.2   |  24.0  |  0.91    Ca    8.4<L>      24 Apr 2020 03:02  Phos  4.7     04-24  Mg     2.2     04-24    TPro  5.6<L>  /  Alb  2.6<L>  /  TBili  <0.2<L>  /  DBili  x   /  AST  51<H>  /  ALT  47<H>  /  AlkPhos  254<H>  04-24      ABG - ( 25 Apr 2020 04:56 )  pH, Arterial: 7.42  pH, Blood: x     /  pCO2: 47    /  pO2: 226   / HCO3: 29    / Base Excess: 4.9   /  SaO2: >100            -----------------------------------------------------------------------------------------------------------------------------------------------------------------------------------    PHYSICAL EXAM:  General: Calm, intubated, sedated for vent synchrony  HEENT: MMM  Neuro:  -Mental status- No acute distress  -CN- PERRL 3mm, EOMI, tongue midline, face symmetric    CV: RRR  Pulm: diminished to auscultation  Abd: Soft, nontender, nondistended  Ext: moderate edema in lower ext  Skin: warm, dry

## 2020-04-25 NOTE — PROGRESS NOTE ADULT - ASSESSMENT
Assessment:  71 yo M w/ acute hypoxemicc respiratory failure 2/2 COVID pneumonia. Intubated 4/16.  ARDS.  Cytokine migdalia/MAC; s/p Tocilizumab 4/16 and Methylprednisolone.  MARIYA. ATN + pre-renal.  Hyperglycemia, h/o DM.    Changes today:   consider proning  inc lantus 8 bid      NEUROLOGIC  Sedation: sedation to vent synchrony  as above  inc gabapentin  cont oxy, olanzapine, klonopin for sedation weaning  cont fentanyl and versed gtt  GBP to 300 tid    RESPIRATORY  Pplat goal <30; Check for AutoPEEP  Ideal body weight: 64kg  28/380/50/12    CARDIOVASCULAR  MAP goal>65, low dose vasopressor as needed  hold atorvastatin with transaminitis    GASTROINTESTINAL:   Tube feeds:  hold if paralyzed/proned or possible extubation.  GI prophylaxis: [] pantoprazole  Bowel regimen[] Miralax   monitor for transaminitis while on plaquenil    RENAL  goal I/O even to neg 1L  lasix prn  preciado for accurate UOP    ENDOCRINE  ISS  Goal FSG < 180  lispro 4units q6hr  lantus 8units bid    HEMATOLOGY  chemical VTE prophylaxis: [] eliquis 5mg bid    INFECTIOUS DISEASES:  SARS-CoV-2 infection/COVID-19  s/p Hydroxychloroquine  trend Inflammatory markers  ID recommendations appreciated  s/p tocilizumab    DISPOSITION:   [] ICU Assessment:  69 yo M w/ acute hypoxemicc respiratory failure 2/2 COVID pneumonia. Intubated 4/16.  ARDS.  Cytokine migdalia/MAC; s/p Tocilizumab 4/16 and Methylprednisolone.  MARIYA. ATN + pre-renal.  Hyperglycemia, h/o DM.    Changes today:   supine- stop proning  inc lantus 12 bid  DC homa  lasix IV    NEUROLOGIC  Sedation: sedation to vent synchrony  cont oxy, olanzapine, klonopin for sedation weaning  cont fentanyl and versed gtt   tid    RESPIRATORY  Pplat goal <30; Check for AutoPEEP  Ideal body weight: 64kg  28/380/50/12    CARDIOVASCULAR  MAP goal>65, low dose vasopressor as needed  hold atorvastatin with transaminitis    GASTROINTESTINAL:   Tube feeds:  hold if paralyzed/proned or possible extubation.  GI prophylaxis: [] pantoprazole  Bowel regimen[] Miralax   monitor for transaminitis while on plaquenil    RENAL  goal I/O even to neg 1L  lasix prn  preciado for accurate UOP    ENDOCRINE  ISS  Goal FSG < 180  lispro 4units q6hr  lantus 12units bid    HEMATOLOGY  chemical VTE prophylaxis: [] eliquis 5mg bid    INFECTIOUS DISEASES:  SARS-CoV-2 infection/COVID-19  s/p Hydroxychloroquine  trend Inflammatory markers  ID recommendations appreciated  s/p tocilizumab    DISPOSITION:   [] ICU Assessment:  69 yo M w/ acute hypoxemicc respiratory failure 2/2 COVID pneumonia. Intubated 4/16.  ARDS.  Cytokine migdalia/MAC; s/p Tocilizumab 4/16 and Methylprednisolone.  MARIYA. ATN + pre-renal.  Hyperglycemia, h/o DM.    Changes today:   supine- stop proning  inc lantus 12 bid  DC homa  lasix IV  start labetalol    NEUROLOGIC  Sedation: sedation to vent synchrony  cont oxy, olanzapine, klonopin for sedation weaning  cont fentanyl and versed gtt   tid    RESPIRATORY  Pplat goal <30; Check for AutoPEEP  Ideal body weight: 64kg  28/380/50/12    CARDIOVASCULAR  MAP goal>65, low dose vasopressor as needed  hold atorvastatin with transaminitis    GASTROINTESTINAL:   Tube feeds:  hold if paralyzed/proned or possible extubation.  GI prophylaxis: [] pantoprazole  Bowel regimen[] Miralax   monitor for transaminitis while on plaquenil    RENAL  goal I/O even to neg 1L  lasix prn  preciado for accurate UOP    ENDOCRINE  ISS  Goal FSG < 180  lispro 4units q6hr  lantus 12units bid    HEMATOLOGY  chemical VTE prophylaxis: [] eliquis 5mg bid    INFECTIOUS DISEASES:  SARS-CoV-2 infection/COVID-19  s/p Hydroxychloroquine  trend Inflammatory markers  ID recommendations appreciated  s/p tocilizumab    DISPOSITION:   [] ICU

## 2020-04-26 NOTE — PROGRESS NOTE ADULT - SUBJECTIVE AND OBJECTIVE BOX
[facility]  INFECTIOUS DISEASES AND INTERNAL MEDICINE at [place]  =======================================================  Natan Huynh MD  Diplomates American Board of Internal Medicine and Infectious Diseases  Telephone [phone]  Fax            [phone]  =======================================================    [id number]  [facility]   follow up: COVID 19 pneumonia    remains intubated    =======================================================      REVIEW OF SYSTEMS:  Limited due to medical condition    =======================================================  Allergies  No Known Allergies    ======================================================  Physical Exam:  ============  (see vitals section below)    General:  sedated; intubated  Eye: Pupils are equal, round and reactive to light, Extraocular movements are intact, Normal conjunctiva.  HENT: Normocephalic, Oral mucosa is moist, No pharyngeal erythema, No sinus tenderness.  ETT  in place  Neck: Supple, No lymphadenopathy.  Respiratory: Lungs with fair air entry  Cardiovascular: Normal rate, Regular rhythm,   Gastrointestinal: Soft, Non-tender, Non-distended, Normal bowel sounds.  Genitourinary: No costovertebral angle tenderness.  Lymphatics: No lymphadenopathy neck,   Musculoskeletal: Normal range of motion, Normal strength.  Integumentary: No rash.  Neurologic: sedated    =======================================================  Vitals:  ============  T(F): 98.5 (26 Apr 2020 10:45), Max: 101.2 (26 Apr 2020 04:00)  HR: 94 (26 Apr 2020 08:22)  BP: 104/56 (26 Apr 2020 08:00)  RR: 28 (26 Apr 2020 08:00)  SpO2: 94% (26 Apr 2020 08:22) (93% - 99%)  temp max in last 48H T(F): , Max: 101.2 (04-26-20 @ 04:00)    =======================================================  Current Antibiotics:    Other medications:  apixaban 5 milliGRAM(s) Oral <User Schedule>  chlorhexidine 0.12% Liquid 15 milliLiter(s) Oral Mucosa every 12 hours  chlorhexidine 4% Liquid 1 Application(s) Topical <User Schedule>  clonazePAM  Tablet 1 milliGRAM(s) Oral every 8 hours  dextrose 5%. 1000 milliLiter(s) IV Continuous <Continuous>  dextrose 50% Injectable 12.5 Gram(s) IV Push once  dextrose 50% Injectable 25 Gram(s) IV Push once  dextrose 50% Injectable 25 Gram(s) IV Push once  fentaNYL   Infusion. 0.5 MICROgram(s)/kG/Hr IV Continuous <Continuous>  gabapentin 300 milliGRAM(s) Oral every 8 hours  insulin glargine Injectable (LANTUS) 12 Unit(s) SubCutaneous two times a day  insulin lispro (HumaLOG) corrective regimen sliding scale   SubCutaneous every 6 hours  insulin lispro Injectable (HumaLOG) 4 Unit(s) SubCutaneous every 6 hours  labetalol 100 milliGRAM(s) Oral every 8 hours  lactated ringers Bolus 250 milliLiter(s) IV Bolus once  lactated ringers Bolus 500 milliLiter(s) IV Bolus once  neomycin/bacitracin/polymyxin Topical Ointment 1 Application(s) Topical two times a day  OLANZapine 10 milliGRAM(s) Oral every 12 hours  oxyCODONE    IR 10 milliGRAM(s) Oral every 8 hours  pantoprazole  Injectable 40 milliGRAM(s) IV Push daily  polyethylene glycol 3350 17 Gram(s) Oral at bedtime  senna 2 Tablet(s) Oral at bedtime      =======================================================  Labs:                        11.4   5.83  )-----------( 181      ( 26 Apr 2020 06:04 )             35.8      04-26    142  |  103  |  44.0<H>  ----------------------------<  133<H>  5.0   |  26.0  |  1.12    Ca    8.3<L>      26 Apr 2020 06:04  Phos  5.0     04-26  Mg     2.0     04-26      Creatinine, Serum: 1.12 mg/dL (04-26-20 @ 06:04)  Creatinine, Serum: 0.68 mg/dL (04-25-20 @ 07:40)  Creatinine, Serum: 0.91 mg/dL (04-24-20 @ 03:02)  Creatinine, Serum: 1.09 mg/dL (04-23-20 @ 14:02)  Creatinine, Serum: 1.21 mg/dL (04-23-20 @ 04:18)  Creatinine, Serum: 0.88 mg/dL (04-22-20 @ 04:43)    Procalcitonin, Serum: 0.16 ng/mL (04-25-20 @ 07:40)  Procalcitonin, Serum: 0.12 ng/mL (04-23-20 @ 04:18)  Procalcitonin, Serum: 0.14 ng/mL (04-21-20 @ 04:34)  Procalcitonin, Serum: 0.26 ng/mL (04-19-20 @ 05:13)  Procalcitonin, Serum: 0.67 ng/mL (04-17-20 @ 04:52)  Procalcitonin, Serum: 0.11 ng/mL (04-15-20 @ 06:53)    D-Dimer Assay, Quantitative: 725 ng/mL DDU (04-25-20 @ 07:40)  D-Dimer Assay, Quantitative: 6116 ng/mL DDU (04-23-20 @ 04:18)  D-Dimer Assay, Quantitative: 7943 ng/mL DDU (04-21-20 @ 04:34)  D-Dimer Assay, Quantitative: 93162 ng/mL DDU (04-19-20 @ 05:13)  D-Dimer Assay, Quantitative: 71667 ng/mL DDU (04-17-20 @ 04:52)    Ferritin, Serum: 298 ng/mL (04-25-20 @ 07:40)  Ferritin, Serum: 370 ng/mL (04-23-20 @ 04:18)  Ferritin, Serum: 384 ng/mL (04-21-20 @ 04:34)  Ferritin, Serum: 385 ng/mL (04-19-20 @ 05:13)  Ferritin, Serum: 384 ng/mL (04-17-20 @ 04:52)  Ferritin, Serum: 409 ng/mL (04-15-20 @ 06:53)  Ferritin, Serum: 520 ng/mL (04-13-20 @ 09:47)  Ferritin, Serum: 464 ng/mL (04-11-20 @ 12:40)    C-Reactive Protein, Serum: 0.79 mg/dL (04-25-20 @ 07:40)  C-Reactive Protein, Serum: 0.47 mg/dL (04-23-20 @ 04:18)  C-Reactive Protein, Serum: 0.43 mg/dL (04-21-20 @ 04:34)  C-Reactive Protein, Serum: 2.04 mg/dL (04-19-20 @ 05:13)  C-Reactive Protein, Serum: 9.42 mg/dL (04-17-20 @ 04:52)  C-Reactive Protein, Serum: 1.84 mg/dL (04-15-20 @ 06:53)  C-Reactive Protein, Serum: 8.85 mg/dL (04-13-20 @ 09:47)  C-Reactive Protein, Serum: 18.96 mg/dL (04-12-20 @ 14:10)    WBC Count: 5.83 K/uL (04-26-20 @ 06:04)  WBC Count: 7.36 K/uL (04-25-20 @ 07:40)  WBC Count: 12.19 K/uL (04-24-20 @ 03:02)  WBC Count: 11.05 K/uL (04-23-20 @ 04:18)  WBC Count: 7.10 K/uL (04-22-20 @ 04:43)      COVID-19 PCR: Detected (04-11-20 @ 12:42)    Lactate Dehydrogenase, Serum: 240 U/L (04-25-20 @ 07:40)  Lactate Dehydrogenase, Serum: 296 U/L (04-23-20 @ 04:18)  Lactate Dehydrogenase, Serum: 315 U/L (04-21-20 @ 04:34)  Lactate Dehydrogenase, Serum: 327 U/L (04-19-20 @ 05:13)  Lactate Dehydrogenase, Serum: 322 U/L (04-17-20 @ 04:52)  Lactate Dehydrogenase, Serum: 351 U/L (04-15-20 @ 06:53)  Lactate Dehydrogenase, Serum: 321 U/L (04-11-20 @ 12:40)    Alkaline Phosphatase, Serum: 254 U/L (04-24-20 @ 03:02)  Alkaline Phosphatase, Serum: 187 U/L (04-19-20 @ 05:13)    Alanine Aminotransferase (ALT/SGPT): 47 U/L (04-24-20 @ 03:02)  Alanine Aminotransferase (ALT/SGPT): 33 U/L (04-19-20 @ 05:13)    Aspartate Aminotransferase (AST/SGOT): 51 U/L (04-24-20 @ 03:02)  Aspartate Aminotransferase (AST/SGOT): 29 U/L (04-19-20 @ 05:13)    Bilirubin Total, Serum: <0.2 mg/dL (04-24-20 @ 03:02)  Bilirubin Total, Serum: 0.2 mg/dL (04-19-20 @ 05:13)    Bilirubin Direct, Serum: 0.1 mg/dL (04-19-20 @ 05:13)

## 2020-04-26 NOTE — PROGRESS NOTE ADULT - SUBJECTIVE AND OBJECTIVE BOX
Chief complaint:   Patient is a 70y old  Male who presents with a chief complaint of SOB (25 Apr 2020 07:41)    HPI:  71 yo M w/ PMH of HTN and DM on insulin and Metformin, and HLD presented with complaint of SOB, HA, mild CP, and weakness. He states he had a nonproductive cough, w/ congestion and feeling cold for raghav last 8 days. He also complained of a fever but did not check his temperature. Upon arrival to the ED his SpO2 was 84% on RA with RR of 28 which improved to 99% on NRB. He was then downtitrated to 6L NC and is saturating around 90-93%. He states since he has been in the ED and put on the oxygen he feels a lot better.   Currently he denies any Dizziness, HA, CP, SOB, Abd pain, N/V/D/C, dysuria, recent travel or sick contacts. (11 Apr 2020 16:00)        24hr EVENTS:      ROS: [ ]  Unable to assess due to mental status   All other systems negative    -----------------------------------------------------------------------------------------------------------------------------------------------------------------------------------  ICU Vital Signs Last 24 Hrs  T(C): 38.4 (26 Apr 2020 04:00), Max: 38.4 (26 Apr 2020 04:00)  T(F): 101.2 (26 Apr 2020 04:00), Max: 101.2 (26 Apr 2020 04:00)  HR: 102 (26 Apr 2020 04:00) (58 - 102)  BP: 109/58 (26 Apr 2020 04:00) (99/70 - 118/74)  BP(mean): 70 (26 Apr 2020 04:00) (70 - 84)  ABP: 93/50 (26 Apr 2020 04:00) (93/50 - 115/56)  ABP(mean): 67 (26 Apr 2020 04:00) (67 - 78)  RR: 28 (26 Apr 2020 04:00) (28 - 28)  SpO2: 93% (26 Apr 2020 04:00) (93% - 99%)      I&O's Summary    25 Apr 2020 07:01  -  26 Apr 2020 07:00  --------------------------------------------------------  IN: 1219.1 mL / OUT: 1905 mL / NET: -685.9 mL        MEDICATIONS  (STANDING):  apixaban 5 milliGRAM(s) Oral <User Schedule>  chlorhexidine 0.12% Liquid 15 milliLiter(s) Oral Mucosa every 12 hours  chlorhexidine 4% Liquid 1 Application(s) Topical <User Schedule>  clonazePAM  Tablet 1 milliGRAM(s) Oral every 8 hours  dextrose 5%. 1000 milliLiter(s) (50 mL/Hr) IV Continuous <Continuous>  dextrose 50% Injectable 12.5 Gram(s) IV Push once  dextrose 50% Injectable 25 Gram(s) IV Push once  dextrose 50% Injectable 25 Gram(s) IV Push once  fentaNYL   Infusion. 0.5 MICROgram(s)/kG/Hr (3.29 mL/Hr) IV Continuous <Continuous>  gabapentin 300 milliGRAM(s) Oral every 8 hours  insulin glargine Injectable (LANTUS) 12 Unit(s) SubCutaneous two times a day  insulin lispro (HumaLOG) corrective regimen sliding scale   SubCutaneous every 6 hours  insulin lispro Injectable (HumaLOG) 4 Unit(s) SubCutaneous every 6 hours  labetalol 100 milliGRAM(s) Oral every 8 hours  lactated ringers Bolus 250 milliLiter(s) IV Bolus once  lactated ringers Bolus 500 milliLiter(s) IV Bolus once  midazolam Infusion 0.02 mG/kG/Hr (1.32 mL/Hr) IV Continuous <Continuous>  neomycin/bacitracin/polymyxin Topical Ointment 1 Application(s) Topical two times a day  OLANZapine 10 milliGRAM(s) Oral every 12 hours  oxyCODONE    IR 10 milliGRAM(s) Oral every 8 hours  pantoprazole  Injectable 40 milliGRAM(s) IV Push daily  polyethylene glycol 3350 17 Gram(s) Oral at bedtime  senna 2 Tablet(s) Oral at bedtime      RESPIRATORY:  Mode: AC/ CMV (Assist Control/ Continuous Mandatory Ventilation)  RR (machine): 28  TV (machine): 380  FiO2: 50  PEEP: 12  ITime: 0.6  MAP: 19  PIP: 38        NEUROIMAGING:   Recent imaging studies were reviewed.    LAB RESULTS:                          11.4   5.83  )-----------( 181      ( 26 Apr 2020 06:04 )             35.8           04-26    142  |  103  |  44.0<H>  ----------------------------<  133<H>  5.0   |  26.0  |  1.12    Ca    8.3<L>      26 Apr 2020 06:04  Phos  5.0     04-26  Mg     2.0     04-26            ABG - ( 26 Apr 2020 05:19 )  pH, Arterial: 7.37  pH, Blood: x     /  pCO2: 51    /  pO2: 73    / HCO3: 28    / Base Excess: 3.8   /  SaO2: 94                    -----------------------------------------------------------------------------------------------------------------------------------------------------------------------------------    PHYSICAL EXAM:  General: Calm, intubated, sedated for vent synchrony  HEENT: MMM  Neuro:  -Mental status- No acute distress  -CN- PERRL 3mm, EOMI, tongue midline, face symmetric    CV: RRR  Pulm: diminished to auscultation  Abd: Soft, nontender, nondistended  Ext: moderate edema in lower ext  Skin: warm, dry Chief complaint:   Patient is a 70y old  Male who presents with a chief complaint of SOB (25 Apr 2020 07:41)    HPI:  71 yo M w/ PMH of HTN and DM on insulin and Metformin, and HLD presented with complaint of SOB, HA, mild CP, and weakness. He states he had a nonproductive cough, w/ congestion and feeling cold for raghav last 8 days. He also complained of a fever but did not check his temperature. Upon arrival to the ED his SpO2 was 84% on RA with RR of 28 which improved to 99% on NRB. He was then downtitrated to 6L NC and is saturating around 90-93%. He states since he has been in the ED and put on the oxygen he feels a lot better.   Currently he denies any Dizziness, HA, CP, SOB, Abd pain, N/V/D/C, dysuria, recent travel or sick contacts. (11 Apr 2020 16:00)    24hr EVENTS:  hypothermic/hyperthermic    ROS: [ ]  Unable to assess due to mental status   All other systems negative    -----------------------------------------------------------------------------------------------------------------------------------------------------------------------------------  ICU Vital Signs Last 24 Hrs  T(C): 38.4 (26 Apr 2020 04:00), Max: 38.4 (26 Apr 2020 04:00)  T(F): 101.2 (26 Apr 2020 04:00), Max: 101.2 (26 Apr 2020 04:00)  HR: 102 (26 Apr 2020 04:00) (58 - 102)  BP: 109/58 (26 Apr 2020 04:00) (99/70 - 118/74)  BP(mean): 70 (26 Apr 2020 04:00) (70 - 84)  ABP: 93/50 (26 Apr 2020 04:00) (93/50 - 115/56)  ABP(mean): 67 (26 Apr 2020 04:00) (67 - 78)  RR: 28 (26 Apr 2020 04:00) (28 - 28)  SpO2: 93% (26 Apr 2020 04:00) (93% - 99%)      I&O's Summary    25 Apr 2020 07:01  -  26 Apr 2020 07:00  --------------------------------------------------------  IN: 1219.1 mL / OUT: 1905 mL / NET: -685.9 mL        MEDICATIONS  (STANDING):  apixaban 5 milliGRAM(s) Oral <User Schedule>  chlorhexidine 0.12% Liquid 15 milliLiter(s) Oral Mucosa every 12 hours  chlorhexidine 4% Liquid 1 Application(s) Topical <User Schedule>  clonazePAM  Tablet 1 milliGRAM(s) Oral every 8 hours  dextrose 5%. 1000 milliLiter(s) (50 mL/Hr) IV Continuous <Continuous>  dextrose 50% Injectable 12.5 Gram(s) IV Push once  dextrose 50% Injectable 25 Gram(s) IV Push once  dextrose 50% Injectable 25 Gram(s) IV Push once  fentaNYL   Infusion. 0.5 MICROgram(s)/kG/Hr (3.29 mL/Hr) IV Continuous <Continuous>  gabapentin 300 milliGRAM(s) Oral every 8 hours  insulin glargine Injectable (LANTUS) 12 Unit(s) SubCutaneous two times a day  insulin lispro (HumaLOG) corrective regimen sliding scale   SubCutaneous every 6 hours  insulin lispro Injectable (HumaLOG) 4 Unit(s) SubCutaneous every 6 hours  labetalol 100 milliGRAM(s) Oral every 8 hours  lactated ringers Bolus 250 milliLiter(s) IV Bolus once  lactated ringers Bolus 500 milliLiter(s) IV Bolus once  midazolam Infusion 0.02 mG/kG/Hr (1.32 mL/Hr) IV Continuous <Continuous>  neomycin/bacitracin/polymyxin Topical Ointment 1 Application(s) Topical two times a day  OLANZapine 10 milliGRAM(s) Oral every 12 hours  oxyCODONE    IR 10 milliGRAM(s) Oral every 8 hours  pantoprazole  Injectable 40 milliGRAM(s) IV Push daily  polyethylene glycol 3350 17 Gram(s) Oral at bedtime  senna 2 Tablet(s) Oral at bedtime      RESPIRATORY:  Mode: AC/ CMV (Assist Control/ Continuous Mandatory Ventilation)  RR (machine): 28  TV (machine): 380  FiO2: 50  PEEP: 12  ITime: 0.6  MAP: 19  PIP: 38        NEUROIMAGING:   Recent imaging studies were reviewed.    LAB RESULTS:                          11.4   5.83  )-----------( 181      ( 26 Apr 2020 06:04 )             35.8           04-26    142  |  103  |  44.0<H>  ----------------------------<  133<H>  5.0   |  26.0  |  1.12    Ca    8.3<L>      26 Apr 2020 06:04  Phos  5.0     04-26  Mg     2.0     04-26            ABG - ( 26 Apr 2020 05:19 )  pH, Arterial: 7.37  pH, Blood: x     /  pCO2: 51    /  pO2: 73    / HCO3: 28    / Base Excess: 3.8   /  SaO2: 94              -----------------------------------------------------------------------------------------------------------------------------------------------------------------------------------    PHYSICAL EXAM:  General: Calm, intubated, sedated for vent synchrony  HEENT: MARY  Neuro:  -Mental status- No acute distress  -CN- PERRL 3mm, EOMI, tongue midline, face symmetric    CV: RRR  Pulm: diminished to auscultation  Abd: Soft, nontender, nondistended  Ext: moderate edema in lower ext  Skin: warm, dry Chief complaint:   Patient is a 70y old  Male who presents with a chief complaint of SOB (25 Apr 2020 07:41)    HPI:  71 yo M w/ PMH of HTN and DM on insulin and Metformin, and HLD presented with complaint of SOB, HA, mild CP, and weakness. He states he had a nonproductive cough, w/ congestion and feeling cold for raghav last 8 days. He also complained of a fever but did not check his temperature. Upon arrival to the ED his SpO2 was 84% on RA with RR of 28 which improved to 99% on NRB. He was then downtitrated to 6L NC and is saturating around 90-93%. He states since he has been in the ED and put on the oxygen he feels a lot better.   Currently he denies any Dizziness, HA, CP, SOB, Abd pain, N/V/D/C, dysuria, recent travel or sick contacts. (11 Apr 2020 16:00)    24hr EVENTS:  hypothermic/hyperthermic  hematuria noted    ROS: [ ]  Unable to assess due to mental status   All other systems negative    -----------------------------------------------------------------------------------------------------------------------------------------------------------------------------------  ICU Vital Signs Last 24 Hrs  T(C): 38.4 (26 Apr 2020 04:00), Max: 38.4 (26 Apr 2020 04:00)  T(F): 101.2 (26 Apr 2020 04:00), Max: 101.2 (26 Apr 2020 04:00)  HR: 102 (26 Apr 2020 04:00) (58 - 102)  BP: 109/58 (26 Apr 2020 04:00) (99/70 - 118/74)  BP(mean): 70 (26 Apr 2020 04:00) (70 - 84)  ABP: 93/50 (26 Apr 2020 04:00) (93/50 - 115/56)  ABP(mean): 67 (26 Apr 2020 04:00) (67 - 78)  RR: 28 (26 Apr 2020 04:00) (28 - 28)  SpO2: 93% (26 Apr 2020 04:00) (93% - 99%)      I&O's Summary    25 Apr 2020 07:01  -  26 Apr 2020 07:00  --------------------------------------------------------  IN: 1219.1 mL / OUT: 1905 mL / NET: -685.9 mL        MEDICATIONS  (STANDING):  apixaban 5 milliGRAM(s) Oral <User Schedule>  chlorhexidine 0.12% Liquid 15 milliLiter(s) Oral Mucosa every 12 hours  chlorhexidine 4% Liquid 1 Application(s) Topical <User Schedule>  clonazePAM  Tablet 1 milliGRAM(s) Oral every 8 hours  dextrose 5%. 1000 milliLiter(s) (50 mL/Hr) IV Continuous <Continuous>  dextrose 50% Injectable 12.5 Gram(s) IV Push once  dextrose 50% Injectable 25 Gram(s) IV Push once  dextrose 50% Injectable 25 Gram(s) IV Push once  fentaNYL   Infusion. 0.5 MICROgram(s)/kG/Hr (3.29 mL/Hr) IV Continuous <Continuous>  gabapentin 300 milliGRAM(s) Oral every 8 hours  insulin glargine Injectable (LANTUS) 12 Unit(s) SubCutaneous two times a day  insulin lispro (HumaLOG) corrective regimen sliding scale   SubCutaneous every 6 hours  insulin lispro Injectable (HumaLOG) 4 Unit(s) SubCutaneous every 6 hours  labetalol 100 milliGRAM(s) Oral every 8 hours  lactated ringers Bolus 250 milliLiter(s) IV Bolus once  lactated ringers Bolus 500 milliLiter(s) IV Bolus once  midazolam Infusion 0.02 mG/kG/Hr (1.32 mL/Hr) IV Continuous <Continuous>  neomycin/bacitracin/polymyxin Topical Ointment 1 Application(s) Topical two times a day  OLANZapine 10 milliGRAM(s) Oral every 12 hours  oxyCODONE    IR 10 milliGRAM(s) Oral every 8 hours  pantoprazole  Injectable 40 milliGRAM(s) IV Push daily  polyethylene glycol 3350 17 Gram(s) Oral at bedtime  senna 2 Tablet(s) Oral at bedtime      RESPIRATORY:  Mode: AC/ CMV (Assist Control/ Continuous Mandatory Ventilation)  RR (machine): 28  TV (machine): 380  FiO2: 50  PEEP: 12  ITime: 0.6  MAP: 19  PIP: 38        NEUROIMAGING:   Recent imaging studies were reviewed.    LAB RESULTS:                          11.4   5.83  )-----------( 181      ( 26 Apr 2020 06:04 )             35.8           04-26    142  |  103  |  44.0<H>  ----------------------------<  133<H>  5.0   |  26.0  |  1.12    Ca    8.3<L>      26 Apr 2020 06:04  Phos  5.0     04-26  Mg     2.0     04-26            ABG - ( 26 Apr 2020 05:19 )  pH, Arterial: 7.37  pH, Blood: x     /  pCO2: 51    /  pO2: 73    / HCO3: 28    / Base Excess: 3.8   /  SaO2: 94              -----------------------------------------------------------------------------------------------------------------------------------------------------------------------------------    PHYSICAL EXAM:  General: Calm, intubated, sedated for vent synchrony  HEENT: MARY  Neuro:  -Mental status- No acute distress  -CN- PERRL 3mm, EOMI, tongue midline, face symmetric    CV: RRR  Pulm: diminished to auscultation  Abd: Soft, nontender, nondistended  Ext: moderate edema in lower ext  Skin: warm, dry

## 2020-04-26 NOTE — PROGRESS NOTE ADULT - ASSESSMENT
Assessment:  69 yo M w/ acute hypoxemicc respiratory failure 2/2 COVID pneumonia. Intubated 4/16.  ARDS.  Cytokine migdalia/MAC; s/p Tocilizumab 4/16 and Methylprednisolone.  MARIYA. ATN + pre-renal.  Hyperglycemia, h/o DM.    Changes today:   inc lantus 12 bid    NEUROLOGIC  Sedation: sedation to vent synchrony  cont oxy, olanzapine, klonopin for sedation weaning  cont fentanyl and versed gtt   tid    RESPIRATORY  Pplat goal <30; Check for AutoPEEP  Ideal body weight: 64kg  28/380/50/12    CARDIOVASCULAR  MAP goal>65, low dose vasopressor as needed  hold atorvastatin with transaminitis  labetalol    GASTROINTESTINAL:   Tube feeds:  hold if paralyzed/proned or possible extubation.  GI prophylaxis: [] pantoprazole  Bowel regimen[] Miralax   monitor for transaminitis while on plaquenil    RENAL  goal I/O even to neg 1L  lasix prn  preciado for accurate UOP    ENDOCRINE  ISS  Goal FSG < 180  lispro 4units q6hr  lantus 12units bid    HEMATOLOGY  chemical VTE prophylaxis: [] eliquis 5mg bid    INFECTIOUS DISEASES:  SARS-CoV-2 infection/COVID-19  s/p Hydroxychloroquine  trend Inflammatory markers  ID recommendations appreciated  s/p tocilizumab    DISPOSITION:   [] ICU Assessment:  69 yo M w/ acute hypoxemicc respiratory failure 2/2 COVID pneumonia. Intubated 4/16.  ARDS.  Cytokine migdalia/MAC; s/p Tocilizumab 4/16 and Methylprednisolone.  MARIYA. ATN + pre-renal.  Hyperglycemia, h/o DM.    Changes today:   stop versed    NEUROLOGIC  Sedation: sedation to vent synchrony  cont oxy, olanzapine, klonopin for sedation weaning  cont fentanyl and versed gtt   tid    RESPIRATORY  Pplat goal <30; Check for AutoPEEP  Ideal body weight: 64kg  28/380/50/14    CARDIOVASCULAR  MAP goal>65, low dose vasopressor as needed  hold atorvastatin with transaminitis  labetalol started    GASTROINTESTINAL:   Tube feeds:  hold if paralyzed/proned or possible extubation.  GI prophylaxis: [] pantoprazole  Bowel regimen[] Miralax   monitor for transaminitis while on plaquenil    RENAL  goal I/O even to neg 1L  lasix prn  preciado for accurate UOP    ENDOCRINE  ISS  Goal FSG < 180  lispro 4units q6hr  lantus 12units bid    HEMATOLOGY  chemical VTE prophylaxis: [] eliquis 5mg bid  lower ext dopplers pending    INFECTIOUS DISEASES:  SARS-CoV-2 infection/COVID-19  s/p Hydroxychloroquine  trend Inflammatory markers  ID recommendations appreciated  s/p tocilizumab    DISPOSITION:   [] ICU Assessment:  71 yo M w/ acute hypoxemicc respiratory failure 2/2 COVID pneumonia. Intubated 4/16.  ARDS.  Cytokine migdalia/MAC; s/p Tocilizumab 4/16 and Methylprednisolone.  MARIYA. ATN + pre-renal.  Hyperglycemia, h/o DM.    Changes today:   stop versed    NEUROLOGIC  Sedation: sedation to vent synchrony  cont oxy, olanzapine, klonopin for sedation weaning  cont fentanyl and versed gtt   tid    RESPIRATORY  Pplat goal <30; Check for AutoPEEP  Ideal body weight: 64kg  28/380/50/14    CARDIOVASCULAR  MAP goal>65, low dose vasopressor as needed  hold atorvastatin with transaminitis  labetalol started    GASTROINTESTINAL:   Tube feeds:  hold if paralyzed/proned or possible extubation.  GI prophylaxis: [] pantoprazole  Bowel regimen[] Miralax   monitor for transaminitis while on plaquenil    RENAL  goal I/O even to neg 1L  lasix prn  preciado for accurate UOP  hematuria- renal ultrasound  IVF    ENDOCRINE  ISS  Goal FSG < 180  lispro 4units q6hr  lantus 12units bid    HEMATOLOGY  chemical VTE prophylaxis: [] eliquis 5mg bid  lower ext dopplers pending    INFECTIOUS DISEASES:  SARS-CoV-2 infection/COVID-19  s/p Hydroxychloroquine  trend Inflammatory markers  ID recommendations appreciated  s/p tocilizumab    DISPOSITION:   [] ICU

## 2020-04-27 NOTE — PROGRESS NOTE ADULT - SUBJECTIVE AND OBJECTIVE BOX
[facility]  INFECTIOUS DISEASES AND INTERNAL MEDICINE at [place]  =======================================================  Natan Huynh MD  Diplomates American Board of Internal Medicine and Infectious Diseases  Telephone [phone]  Fax            [phone]  =======================================================    [id number]  [facility] [place]   follow up: COVID 19 pneumonia    remains intubated  interactive today    =======================================================      REVIEW OF SYSTEMS:  Limited due to medical condition    =======================================================  Allergies  No Known Allergies    ======================================================  Physical Exam:  ============  (see vitals section below)    General:  sedated; intubated  Eye: Pupils are equal, round and reactive to light, Extraocular movements are intact, Normal conjunctiva.  HENT: Normocephalic, Oral mucosa is moist, No pharyngeal erythema, No sinus tenderness.  ETT  in place  Neck: Supple, No lymphadenopathy.  Respiratory: Lungs with fair air entry  Cardiovascular: Normal rate, Regular rhythm,   Gastrointestinal: Soft, Non-tender, Non-distended, Normal bowel sounds.  Genitourinary: No costovertebral angle tenderness.  Lymphatics: No lymphadenopathy neck,   Musculoskeletal: Normal range of motion, Normal strength.  Integumentary: No rash.  Neurologic: sedated    =======================================================   Vitals:  ============  T(F): 98.8 (27 Apr 2020 08:00), Max: 98.9 (26 Apr 2020 20:00)  HR: 79 (27 Apr 2020 12:04)  BP: 140/64 (27 Apr 2020 06:00)  RR: 26 (27 Apr 2020 12:00)  SpO2: 98% (27 Apr 2020 12:04) (96% - 99%)  temp max in last 48H T(F): , Max: 101.2 (04-26-20 @ 04:00)    =======================================================  Current Antibiotics:    Other medications:  apixaban 5 milliGRAM(s) Oral <User Schedule>  chlorhexidine 0.12% Liquid 15 milliLiter(s) Oral Mucosa every 12 hours  chlorhexidine 4% Liquid 1 Application(s) Topical <User Schedule>  clonazePAM  Tablet 0.5 milliGRAM(s) Oral every 8 hours  dextrose 5%. 1000 milliLiter(s) IV Continuous <Continuous>  dextrose 50% Injectable 12.5 Gram(s) IV Push once  dextrose 50% Injectable 25 Gram(s) IV Push once  dextrose 50% Injectable 25 Gram(s) IV Push once  gabapentin 300 milliGRAM(s) Oral every 8 hours  insulin glargine Injectable (LANTUS) 12 Unit(s) SubCutaneous two times a day  insulin lispro (HumaLOG) corrective regimen sliding scale   SubCutaneous every 6 hours  insulin lispro Injectable (HumaLOG) 4 Unit(s) SubCutaneous every 6 hours  labetalol 100 milliGRAM(s) Oral every 8 hours  lactated ringers Bolus 250 milliLiter(s) IV Bolus once  lactated ringers Bolus 500 milliLiter(s) IV Bolus once  naloxegol 25 milliGRAM(s) Oral daily  neomycin/bacitracin/polymyxin Topical Ointment 1 Application(s) Topical two times a day  OLANZapine 10 milliGRAM(s) Oral every 12 hours  oxyCODONE    IR 5 milliGRAM(s) Oral every 8 hours  pantoprazole  Injectable 40 milliGRAM(s) IV Push daily  polyethylene glycol 3350 17 Gram(s) Oral at bedtime  senna 2 Tablet(s) Oral at bedtime      =======================================================  Labs:                        10.5   7.10  )-----------( 179      ( 27 Apr 2020 04:23 )             33.5      04-27    141  |  101  |  40.0<H>  ----------------------------<  172<H>  4.9   |  26.0  |  0.97    Ca    8.3<L>      27 Apr 2020 04:23  Phos  5.2     04-27  Mg     2.1     04-27        Creatinine, Serum: 0.97 mg/dL (04-27-20 @ 04:23)  Creatinine, Serum: 1.12 mg/dL (04-26-20 @ 06:04)  Creatinine, Serum: 0.68 mg/dL (04-25-20 @ 07:40)  Creatinine, Serum: 0.91 mg/dL (04-24-20 @ 03:02)  Creatinine, Serum: 1.09 mg/dL (04-23-20 @ 14:02)  Creatinine, Serum: 1.21 mg/dL (04-23-20 @ 04:18)    Procalcitonin, Serum: 0.12 ng/mL (04-27-20 @ 04:23)  Procalcitonin, Serum: 0.16 ng/mL (04-25-20 @ 07:40)  Procalcitonin, Serum: 0.12 ng/mL (04-23-20 @ 04:18)  Procalcitonin, Serum: 0.14 ng/mL (04-21-20 @ 04:34)  Procalcitonin, Serum: 0.26 ng/mL (04-19-20 @ 05:13)  Procalcitonin, Serum: 0.67 ng/mL (04-17-20 @ 04:52)  Procalcitonin, Serum: 0.11 ng/mL (04-15-20 @ 06:53)    D-Dimer Assay, Quantitative: 679 ng/mL DDU (04-27-20 @ 04:23)  D-Dimer Assay, Quantitative: 725 ng/mL DDU (04-25-20 @ 07:40)  D-Dimer Assay, Quantitative: 6116 ng/mL DDU (04-23-20 @ 04:18)  D-Dimer Assay, Quantitative: 7943 ng/mL DDU (04-21-20 @ 04:34)  D-Dimer Assay, Quantitative: 70991 ng/mL DDU (04-19-20 @ 05:13)    Ferritin, Serum: 282 ng/mL (04-27-20 @ 04:23)  Ferritin, Serum: 298 ng/mL (04-25-20 @ 07:40)  Ferritin, Serum: 370 ng/mL (04-23-20 @ 04:18)  Ferritin, Serum: 384 ng/mL (04-21-20 @ 04:34)  Ferritin, Serum: 385 ng/mL (04-19-20 @ 05:13)  Ferritin, Serum: 384 ng/mL (04-17-20 @ 04:52)  Ferritin, Serum: 409 ng/mL (04-15-20 @ 06:53)  Ferritin, Serum: 520 ng/mL (04-13-20 @ 09:47)  Ferritin, Serum: 464 ng/mL (04-11-20 @ 12:40)    C-Reactive Protein, Serum: 0.66 mg/dL (04-27-20 @ 04:23)  C-Reactive Protein, Serum: 0.79 mg/dL (04-25-20 @ 07:40)  C-Reactive Protein, Serum: 0.47 mg/dL (04-23-20 @ 04:18)  C-Reactive Protein, Serum: 0.43 mg/dL (04-21-20 @ 04:34)  C-Reactive Protein, Serum: 2.04 mg/dL (04-19-20 @ 05:13)  C-Reactive Protein, Serum: 9.42 mg/dL (04-17-20 @ 04:52)  C-Reactive Protein, Serum: 1.84 mg/dL (04-15-20 @ 06:53)    WBC Count: 7.10 K/uL (04-27-20 @ 04:23)  WBC Count: 5.83 K/uL (04-26-20 @ 06:04)  WBC Count: 7.36 K/uL (04-25-20 @ 07:40)  WBC Count: 12.19 K/uL (04-24-20 @ 03:02)  WBC Count: 11.05 K/uL (04-23-20 @ 04:18)      COVID-19 PCR: Detected (04-11-20 @ 12:42)    Lactate Dehydrogenase, Serum: 262 U/L (04-27-20 @ 04:23)  Lactate Dehydrogenase, Serum: 240 U/L (04-25-20 @ 07:40)  Lactate Dehydrogenase, Serum: 296 U/L (04-23-20 @ 04:18)  Lactate Dehydrogenase, Serum: 315 U/L (04-21-20 @ 04:34)  Lactate Dehydrogenase, Serum: 327 U/L (04-19-20 @ 05:13)  Lactate Dehydrogenase, Serum: 322 U/L (04-17-20 @ 04:52)  Lactate Dehydrogenase, Serum: 351 U/L (04-15-20 @ 06:53)  Lactate Dehydrogenase, Serum: 321 U/L (04-11-20 @ 12:40)    Alkaline Phosphatase, Serum: 254 U/L (04-24-20 @ 03:02)  Alanine Aminotransferase (ALT/SGPT): 47 U/L (04-24-20 @ 03:02)  Aspartate Aminotransferase (AST/SGOT): 51 U/L (04-24-20 @ 03:02)  Bilirubin Total, Serum: <0.2 mg/dL (04-24-20 @ 03:02)

## 2020-04-27 NOTE — PROGRESS NOTE ADULT - SUBJECTIVE AND OBJECTIVE BOX
71 yo M w/ PMH of HTN and DM on insulin and Metformin, and HLD presented with complaint of SOB, HA, mild CP, and weakness. He states he had a nonproductive cough, w/ congestion and feeling cold for raghav last 8 days. He also complained of a fever but did not check his temperature. Upon arrival to the ED his SpO2 was 84% on RA with RR of 28 which improved to 99% on NRB. He was then downtitrated to 6L NC and is saturating around 90-93%. He states since he has been in the ED and put on the oxygen he feels a lot better.   Currently he denies any Dizziness, HA, CP, SOB, Abd pain, N/V/D/C, dysuria, recent travel or sick contacts. (11 Apr 2020 16:00)    24hr EVENTS:  hematuria noted again      PHYSICAL EXAM:  General: Calm, intubated, sedated for vent synchrony  HEENT: MMM  Neuro: sedated.  CV: RRR  Pulm: diminished to auscultation  Abd: Soft, nontender, nondistended  Skin: warm, dry

## 2020-04-27 NOTE — CHART NOTE - NSCHARTNOTEFT_GEN_A_CORE
Source: Patient [ ]  Family [ ]   other [x ]    Current Diet: Diet, NPO with Tube Feed:   Tube Feeding Modality: Orogastric  Glucerna 1.5 Rodger  Total Volume for 24 Hours (mL): 1000  Bolus  Total Volume of Bolus (mL):  250  Tube Feed Frequency: Every 6 hours   Tube Feed Start Time: 09:00  Bolus Feed Rate (mL per Hour): 250   Bolus Feed Duration (in Hours): 1 (04-25-20 @ 09:18)    Current Weight:   (4/11) 145#    % Weight Change no new wt to assess, will continue to monitor.  Aware 2+ mild generalized edema/left arm noted per documentation.    Pertinent Medications: MEDICATIONS  (STANDING):  apixaban 5 milliGRAM(s) Oral <User Schedule>  chlorhexidine 0.12% Liquid 15 milliLiter(s) Oral Mucosa every 12 hours  chlorhexidine 4% Liquid 1 Application(s) Topical <User Schedule>  clonazePAM  Tablet 1 milliGRAM(s) Oral every 8 hours  dextrose 5%. 1000 milliLiter(s) (50 mL/Hr) IV Continuous <Continuous>  dextrose 50% Injectable 12.5 Gram(s) IV Push once  dextrose 50% Injectable 25 Gram(s) IV Push once  dextrose 50% Injectable 25 Gram(s) IV Push once  fentaNYL   Infusion. 0.5 MICROgram(s)/kG/Hr (3.29 mL/Hr) IV Continuous <Continuous>  gabapentin 300 milliGRAM(s) Oral every 8 hours  insulin glargine Injectable (LANTUS) 12 Unit(s) SubCutaneous two times a day  insulin lispro (HumaLOG) corrective regimen sliding scale   SubCutaneous every 6 hours  insulin lispro Injectable (HumaLOG) 4 Unit(s) SubCutaneous every 6 hours  labetalol 100 milliGRAM(s) Oral every 8 hours  lactated ringers Bolus 250 milliLiter(s) IV Bolus once  lactated ringers Bolus 500 milliLiter(s) IV Bolus once  neomycin/bacitracin/polymyxin Topical Ointment 1 Application(s) Topical two times a day  OLANZapine 10 milliGRAM(s) Oral every 12 hours  oxyCODONE    IR 10 milliGRAM(s) Oral every 8 hours  pantoprazole  Injectable 40 milliGRAM(s) IV Push daily  polyethylene glycol 3350 17 Gram(s) Oral at bedtime  senna 2 Tablet(s) Oral at bedtime    MEDICATIONS  (PRN):  acetaminophen   Tablet .. 650 milliGRAM(s) Oral every 6 hours PRN Temp greater or equal to 38C (100.4F)  ALBUTerol    90 MICROgram(s) HFA Inhaler 1 Puff(s) Inhalation every 4 hours PRN Shortness of Breath and/or Wheezing  dextrose 40% Gel 15 Gram(s) Oral once PRN Blood Glucose LESS THAN 70 milliGRAM(s)/deciliter  glucagon  Injectable 1 milliGRAM(s) IntraMuscular once PRN Glucose LESS THAN 70 milligrams/deciliter  magnesium hydroxide Suspension 30 milliLiter(s) Oral daily PRN Constipation  sodium chloride 0.9% lock flush 10 milliLiter(s) IV Push every 1 hour PRN Pre/post blood products, medications, blood draw, and to maintain line patency    Pertinent Labs: CBC Full  -  ( 27 Apr 2020 04:23 )  WBC Count : 7.10 K/uL  RBC Count : 3.57 M/uL  Hemoglobin : 10.5 g/dL  Hematocrit : 33.5 %  Platelet Count - Automated : 179 K/uL  Mean Cell Volume : 93.8 fl  Mean Cell Hemoglobin : 29.4 pg  Mean Cell Hemoglobin Concentration : 31.3 gm/dL  Auto Neutrophil # : 5.02 K/uL  Auto Lymphocyte # : 0.95 K/uL  Auto Monocyte # : 0.95 K/uL  Auto Eosinophil # : 0.12 K/uL  Auto Basophil # : 0.02 K/uL  Auto Neutrophil % : 70.6 %  Auto Lymphocyte % : 13.4 %  Auto Monocyte % : 13.4 %  Auto Eosinophil % : 1.7 %  Auto Basophil % : 0.3 %  04-27 Na141 mmol/L Glu 172 mg/dL<H> K+ 4.9 mmol/L Cr  0.97 mg/dL BUN 40.0 mg/dL<H> Phos 5.2 mg/dL<H>       Skin: no skin breakdown noted    Current Nutrition Diagnosis: Pt remains at nutrition risk secondary to increased nutrient needs related to increased physiologic demand with healing as evidenced by pt with acute hypoxic respiratory failure due to COVID-19 requiring intubation (s/p rapid response (4/16).  Pt remains intubated/sedated, receiving bolus enteral nutrition regimen.    Recommendations:   1. Consider increasing Glucerna 1.5 to 275ml q6 hrs daily, as tolerated (1100ml, 1650kcal, 90g protein 835ml free water)  2. RX: MVI and Vit C 500mg daily   3. Monitor daily wts     Monitoring and Evaluation:   [ ] PO intake [x ] Tolerance to diet prescription [X] Weights  [X] Follow up per protocol [X] Labs:

## 2020-04-27 NOTE — PROGRESS NOTE ADULT - ASSESSMENT
Assessment:  69 yo M w/ acute hypoxemicc respiratory failure 2/2 COVID pneumonia. Intubated 4/16.  ARDS.  Cytokine migdalia/MAC; s/p Tocilizumab 4/16 and Methylprednisolone.  MARIYA. ATN + pre-renal.  Hyperglycemia, h/o DM.    Changes today:   -stop Fentanyl; decrease Clonazepam and Oxy  -bladder scan, flush Preciado, consider replacement if clotted    NEUROLOGIC  Sedation: sedation to vent synchrony  cont oxy, olanzapine, klonopin for sedation weaning  will keep off anesthetics    RESPIRATORY  Pplat goal <30; Check for AutoPEEP  Ideal body weight: 64kg  28/380/50/14    CARDIOVASCULAR  MAP goal>65, low dose vasopressor as needed  hold atorvastatin with transaminitis  labetalol started    GASTROINTESTINAL:   Tube feeds:  hold if paralyzed/proned or possible extubation.  GI prophylaxis: [] pantoprazole  Bowel regimen[] Miralax   monitor for transaminitis while on plaquenil    RENAL  goal I/O even   lasix prn  preciado for accurate UOP  hematuria- renal ultrasound  IVF    ENDOCRINE  ISS+ Lantus  Goal FSG < 180    HEMATOLOGY  chemical VTE prophylaxis: [] eliquis 5mg bid    INFECTIOUS DISEASES:  SARS-CoV-2 infection/COVID-19  s/p Hydroxychloroquine  trend Inflammatory markers  ID recommendations appreciated  s/p tocilizumab    DISPOSITION:   [] ICU

## 2020-04-27 NOTE — CHART NOTE - NSCHARTNOTEFT_GEN_A_CORE
Spoke to patient's spouse Sakshi and updated her on patients status. All questions answered. [place]  used ID # [id number].

## 2020-04-27 NOTE — PROGRESS NOTE ADULT - ASSESSMENT
69 yo M w/ PMH of HTN and DM on insulin and Metformin, and HLD presented with complaint of SOB, HA, mild CP, and weakness. He states he had a nonproductive cough, w/ congestion and feeling cold for raghav last 8 days. He also complained of a fever but did not check his temperature. Upon arrival to the ED his SpO2 was 84% on RA with RR of 28 which improved to 99% on NRB. He was then downtitrated to 6L NC and is saturating around 90-93%. He states since he has been in the ED and put on the oxygen he feels a lot better.   Currently he denies any Dizziness, HA, CP, SOB, Abd pain, N/V/D/C, dysuria, recent travel or sick contacts. (11 Apr 2020 16:00)    His Chest Xray showed bilateral PNA.  patient was admitted for management of acute hypoxic respiratory failure 2/2 COVID pneumonia.  He was treated with a course of Plaquinel 4/11 - 4/15     patient's course continue to deteriorate.  On 4/16/2020, he was intubated for worsening of resp failure.     Impression:  COVID-19 Pneumonia  multifocal Pneumonia  acute hypoxic respiratory failure  cough  shortness of breath  s/p intubation      Plan:  -  completed course of Hydroxychloroquine 4/15/2020  - s/p steroids 4/11 - 4/15  - s/p ACTEMRA x 1 dose 4/16      Continue supportive care measures  - s/p intubation on 4/16/2020  - Continue mechanical ventilation  - continue Oxygenation     ELEVATED D-dimers  - on Eliquis       Inflammatory markers downtrending        Will follow with you.

## 2020-04-28 NOTE — PROGRESS NOTE ADULT - SUBJECTIVE AND OBJECTIVE BOX
[facility]  INFECTIOUS DISEASES AND INTERNAL MEDICINE at [place]  =======================================================  Natan Huynh MD  Diplomates American Board of Internal Medicine and Infectious Diseases  Telephone [phone]  Fax            [phone]  =======================================================    [id number]  [facility] [place]   follow up: COVID 19 pneumonia    remains intubated  interactive today    =======================================================      REVIEW OF SYSTEMS:  Limited due to medical condition    =======================================================  Allergies  No Known Allergies    ======================================================  Physical Exam:  ============  (see vitals section below)    General:  sedated; intubated  Eye: Pupils are equal, round and reactive to light, Extraocular movements are intact, Normal conjunctiva.  HENT: Normocephalic, Oral mucosa is moist, No pharyngeal erythema, No sinus tenderness.  ETT  in place  Neck: Supple, No lymphadenopathy.  Respiratory: Lungs with fair air entry  Cardiovascular: Normal rate, Regular rhythm,   Gastrointestinal: Soft, Non-tender, Non-distended, Normal bowel sounds.  Genitourinary: No costovertebral angle tenderness.  Lymphatics: No lymphadenopathy neck,   Musculoskeletal: Normal range of motion, Normal strength.  Integumentary: No rash.  Neurologic: sedated    =======================================================   Vitals:  ============  T(F): 97.9 (28 Apr 2020 12:00), Max: 99.7 (28 Apr 2020 04:00)  HR: 78 (28 Apr 2020 12:00)  BP: 162/82 (27 Apr 2020 20:00)  RR: 24 (28 Apr 2020 12:00)  SpO2: 95% (28 Apr 2020 12:00) (93% - 100%)  temp max in last 48H T(F): , Max: 99.7 (04-28-20 @ 04:00)    =======================================================  Current Antibiotics:    Other medications:  apixaban 5 milliGRAM(s) Oral <User Schedule>  chlorhexidine 0.12% Liquid 15 milliLiter(s) Oral Mucosa every 12 hours  chlorhexidine 4% Liquid 1 Application(s) Topical <User Schedule>  dexMEDEtomidine Infusion 0.5 MICROgram(s)/kG/Hr IV Continuous <Continuous>  dextrose 5%. 1000 milliLiter(s) IV Continuous <Continuous>  dextrose 50% Injectable 12.5 Gram(s) IV Push once  dextrose 50% Injectable 25 Gram(s) IV Push once  dextrose 50% Injectable 25 Gram(s) IV Push once  gabapentin 300 milliGRAM(s) Oral every 8 hours  insulin glargine Injectable (LANTUS) 12 Unit(s) SubCutaneous two times a day  insulin lispro (HumaLOG) corrective regimen sliding scale   SubCutaneous every 6 hours  insulin lispro Injectable (HumaLOG) 4 Unit(s) SubCutaneous every 6 hours  labetalol 100 milliGRAM(s) Oral every 8 hours  naloxegol 25 milliGRAM(s) Oral daily  neomycin/bacitracin/polymyxin Topical Ointment 1 Application(s) Topical two times a day  OLANZapine 10 milliGRAM(s) Oral at bedtime  oxyCODONE    IR 5 milliGRAM(s) Oral every 8 hours  pantoprazole  Injectable 40 milliGRAM(s) IV Push daily  polyethylene glycol 3350 17 Gram(s) Oral at bedtime  senna 2 Tablet(s) Oral at bedtime      =======================================================  Labs:                        11.0   9.28  )-----------( 172      ( 28 Apr 2020 04:41 )             33.8      04-28    141  |  103  |  30.0<H>  ----------------------------<  87  4.3   |  26.0  |  0.81    Ca    9.0      28 Apr 2020 04:41  Phos  3.3     04-28  Mg     2.0     04-28        Creatinine, Serum: 0.81 mg/dL (04-28-20 @ 04:41)  Creatinine, Serum: 0.97 mg/dL (04-27-20 @ 04:23)  Creatinine, Serum: 1.12 mg/dL (04-26-20 @ 06:04)  Creatinine, Serum: 0.68 mg/dL (04-25-20 @ 07:40)  Creatinine, Serum: 0.91 mg/dL (04-24-20 @ 03:02)    Procalcitonin, Serum: 0.12 ng/mL (04-27-20 @ 04:23)  Procalcitonin, Serum: 0.16 ng/mL (04-25-20 @ 07:40)  Procalcitonin, Serum: 0.12 ng/mL (04-23-20 @ 04:18)  Procalcitonin, Serum: 0.14 ng/mL (04-21-20 @ 04:34)  Procalcitonin, Serum: 0.26 ng/mL (04-19-20 @ 05:13)  Procalcitonin, Serum: 0.67 ng/mL (04-17-20 @ 04:52)  Procalcitonin, Serum: 0.11 ng/mL (04-15-20 @ 06:53)    D-Dimer Assay, Quantitative: 679 ng/mL DDU (04-27-20 @ 04:23)  D-Dimer Assay, Quantitative: 725 ng/mL DDU (04-25-20 @ 07:40)  D-Dimer Assay, Quantitative: 6116 ng/mL DDU (04-23-20 @ 04:18)  D-Dimer Assay, Quantitative: 7943 ng/mL DDU (04-21-20 @ 04:34)  D-Dimer Assay, Quantitative: 35331 ng/mL DDU (04-19-20 @ 05:13)    Ferritin, Serum: 282 ng/mL (04-27-20 @ 04:23)  Ferritin, Serum: 298 ng/mL (04-25-20 @ 07:40)  Ferritin, Serum: 370 ng/mL (04-23-20 @ 04:18)  Ferritin, Serum: 384 ng/mL (04-21-20 @ 04:34)  Ferritin, Serum: 385 ng/mL (04-19-20 @ 05:13)  Ferritin, Serum: 384 ng/mL (04-17-20 @ 04:52)  Ferritin, Serum: 409 ng/mL (04-15-20 @ 06:53)  Ferritin, Serum: 520 ng/mL (04-13-20 @ 09:47)  Ferritin, Serum: 464 ng/mL (04-11-20 @ 12:40)    C-Reactive Protein, Serum: 0.66 mg/dL (04-27-20 @ 04:23)  C-Reactive Protein, Serum: 0.79 mg/dL (04-25-20 @ 07:40)  C-Reactive Protein, Serum: 0.47 mg/dL (04-23-20 @ 04:18)  C-Reactive Protein, Serum: 0.43 mg/dL (04-21-20 @ 04:34)  C-Reactive Protein, Serum: 2.04 mg/dL (04-19-20 @ 05:13)  C-Reactive Protein, Serum: 9.42 mg/dL (04-17-20 @ 04:52)  C-Reactive Protein, Serum: 1.84 mg/dL (04-15-20 @ 06:53)    WBC Count: 9.28 K/uL (04-28-20 @ 04:41)  WBC Count: 7.10 K/uL (04-27-20 @ 04:23)  WBC Count: 5.83 K/uL (04-26-20 @ 06:04)  WBC Count: 7.36 K/uL (04-25-20 @ 07:40)  WBC Count: 12.19 K/uL (04-24-20 @ 03:02)      COVID-19 PCR: Detected (04-11-20 @ 12:42)    Lactate Dehydrogenase, Serum: 262 U/L (04-27-20 @ 04:23)  Lactate Dehydrogenase, Serum: 240 U/L (04-25-20 @ 07:40)  Lactate Dehydrogenase, Serum: 296 U/L (04-23-20 @ 04:18)  Lactate Dehydrogenase, Serum: 315 U/L (04-21-20 @ 04:34)  Lactate Dehydrogenase, Serum: 327 U/L (04-19-20 @ 05:13)  Lactate Dehydrogenase, Serum: 322 U/L (04-17-20 @ 04:52)  Lactate Dehydrogenase, Serum: 351 U/L (04-15-20 @ 06:53)  Lactate Dehydrogenase, Serum: 321 U/L (04-11-20 @ 12:40)

## 2020-04-28 NOTE — PROGRESS NOTE ADULT - SUBJECTIVE AND OBJECTIVE BOX
69 yo M w/ PMH of HTN and DM on insulin and Metformin, and HLD presented with complaint of SOB, HA, mild CP, and weakness. He states he had a nonproductive cough, w/ congestion and feeling cold for raghav last 8 days. He also complained of a fever but did not check his temperature. Upon arrival to the ED his SpO2 was 84% on RA with RR of 28 which improved to 99% on NRB. He was then downtitrated to 6L NC and is saturating around 90-93%. He states since he has been in the ED and put on the oxygen he feels a lot better.   Currently he denies any Dizziness, HA, CP, SOB, Abd pain, N/V/D/C, dysuria, recent travel or sick contacts. (11 Apr 2020 16:00)    24hr EVENTS:  none reported.    PHYSICAL EXAM:  General: Calm, intubated, sedated for vent synchrony  HEENT: MMM  Neuro: sedated.  CV: RRR  Pulm: diminished to auscultation  Abd: Soft, nontender, nondistended  Skin: warm, dry

## 2020-04-28 NOTE — PROGRESS NOTE ADULT - ASSESSMENT
Assessment:  69 yo M w/ acute hypoxemicc respiratory failure 2/2 COVID pneumonia. Intubated 4/16.  ARDS.  Cytokine migdalia/MAC; s/p Tocilizumab 4/16 and Methylprednisolone.  MARIYA. ATN + pre-renal.  Hyperglycemia, h/o DM.    Changes today:   -d/c Clonazepam, decrease Zyprexa to 10 daily; trying to wake up  -vent managment  -Preciado is patent, no retention    NEUROLOGIC  Sedation: sedation to vent synchrony  cont oxy, olanzapine for sedation weaning  on Precedex PRN    RESPIRATORY  Pplat goal <30; Check for AutoPEEP    CARDIOVASCULAR  MAP goal>65, low dose vasopressor as needed  hold atorvastatin with transaminitis  labetalol started    GASTROINTESTINAL:   Tube feeds:  hold if paralyzed/proned or possible extubation.  GI prophylaxis: [] pantoprazole  Bowel regimen[] Miralax   monitor for transaminitis while on plaquenil    RENAL  goal I/O even   lasix prn  preciado for accurate UOP  hematuria- renal ultrasound  IVF    ENDOCRINE  ISS+ Lantus  Goal FSG < 180    HEMATOLOGY  chemical VTE prophylaxis: [] eliquis 5mg bid    INFECTIOUS DISEASES:  SARS-CoV-2 infection/COVID-19  s/p Hydroxychloroquine  trend Inflammatory markers  ID recommendations appreciated  s/p tocilizumab    DISPOSITION:   [] ICU

## 2020-04-29 NOTE — PROGRESS NOTE ADULT - ASSESSMENT
Assessment:  69 yo M w/ acute hypoxemicc respiratory failure 2/2 COVID pneumonia. Intubated 4/16.  ARDS.  Cytokine migdalia/MAC; s/p Tocilizumab 4/16 and Methylprednisolone.  MARIYA. ATN + pre-renal.  Hyperglycemia, h/o DM.    Changes today:   -decrease Oxy, wean Precedex to facilitate arousal  -CPAP trial  -started on empiric Zosyn in view of copious secretions    NEUROLOGIC  Sedation: sedation to vent synchrony as above  aim for sedation weaning    RESPIRATORY  Pplat goal <30; Check for AutoPEEP    CARDIOVASCULAR  MAP goal>65, low dose vasopressor as needed  hold atorvastatin with transaminitis  labetalol started    GASTROINTESTINAL:   Tube feeds:  hold after MN  GI prophylaxis: [] pantoprazole  Bowel regimen[] Miralax   monitor for transaminitis while on plaquenil    RENAL  goal I/O even   lasix prn  preciado for accurate UOP  hematuria, stable H/H, likely traumatic    ENDOCRINE  ISS+ Lantus  Goal FSG < 180    HEMATOLOGY  chemical VTE prophylaxis: [] eliquis 5mg bid    INFECTIOUS DISEASES:  SARS-CoV-2 infection/COVID-19  s/p Hydroxychloroquine  trend Inflammatory markers  ID recommendations appreciated  s/p tocilizumab    DISPOSITION:   [] ICU

## 2020-04-29 NOTE — PROGRESS NOTE ADULT - SUBJECTIVE AND OBJECTIVE BOX
69 yo M w/ PMH of HTN and DM on insulin and Metformin, and HLD presented with complaint of SOB, HA, mild CP, and weakness. He states he had a nonproductive cough, w/ congestion and feeling cold for raghav last 8 days. He also complained of a fever but did not check his temperature. Upon arrival to the ED his SpO2 was 84% on RA with RR of 28 which improved to 99% on NRB. He was then downtitrated to 6L NC and is saturating around 90-93%. He states since he has been in the ED and put on the oxygen he feels a lot better.   Currently he denies any Dizziness, HA, CP, SOB, Abd pain, N/V/D/C, dysuria, recent travel or sick contacts. (11 Apr 2020 16:00)    24hr EVENTS:  waking up; noted to have copious ETT secretions.    PHYSICAL EXAM:  General: Calm, intubated, sedated for vent synchrony  HEENT: MMM  Neuro: EO to voice, tracks, attempts to speak, CALDWELL 1/5.  CV: RRR  Pulm: diminished to auscultation  Abd: Soft, nontender, nondistended  Skin: warm, dry

## 2020-04-29 NOTE — PROGRESS NOTE ADULT - ASSESSMENT
69 yo M w/ PMH of HTN and DM on insulin and Metformin, and HLD presented with complaint of SOB, HA, mild CP, and weakness. He states he had a nonproductive cough, w/ congestion and feeling cold for raghav last 8 days. He also complained of a fever but did not check his temperature. Upon arrival to the ED his SpO2 was 84% on RA with RR of 28 which improved to 99% on NRB. He was then downtitrated to 6L NC and is saturating around 90-93%. He states since he has been in the ED and put on the oxygen he feels a lot better.   Currently he denies any Dizziness, HA, CP, SOB, Abd pain, N/V/D/C, dysuria, recent travel or sick contacts. (11 Apr 2020 16:00)    His Chest Xray showed bilateral PNA.  patient was admitted for management of acute hypoxic respiratory failure 2/2 COVID pneumonia.  He was treated with a course of Plaquinel 4/11 - 4/15     patient's course continue to deteriorate.  On 4/16/2020, he was intubated for worsening of resp failure.     Impression:  COVID-19 Pneumonia  multifocal Pneumonia  acute hypoxic respiratory failure  cough  shortness of breath  s/p intubation      Plan:  -  completed course of Hydroxychloroquine 4/15/2020  - s/p steroids 4/11 - 4/15  - s/p ACTEMRA x 1 dose 4/16      Continue supportive care measures  - s/p intubation on 4/16/2020    - Continue mechanical ventilation  - continue Oxygenation     ELEVATED D-dimers  - on Eliquis     INCREASE ET TUBE secretions  - check sputum cx  - start ZOSYN empirically.       Will follow with you.

## 2020-04-29 NOTE — PROGRESS NOTE ADULT - SUBJECTIVE AND OBJECTIVE BOX
[facility]  INFECTIOUS DISEASES AND INTERNAL MEDICINE at [place]  =======================================================  Natan Huynh MD  Diplomates American Board of Internal Medicine and Infectious Diseases  Telephone [phone]  Fax            [phone]  =======================================================    [id number]  [facility]   follow up: COVID 19 pneumonia    remains intubated    dark tan secretions from ET tube    =======================================================      REVIEW OF SYSTEMS:  Limited due to medical condition    =======================================================  Allergies  No Known Allergies    ======================================================  Physical Exam:  ============  (see vitals section below)    General:  sedated; intubated  Eye: Pupils are equal, round and reactive to light, Extraocular movements are intact, Normal conjunctiva.  HENT: Normocephalic, Oral mucosa is moist, No pharyngeal erythema, No sinus tenderness.  ETT  in place- TAN COLOR SECRETIONS  Neck: Supple, No lymphadenopathy.  Respiratory: Lungs with fair air entry  Cardiovascular: Normal rate, Regular rhythm,   Gastrointestinal: Soft, Non-tender, Non-distended, Normal bowel sounds.  Genitourinary: No costovertebral angle tenderness.  Lymphatics: No lymphadenopathy neck,   Musculoskeletal: Normal range of motion, Normal strength.  Integumentary: No rash.  Neurologic: sedated    =======================================================      Vitals:  ============  T(F): 98.2 (29 Apr 2020 10:00), Max: 98.9 (28 Apr 2020 16:28)  HR: 73 (29 Apr 2020 12:00)  BP: --  RR: 30 (29 Apr 2020 12:00)  SpO2: 91% (29 Apr 2020 12:00) (90% - 98%)  temp max in last 48H T(F): , Max: 99.7 (04-28-20 @ 04:00)    =======================================================  Current Antibiotics:  piperacillin/tazobactam IVPB.. 3.375 Gram(s) IV Intermittent every 8 hours    Other medications:  apixaban 5 milliGRAM(s) Oral <User Schedule>  chlorhexidine 0.12% Liquid 15 milliLiter(s) Oral Mucosa every 12 hours  chlorhexidine 4% Liquid 1 Application(s) Topical <User Schedule>  dexMEDEtomidine Infusion 0.5 MICROgram(s)/kG/Hr IV Continuous <Continuous>  dextrose 5%. 1000 milliLiter(s) IV Continuous <Continuous>  dextrose 50% Injectable 12.5 Gram(s) IV Push once  dextrose 50% Injectable 25 Gram(s) IV Push once  dextrose 50% Injectable 25 Gram(s) IV Push once  gabapentin 300 milliGRAM(s) Oral every 8 hours  insulin glargine Injectable (LANTUS) 12 Unit(s) SubCutaneous two times a day  insulin lispro (HumaLOG) corrective regimen sliding scale   SubCutaneous every 6 hours  insulin lispro Injectable (HumaLOG) 4 Unit(s) SubCutaneous every 6 hours  labetalol 100 milliGRAM(s) Oral every 8 hours  neomycin/bacitracin/polymyxin Topical Ointment 1 Application(s) Topical two times a day  OLANZapine 10 milliGRAM(s) Oral at bedtime  oxyCODONE    IR 5 milliGRAM(s) Oral every 12 hours  pantoprazole  Injectable 40 milliGRAM(s) IV Push daily  polyethylene glycol 3350 17 Gram(s) Oral at bedtime  senna 2 Tablet(s) Oral at bedtime      =======================================================  Labs:                        10.4   9.70  )-----------( 157      ( 29 Apr 2020 05:20 )             32.5      04-29    141  |  102  |  31.0<H>  ----------------------------<  195<H>  4.6   |  27.0  |  0.76    Ca    8.6      29 Apr 2020 05:20  Phos  4.0     04-29  Mg     2.0     04-29        Creatinine, Serum: 0.76 mg/dL (04-29-20 @ 05:20)  Creatinine, Serum: 0.81 mg/dL (04-28-20 @ 04:41)  Creatinine, Serum: 0.97 mg/dL (04-27-20 @ 04:23)  Creatinine, Serum: 1.12 mg/dL (04-26-20 @ 06:04)  Creatinine, Serum: 0.68 mg/dL (04-25-20 @ 07:40)    Procalcitonin, Serum: 0.22 ng/mL (04-29-20 @ 05:20)  Procalcitonin, Serum: 0.12 ng/mL (04-27-20 @ 04:23)  Procalcitonin, Serum: 0.16 ng/mL (04-25-20 @ 07:40)  Procalcitonin, Serum: 0.12 ng/mL (04-23-20 @ 04:18)  Procalcitonin, Serum: 0.14 ng/mL (04-21-20 @ 04:34)  Procalcitonin, Serum: 0.26 ng/mL (04-19-20 @ 05:13)  Procalcitonin, Serum: 0.67 ng/mL (04-17-20 @ 04:52)    D-Dimer Assay, Quantitative: 391 ng/mL DDU (04-29-20 @ 05:20)  D-Dimer Assay, Quantitative: 679 ng/mL DDU (04-27-20 @ 04:23)  D-Dimer Assay, Quantitative: 725 ng/mL DDU (04-25-20 @ 07:40)  D-Dimer Assay, Quantitative: 6116 ng/mL DDU (04-23-20 @ 04:18)  D-Dimer Assay, Quantitative: 7943 ng/mL DDU (04-21-20 @ 04:34)    Ferritin, Serum: 262 ng/mL (04-29-20 @ 05:20)  Ferritin, Serum: 282 ng/mL (04-27-20 @ 04:23)  Ferritin, Serum: 298 ng/mL (04-25-20 @ 07:40)  Ferritin, Serum: 370 ng/mL (04-23-20 @ 04:18)  Ferritin, Serum: 384 ng/mL (04-21-20 @ 04:34)  Ferritin, Serum: 385 ng/mL (04-19-20 @ 05:13)  Ferritin, Serum: 384 ng/mL (04-17-20 @ 04:52)  Ferritin, Serum: 409 ng/mL (04-15-20 @ 06:53)  Ferritin, Serum: 520 ng/mL (04-13-20 @ 09:47)  Ferritin, Serum: 464 ng/mL (04-11-20 @ 12:40)    C-Reactive Protein, Serum: 1.84 mg/dL (04-29-20 @ 05:20)  C-Reactive Protein, Serum: 0.66 mg/dL (04-27-20 @ 04:23)  C-Reactive Protein, Serum: 0.79 mg/dL (04-25-20 @ 07:40)  C-Reactive Protein, Serum: 0.47 mg/dL (04-23-20 @ 04:18)  C-Reactive Protein, Serum: 0.43 mg/dL (04-21-20 @ 04:34)  C-Reactive Protein, Serum: 2.04 mg/dL (04-19-20 @ 05:13)  C-Reactive Protein, Serum: 9.42 mg/dL (04-17-20 @ 04:52)    WBC Count: 9.70 K/uL (04-29-20 @ 05:20)  WBC Count: 9.28 K/uL (04-28-20 @ 04:41)  WBC Count: 7.10 K/uL (04-27-20 @ 04:23)  WBC Count: 5.83 K/uL (04-26-20 @ 06:04)  WBC Count: 7.36 K/uL (04-25-20 @ 07:40)      COVID-19 PCR: Detected (04-11-20 @ 12:42)    Lactate Dehydrogenase, Serum: 290 U/L (04-29-20 @ 05:20)  Lactate Dehydrogenase, Serum: 262 U/L (04-27-20 @ 04:23)  Lactate Dehydrogenase, Serum: 240 U/L (04-25-20 @ 07:40)  Lactate Dehydrogenase, Serum: 296 U/L (04-23-20 @ 04:18)  Lactate Dehydrogenase, Serum: 315 U/L (04-21-20 @ 04:34)  Lactate Dehydrogenase, Serum: 327 U/L (04-19-20 @ 05:13)  Lactate Dehydrogenase, Serum: 322 U/L (04-17-20 @ 04:52)  Lactate Dehydrogenase, Serum: 351 U/L (04-15-20 @ 06:53)  Lactate Dehydrogenase, Serum: 321 U/L (04-11-20 @ 12:40)

## 2020-04-30 NOTE — PROGRESS NOTE ADULT - ASSESSMENT
Assessment:  69 yo M w/ acute hypoxemicc respiratory failure 2/2 COVID pneumonia. Intubated 4/16.  ARDS.  Cytokine migdalia/MAC; s/p Tocilizumab 4/16 and Methylprednisolone.  MARIYA. ATN + pre-renal.  Hyperglycemia, h/o DM.    Changes today:   -received LAsix 20  -NRB, wean as tolerates  -S/S in 4 hours  -PT    NEUROLOGIC  Sedation: wean Precedex    RESPIRATORY  Pplat goal <30; Check for AutoPEEP    CARDIOVASCULAR  MAP goal>65, low dose vasopressor as needed  hold atorvastatin with transaminitis  labetalol started    GASTROINTESTINAL:   Tube feeds:  hold  GI prophylaxis: [] pantoprazole  Bowel regimen[] Miralax     RENAL  goal I/O even   lasix prn  preciado for accurate UOP  hematuria, stable H/H, likely traumatic - monitor    ENDOCRINE  ISS+ Lantus  Goal FSG < 180    HEMATOLOGY  chemical VTE prophylaxis: [] eliquis 5mg bid    INFECTIOUS DISEASES:  SARS-CoV-2 infection/COVID-19  s/p Hydroxychloroquine  trend Inflammatory markers  ID recommendations appreciated  s/p tocilizumab    DISPOSITION:   [] ICU

## 2020-04-30 NOTE — PROGRESS NOTE ADULT - SUBJECTIVE AND OBJECTIVE BOX
69 yo M w/ PMH of HTN and DM on insulin and Metformin, and HLD presented with complaint of SOB, HA, mild CP, and weakness. He states he had a nonproductive cough, w/ congestion and feeling cold for raghav last 8 days. He also complained of a fever but did not check his temperature. Upon arrival to the ED his SpO2 was 84% on RA with RR of 28 which improved to 99% on NRB. He was then downtitrated to 6L NC and is saturating around 90-93%. He states since he has been in the ED and put on the oxygen he feels a lot better.   Currently he denies any Dizziness, HA, CP, SOB, Abd pain, N/V/D/C, dysuria, recent travel or sick contacts. (11 Apr 2020 16:00)    24hr EVENTS: tolerated CPAP, extubated to NRB mask.    PHYSICAL EXAM:  General: Calm, cooperative, attempts to speak.  HEENT: MMM  Neuro: EO, FC, AAOx1, CALDWELL 1/5.  CV: RRR  Pulm: diminished to auscultation  Abd: Soft, nontender, distended  Skin: warm, dry

## 2020-04-30 NOTE — PROGRESS NOTE ADULT - SUBJECTIVE AND OBJECTIVE BOX
[facility]  INFECTIOUS DISEASES AND INTERNAL MEDICINE at [place]  =======================================================  Natan Huynh MD  Diplomates American Board of Internal Medicine and Infectious Diseases  Telephone [phone]  Fax            [phone]  =======================================================    [id number]  [facility]   follow up: COVID 19 pneumonia    remains intubated    dark tan secretions; sputum from 4/29/2020 sent.     trach filter changed.       =======================================================      REVIEW OF SYSTEMS:  Limited due to medical condition    =======================================================  Allergies  No Known Allergies    ======================================================  Physical Exam:  ============  (see vitals section below)    General:  sedated; intubated  Eye: Pupils are equal, round and reactive to light, Extraocular movements are intact, Normal conjunctiva.  HENT: Normocephalic, Oral mucosa is moist, No pharyngeal erythema, No sinus tenderness.  ETT  in place   Neck: Supple, No lymphadenopathy.  Respiratory: Lungs with fair air entry  Cardiovascular: Normal rate, Regular rhythm,   Gastrointestinal: Soft, Non-tender, Non-distended, Normal bowel sounds.  Genitourinary: No costovertebral angle tenderness.  Lymphatics: No lymphadenopathy neck,   Musculoskeletal: Normal range of motion, Normal strength.  Integumentary: No rash.  Neurologic: sedated    =======================================================      Vitals:  ============  T(F): 98.3 (30 Apr 2020 08:01), Max: 98.3 (30 Apr 2020 08:00)  HR: 63 (30 Apr 2020 10:00)  BP: --  RR: 24 (30 Apr 2020 10:00)  SpO2: 96% (30 Apr 2020 10:00) (91% - 98%)  temp max in last 48H T(F): , Max: 98.9 (04-28-20 @ 16:28)    =======================================================  Current Antibiotics:  piperacillin/tazobactam IVPB.. 3.375 Gram(s) IV Intermittent every 8 hours    Other medications:  apixaban 5 milliGRAM(s) Oral <User Schedule>  chlorhexidine 0.12% Liquid 15 milliLiter(s) Oral Mucosa every 12 hours  chlorhexidine 4% Liquid 1 Application(s) Topical <User Schedule>  dexMEDEtomidine Infusion 0.4 MICROgram(s)/kG/Hr IV Continuous <Continuous>  dexMEDEtomidine Infusion 0.5 MICROgram(s)/kG/Hr IV Continuous <Continuous>  dextrose 5%. 1000 milliLiter(s) IV Continuous <Continuous>  dextrose 50% Injectable 12.5 Gram(s) IV Push once  dextrose 50% Injectable 25 Gram(s) IV Push once  dextrose 50% Injectable 25 Gram(s) IV Push once  gabapentin 300 milliGRAM(s) Oral every 8 hours  insulin glargine Injectable (LANTUS) 12 Unit(s) SubCutaneous two times a day  insulin lispro (HumaLOG) corrective regimen sliding scale   SubCutaneous every 6 hours  insulin lispro Injectable (HumaLOG) 4 Unit(s) SubCutaneous every 6 hours  labetalol 100 milliGRAM(s) Oral every 8 hours  neomycin/bacitracin/polymyxin Topical Ointment 1 Application(s) Topical two times a day  OLANZapine 10 milliGRAM(s) Oral at bedtime  oxyCODONE    IR 5 milliGRAM(s) Oral every 12 hours  pantoprazole  Injectable 40 milliGRAM(s) IV Push daily  polyethylene glycol 3350 17 Gram(s) Oral at bedtime  senna 2 Tablet(s) Oral at bedtime      =======================================================  Labs:                        10.2   8.66  )-----------( 176      ( 30 Apr 2020 04:49 )             31.0      04-30    133<L>  |  94<L>  |  26.0<H>  ----------------------------<  127<H>  3.8   |  27.0  |  0.82    Ca    8.4<L>      30 Apr 2020 04:49  Phos  4.2     04-30  Mg     2.0     04-30    TPro  5.6<L>  /  Alb  2.5<L>  /  TBili  0.4  /  DBili  x   /  AST  25  /  ALT  22  /  AlkPhos  152<H>  04-30      Culture - Sputum (collected 04-29-20 @ 17:14)  Source: .Sputum Sputum  Gram Stain (04-29-20 @ 23:45):    Numerous polymorphonuclear leukocytes seen per low power field    Rare Squamous epithelial cells seen per low power field    Moderate Gram Positive Cocci in Pairs and Chains seen per oil power field    Few Gram Negative Rods seen per oil power field    Rare Gram Positive Rods seen per oil power field      Creatinine, Serum: 0.82 mg/dL (04-30-20 @ 04:49)  Creatinine, Serum: 0.76 mg/dL (04-29-20 @ 05:20)  Creatinine, Serum: 0.81 mg/dL (04-28-20 @ 04:41)  Creatinine, Serum: 0.97 mg/dL (04-27-20 @ 04:23)  Creatinine, Serum: 1.12 mg/dL (04-26-20 @ 06:04)    Procalcitonin, Serum: 0.18 ng/mL (04-30-20 @ 04:49)  Procalcitonin, Serum: 0.22 ng/mL (04-29-20 @ 05:20)  Procalcitonin, Serum: 0.12 ng/mL (04-27-20 @ 04:23)  Procalcitonin, Serum: 0.16 ng/mL (04-25-20 @ 07:40)  Procalcitonin, Serum: 0.12 ng/mL (04-23-20 @ 04:18)  Procalcitonin, Serum: 0.14 ng/mL (04-21-20 @ 04:34)  Procalcitonin, Serum: 0.26 ng/mL (04-19-20 @ 05:13)  Procalcitonin, Serum: 0.67 ng/mL (04-17-20 @ 04:52)    D-Dimer Assay, Quantitative: 391 ng/mL DDU (04-29-20 @ 05:20)  D-Dimer Assay, Quantitative: 679 ng/mL DDU (04-27-20 @ 04:23)  D-Dimer Assay, Quantitative: 725 ng/mL DDU (04-25-20 @ 07:40)  D-Dimer Assay, Quantitative: 6116 ng/mL DDU (04-23-20 @ 04:18)  D-Dimer Assay, Quantitative: 7943 ng/mL DDU (04-21-20 @ 04:34)    Ferritin, Serum: 208 ng/mL (04-30-20 @ 04:49)  Ferritin, Serum: 262 ng/mL (04-29-20 @ 05:20)  Ferritin, Serum: 282 ng/mL (04-27-20 @ 04:23)  Ferritin, Serum: 298 ng/mL (04-25-20 @ 07:40)  Ferritin, Serum: 370 ng/mL (04-23-20 @ 04:18)  Ferritin, Serum: 384 ng/mL (04-21-20 @ 04:34)  Ferritin, Serum: 385 ng/mL (04-19-20 @ 05:13)  Ferritin, Serum: 384 ng/mL (04-17-20 @ 04:52)  Ferritin, Serum: 409 ng/mL (04-15-20 @ 06:53)  Ferritin, Serum: 520 ng/mL (04-13-20 @ 09:47)  Ferritin, Serum: 464 ng/mL (04-11-20 @ 12:40)    C-Reactive Protein, Serum: 4.41 mg/dL (04-30-20 @ 04:49)  C-Reactive Protein, Serum: 1.84 mg/dL (04-29-20 @ 05:20)  C-Reactive Protein, Serum: 0.66 mg/dL (04-27-20 @ 04:23)  C-Reactive Protein, Serum: 0.79 mg/dL (04-25-20 @ 07:40)  C-Reactive Protein, Serum: 0.47 mg/dL (04-23-20 @ 04:18)  C-Reactive Protein, Serum: 0.43 mg/dL (04-21-20 @ 04:34)  C-Reactive Protein, Serum: 2.04 mg/dL (04-19-20 @ 05:13)  C-Reactive Protein, Serum: 9.42 mg/dL (04-17-20 @ 04:52)    WBC Count: 8.66 K/uL (04-30-20 @ 04:49)  WBC Count: 9.70 K/uL (04-29-20 @ 05:20)  WBC Count: 9.28 K/uL (04-28-20 @ 04:41)  WBC Count: 7.10 K/uL (04-27-20 @ 04:23)  WBC Count: 5.83 K/uL (04-26-20 @ 06:04)      COVID-19 PCR: Detected (04-11-20 @ 12:42)    Lactate Dehydrogenase, Serum: 286 U/L (04-30-20 @ 04:49)  Lactate Dehydrogenase, Serum: 290 U/L (04-29-20 @ 05:20)       Alkaline Phosphatase, Serum: 152 U/L (04-30-20 @ 04:49)  Alanine Aminotransferase (ALT/SGPT): 22 U/L (04-30-20 @ 04:49)  Aspartate Aminotransferase (AST/SGOT): 25 U/L (04-30-20 @ 04:49)  Bilirubin Total, Serum: 0.4 mg/dL (04-30-20 @ 04:49)

## 2020-05-01 NOTE — PROGRESS NOTE ADULT - ASSESSMENT
Assessment:  69 yo M w/ acute hypoxemicc respiratory failure 2/2 COVID pneumonia. Extubated 5/1.  ARDS.  Concern for upper airway obstruction - ?vocal cord paralysis/dysfunction.  Cytokine migdalia/MAC; s/p Tocilizumab 4/16 and Methylprednisolone.  MARIYA. ATN + pre-renal.  Hyperglycemia, h/o DM.    Changes today:   -in resp distress in am - struggles to breath with stridor-like sound (pronounced mostly with inspiration though) on a gas - CO2 retention: received Lasix 40 with good UO, Mg, Epi 0.5 SQ with no significant improvement.  Of note, saturated 100% on NRB during the episode, EtCO2 remained 35s  -started on BiPAP; more comfortable now  -switch to SQL as will be NPO for now  -switched to Meropenem - Pseudomonas in sputum    NEUROLOGIC  Sedation: keep low-dose Precedex    RESPIRATORY  BiPAP    CARDIOVASCULAR  MAP goal>65, low dose vasopressor as needed  hold atorvastatin with transaminitis  labetalol TID    GASTROINTESTINAL:   Tube feeds:  hold, keep NPO for now  GI prophylaxis: [] pantoprazole  Bowel regimen[] Miralax     RENAL  goal I/O even   lasix prn  preciado for accurate UOP  hematuria, stable H/H, likely traumatic - improving    ENDOCRINE  ISS+ Lantus  Goal FSG < 180    HEMATOLOGY  chemical VTE prophylaxis: [] Lovenox    INFECTIOUS DISEASES:  SARS-CoV-2 infection/COVID-19  s/p Hydroxychloroquine  trend Inflammatory markers  ID recommendations appreciated, on Meropenem  s/p tocilizumab    DISPOSITION:   [] ICU

## 2020-05-01 NOTE — PROGRESS NOTE ADULT - SUBJECTIVE AND OBJECTIVE BOX
71 yo M w/ PMH of HTN and DM on insulin and Metformin, and HLD presented with complaint of SOB, HA, mild CP, and weakness. He states he had a nonproductive cough, w/ congestion and feeling cold for raghav last 8 days. He also complained of a fever but did not check his temperature. Upon arrival to the ED his SpO2 was 84% on RA with RR of 28 which improved to 99% on NRB. He was then downtitrated to 6L NC and is saturating around 90-93%. He states since he has been in the ED and put on the oxygen he feels a lot better.   Currently he denies any Dizziness, HA, CP, SOB, Abd pain, N/V/D/C, dysuria, recent travel or sick contacts. (11 Apr 2020 16:00)    24hr EVENTS: extubated yesterday; stridor-like sound - received 125 Solumedrol in PM, started on standing 60q6.    PHYSICAL EXAM: on BiPAP  General: Calm, cooperative, attempts to speak.  HEENT: MMM  Neuro: EO, FC, AAOx1, CALDWELL at least 3/5.  CV: RRR  Pulm: diminished to auscultation  Abd: Soft, nontender, distended  Skin: warm, dry

## 2020-05-01 NOTE — PROGRESS NOTE ADULT - SUBJECTIVE AND OBJECTIVE BOX
[facility]  INFECTIOUS DISEASES AND INTERNAL MEDICINE [facility] [place]  =======================================================  Natan Huynh MD  Diplomates American Board of Internal Medicine and Infectious Diseases  Telephone [phone]  Fax            [phone]  =======================================================    [id number]  [place]   follow up: COVID 19 pneumonia    extubated yesterday    dark tan secretions; sputum from 4/29/2020 sent.     trach filter changed.     =======================================================      REVIEW OF SYSTEMS:  Limited due to medical condition    =======================================================  Allergies  No Known Allergies    ======================================================  Physical Exam:  ============  (see vitals section below)    General:  sedated; intubated  Eye: Pupils are equal, round and reactive to light, Extraocular movements are intact, Normal conjunctiva.  HENT: Normocephalic, Oral mucosa is moist, No pharyngeal erythema, No sinus tenderness.  ETT  in place   Neck: Supple, No lymphadenopathy.  Respiratory: Lungs with fair air entry  Cardiovascular: Normal rate, Regular rhythm,   Gastrointestinal: Soft, Non-tender, Non-distended, Normal bowel sounds.  Genitourinary: No costovertebral angle tenderness.  Lymphatics: No lymphadenopathy neck,   Musculoskeletal: Normal range of motion, Normal strength.  Integumentary: No rash.  Neurologic: sedated    =======================================================      Vitals:  ============  T(F): 98.3 (30 Apr 2020 08:01), Max: 98.3 (30 Apr 2020 08:00)  HR: 63 (30 Apr 2020 10:00)  BP: --  RR: 24 (30 Apr 2020 10:00)  SpO2: 96% (30 Apr 2020 10:00) (91% - 98%)  temp max in last 48H T(F): , Max: 98.9 (04-28-20 @ 16:28)    =======================================================  Current Antibiotics:  piperacillin/tazobactam IVPB.. 3.375 Gram(s) IV Intermittent every 8 hours    Other medications:  apixaban 5 milliGRAM(s) Oral <User Schedule>  chlorhexidine 0.12% Liquid 15 milliLiter(s) Oral Mucosa every 12 hours  chlorhexidine 4% Liquid 1 Application(s) Topical <User Schedule>  dexMEDEtomidine Infusion 0.4 MICROgram(s)/kG/Hr IV Continuous <Continuous>  dexMEDEtomidine Infusion 0.5 MICROgram(s)/kG/Hr IV Continuous <Continuous>  dextrose 5%. 1000 milliLiter(s) IV Continuous <Continuous>  dextrose 50% Injectable 12.5 Gram(s) IV Push once  dextrose 50% Injectable 25 Gram(s) IV Push once  dextrose 50% Injectable 25 Gram(s) IV Push once  gabapentin 300 milliGRAM(s) Oral every 8 hours  insulin glargine Injectable (LANTUS) 12 Unit(s) SubCutaneous two times a day  insulin lispro (HumaLOG) corrective regimen sliding scale   SubCutaneous every 6 hours  insulin lispro Injectable (HumaLOG) 4 Unit(s) SubCutaneous every 6 hours  labetalol 100 milliGRAM(s) Oral every 8 hours  neomycin/bacitracin/polymyxin Topical Ointment 1 Application(s) Topical two times a day  OLANZapine 10 milliGRAM(s) Oral at bedtime  oxyCODONE    IR 5 milliGRAM(s) Oral every 12 hours  pantoprazole  Injectable 40 milliGRAM(s) IV Push daily  polyethylene glycol 3350 17 Gram(s) Oral at bedtime  senna 2 Tablet(s) Oral at bedtime      =======================================================  Labs:                        10.2   8.66  )-----------( 176      ( 30 Apr 2020 04:49 )             31.0      04-30    133<L>  |  94<L>  |  26.0<H>  ----------------------------<  127<H>  3.8   |  27.0  |  0.82    Ca    8.4<L>      30 Apr 2020 04:49  Phos  4.2     04-30  Mg     2.0     04-30    TPro  5.6<L>  /  Alb  2.5<L>  /  TBili  0.4  /  DBili  x   /  AST  25  /  ALT  22  /  AlkPhos  152<H>  04-30      Culture - Sputum . (04.29.20 @ 17:14)    Gram Stain:   Numerous polymorphonuclear leukocytes seen per low power field  Rare Squamous epithelial cells seen per low power field  Moderate Gram Positive Cocci in Pairs and Chains seen per oil power field  Few Gram Negative Rods seen per oil power field  Rare Gram Positive Rods seen per oil power field    Specimen Source: .Sputum Sputum    Culture Results:   Numerous Pseudomonas aeruginosa      Creatinine, Serum: 0.82 mg/dL (04-30-20 @ 04:49)  Creatinine, Serum: 0.76 mg/dL (04-29-20 @ 05:20)  Creatinine, Serum: 0.81 mg/dL (04-28-20 @ 04:41)  Creatinine, Serum: 0.97 mg/dL (04-27-20 @ 04:23)  Creatinine, Serum: 1.12 mg/dL (04-26-20 @ 06:04)    Procalcitonin, Serum: 0.18 ng/mL (04-30-20 @ 04:49)  Procalcitonin, Serum: 0.22 ng/mL (04-29-20 @ 05:20)  Procalcitonin, Serum: 0.12 ng/mL (04-27-20 @ 04:23)  Procalcitonin, Serum: 0.16 ng/mL (04-25-20 @ 07:40)  Procalcitonin, Serum: 0.12 ng/mL (04-23-20 @ 04:18)  Procalcitonin, Serum: 0.14 ng/mL (04-21-20 @ 04:34)  Procalcitonin, Serum: 0.26 ng/mL (04-19-20 @ 05:13)  Procalcitonin, Serum: 0.67 ng/mL (04-17-20 @ 04:52)    D-Dimer Assay, Quantitative: 391 ng/mL DDU (04-29-20 @ 05:20)  D-Dimer Assay, Quantitative: 679 ng/mL DDU (04-27-20 @ 04:23)  D-Dimer Assay, Quantitative: 725 ng/mL DDU (04-25-20 @ 07:40)  D-Dimer Assay, Quantitative: 6116 ng/mL DDU (04-23-20 @ 04:18)  D-Dimer Assay, Quantitative: 7943 ng/mL DDU (04-21-20 @ 04:34)    Ferritin, Serum: 208 ng/mL (04-30-20 @ 04:49)  Ferritin, Serum: 262 ng/mL (04-29-20 @ 05:20)  Ferritin, Serum: 282 ng/mL (04-27-20 @ 04:23)  Ferritin, Serum: 298 ng/mL (04-25-20 @ 07:40)  Ferritin, Serum: 370 ng/mL (04-23-20 @ 04:18)  Ferritin, Serum: 384 ng/mL (04-21-20 @ 04:34)  Ferritin, Serum: 385 ng/mL (04-19-20 @ 05:13)  Ferritin, Serum: 384 ng/mL (04-17-20 @ 04:52)  Ferritin, Serum: 409 ng/mL (04-15-20 @ 06:53)  Ferritin, Serum: 520 ng/mL (04-13-20 @ 09:47)  Ferritin, Serum: 464 ng/mL (04-11-20 @ 12:40)    C-Reactive Protein, Serum: 4.41 mg/dL (04-30-20 @ 04:49)  C-Reactive Protein, Serum: 1.84 mg/dL (04-29-20 @ 05:20)  C-Reactive Protein, Serum: 0.66 mg/dL (04-27-20 @ 04:23)  C-Reactive Protein, Serum: 0.79 mg/dL (04-25-20 @ 07:40)  C-Reactive Protein, Serum: 0.47 mg/dL (04-23-20 @ 04:18)  C-Reactive Protein, Serum: 0.43 mg/dL (04-21-20 @ 04:34)  C-Reactive Protein, Serum: 2.04 mg/dL (04-19-20 @ 05:13)  C-Reactive Protein, Serum: 9.42 mg/dL (04-17-20 @ 04:52)    WBC Count: 8.66 K/uL (04-30-20 @ 04:49)  WBC Count: 9.70 K/uL (04-29-20 @ 05:20)  WBC Count: 9.28 K/uL (04-28-20 @ 04:41)  WBC Count: 7.10 K/uL (04-27-20 @ 04:23)  WBC Count: 5.83 K/uL (04-26-20 @ 06:04)      COVID-19 PCR: Detected (04-11-20 @ 12:42)    Lactate Dehydrogenase, Serum: 286 U/L (04-30-20 @ 04:49)  Lactate Dehydrogenase, Serum: 290 U/L (04-29-20 @ 05:20)    Alkaline Phosphatase, Serum: 152 U/L (04-30-20 @ 04:49)  Alanine Aminotransferase (ALT/SGPT): 22 U/L (04-30-20 @ 04:49)  Aspartate Aminotransferase (AST/SGOT): 25 U/L (04-30-20 @ 04:49)  Bilirubin Total, Serum: 0.4 mg/dL (04-30-20 @ 04:49)

## 2020-05-01 NOTE — PROGRESS NOTE ADULT - SUBJECTIVE AND OBJECTIVE BOX
[facility] CONSULT  (Full note dictated)    IMPRESSION:  70 male with COVID pneumonia, ARDS, extubated today after 14 day intubation with:   Mild left vocal fold paresis, mild bilateral vocal cord edema and mild (left greater than right) vocal process granulation tissue formation  Overall airway is patent at glottic level and vocal cords are mobile  RECOMMEND:  Agree with continued steroids which will aid in resolution of granulation  PPI  Speech Therapy evaluation  Aspiration precautions  Would expect mild left VC paresis to resolve over time  Will sign off  Please reconsult with any concerns  Followup with ENT as outpatient  Discussed with patient via     Jose R Frederick MD, FACS

## 2020-05-01 NOTE — PROGRESS NOTE ADULT - ASSESSMENT
71 yo M w/ PMH of HTN and DM on insulin and Metformin, and HLD presented with complaint of SOB, HA, mild CP, and weakness. He states he had a nonproductive cough, w/ congestion and feeling cold for raghav last 8 days. He also complained of a fever but did not check his temperature. Upon arrival to the ED his SpO2 was 84% on RA with RR of 28 which improved to 99% on NRB. He was then downtitrated to 6L NC and is saturating around 90-93%. He states since he has been in the ED and put on the oxygen he feels a lot better.   Currently he denies any Dizziness, HA, CP, SOB, Abd pain, N/V/D/C, dysuria, recent travel or sick contacts. (11 Apr 2020 16:00)    His Chest Xray showed bilateral PNA.  patient was admitted for management of acute hypoxic respiratory failure 2/2 COVID pneumonia.  He was treated with a course of Plaquinel 4/11 - 4/15     patient's course continue to deteriorate.  On 4/16/2020, he was intubated for worsening of resp failure.     Impression:  COVID-19 Pneumonia  multifocal Pneumonia  acute hypoxic respiratory failure  cough  shortness of breath  s/p intubation      Plan:  -  completed course of Hydroxychloroquine 4/15/2020  - s/p steroids 4/11 - 4/15  - s/p ACTEMRA x 1 dose 4/16      Continue supportive care measures  - s/p intubation on 4/16/2020    - Continue mechanical ventilation  - continue Oxygenation     ELEVATED D-dimers  - on Eliquis     INCREASE ET TUBE secretions  - sputum GRAM STAIN is POLYMORPHIC, will follow    Pseudomonas in sputum culture  - d/c zosyn  - start on Merrem      Will follow with you.

## 2020-05-02 NOTE — PROGRESS NOTE ADULT - SUBJECTIVE AND OBJECTIVE BOX
Chief complaint:   Patient is a 70y old  Male who presents with a chief complaint of SOB (01 May 2020 18:26)    HPI:  71 yo M w/ PMH of HTN and DM on insulin and Metformin, and HLD presented with complaint of SOB, HA, mild CP, and weakness. He states he had a nonproductive cough, w/ congestion and feeling cold for raghav last 8 days. He also complained of a fever but did not check his temperature. Upon arrival to the ED his SpO2 was 84% on RA with RR of 28 which improved to 99% on NRB. He was then downtitrated to 6L NC and is saturating around 90-93%. He states since he has been in the ED and put on the oxygen he feels a lot better.   Currently he denies any Dizziness, HA, CP, SOB, Abd pain, N/V/D/C, dysuria, recent travel or sick contacts. (11 Apr 2020 16:00)        24hr EVENTS:      ROS: [ ]  Unable to assess due to mental status   All other systems negative    -----------------------------------------------------------------------------------------------------------------------------------------------------------------------------------  ICU Vital Signs Last 24 Hrs  T(C): 36.7 (02 May 2020 04:00), Max: 37.3 (01 May 2020 20:00)  T(F): 98 (02 May 2020 04:00), Max: 99.1 (01 May 2020 20:00)  HR: 67 (02 May 2020 07:33) (67 - 97)  BP: 137/54 (02 May 2020 04:00) (137/54 - 151/64)  BP(mean): 81 (02 May 2020 04:00) (78 - 82)  ABP: 140/47 (02 May 2020 04:00) (140/47 - 166/47)  ABP(mean): 77 (02 May 2020 04:00) (77 - 89)  RR: 29 (02 May 2020 04:00) (26 - 30)  SpO2: 100% (02 May 2020 07:33) (94% - 100%)      I&O's Summary    01 May 2020 07:01  -  02 May 2020 07:00  --------------------------------------------------------  IN: 320.1 mL / OUT: 1995 mL / NET: -1674.9 mL        MEDICATIONS  (STANDING):  chlorhexidine 4% Liquid 1 Application(s) Topical <User Schedule>  dexMEDEtomidine Infusion 0.4 MICROgram(s)/kG/Hr (6.58 mL/Hr) IV Continuous <Continuous>  dextrose 5%. 1000 milliLiter(s) (50 mL/Hr) IV Continuous <Continuous>  dextrose 50% Injectable 12.5 Gram(s) IV Push once  dextrose 50% Injectable 25 Gram(s) IV Push once  dextrose 50% Injectable 25 Gram(s) IV Push once  enoxaparin Injectable 40 milliGRAM(s) SubCutaneous every 12 hours  gabapentin 300 milliGRAM(s) Oral every 8 hours  insulin glargine Injectable (LANTUS) 12 Unit(s) SubCutaneous two times a day  insulin lispro (HumaLOG) corrective regimen sliding scale   SubCutaneous every 6 hours  insulin lispro Injectable (HumaLOG) 4 Unit(s) SubCutaneous every 6 hours  labetalol Injectable 10 milliGRAM(s) IV Push every 8 hours  meropenem  IVPB 1000 milliGRAM(s) IV Intermittent every 8 hours  methylPREDNISolone sodium succinate Injectable 60 milliGRAM(s) IV Push every 6 hours  neomycin/bacitracin/polymyxin Topical Ointment 1 Application(s) Topical two times a day  OLANZapine 10 milliGRAM(s) Oral at bedtime  pantoprazole  Injectable 40 milliGRAM(s) IV Push daily  potassium chloride  20 mEq/100 mL IVPB 20 milliEquivalent(s) IV Intermittent every 2 hours      RESPIRATORY:        NEUROIMAGING:   Recent imaging studies were reviewed.    LAB RESULTS:                          10.1   12.65 )-----------( 282      ( 02 May 2020 05:19 )             30.6           05-02    143  |  101  |  41.0<H>  ----------------------------<  59<L>  3.0<L>   |  28.0  |  1.03    Ca    8.4<L>      02 May 2020 05:19  Phos  3.0     05-02  Mg     2.3     05-02    TPro  6.0<L>  /  Alb  2.8<L>  /  TBili  0.4  /  DBili  x   /  AST  21  /  ALT  19  /  AlkPhos  137<H>  05-02          ABG - ( 01 May 2020 14:38 )  pH, Arterial: 7.40  pH, Blood: x     /  pCO2: 45    /  pO2: 72    / HCO3: 26    / Base Excess: x     /  SaO2: 95                    -----------------------------------------------------------------------------------------------------------------------------------------------------------------------------------    PHYSICAL EXAM:  General: Calm, intubated, sedated for vent synchrony  HEENT: MMM  Neuro:  -Mental status- No acute distress  -CN- PERRL 3mm, EOMI, tongue midline, face symmetric    CV: RRR  Pulm: diminished to auscultation  Abd: Soft, nontender, nondistended  Ext: moderate edema in lower ext  Skin: warm, dry Chief complaint:   Patient is a 70y old  Male who presents with a chief complaint of SOB (01 May 2020 18:26)    HPI:  69 yo M w/ PMH of HTN and DM on insulin and Metformin, and HLD presented with complaint of SOB, HA, mild CP, and weakness. He states he had a nonproductive cough, w/ congestion and feeling cold for raghav last 8 days. He also complained of a fever but did not check his temperature. Upon arrival to the ED his SpO2 was 84% on RA with RR of 28 which improved to 99% on NRB. He was then downtitrated to 6L NC and is saturating around 90-93%. He states since he has been in the ED and put on the oxygen he feels a lot better.   Currently he denies any Dizziness, HA, CP, SOB, Abd pain, N/V/D/C, dysuria, recent travel or sick contacts. (11 Apr 2020 16:00)    24hr EVENTS:  BiPAP  covid(neg)  hypoglycemia  vocal cord paralysis- cont solumedrol    ROS: [ x]  Unable to assess due to mental status   All other systems negative    -----------------------------------------------------------------------------------------------------------------------------------------------------------------------------------  ICU Vital Signs Last 24 Hrs  T(C): 36.7 (02 May 2020 04:00), Max: 37.3 (01 May 2020 20:00)  T(F): 98 (02 May 2020 04:00), Max: 99.1 (01 May 2020 20:00)  HR: 67 (02 May 2020 07:33) (67 - 97)  BP: 137/54 (02 May 2020 04:00) (137/54 - 151/64)  BP(mean): 81 (02 May 2020 04:00) (78 - 82)  ABP: 140/47 (02 May 2020 04:00) (140/47 - 166/47)  ABP(mean): 77 (02 May 2020 04:00) (77 - 89)  RR: 29 (02 May 2020 04:00) (26 - 30)  SpO2: 100% (02 May 2020 07:33) (94% - 100%)      I&O's Summary    01 May 2020 07:01  -  02 May 2020 07:00  --------------------------------------------------------  IN: 320.1 mL / OUT: 1995 mL / NET: -1674.9 mL        MEDICATIONS  (STANDING):  chlorhexidine 4% Liquid 1 Application(s) Topical <User Schedule>  dexMEDEtomidine Infusion 0.4 MICROgram(s)/kG/Hr (6.58 mL/Hr) IV Continuous <Continuous>  dextrose 5%. 1000 milliLiter(s) (50 mL/Hr) IV Continuous <Continuous>  dextrose 50% Injectable 12.5 Gram(s) IV Push once  dextrose 50% Injectable 25 Gram(s) IV Push once  dextrose 50% Injectable 25 Gram(s) IV Push once  enoxaparin Injectable 40 milliGRAM(s) SubCutaneous every 12 hours  gabapentin 300 milliGRAM(s) Oral every 8 hours  insulin glargine Injectable (LANTUS) 12 Unit(s) SubCutaneous two times a day  insulin lispro (HumaLOG) corrective regimen sliding scale   SubCutaneous every 6 hours  insulin lispro Injectable (HumaLOG) 4 Unit(s) SubCutaneous every 6 hours  labetalol Injectable 10 milliGRAM(s) IV Push every 8 hours  meropenem  IVPB 1000 milliGRAM(s) IV Intermittent every 8 hours  methylPREDNISolone sodium succinate Injectable 60 milliGRAM(s) IV Push every 6 hours  neomycin/bacitracin/polymyxin Topical Ointment 1 Application(s) Topical two times a day  OLANZapine 10 milliGRAM(s) Oral at bedtime  pantoprazole  Injectable 40 milliGRAM(s) IV Push daily  potassium chloride  20 mEq/100 mL IVPB 20 milliEquivalent(s) IV Intermittent every 2 hours      RESPIRATORY:        NEUROIMAGING:   Recent imaging studies were reviewed.    LAB RESULTS:                          10.1   12.65 )-----------( 282      ( 02 May 2020 05:19 )             30.6           05-02    143  |  101  |  41.0<H>  ----------------------------<  59<L>  3.0<L>   |  28.0  |  1.03    Ca    8.4<L>      02 May 2020 05:19  Phos  3.0     05-02  Mg     2.3     05-02    TPro  6.0<L>  /  Alb  2.8<L>  /  TBili  0.4  /  DBili  x   /  AST  21  /  ALT  19  /  AlkPhos  137<H>  05-02          ABG - ( 01 May 2020 14:38 )  pH, Arterial: 7.40  pH, Blood: x     /  pCO2: 45    /  pO2: 72    / HCO3: 26    / Base Excess: x     /  SaO2: 95                    -----------------------------------------------------------------------------------------------------------------------------------------------------------------------------------    PHYSICAL EXAM:  General: Calm  HEENT: MMM  Neuro:  -Mental status- No acute distress  -CN- PERRL 3mm, EOMI, tongue midline, face symmetric    CV: RRR  Pulm: diminished to auscultation  Abd: Soft, nontender, nondistended  Ext: moderate edema in lower ext  Skin: warm, dry

## 2020-05-02 NOTE — CHART NOTE - NSCHARTNOTEFT_GEN_A_CORE
Upon Nutritional Assessment by the Registered Dietitian your patient was determined to meet criteria / has evidence of the following diagnosis/diagnoses:          [x ]  Mild Protein Calorie Malnutrition        [ ]  Moderate Protein Calorie Malnutrition        [ ] Severe Protein Calorie Malnutrition        [ ] Unspecified Protein Calorie Malnutrition        [ ] Underweight / BMI <19        [ ] Morbid Obesity / BMI > 40    Pt remains at high nutrition risk secondary to malnutrition (mild, acute) related to inability to meet sufficient protein-energy in setting of COVID+, acute respiratory failure requiring prolonged intubation, now extubated with concern for upper airway obstruction (?vocal cord paralysis/dysfunction) as evidenced by meeting <75% nutrient needs >/5 days and +edema.    Findings as based on:  •  Comprehensive nutrition assessment and consultation  •  Calorie counts (nutrient intake analysis)  •  Food acceptance and intake status from observations by staff  •  Follow up  •  Patient education  •  Intervention secondary to interdisciplinary rounds  •   concerns      Treatment:    The following has been recommended:  1) Diet per SLP recommendations. If pt deemed safe for po intake, recommend CCHO, DASH/TLC diet with consistency per SLP and add Glucerna TID to optimize po intake and provide an additional 220 kcal, 10g protein per serving if feasible.  2) If pt deemed unsafe for po intake, consider alternative means of nutrition/hydration- RD to follow up with recommendations if warranted.  3) Rx: MVI daily as feasible.  4) Obtain daily weights to monitor trends.    PROVIDER Section:     By signing this assessment you are acknowledging and agree with the diagnosis/diagnoses assigned by the Registered Dietitian    Comments:

## 2020-05-02 NOTE — CHART NOTE - NSCHARTNOTEFT_GEN_A_CORE
Source: Patient [ ]  Family [ ]   other [ x]    Current Diet: Diet, NPO:   Except Medications (04-29-20 @ 16:44)    Current Weight:   (4/11) 145 lbs  No new weight  Noted with 2+ generalized and 3+ b/l leg edema    Pertinent Medications: MEDICATIONS  (STANDING):  chlorhexidine 4% Liquid 1 Application(s) Topical <User Schedule>  dexMEDEtomidine Infusion 0.4 MICROgram(s)/kG/Hr (6.58 mL/Hr) IV Continuous <Continuous>  dextrose 5%. 1000 milliLiter(s) (50 mL/Hr) IV Continuous <Continuous>  dextrose 50% Injectable 12.5 Gram(s) IV Push once  dextrose 50% Injectable 25 Gram(s) IV Push once  dextrose 50% Injectable 25 Gram(s) IV Push once  enoxaparin Injectable 40 milliGRAM(s) SubCutaneous every 12 hours  gabapentin 300 milliGRAM(s) Oral every 8 hours  insulin glargine Injectable (LANTUS) 12 Unit(s) SubCutaneous two times a day  insulin lispro (HumaLOG) corrective regimen sliding scale   SubCutaneous every 6 hours  insulin lispro Injectable (HumaLOG) 4 Unit(s) SubCutaneous every 6 hours  labetalol Injectable 10 milliGRAM(s) IV Push every 8 hours  meropenem  IVPB 1000 milliGRAM(s) IV Intermittent every 8 hours  methylPREDNISolone sodium succinate Injectable 60 milliGRAM(s) IV Push every 6 hours  neomycin/bacitracin/polymyxin Topical Ointment 1 Application(s) Topical two times a day  OLANZapine 10 milliGRAM(s) Oral at bedtime  pantoprazole  Injectable 40 milliGRAM(s) IV Push daily  potassium chloride  20 mEq/100 mL IVPB 20 milliEquivalent(s) IV Intermittent every 2 hours    MEDICATIONS  (PRN):  acetaminophen   Tablet .. 650 milliGRAM(s) Oral every 6 hours PRN Temp greater or equal to 38C (100.4F)  ALBUTerol    90 MICROgram(s) HFA Inhaler 1 Puff(s) Inhalation every 4 hours PRN Shortness of Breath and/or Wheezing  dextrose 40% Gel 15 Gram(s) Oral once PRN Blood Glucose LESS THAN 70 milliGRAM(s)/deciliter  glucagon  Injectable 1 milliGRAM(s) IntraMuscular once PRN Glucose LESS THAN 70 milligrams/deciliter  hydrALAZINE Injectable 10 milliGRAM(s) IV Push every 2 hours PRN SBP >160  sodium chloride 0.9% lock flush 10 milliLiter(s) IV Push every 1 hour PRN Pre/post blood products, medications, blood draw, and to maintain line patency    Pertinent Labs: CBC Full  -  ( 02 May 2020 05:19 )  WBC Count : 12.65 K/uL  RBC Count : 3.40 M/uL  Hemoglobin : 10.1 g/dL  Hematocrit : 30.6 %  Platelet Count - Automated : 282 K/uL  Mean Cell Volume : 90.0 fl  Mean Cell Hemoglobin : 29.7 pg  Mean Cell Hemoglobin Concentration : 33.0 gm/dL  Auto Neutrophil # : 10.03 K/uL  Auto Lymphocyte # : 0.84 K/uL  Auto Monocyte # : 1.68 K/uL  Auto Eosinophil # : 0.00 K/uL  Auto Basophil # : 0.01 K/uL  Auto Neutrophil % : 79.3 %  Auto Lymphocyte % : 6.6 %  Auto Monocyte % : 13.3 %  Auto Eosinophil % : 0.0 %  Auto Basophil % : 0.1 %    05-02 Na143 mmol/L Glu 59 mg/dL<L> K+ 3.0 mmol/L<L> Cr  1.03 mg/dL BUN 41.0 mg/dL<H> Phos 3.0 mg/dL Alb 2.8 g/dL<L> PAB n/a       Skin: Intact per documentation    Nutrition focused physical exam not conducted at this time- found signs of malnutrition [ ]absent [ ]present    Subcutaneous fat loss: [ ] Orbital fat pads region, [ ]Buccal fat region, [ ]Triceps region,  [ ]Ribs region    Muscle wasting: [ ]Temples region, [ ]Clavicle region, [ ]Shoulder region, [ ]Scapula region, [ ]Interosseous region,  [ ]thigh region, [ ]Calf region    Estimated Needs:   [ x] no change since previous assessment  [ ] recalculated:     Current Nutrition Diagnosis: Pt remains at high nutrition risk secondary to malnutrition (mild, acute) related to inability to meet sufficient protein-energy in setting of COVID+, acute respiratory failure requiring prolonged intubation, now extubated with concern for upper airway obstruction (?vocal cord paralysis/dysfunction) as evidenced by meeting <75% nutrient needs >/5 days and +edema. Pt currently NPO x ~4 days, awaiting SLP swallow evaluation.     Recommendations:   1) Diet per SLP recommendations. If pt deemed safe for po intake, recommend CCHO, DASH/TLC diet with consistency per SLP and add Glucerna TID to optimize po intake and provide an additional 220 kcal, 10g protein per serving if feasible.  2) If pt deemed unsafe for po intake, consider alternative means of nutrition/hydration- RD to follow up with recommendations if warranted.  3) Rx: MVI daily as feasible.  4) Obtain daily weights to monitor trends.    Monitoring and Evaluation:   [ ] PO intake [ ] Tolerance to diet prescription [X] Weights  [X] Follow up per protocol [X] Labs Source: Patient [ ]  Family [ ]   other [ x]    Current Diet: Diet, NPO:   Except Medications (04-29-20 @ 16:44)    Current Weight:   (4/11) 145 lbs  No new weight  Noted with 2+ generalized and 3+ b/l leg edema    Pertinent Medications: MEDICATIONS  (STANDING):  chlorhexidine 4% Liquid 1 Application(s) Topical <User Schedule>  dexMEDEtomidine Infusion 0.4 MICROgram(s)/kG/Hr (6.58 mL/Hr) IV Continuous <Continuous>  dextrose 5%. 1000 milliLiter(s) (50 mL/Hr) IV Continuous <Continuous>  dextrose 50% Injectable 12.5 Gram(s) IV Push once  dextrose 50% Injectable 25 Gram(s) IV Push once  dextrose 50% Injectable 25 Gram(s) IV Push once  enoxaparin Injectable 40 milliGRAM(s) SubCutaneous every 12 hours  gabapentin 300 milliGRAM(s) Oral every 8 hours  insulin glargine Injectable (LANTUS) 12 Unit(s) SubCutaneous two times a day  insulin lispro (HumaLOG) corrective regimen sliding scale   SubCutaneous every 6 hours  insulin lispro Injectable (HumaLOG) 4 Unit(s) SubCutaneous every 6 hours  labetalol Injectable 10 milliGRAM(s) IV Push every 8 hours  meropenem  IVPB 1000 milliGRAM(s) IV Intermittent every 8 hours  methylPREDNISolone sodium succinate Injectable 60 milliGRAM(s) IV Push every 6 hours  neomycin/bacitracin/polymyxin Topical Ointment 1 Application(s) Topical two times a day  OLANZapine 10 milliGRAM(s) Oral at bedtime  pantoprazole  Injectable 40 milliGRAM(s) IV Push daily  potassium chloride  20 mEq/100 mL IVPB 20 milliEquivalent(s) IV Intermittent every 2 hours    MEDICATIONS  (PRN):  acetaminophen   Tablet .. 650 milliGRAM(s) Oral every 6 hours PRN Temp greater or equal to 38C (100.4F)  ALBUTerol    90 MICROgram(s) HFA Inhaler 1 Puff(s) Inhalation every 4 hours PRN Shortness of Breath and/or Wheezing  dextrose 40% Gel 15 Gram(s) Oral once PRN Blood Glucose LESS THAN 70 milliGRAM(s)/deciliter  glucagon  Injectable 1 milliGRAM(s) IntraMuscular once PRN Glucose LESS THAN 70 milligrams/deciliter  hydrALAZINE Injectable 10 milliGRAM(s) IV Push every 2 hours PRN SBP >160  sodium chloride 0.9% lock flush 10 milliLiter(s) IV Push every 1 hour PRN Pre/post blood products, medications, blood draw, and to maintain line patency    Pertinent Labs: CBC Full  -  ( 02 May 2020 05:19 )  WBC Count : 12.65 K/uL  RBC Count : 3.40 M/uL  Hemoglobin : 10.1 g/dL  Hematocrit : 30.6 %  Platelet Count - Automated : 282 K/uL  Mean Cell Volume : 90.0 fl  Mean Cell Hemoglobin : 29.7 pg  Mean Cell Hemoglobin Concentration : 33.0 gm/dL  Auto Neutrophil # : 10.03 K/uL  Auto Lymphocyte # : 0.84 K/uL  Auto Monocyte # : 1.68 K/uL  Auto Eosinophil # : 0.00 K/uL  Auto Basophil # : 0.01 K/uL  Auto Neutrophil % : 79.3 %  Auto Lymphocyte % : 6.6 %  Auto Monocyte % : 13.3 %  Auto Eosinophil % : 0.0 %  Auto Basophil % : 0.1 %    05-02 Na143 mmol/L Glu 59 mg/dL<L> K+ 3.0 mmol/L<L> Cr  1.03 mg/dL BUN 41.0 mg/dL<H> Phos 3.0 mg/dL Alb 2.8 g/dL<L> PAB n/a       Skin: Intact per documentation    Nutrition focused physical exam not conducted at this time- found signs of malnutrition [ ]absent [ ]present    Subcutaneous fat loss: [ ] Orbital fat pads region, [ ]Buccal fat region, [ ]Triceps region,  [ ]Ribs region    Muscle wasting: [ ]Temples region, [ ]Clavicle region, [ ]Shoulder region, [ ]Scapula region, [ ]Interosseous region,  [ ]thigh region, [ ]Calf region    Estimated Needs:   [ x] no change since previous assessment  [ ] recalculated:     Current Nutrition Diagnosis: Pt remains at high nutrition risk secondary to malnutrition (mild, acute) related to inability to meet sufficient protein-energy in setting of COVID+, acute respiratory failure requiring prolonged intubation, now extubated with concern for upper airway obstruction (?vocal cord paralysis/dysfunction) as evidenced by meeting <75% nutrient needs >/5 days and +edema. Pt currently NPO x 3 days, awaiting SLP swallow evaluation.     Recommendations:   1) Diet per SLP recommendations. If pt deemed safe for po intake, recommend CCHO, DASH/TLC diet with consistency per SLP and add Glucerna TID to optimize po intake and provide an additional 220 kcal, 10g protein per serving if feasible.  2) If pt deemed unsafe for po intake, consider alternative means of nutrition/hydration- RD to follow up with recommendations if warranted.  3) Rx: MVI daily as feasible.  4) Obtain daily weights to monitor trends.    Monitoring and Evaluation:   [ ] PO intake [ ] Tolerance to diet prescription [X] Weights  [X] Follow up per protocol [X] Labs

## 2020-05-02 NOTE — PROGRESS NOTE ADULT - ASSESSMENT
Assessment:  71 yo M w/ acute hypoxemicc respiratory failure 2/2 COVID pneumonia. Extubated 5/1.  ARDS.  Concern for upper airway obstruction - ?vocal cord paralysis/dysfunction.  Cytokine migdalia/MAC; s/p Tocilizumab 4/16 and Methylprednisolone.  MARIYA. ATN + pre-renal.  Hyperglycemia, h/o DM.    Changes today:   -in resp distress in am - struggles to breath with stridor-like sound (pronounced mostly with inspiration though) on a gas - CO2 retention: received Lasix 40 with good UO, Mg, Epi 0.5 SQ with no significant improvement.  Of note, saturated 100% on NRB during the episode, EtCO2 remained 35s  -started on BiPAP; more comfortable now  -switch to SQL as will be NPO for now  -switched to Meropenem - Pseudomonas in sputum    NEUROLOGIC  Sedation: keep low-dose Precedex    RESPIRATORY  BiPAP    CARDIOVASCULAR  MAP goal>65, low dose vasopressor as needed  hold atorvastatin with transaminitis  labetalol TID    GASTROINTESTINAL:   Tube feeds:  hold, keep NPO for now  GI prophylaxis: [] pantoprazole  Bowel regimen[] Miralax     RENAL  goal I/O even   lasix prn  preciado for accurate UOP  hematuria, stable H/H, likely traumatic - improving    ENDOCRINE  ISS+ Lantus  Goal FSG < 180    HEMATOLOGY  chemical VTE prophylaxis: [] Lovenox    INFECTIOUS DISEASES:  SARS-CoV-2 infection/COVID-19  s/p Hydroxychloroquine  trend Inflammatory markers  ID recommendations appreciated, on Meropenem  s/p tocilizumab    DISPOSITION:   [] ICU Assessment:  69 yo M w/ acute hypoxemicc respiratory failure 2/2 COVID pneumonia. Extubated 5/1.  ARDS.  Concern for upper airway obstruction - ?vocal cord paralysis/dysfunction.  Cytokine migdalia/MAC; s/p Tocilizumab 4/16 and Methylprednisolone.  MARIYA. ATN + pre-renal.  Hyperglycemia, h/o DM.  Pseudomonas in sputum  vocal cord paralysis    Changes today:   - wean biPAP  - stop precedex    NEUROLOGIC  Sedation: stop Precedex  dilaudid prn    RESPIRATORY  BiPAP wean   vocal cord paralysis- cont solumedrol    CARDIOVASCULAR  MAP goal>65, low dose vasopressor as needed  hold atorvastatin with transaminitis  labetalol TID    GASTROINTESTINAL:   Tube feeds:  hold, keep NPO for now  NGT start TF  GI prophylaxis: [] pantoprazole  Bowel regimen[] Miralax     RENAL  goal I/O even   lasix prn  preciado for accurate UOP  hematuria, stable H/H, likely traumatic - improving    ENDOCRINE  ISS  Goal FSG < 180    HEMATOLOGY  chemical VTE prophylaxis: [] Lovenox    INFECTIOUS DISEASES:  SARS-CoV-2 infection/COVID-19  s/p Hydroxychloroquine  trend Inflammatory markers  ID recommendations appreciated  s/p tocilizumab  switched to Meropenem - Pseudomonas in sputum    DISPOSITION:   [] ICU

## 2020-05-03 NOTE — PROGRESS NOTE ADULT - THIS PATIENT HAS THE FOLLOWING CONDITION(S)/DIAGNOSES ON THIS ADMISSION:
Acute Respiratory Failure

## 2020-05-03 NOTE — PROGRESS NOTE ADULT - ASSESSMENT
Assessment:  71 yo M w/ acute hypoxemicc respiratory failure 2/2 COVID pneumonia. Extubated 5/1.  ARDS.  Concern for upper airway obstruction - ?vocal cord paralysis/dysfunction.  Cytokine migdalia/MAC; s/p Tocilizumab 4/16 and Methylprednisolone.  MARIYA. ATN + pre-renal.  Hyperglycemia, h/o DM.  Pseudomonas in sputum  vocal cord paralysis    Changes today:   - wean biPAP    NEUROLOGIC  Sedation: stop Precedex  dilaudid prn    RESPIRATORY  BiPAP wean   vocal cord paralysis- cont solumedrol    CARDIOVASCULAR  MAP goal>65, low dose vasopressor as needed  hold atorvastatin with transaminitis  labetalol TID    GASTROINTESTINAL:   Tube feeds:  hold, keep NPO for now  NGT start TF  GI prophylaxis: [] pantoprazole  Bowel regimen[] Miralax     RENAL  goal I/O even   lasix prn  preciado for accurate UOP  hematuria, stable H/H, likely traumatic - improving    ENDOCRINE  ISS  Goal FSG < 180    HEMATOLOGY  chemical VTE prophylaxis: [] Lovenox    INFECTIOUS DISEASES:  SARS-CoV-2 infection/COVID-19  s/p Hydroxychloroquine  trend Inflammatory markers  ID recommendations appreciated  s/p tocilizumab  switched to Meropenem - Pseudomonas in sputum    DISPOSITION:   [] ICU Assessment:  71 yo M w/ acute hypoxemicc respiratory failure 2/2 COVID pneumonia. Extubated 5/1.  ARDS.  Concern for upper airway obstruction - ?vocal cord paralysis/dysfunction.  Cytokine migdalia/MAC; s/p Tocilizumab 4/16 and Methylprednisolone.  MARIYA. ATN + pre-renal.  Hyperglycemia, h/o DM.  Pseudomonas in sputum  vocal cord paralysis    Changes today:   - wean biPAP    NEUROLOGIC  Sedation: wean Precedex  start clonidine  dilaudid prn    RESPIRATORY  venti mask 50%  vocal cord paralysis- cont solumedrol    CARDIOVASCULAR  MAP goal>65, low dose vasopressor as needed  hold atorvastatin with transaminitis  labetalol TID    GASTROINTESTINAL:   Tube feeds:  NGT start TF  GI prophylaxis: [] pantoprazole  Bowel regimen[] Miralax     RENAL  goal I/O even   lasix prn  preciado for accurate UOP  hematuria, stable H/H, likely traumatic - improving    ENDOCRINE  ISS  Goal FSG < 180    HEMATOLOGY  chemical VTE prophylaxis: [] Lovenox    INFECTIOUS DISEASES:  SARS-CoV-2 infection/COVID-19  s/p Hydroxychloroquine  trend Inflammatory markers  ID recommendations appreciated  s/p tocilizumab  switched to Meropenem - Pseudomonas in sputum  covid (+)    DISPOSITION:   [] ICU

## 2020-05-03 NOTE — PROGRESS NOTE ADULT - SUBJECTIVE AND OBJECTIVE BOX
Chief complaint:   Patient is a 70y old  Male who presents with a chief complaint of SOB (02 May 2020 08:15)    HPI:  69 yo M w/ PMH of HTN and DM on insulin and Metformin, and HLD presented with complaint of SOB, HA, mild CP, and weakness. He states he had a nonproductive cough, w/ congestion and feeling cold for raghav last 8 days. He also complained of a fever but did not check his temperature. Upon arrival to the ED his SpO2 was 84% on RA with RR of 28 which improved to 99% on NRB. He was then downtitrated to 6L NC and is saturating around 90-93%. He states since he has been in the ED and put on the oxygen he feels a lot better.   Currently he denies any Dizziness, HA, CP, SOB, Abd pain, N/V/D/C, dysuria, recent travel or sick contacts. (11 Apr 2020 16:00)        24hr EVENTS:      ROS: [ ]  Unable to assess due to mental status   All other systems negative    -----------------------------------------------------------------------------------------------------------------------------------------------------------------------------------  ICU Vital Signs Last 24 Hrs  T(C): 36.1 (03 May 2020 04:00), Max: 36.5 (02 May 2020 15:22)  T(F): 96.9 (03 May 2020 04:00), Max: 97.7 (02 May 2020 15:22)  HR: 66 (03 May 2020 04:00) (66 - 90)  BP: 134/71 (03 May 2020 04:00) (134/71 - 159/72)  BP(mean): 102 (03 May 2020 04:00) (100 - 102)  ABP: 141/51 (03 May 2020 04:00) (135/78 - 179/80)  ABP(mean): 81 (03 May 2020 04:00) (81 - 102)  RR: 27 (03 May 2020 04:00) (15 - 27)  SpO2: 50% (03 May 2020 06:36) (50% - 100%)      I&O's Summary    02 May 2020 07:01  -  03 May 2020 07:00  --------------------------------------------------------  IN: 511.9 mL / OUT: 920 mL / NET: -408.1 mL        MEDICATIONS  (STANDING):  chlorhexidine 4% Liquid 1 Application(s) Topical <User Schedule>  dexMEDEtomidine Infusion 0.4 MICROgram(s)/kG/Hr (6.58 mL/Hr) IV Continuous <Continuous>  dextrose 5%. 1000 milliLiter(s) (50 mL/Hr) IV Continuous <Continuous>  dextrose 50% Injectable 12.5 Gram(s) IV Push once  dextrose 50% Injectable 25 Gram(s) IV Push once  dextrose 50% Injectable 25 Gram(s) IV Push once  dextrose 50% Injectable 25 Gram(s) IV Push once  dextrose 50% Injectable 12.5 Gram(s) IV Push once  enoxaparin Injectable 40 milliGRAM(s) SubCutaneous every 12 hours  gabapentin 300 milliGRAM(s) Oral every 8 hours  insulin glargine Injectable (LANTUS) 12 Unit(s) SubCutaneous two times a day  insulin lispro (HumaLOG) corrective regimen sliding scale   SubCutaneous every 6 hours  insulin lispro Injectable (HumaLOG) 4 Unit(s) SubCutaneous every 6 hours  labetalol Injectable 10 milliGRAM(s) IV Push every 8 hours  meropenem  IVPB 1000 milliGRAM(s) IV Intermittent every 8 hours  methylPREDNISolone sodium succinate Injectable 60 milliGRAM(s) IV Push every 6 hours  neomycin/bacitracin/polymyxin Topical Ointment 1 Application(s) Topical two times a day  OLANZapine 10 milliGRAM(s) Oral at bedtime  pantoprazole  Injectable 40 milliGRAM(s) IV Push daily      NEUROIMAGING:   Recent imaging studies were reviewed.    LAB RESULTS:                          10.0   10.35 )-----------( 254      ( 03 May 2020 05:57 )             31.1           05-03    146<H>  |  106  |  41.0<H>  ----------------------------<  119<H>  4.1   |  27.0  |  0.92    Ca    8.4<L>      03 May 2020 05:57  Phos  2.9     05-03  Mg     2.3     05-03    TPro  6.0<L>  /  Alb  3.0<L>  /  TBili  0.4  /  DBili  x   /  AST  23  /  ALT  20  /  AlkPhos  133<H>  05-03      ABG - ( 01 May 2020 14:38 )  pH, Arterial: 7.40  pH, Blood: x     /  pCO2: 45    /  pO2: 72    / HCO3: 26    / Base Excess: x     /  SaO2: 95          -----------------------------------------------------------------------------------------------------------------------------------------------------------------------------------    PHYSICAL EXAM:  General: Calm, intubated, sedated for vent synchrony  HEENT: MMM  Neuro:  -Mental status- No acute distress  -CN- PERRL 3mm, EOMI, tongue midline, face symmetric    CV: RRR  Pulm: diminished to auscultation  Abd: Soft, nontender, nondistended  Ext: moderate edema in lower ext  Skin: warm, dry Chief complaint:   Patient is a 70y old  Male who presents with a chief complaint of SOB (02 May 2020 08:15)    HPI:  71 yo M w/ PMH of HTN and DM on insulin and Metformin, and HLD presented with complaint of SOB, HA, mild CP, and weakness. He states he had a nonproductive cough, w/ congestion and feeling cold for raghav last 8 days. He also complained of a fever but did not check his temperature. Upon arrival to the ED his SpO2 was 84% on RA with RR of 28 which improved to 99% on NRB. He was then downtitrated to 6L NC and is saturating around 90-93%. He states since he has been in the ED and put on the oxygen he feels a lot better.   Currently he denies any Dizziness, HA, CP, SOB, Abd pain, N/V/D/C, dysuria, recent travel or sick contacts. (11 Apr 2020 16:00)    24hr EVENTS:  start clonidine and wean precedex    ROS: [ ]  Unable to assess due to mental status   All other systems negative    -----------------------------------------------------------------------------------------------------------------------------------------------------------------------------------  ICU Vital Signs Last 24 Hrs  T(C): 36.1 (03 May 2020 04:00), Max: 36.5 (02 May 2020 15:22)  T(F): 96.9 (03 May 2020 04:00), Max: 97.7 (02 May 2020 15:22)  HR: 66 (03 May 2020 04:00) (66 - 90)  BP: 134/71 (03 May 2020 04:00) (134/71 - 159/72)  BP(mean): 102 (03 May 2020 04:00) (100 - 102)  ABP: 141/51 (03 May 2020 04:00) (135/78 - 179/80)  ABP(mean): 81 (03 May 2020 04:00) (81 - 102)  RR: 27 (03 May 2020 04:00) (15 - 27)  SpO2: 50% (03 May 2020 06:36) (50% - 100%)      I&O's Summary    02 May 2020 07:01  -  03 May 2020 07:00  --------------------------------------------------------  IN: 511.9 mL / OUT: 920 mL / NET: -408.1 mL        MEDICATIONS  (STANDING):  chlorhexidine 4% Liquid 1 Application(s) Topical <User Schedule>  dexMEDEtomidine Infusion 0.4 MICROgram(s)/kG/Hr (6.58 mL/Hr) IV Continuous <Continuous>  dextrose 5%. 1000 milliLiter(s) (50 mL/Hr) IV Continuous <Continuous>  dextrose 50% Injectable 12.5 Gram(s) IV Push once  dextrose 50% Injectable 25 Gram(s) IV Push once  dextrose 50% Injectable 25 Gram(s) IV Push once  dextrose 50% Injectable 25 Gram(s) IV Push once  dextrose 50% Injectable 12.5 Gram(s) IV Push once  enoxaparin Injectable 40 milliGRAM(s) SubCutaneous every 12 hours  gabapentin 300 milliGRAM(s) Oral every 8 hours  insulin glargine Injectable (LANTUS) 12 Unit(s) SubCutaneous two times a day  insulin lispro (HumaLOG) corrective regimen sliding scale   SubCutaneous every 6 hours  insulin lispro Injectable (HumaLOG) 4 Unit(s) SubCutaneous every 6 hours  labetalol Injectable 10 milliGRAM(s) IV Push every 8 hours  meropenem  IVPB 1000 milliGRAM(s) IV Intermittent every 8 hours  methylPREDNISolone sodium succinate Injectable 60 milliGRAM(s) IV Push every 6 hours  neomycin/bacitracin/polymyxin Topical Ointment 1 Application(s) Topical two times a day  OLANZapine 10 milliGRAM(s) Oral at bedtime  pantoprazole  Injectable 40 milliGRAM(s) IV Push daily      NEUROIMAGING:   Recent imaging studies were reviewed.    LAB RESULTS:                          10.0   10.35 )-----------( 254      ( 03 May 2020 05:57 )             31.1           05-03    146<H>  |  106  |  41.0<H>  ----------------------------<  119<H>  4.1   |  27.0  |  0.92    Ca    8.4<L>      03 May 2020 05:57  Phos  2.9     05-03  Mg     2.3     05-03    TPro  6.0<L>  /  Alb  3.0<L>  /  TBili  0.4  /  DBili  x   /  AST  23  /  ALT  20  /  AlkPhos  133<H>  05-03      ABG - ( 01 May 2020 14:38 )  pH, Arterial: 7.40  pH, Blood: x     /  pCO2: 45    /  pO2: 72    / HCO3: 26    / Base Excess: x     /  SaO2: 95          -----------------------------------------------------------------------------------------------------------------------------------------------------------------------------------    PHYSICAL EXAM:  General: Calm, intubated, sedated for vent synchrony  HEENT: MMM  Neuro:  -Mental status- No acute distress  -CN- PERRL 3mm, EOMI, tongue midline, face symmetric    CV: RRR  Pulm: diminished to auscultation  Abd: Soft, nontender, nondistended  Ext: moderate edema in lower ext  Skin: warm, dry

## 2020-05-03 NOTE — PROGRESS NOTE ADULT - SUBJECTIVE AND OBJECTIVE BOX
CC: Covid pneumonia in hypoxic resp failure . Extubated and downgraded from iCU to floor.  Had received Tocilizumab. Now saturating 94% on ventimask. NGT feeding .    HPI:  69 yo M w/ PMH of HTN and DM on insulin and Metformin, and HLD presented with complaint of SOB, HA, mild CP, and weakness. He states he had a nonproductive cough, w/ congestion and feeling cold for raghav last 8 days. He also complained of a fever but did not check his temperature. Upon arrival to the ED his SpO2 was 84% on RA with RR of 28 which improved to 99% on NRB. He was then downtitrated to 6L NC and is saturating around 90-93%. He states since he has been in the ED and put on the oxygen he feels a lot better.   Currently he denies any Dizziness, HA, CP, SOB, Abd pain, N/V/D/C, dysuria, recent travel or sick contacts. (11 Apr 2020 16:00)    REVIEW OF SYSTEMS:    Patient denied fever, chills, abdominal pain, nausea, vomiting, cough, shortness of breath, chest pain or palpitations    Vital Signs Last 24 Hrs  T(C): 36.3 (03 May 2020 08:00), Max: 36.4 (02 May 2020 20:00)  T(F): 97.4 (03 May 2020 08:00), Max: 97.6 (03 May 2020 00:00)  HR: 70 (03 May 2020 16:05) (61 - 90)  BP: 134/71 (03 May 2020 04:00) (134/71 - 140/81)  BP(mean): 102 (03 May 2020 04:00) (100 - 102)  RR: 24 (03 May 2020 08:55) (20 - 27)  SpO2: 97% (03 May 2020 09:01) (50% - 100%)I&O's Summary    02 May 2020 07:01  -  03 May 2020 07:00  --------------------------------------------------------  IN: 511.9 mL / OUT: 920 mL / NET: -408.1 mL    03 May 2020 07:01  -  03 May 2020 16:49  --------------------------------------------------------  IN: 210 mL / OUT: 0 mL / NET: 210 mL      PHYSICAL EXAM:  GENERAL: NAD, well-groomed  HEENT: PERRL, +EOMI, anicteric, no [place]  NECK: Supple, No JVD   CHEST/LUNG: CTA bilaterally; Normal effort  HEART: S1S2 Normal intensity, no murmurs, gallops or rubs noted  ABDOMEN: Soft, BS Normoactive, NT, ND, no HSM noted  EXTREMITIES:  2+ radial and DP pulses noted, no clubbing, cyanosis, or edema noted, FROM x 4  SKIN: No rashes or lesions noted  NEURO: A&Ox3, no focal deficits noted, CN II-XII intact  PSYCH: normal mood and affect; insight/judgement appropriate  LABS:                        10.0   10.35 )-----------( 254      ( 03 May 2020 05:57 )             31.1     05-03    146<H>  |  106  |  41.0<H>  ----------------------------<  119<H>  4.1   |  27.0  |  0.92    Ca    8.4<L>      03 May 2020 05:57  Phos  2.9     05-03  Mg     2.3     05-03    TPro  6.0<L>  /  Alb  3.0<L>  /  TBili  0.4  /  DBili  x   /  AST  23  /  ALT  20  /  AlkPhos  133<H>  05-03        RADIOLOGY & ADDITIONAL TESTS:    MEDICATIONS:  MEDICATIONS  (STANDING):  chlorhexidine 4% Liquid 1 Application(s) Topical <User Schedule>  cloNIDine 0.1 milliGRAM(s) Oral every 12 hours  dextrose 5%. 1000 milliLiter(s) (50 mL/Hr) IV Continuous <Continuous>  dextrose 50% Injectable 12.5 Gram(s) IV Push once  dextrose 50% Injectable 25 Gram(s) IV Push once  dextrose 50% Injectable 25 Gram(s) IV Push once  dextrose 50% Injectable 25 Gram(s) IV Push once  dextrose 50% Injectable 12.5 Gram(s) IV Push once  enoxaparin Injectable 40 milliGRAM(s) SubCutaneous every 12 hours  gabapentin 300 milliGRAM(s) Oral every 8 hours  insulin glargine Injectable (LANTUS) 12 Unit(s) SubCutaneous two times a day  insulin lispro (HumaLOG) corrective regimen sliding scale   SubCutaneous every 6 hours  insulin lispro Injectable (HumaLOG) 4 Unit(s) SubCutaneous every 6 hours  labetalol Injectable 10 milliGRAM(s) IV Push every 8 hours  meropenem  IVPB 1000 milliGRAM(s) IV Intermittent every 8 hours  methylPREDNISolone sodium succinate Injectable 60 milliGRAM(s) IV Push every 6 hours  neomycin/bacitracin/polymyxin Topical Ointment 1 Application(s) Topical two times a day  OLANZapine 10 milliGRAM(s) Oral at bedtime  pantoprazole  Injectable 40 milliGRAM(s) IV Push daily    MEDICATIONS  (PRN):  acetaminophen   Tablet .. 650 milliGRAM(s) Oral every 6 hours PRN Temp greater or equal to 38C (100.4F)  ALBUTerol    90 MICROgram(s) HFA Inhaler 1 Puff(s) Inhalation every 4 hours PRN Shortness of Breath and/or Wheezing  ALPRAZolam 0.5 milliGRAM(s) Oral every 6 hours PRN Agitation  dextrose 40% Gel 15 Gram(s) Oral once PRN Blood Glucose LESS THAN 70 milliGRAM(s)/deciliter  glucagon  Injectable 1 milliGRAM(s) IntraMuscular once PRN Glucose LESS THAN 70 milligrams/deciliter  hydrALAZINE Injectable 10 milliGRAM(s) IV Push every 2 hours PRN SBP >160  HYDROmorphone  Injectable 2 milliGRAM(s) IV Push every 3 hours PRN agitation  sodium chloride 0.9% lock flush 10 milliLiter(s) IV Push every 1 hour PRN Pre/post blood products, medications, blood draw, and to maintain line patency DISPLAY PLAN FREE TEXT

## 2020-05-03 NOTE — PROGRESS NOTE ADULT - ASSESSMENT
69 yo M w/ acute hypoxemic  respiratory failure 2/2 COVID pneumonia. Extubated 5/1.    Covid pneumonia with hypoxic resp failure.  Status post vent, extubated 5/1  Concern for vocal cord paralysis/dysfunction.  NGTube feeding   Will obtain a swallow and speech evaluation    MARIYA. ATN + pre-renal.  Resolved on iv hydration   Diabetes Mellitus  Insulin sliding scale     Pseudomonas in sputum/Bacterial pneumonia possibly ventilator associated    Meropenem 1g iv q8    vocal cord paralysis  Likely from prolonged vent intubation  Speech pathology eval  Steroid     Hypertension  Labetalol 10mg iv q8  Clonidine 0.1mg po bid

## 2020-05-03 NOTE — CHART NOTE - NSCHARTNOTEFT_GEN_A_CORE
Patient no longer requires ICU setting. To be downgraded to floor with . Discussed with Dr. Naik and logistics 5/3/20 ~ 14:51

## 2020-05-04 NOTE — SWALLOW BEDSIDE ASSESSMENT ADULT - SWALLOW EVAL: DIAGNOSIS
Pt presents w/functional oral phase of swallow for consistencies trialed thin and nectar thick liquids.  Suspected pharyngeal dysphagia across all presentations thin/nectar as marked by +delayed swallow trigger, w/delayed throat clear and desat to 83-85%.

## 2020-05-04 NOTE — PROGRESS NOTE ADULT - SUBJECTIVE AND OBJECTIVE BOX
CC: Covid pneumonia in hypoxic resp failure. Status post ICU stay on vent. Extubated.  NGT feeding . Confusion ,mental status changes resolving.  Discontinued restraint.   HPI:  69 yo M w/ PMH of HTN and DM on insulin and Metformin, and HLD presented with complaint of SOB, HA, mild CP, and weakness. He states he had a nonproductive cough, w/ congestion and feeling cold for raghav last 8 days. He also complained of a fever but did not check his temperature. Upon arrival to the ED his SpO2 was 84% on RA with RR of 28 which improved to 99% on NRB. He was then downtitrated to 6L NC and is saturating around 90-93%. He states since he has been in the ED and put on the oxygen he feels a lot better.   Currently he denies any Dizziness, HA, CP, SOB, Abd pain, N/V/D/C, dysuria, recent travel or sick contacts. (11 Apr 2020 16:00)    REVIEW OF SYSTEMS:    Patient denied fever, chills, abdominal pain, nausea, vomiting, cough, shortness of breath, chest pain or palpitations    Vital Signs Last 24 Hrs  T(C): 37.1 (04 May 2020 08:04), Max: 37.1 (04 May 2020 08:04)  T(F): 98.7 (04 May 2020 08:04), Max: 98.7 (04 May 2020 08:04)  HR: 79 (04 May 2020 08:04) (70 - 110)  BP: 145/70 (04 May 2020 08:04) (131/63 - 177/68)  BP(mean): --  RR: 20 (04 May 2020 08:04) (20 - 24)  SpO2: 95% (04 May 2020 08:04) (91% - 95%)I&O's Summary    03 May 2020 07:01  -  04 May 2020 07:00  --------------------------------------------------------  IN: 310 mL / OUT: 500 mL / NET: -190 mL    04 May 2020 07:01  -  04 May 2020 15:50  --------------------------------------------------------  IN: 400 mL / OUT: 0 mL / NET: 400 mL      PHYSICAL EXAM:  GENERAL: NAD, well-groomed  HEENT: PERRL, +EOMI, anicteric, no [place]  NECK: Supple, No JVD   CHEST/LUNG: CTA bilaterally; Normal effort  HEART: S1S2 Normal intensity, no murmurs, gallops or rubs noted  ABDOMEN: Soft, BS Normoactive, NT, ND, no HSM noted  EXTREMITIES:  2+ radial and DP pulses noted, no clubbing, cyanosis, or edema noted, Limited mobility   SKIN: No rashes or lesions noted  NEURO: Lethargy , no focal deficits noted, CN II-XII intact  PSYCH: Depressed mood and affect; insight/judgement appropriate  LABS:                        10.4   8.86  )-----------( 294      ( 04 May 2020 08:20 )             32.9     05-04    147<H>  |  108<H>  |  44.0<H>  ----------------------------<  198<H>  3.9   |  27.0  |  0.91    Ca    8.4<L>      04 May 2020 08:20  Phos  2.8     05-04  Mg     2.5     05-04    TPro  5.8<L>  /  Alb  2.9<L>  /  TBili  0.4  /  DBili  x   /  AST  79<H>  /  ALT  62<H>  /  AlkPhos  185<H>  05-04        RADIOLOGY & ADDITIONAL TESTS:    MEDICATIONS:  MEDICATIONS  (STANDING):  cloNIDine 0.1 milliGRAM(s) Oral every 12 hours  dextrose 5%. 1000 milliLiter(s) (50 mL/Hr) IV Continuous <Continuous>  dextrose 50% Injectable 12.5 Gram(s) IV Push once  dextrose 50% Injectable 25 Gram(s) IV Push once  dextrose 50% Injectable 25 Gram(s) IV Push once  dextrose 50% Injectable 25 Gram(s) IV Push once  dextrose 50% Injectable 12.5 Gram(s) IV Push once  enoxaparin Injectable 40 milliGRAM(s) SubCutaneous every 12 hours  gabapentin 300 milliGRAM(s) Oral every 8 hours  hydrochlorothiazide 25 milliGRAM(s) Oral daily  insulin glargine Injectable (LANTUS) 12 Unit(s) SubCutaneous two times a day  insulin lispro (HumaLOG) corrective regimen sliding scale   SubCutaneous every 6 hours  insulin lispro Injectable (HumaLOG) 4 Unit(s) SubCutaneous every 6 hours  labetalol 100 milliGRAM(s) Oral every 8 hours  meropenem  IVPB 1000 milliGRAM(s) IV Intermittent every 8 hours  methylPREDNISolone sodium succinate Injectable 60 milliGRAM(s) IV Push every 6 hours  neomycin/bacitracin/polymyxin Topical Ointment 1 Application(s) Topical two times a day  OLANZapine 10 milliGRAM(s) Oral at bedtime  pantoprazole    Tablet 40 milliGRAM(s) Oral before breakfast    MEDICATIONS  (PRN):  acetaminophen   Tablet .. 650 milliGRAM(s) Oral every 6 hours PRN Temp greater or equal to 38C (100.4F)  ALBUTerol    90 MICROgram(s) HFA Inhaler 1 Puff(s) Inhalation every 4 hours PRN Shortness of Breath and/or Wheezing  ALPRAZolam 0.5 milliGRAM(s) Oral every 6 hours PRN Agitation  dextrose 40% Gel 15 Gram(s) Oral once PRN Blood Glucose LESS THAN 70 milliGRAM(s)/deciliter  glucagon  Injectable 1 milliGRAM(s) IntraMuscular once PRN Glucose LESS THAN 70 milligrams/deciliter  hydrALAZINE Injectable 10 milliGRAM(s) IV Push every 2 hours PRN SBP >160  HYDROmorphone  Injectable 2 milliGRAM(s) IV Push every 3 hours PRN agitation  sodium chloride 0.9% lock flush 10 milliLiter(s) IV Push every 1 hour PRN Pre/post blood products, medications, blood draw, and to maintain line patency

## 2020-05-04 NOTE — PROGRESS NOTE ADULT - NSHPATTENDINGPLANDISCUSS_GEN_ALL_CORE
ICU team
Patient and RN
Patient, RN
Patient, RN
Wife and RN
medical team
Patient and RN

## 2020-05-04 NOTE — PROGRESS NOTE ADULT - ASSESSMENT
69 yo M w/ acute hypoxemic  respiratory failure 2/2 COVID pneumonia. Extubated 5/1.    Covid pneumonia with hypoxic resp failure.  Status post vent, extubated 5/1  Concern for vocal cord paralysis/dysfunction.  NGTube feeding   Speech path eval - NPO as desat with attempt to feed.       MARIYA. ATN + pre-renal.  Resolved on iv hydration     Diabetes Mellitus  Insulin sliding scale     Pseudomonas in sputum/Bacterial pneumonia possibly ventilator associated    Meropenem 1g iv q8    vocal cord paralysis  Likely from prolonged vent intubation  Speech pathology eval  Steroid     Hypertension  Labetalol 100mg po q8  Clonidine 0.1mg po bid   HCTZ 25mg po daily

## 2020-05-04 NOTE — PHYSICAL THERAPY INITIAL EVALUATION ADULT - CRITERIA FOR SKILLED THERAPEUTIC INTERVENTIONS
predicted duration of therapy intervention/functional limitations in following categories/risk reduction/prevention/impairments found/rehab potential/anticipated equipment needs at discharge/therapy frequency/anticipated discharge recommendation

## 2020-05-04 NOTE — PROGRESS NOTE ADULT - SUBJECTIVE AND OBJECTIVE BOX
[facility]  INFECTIOUS DISEASES AND INTERNAL MEDICINE at [place]  =======================================================  Natan Huynh MD  Diplomates American Board of Internal Medicine and Infectious Diseases  Telephone [phone]  Fax            [phone]  =======================================================    [id number]  [place]   follow up: COVID 19 pneumonia    doing well  remains extubated    =======================================================      REVIEW OF SYSTEMS:  Limited due to medical condition    =======================================================  Allergies  No Known Allergies    ======================================================  Physical Exam:  ============  (see vitals section below)    General: awake interactive  Eye: Pupils are equal, round and reactive to light, Extraocular movements are intact, Normal conjunctiva.  HENT: Normocephalic, Oral mucosa is moist, No pharyngeal erythema, No sinus tenderness.  ETT  in place   Neck: Supple, No lymphadenopathy.  Respiratory: Lungs with fair air entry  Cardiovascular: Normal rate, Regular rhythm,   Gastrointestinal: Soft, Non-tender, Non-distended, Normal bowel sounds.  Genitourinary: No costovertebral angle tenderness.  Lymphatics: No lymphadenopathy neck,   Musculoskeletal: Normal range of motion, Normal strength.  Integumentary: No rash.  Neurologic: interactive    =======================================================     Vitals:  ============  T(F): 98.7 (04 May 2020 08:04), Max: 98.7 (04 May 2020 08:04)  HR: 79 (04 May 2020 08:04)  BP: 145/70 (04 May 2020 08:04)  RR: 20 (04 May 2020 08:04)  SpO2: 95% (04 May 2020 08:04) (91% - 95%)  temp max in last 48H T(F): , Max: 98.7 (05-04-20 @ 08:04)    =======================================================  Current Antibiotics:  meropenem  IVPB 1000 milliGRAM(s) IV Intermittent every 8 hours    Other medications:  cloNIDine 0.1 milliGRAM(s) Oral every 12 hours  dextrose 5%. 1000 milliLiter(s) IV Continuous <Continuous>  dextrose 50% Injectable 12.5 Gram(s) IV Push once  dextrose 50% Injectable 25 Gram(s) IV Push once  dextrose 50% Injectable 25 Gram(s) IV Push once  dextrose 50% Injectable 25 Gram(s) IV Push once  dextrose 50% Injectable 12.5 Gram(s) IV Push once  enoxaparin Injectable 40 milliGRAM(s) SubCutaneous every 12 hours  gabapentin 300 milliGRAM(s) Oral every 8 hours  hydrochlorothiazide 25 milliGRAM(s) Oral daily  insulin glargine Injectable (LANTUS) 12 Unit(s) SubCutaneous two times a day  insulin lispro (HumaLOG) corrective regimen sliding scale   SubCutaneous every 6 hours  insulin lispro Injectable (HumaLOG) 4 Unit(s) SubCutaneous every 6 hours  labetalol 100 milliGRAM(s) Oral every 8 hours  methylPREDNISolone sodium succinate Injectable 60 milliGRAM(s) IV Push every 6 hours  neomycin/bacitracin/polymyxin Topical Ointment 1 Application(s) Topical two times a day  OLANZapine 10 milliGRAM(s) Oral at bedtime  pantoprazole    Tablet 40 milliGRAM(s) Oral before breakfast      =======================================================  Labs:                        10.4   8.86  )-----------( 294      ( 04 May 2020 08:20 )             32.9      05-04    147<H>  |  108<H>  |  44.0<H>  ----------------------------<  198<H>  3.9   |  27.0  |  0.91    Ca    8.4<L>      04 May 2020 08:20  Phos  2.8     05-04  Mg     2.5     05-04    TPro  5.8<L>  /  Alb  2.9<L>  /  TBili  0.4  /  DBili  x   /  AST  79<H>  /  ALT  62<H>  /  AlkPhos  185<H>  05-04      Culture - Sputum (collected 04-29-20 @ 17:14)  Source: .Sputum Sputum  Gram Stain (04-29-20 @ 23:45):    Numerous polymorphonuclear leukocytes seen per low power field    Rare Squamous epithelial cells seen per low power field    Moderate Gram Positive Cocci in Pairs and Chains seen per oil power field    Few Gram Negative Rods seen per oil power field    Rare Gram Positive Rods seen per oil power field  Final Report (05-01-20 @ 18:22):    Numerous Pseudomonas aeruginosa    Normal Respiratory Maria T absent  Organism: Pseudomonas aeruginosa (05-01-20 @ 18:22)  Organism: Pseudomonas aeruginosa (05-01-20 @ 18:22)    Sensitivities:      -  Amikacin: S <=16      -  Aztreonam: R >16      -  Cefepime: S 8      -  Ceftazidime: I 16      -  Ciprofloxacin: S <=0.25      -  Gentamicin: S <=2      -  Imipenem: I 4      -  Levofloxacin: S <=0.5      -  Meropenem: S <=1      -  Piperacillin/Tazobactam: I 64      -  Tobramycin: S 4      Method Type: NIGHAT      Creatinine, Serum: 0.91 mg/dL (05-04-20 @ 08:20)  Creatinine, Serum: 0.92 mg/dL (05-03-20 @ 05:57)  Creatinine, Serum: 1.03 mg/dL (05-02-20 @ 05:19)  Creatinine, Serum: 0.85 mg/dL (05-01-20 @ 05:31)  Creatinine, Serum: 0.82 mg/dL (04-30-20 @ 04:49)    Procalcitonin, Serum: 0.09 ng/mL (05-04-20 @ 08:20)  Procalcitonin, Serum: 0.16 ng/mL (05-02-20 @ 05:19)  Procalcitonin, Serum: 0.18 ng/mL (04-30-20 @ 04:49)  Procalcitonin, Serum: 0.22 ng/mL (04-29-20 @ 05:20)       D-Dimer Assay, Quantitative: 468 ng/mL DDU (05-04-20 @ 08:20)  D-Dimer Assay, Quantitative: 416 ng/mL DDU (05-02-20 @ 05:19)  D-Dimer Assay, Quantitative: 391 ng/mL DDU (04-29-20 @ 05:20)  D-Dimer Assay, Quantitative: 679 ng/mL DDU (04-27-20 @ 04:23)  D-Dimer Assay, Quantitative: 725 ng/mL DDU (04-25-20 @ 07:40)    Ferritin, Serum: 371 ng/mL (05-04-20 @ 08:20)  Ferritin, Serum: 316 ng/mL (05-02-20 @ 05:19)  Ferritin, Serum: 208 ng/mL (04-30-20 @ 04:49)  Ferritin, Serum: 262 ng/mL (04-29-20 @ 05:20)  Ferritin, Serum: 282 ng/mL (04-27-20 @ 04:23)  Ferritin, Serum: 298 ng/mL (04-25-20 @ 07:40)  Ferritin, Serum: 370 ng/mL (04-23-20 @ 04:18)  Ferritin, Serum: 384 ng/mL (04-21-20 @ 04:34)  Ferritin, Serum: 385 ng/mL (04-19-20 @ 05:13)  Ferritin, Serum: 384 ng/mL (04-17-20 @ 04:52)  Ferritin, Serum: 409 ng/mL (04-15-20 @ 06:53)  Ferritin, Serum: 520 ng/mL (04-13-20 @ 09:47)  Ferritin, Serum: 464 ng/mL (04-11-20 @ 12:40)    C-Reactive Protein, Serum: 0.74 mg/dL (05-04-20 @ 08:20)  C-Reactive Protein, Serum: 2.00 mg/dL (05-02-20 @ 05:19)  C-Reactive Protein, Serum: 4.41 mg/dL (04-30-20 @ 04:49)  C-Reactive Protein, Serum: 1.84 mg/dL (04-29-20 @ 05:20)  C-Reactive Protein, Serum: 0.66 mg/dL (04-27-20 @ 04:23)  C-Reactive Protein, Serum: 0.79 mg/dL (04-25-20 @ 07:40)  C-Reactive Protein, Serum: 0.47 mg/dL (04-23-20 @ 04:18)  C-Reactive Protein, Serum: 0.43 mg/dL (04-21-20 @ 04:34)    WBC Count: 8.86 K/uL (05-04-20 @ 08:20)  WBC Count: 10.35 K/uL (05-03-20 @ 05:57)  WBC Count: 12.65 K/uL (05-02-20 @ 05:19)  WBC Count: 10.67 K/uL (05-01-20 @ 05:31)  WBC Count: 8.66 K/uL (04-30-20 @ 04:49)      COVID-19 PCR: Detected (05-02-20 @ 15:18)  COVID-19 PCR: NotDetec (05-01-20 @ 09:45)  COVID-19 PCR: Detected (04-11-20 @ 12:42)    Lactate Dehydrogenase, Serum: 313 U/L (05-04-20 @ 08:20)  Lactate Dehydrogenase, Serum: 312 U/L (05-02-20 @ 05:19)  Lactate Dehydrogenase, Serum: 286 U/L (04-30-20 @ 04:49)  Lactate Dehydrogenase, Serum: 290 U/L (04-29-20 @ 05:20)  Lactate Dehydrogenase, Serum: 262 U/L (04-27-20 @ 04:23)  Lactate Dehydrogenase, Serum: 240 U/L (04-25-20 @ 07:40)  Lactate Dehydrogenase, Serum: 296 U/L (04-23-20 @ 04:18)  Lactate Dehydrogenase, Serum: 315 U/L (04-21-20 @ 04:34)  Lactate Dehydrogenase, Serum: 327 U/L (04-19-20 @ 05:13)  Lactate Dehydrogenase, Serum: 322 U/L (04-17-20 @ 04:52)  Lactate Dehydrogenase, Serum: 351 U/L (04-15-20 @ 06:53)  Lactate Dehydrogenase, Serum: 321 U/L (04-11-20 @ 12:40)    Alkaline Phosphatase, Serum: 185 U/L (05-04-20 @ 08:20)  Alkaline Phosphatase, Serum: 133 U/L (05-03-20 @ 05:57)  Alkaline Phosphatase, Serum: 137 U/L (05-02-20 @ 05:19)  Alkaline Phosphatase, Serum: 163 U/L (05-01-20 @ 05:31)  Alanine Aminotransferase (ALT/SGPT): 62 U/L (05-04-20 @ 08:20)  Alanine Aminotransferase (ALT/SGPT): 20 U/L (05-03-20 @ 05:57)  Alanine Aminotransferase (ALT/SGPT): 19 U/L (05-02-20 @ 05:19)  Alanine Aminotransferase (ALT/SGPT): 23 U/L (05-01-20 @ 05:31)  Aspartate Aminotransferase (AST/SGOT): 79 U/L (05-04-20 @ 08:20)  Aspartate Aminotransferase (AST/SGOT): 23 U/L (05-03-20 @ 05:57)  Aspartate Aminotransferase (AST/SGOT): 21 U/L (05-02-20 @ 05:19)  Aspartate Aminotransferase (AST/SGOT): 29 U/L (05-01-20 @ 05:31)  Bilirubin Total, Serum: 0.4 mg/dL (05-04-20 @ 08:20)  Bilirubin Total, Serum: 0.4 mg/dL (05-03-20 @ 05:57)  Bilirubin Total, Serum: 0.4 mg/dL (05-02-20 @ 05:19)  Bilirubin Total, Serum: 0.5 mg/dL (05-01-20 @ 05:31)

## 2020-05-04 NOTE — PHYSICAL THERAPY INITIAL EVALUATION ADULT - ADDITIONAL COMMENTS
Pt reports independent PTA, owns no DME. Works full time in a factory. +  Pt's wife is independent and available to assist if needed

## 2020-05-04 NOTE — OCCUPATIONAL THERAPY INITIAL EVALUATION ADULT - PLANNED THERAPY INTERVENTIONS, OT EVAL
neuromuscular re-education/balance training/motor coordination training/ADL retraining/bed mobility training/strengthening/transfer training

## 2020-05-04 NOTE — SWALLOW BEDSIDE ASSESSMENT ADULT - SLP PERTINENT HISTORY OF CURRENT PROBLEM
As per MD note, "69 yo M w/ acute hypoxemicc respiratory failure 2/2 COVID pneumonia. Extubated 5/1".

## 2020-05-04 NOTE — SWALLOW BEDSIDE ASSESSMENT ADULT - PHARYNGEAL PHASE
Delayed pharyngeal swallow/Delayed throat clear post oral intake/desat to 85% desat to 83%/Delayed throat clear post oral intake/Delayed pharyngeal swallow

## 2020-05-04 NOTE — SWALLOW BEDSIDE ASSESSMENT ADULT - ASR SWALLOW ASPIRATION MONITOR
change of breathing pattern/position upright (90Y)/fever/throat clearing/oral hygiene/gurgly voice/pneumonia/upper respiratory infection/cough

## 2020-05-04 NOTE — PHYSICAL THERAPY INITIAL EVALUATION ADULT - IMPAIRMENTS CONTRIBUTING TO GAIT DEVIATIONS, PT EVAL
cool
decreased strength/impaired balance/narrow base of support/impaired motor control/impaired coordination

## 2020-05-04 NOTE — PHYSICAL THERAPY INITIAL EVALUATION ADULT - PLANNED THERAPY INTERVENTIONS, PT EVAL
gait training/neuromuscular re-education/ROM/balance training/bed mobility training/strengthening/transfer training/motor coordination training

## 2020-05-04 NOTE — SWALLOW BEDSIDE ASSESSMENT ADULT - SLP GENERAL OBSERVATIONS
Pt recd awake and upright in bed, +50% O2 via venti-mask w/O2 sats @ 99% prior to PO, desat to 86-88% w/removal of mask, immediate rise to >93% once replaced.  +Malaysian speaking, language line utilized for interpretation. Fair ability for simple command following, 0/10 pain scale reported pre and post.

## 2020-05-04 NOTE — PROGRESS NOTE ADULT - ASSESSMENT
71 yo M w/ PMH of HTN and DM on insulin and Metformin, and HLD presented with complaint of SOB, HA, mild CP, and weakness. He states he had a nonproductive cough, w/ congestion and feeling cold for raghav last 8 days. He also complained of a fever but did not check his temperature. Upon arrival to the ED his SpO2 was 84% on RA with RR of 28 which improved to 99% on NRB. He was then downtitrated to 6L NC and is saturating around 90-93%. He states since he has been in the ED and put on the oxygen he feels a lot better.   Currently he denies any Dizziness, HA, CP, SOB, Abd pain, N/V/D/C, dysuria, recent travel or sick contacts. (11 Apr 2020 16:00)    His Chest Xray showed bilateral PNA.  patient was admitted for management of acute hypoxic respiratory failure 2/2 COVID pneumonia.  He was treated with a course of Plaquinel 4/11 - 4/15     patient's course continue to deteriorate.  On 4/16/2020, he was intubated for worsening of resp failure.     Impression:  COVID-19 Pneumonia  multifocal Pneumonia  acute hypoxic respiratory failure  cough  shortness of breath  s/p intubation; extubated 4/29    Plan:  -  completed course of Hydroxychloroquine 4/15/2020  - s/p steroids 4/11 - 4/15  - s/p ACTEMRA x 1 dose 4/16      Continue supportive care measures     - continue Oxygenation     ELEVATED D-dimers  - on Eliquis     Pseudomonas in sputum culture  - intermediate to zosyn  - continue on Merrem x  7 days      Will follow with you.

## 2020-05-05 NOTE — PROGRESS NOTE ADULT - SUBJECTIVE AND OBJECTIVE BOX
CC: COVID/Myopathy    INTERVAL HPI/OVERNIGHT EVENTS: Patient seen and examined. Seen by SLP this am. Diet advanced to Pureed with no liquids. NG tube pulled. TLC removed, manual pressure held until hemostasis obtained. Denies chest pain, nausea, vomiting, fever, chills. Maintaining oxygen saturation >92 % on 50% VM. Appears comfortable.     Vital Signs Last 24 Hrs  T(C): 36.6 (05 May 2020 08:30), Max: 36.9 (04 May 2020 15:25)  T(F): 97.8 (05 May 2020 08:30), Max: 98.5 (04 May 2020 15:25)  HR: 68 (05 May 2020 08:30) (68 - 78)  BP: 143/73 (05 May 2020 08:30) (143/73 - 183/79)  BP(mean): --  RR: 20 (05 May 2020 08:30) (19 - 20)  SpO2: 100% (05 May 2020 08:30) (95% - 100%)      PHYSICAL EXAM:  GENERAL: NAD, well-groomed  HEENT: PERRL, +EOMI, anicteric, no [place]  NECK: Supple, No JVD   CHEST/LUNG: CTA bilaterally; Normal effort  HEART: S1S2 Normal intensity, no murmurs, gallops or rubs noted  ABDOMEN: Soft, BS Normoactive, NT, ND, no HSM noted  EXTREMITIES:  2+ radial and DP pulses noted, no clubbing, cyanosis, or edema noted, Limited mobility   SKIN: No rashes or lesions noted  NEURO: No focal deficits  PSYCH: Calm and cooperative  I&O's Detail    04 May 2020 07:01  -  05 May 2020 07:00  --------------------------------------------------------  IN:    Enteral Tube Flush: 450 mL    Glucerna 1.5: 320 mL  Total IN: 770 mL    OUT:    Voided: 700 mL  Total OUT: 700 mL    Total NET: 70 mL                                    9.7    9.08  )-----------( 273      ( 05 May 2020 06:53 )             30.7     05 May 2020 06:53    144    |  103    |  38.0   ----------------------------<  182    4.2     |  30.0   |  0.77     Ca    8.2        05 May 2020 06:53  Phos  3.2       05 May 2020 06:53  Mg     2.4       05 May 2020 06:53    TPro  5.6    /  Alb  2.8    /  TBili  0.3    /  DBili  x      /  AST  59     /  ALT  58     /  AlkPhos  176    05 May 2020 06:53      CAPILLARY BLOOD GLUCOSE      POCT Blood Glucose.: 110 mg/dL (05 May 2020 12:51)  POCT Blood Glucose.: 82 mg/dL (05 May 2020 08:49)  POCT Blood Glucose.: 190 mg/dL (05 May 2020 05:08)  POCT Blood Glucose.: 282 mg/dL (04 May 2020 23:16)  POCT Blood Glucose.: 187 mg/dL (04 May 2020 16:47)  POCT Blood Glucose.: 211 mg/dL (04 May 2020 14:47)    LIVER FUNCTIONS - ( 05 May 2020 06:53 )  Alb: 2.8 g/dL / Pro: 5.6 g/dL / ALK PHOS: 176 U/L / ALT: 58 U/L / AST: 59 U/L / GGT: x               MEDICATIONS  (STANDING):  cloNIDine 0.1 milliGRAM(s) Oral every 12 hours  dextrose 5%. 1000 milliLiter(s) (50 mL/Hr) IV Continuous <Continuous>  dextrose 50% Injectable 12.5 Gram(s) IV Push once  dextrose 50% Injectable 25 Gram(s) IV Push once  dextrose 50% Injectable 25 Gram(s) IV Push once  dextrose 50% Injectable 25 Gram(s) IV Push once  dextrose 50% Injectable 12.5 Gram(s) IV Push once  enoxaparin Injectable 40 milliGRAM(s) SubCutaneous every 12 hours  gabapentin 300 milliGRAM(s) Oral every 8 hours  hydrochlorothiazide 25 milliGRAM(s) Oral daily  insulin glargine Injectable (LANTUS) 12 Unit(s) SubCutaneous at bedtime  insulin lispro (HumaLOG) corrective regimen sliding scale   SubCutaneous Before meals and at bedtime  labetalol 100 milliGRAM(s) Oral every 8 hours  melatonin 5 milliGRAM(s) Oral at bedtime  meropenem  IVPB 1000 milliGRAM(s) IV Intermittent every 8 hours  neomycin/bacitracin/polymyxin Topical Ointment 1 Application(s) Topical two times a day  pantoprazole    Tablet 40 milliGRAM(s) Oral before breakfast  sodium chloride 0.9%. 1000 milliLiter(s) (50 mL/Hr) IV Continuous <Continuous>    MEDICATIONS  (PRN):  acetaminophen   Tablet .. 650 milliGRAM(s) Oral every 6 hours PRN Temp greater or equal to 38C (100.4F)  ALBUTerol    90 MICROgram(s) HFA Inhaler 1 Puff(s) Inhalation every 4 hours PRN Shortness of Breath and/or Wheezing  ALPRAZolam 0.5 milliGRAM(s) Oral every 6 hours PRN Agitation  dextrose 40% Gel 15 Gram(s) Oral once PRN Blood Glucose LESS THAN 70 milliGRAM(s)/deciliter  glucagon  Injectable 1 milliGRAM(s) IntraMuscular once PRN Glucose LESS THAN 70 milligrams/deciliter  hydrALAZINE Injectable 10 milliGRAM(s) IV Push every 2 hours PRN SBP >160  sodium chloride 0.9% lock flush 10 milliLiter(s) IV Push every 1 hour PRN Pre/post blood products, medications, blood draw, and to maintain line patency      RADIOLOGY & ADDITIONAL TESTS:

## 2020-05-05 NOTE — PROGRESS NOTE ADULT - SUBJECTIVE AND OBJECTIVE BOX
CC: Covid pneumonia in hypoxic resp failure.  Deconditioning from prolong hospitalization, ICU stay , intubation for resp failure. Extubated , NG tube feeding.   HPI:  71 yo M w/ PMH of HTN and DM on insulin and Metformin, and HLD presented with complaint of SOB, HA, mild CP, and weakness. He states he had a nonproductive cough, w/ congestion and feeling cold for raghav last 8 days. He also complained of a fever but did not check his temperature. Upon arrival to the ED his SpO2 was 84% on RA with RR of 28 which improved to 99% on NRB. He was then downtitrated to 6L NC and is saturating around 90-93%. He states since he has been in the ED and put on the oxygen he feels a lot better.   Currently he denies any Dizziness, HA, CP, SOB, Abd pain, N/V/D/C, dysuria, recent travel or sick contacts. (11 Apr 2020 16:00)    REVIEW OF SYSTEMS:    Patient denied fever, chills, abdominal pain, nausea, vomiting, cough, shortness of breath, chest pain or palpitations    Vital Signs Last 24 Hrs  T(C): 36.6 (05 May 2020 08:30), Max: 36.9 (04 May 2020 15:25)  T(F): 97.8 (05 May 2020 08:30), Max: 98.5 (04 May 2020 15:25)  HR: 68 (05 May 2020 08:30) (68 - 78)  BP: 143/73 (05 May 2020 08:30) (143/73 - 183/79)  BP(mean): --  RR: 20 (05 May 2020 08:30) (19 - 20)  SpO2: 100% (05 May 2020 08:30) (95% - 100%)I&O's Summary    04 May 2020 07:01  -  05 May 2020 07:00  --------------------------------------------------------  IN: 770 mL / OUT: 700 mL / NET: 70 mL      PHYSICAL EXAM:  GENERAL: NAD,   HEENT: PERRL, +EOMI, anicteric, no [place]  NECK: Supple, No JVD   CHEST/LUNG: CTA bilaterally; Normal effort  HEART: S1S2 Normal intensity, no murmurs, gallops or rubs noted  ABDOMEN: Soft, BS Normoactive, NT, ND, no HSM noted  EXTREMITIES:  2+ radial and DP pulses noted, no clubbing, cyanosis, or edema noted, Limited mobility   SKIN: No rashes or lesions noted  NEURO: A&O, no focal deficits noted, CN II-XII intact  PSYCH: Depressed mood and affect; insight/judgement appropriate  LABS:                        9.7    9.08  )-----------( 273      ( 05 May 2020 06:53 )             30.7     05-05    144  |  103  |  38.0<H>  ----------------------------<  182<H>  4.2   |  30.0<H>  |  0.77    Ca    8.2<L>      05 May 2020 06:53  Phos  3.2     05-05  Mg     2.4     05-05    TPro  5.6<L>  /  Alb  2.8<L>  /  TBili  0.3<L>  /  DBili  x   /  AST  59<H>  /  ALT  58<H>  /  AlkPhos  176<H>  05-05        RADIOLOGY & ADDITIONAL TESTS:    MEDICATIONS:  MEDICATIONS  (STANDING):  cloNIDine 0.1 milliGRAM(s) Oral every 12 hours  dextrose 5%. 1000 milliLiter(s) (50 mL/Hr) IV Continuous <Continuous>  dextrose 50% Injectable 12.5 Gram(s) IV Push once  dextrose 50% Injectable 25 Gram(s) IV Push once  dextrose 50% Injectable 25 Gram(s) IV Push once  dextrose 50% Injectable 25 Gram(s) IV Push once  dextrose 50% Injectable 12.5 Gram(s) IV Push once  enoxaparin Injectable 40 milliGRAM(s) SubCutaneous every 12 hours  gabapentin 300 milliGRAM(s) Oral every 8 hours  hydrALAZINE 25 milliGRAM(s) Oral three times a day  insulin glargine Injectable (LANTUS) 12 Unit(s) SubCutaneous at bedtime  insulin lispro (HumaLOG) corrective regimen sliding scale   SubCutaneous Before meals and at bedtime  labetalol 100 milliGRAM(s) Oral every 8 hours  melatonin 5 milliGRAM(s) Oral at bedtime  meropenem  IVPB 1000 milliGRAM(s) IV Intermittent every 8 hours  neomycin/bacitracin/polymyxin Topical Ointment 1 Application(s) Topical two times a day  pantoprazole    Tablet 40 milliGRAM(s) Oral before breakfast  sodium chloride 0.9%. 1000 milliLiter(s) (50 mL/Hr) IV Continuous <Continuous>    MEDICATIONS  (PRN):  acetaminophen   Tablet .. 650 milliGRAM(s) Oral every 6 hours PRN Temp greater or equal to 38C (100.4F)  ALBUTerol    90 MICROgram(s) HFA Inhaler 1 Puff(s) Inhalation every 4 hours PRN Shortness of Breath and/or Wheezing  ALPRAZolam 0.5 milliGRAM(s) Oral every 6 hours PRN Agitation  dextrose 40% Gel 15 Gram(s) Oral once PRN Blood Glucose LESS THAN 70 milliGRAM(s)/deciliter  glucagon  Injectable 1 milliGRAM(s) IntraMuscular once PRN Glucose LESS THAN 70 milligrams/deciliter  hydrALAZINE Injectable 10 milliGRAM(s) IV Push every 6 hours PRN SBP >160  sodium chloride 0.9% lock flush 10 milliLiter(s) IV Push every 1 hour PRN Pre/post blood products, medications, blood draw, and to maintain line patency

## 2020-05-05 NOTE — CONSULT NOTE ADULT - ATTENDING COMMENTS
Patient seen and examined. Discussed the patient and treatment recommendations of resident.   Patient could benefit from acute rehab but needs to improve functionally so that she can meet criteria for AR.

## 2020-05-05 NOTE — PROGRESS NOTE ADULT - ASSESSMENT
69 yo M w/ acute hypoxemic  respiratory failure 2/2 COVID pneumonia. Extubated 5/1.    Covid pneumonia with hypoxic resp failure.  Status post vent, extubated 5/1  Concern for vocal cord paralysis/dysfunction.  Seen by SLP recommend pureed with no liquids, will continue IV hydration for now      MARIYA. ATN + pre-renal.  Resolved on iv hydration     Diabetes Mellitus  Insulin sliding scale     Pseudomonas in sputum/Bacterial pneumonia possibly ventilator associated    Meropenem 1g iv q8    vocal cord paralysis   seen by ENT s/p laryngoscopy showing vocal cord paralysis and edema.  Likely from prolonged vent intubation  SLP following  Steroid     Hypertension  Labetalol 100mg po q8  Clonidine 0.1mg po bid   Hydralazine 25 mg TID    Dispo: PT/OT/SLP recommend ACUTE rehab when medically stable. 71 yo M w/ acute hypoxemic  respiratory failure 2/2 COVID pneumonia. Extubated 5/1.    Covid pneumonia with hypoxic resp failure.  Status post vent, extubated 5/1  Concern for vocal cord paralysis/dysfunction.  Seen by SLP recommend pureed with no liquids, will continue IV hydration for now      MARIYA. ATN + pre-renal.  Resolved on iv hydration     Diabetes Mellitus  Insulin sliding scale     Pseudomonas in sputum/Bacterial pneumonia possibly ventilator associated    Meropenem 1g iv q8    vocal cord paralysis   seen by ENT s/p laryngoscopy showing vocal cord paralysis and edema.  Likely from prolonged vent intubation  SLP following  Steroid     Hypertension  Labetalol 100mg po q8  Clonidine 0.1mg po bid   Hydralazine 25 mg TID    Dispo: PT/OT/SLP recommend ACUTE rehab when medically stable. Updated [place] white [phone].

## 2020-05-05 NOTE — CHART NOTE - NSCHARTNOTEFT_GEN_A_CORE
Source: Patient [ ]  Family [ ]   other [x ]    Current Diet: Diet, NPO with Tube Feed:   Tube Feeding Modality: Nasogastric  Glucerna 1.5 [place]  Total Volume for 24 Hours (mL): 1200  Continuous  Starting Tube Feed Rate {mL per Hour}: 50  Until Goal Tube Feed Rate (mL per Hour): 50  Tube Feed Duration (in Hours): 24  Tube Feed Start Time: 11:38 (05-03-20 @ 11:38)    Current Weight:   (4/11) 145 lbs    % Weight Change No new weight to assess, will continue to monitor.  Aware pt with 1+ trace edema right foot/ankle and 2+ mild b/l arm edema noted per documentation    Pertinent Medications: MEDICATIONS  (STANDING):  cloNIDine 0.1 milliGRAM(s) Oral every 12 hours  dextrose 5%. 1000 milliLiter(s) (50 mL/Hr) IV Continuous <Continuous>  dextrose 50% Injectable 12.5 Gram(s) IV Push once  dextrose 50% Injectable 25 Gram(s) IV Push once  dextrose 50% Injectable 25 Gram(s) IV Push once  dextrose 50% Injectable 25 Gram(s) IV Push once  dextrose 50% Injectable 12.5 Gram(s) IV Push once  enoxaparin Injectable 40 milliGRAM(s) SubCutaneous every 12 hours  gabapentin 300 milliGRAM(s) Oral every 8 hours  hydrochlorothiazide 25 milliGRAM(s) Oral daily  insulin glargine Injectable (LANTUS) 12 Unit(s) SubCutaneous two times a day  insulin lispro (HumaLOG) corrective regimen sliding scale   SubCutaneous every 6 hours  insulin lispro Injectable (HumaLOG) 4 Unit(s) SubCutaneous every 6 hours  labetalol 100 milliGRAM(s) Oral every 8 hours  meropenem  IVPB 1000 milliGRAM(s) IV Intermittent every 8 hours  neomycin/bacitracin/polymyxin Topical Ointment 1 Application(s) Topical two times a day  OLANZapine 10 milliGRAM(s) Oral at bedtime  pantoprazole    Tablet 40 milliGRAM(s) Oral before breakfast    MEDICATIONS  (PRN):  acetaminophen   Tablet .. 650 milliGRAM(s) Oral every 6 hours PRN Temp greater or equal to 38C (100.4F)  ALBUTerol    90 MICROgram(s) HFA Inhaler 1 Puff(s) Inhalation every 4 hours PRN Shortness of Breath and/or Wheezing  ALPRAZolam 0.5 milliGRAM(s) Oral every 6 hours PRN Agitation  dextrose 40% Gel 15 Gram(s) Oral once PRN Blood Glucose LESS THAN 70 milliGRAM(s)/deciliter  glucagon  Injectable 1 milliGRAM(s) IntraMuscular once PRN Glucose LESS THAN 70 milligrams/deciliter  hydrALAZINE Injectable 10 milliGRAM(s) IV Push every 2 hours PRN SBP >160  HYDROmorphone  Injectable 2 milliGRAM(s) IV Push every 3 hours PRN agitation  sodium chloride 0.9% lock flush 10 milliLiter(s) IV Push every 1 hour PRN Pre/post blood products, medications, blood draw, and to maintain line patency    Pertinent Labs: CBC Full  -  ( 05 May 2020 06:53 )  WBC Count : 9.08 K/uL  RBC Count : 3.27 M/uL  Hemoglobin : 9.7 g/dL  Hematocrit : 30.7 %  Platelet Count - Automated : 273 K/uL  Mean Cell Volume : 93.9 fl  Mean Cell Hemoglobin : 29.7 pg  Mean Cell Hemoglobin Concentration : 31.6 gm/dL  Auto Neutrophil # : 7.57 K/uL  Auto Lymphocyte # : 0.50 K/uL  Auto Monocyte # : 0.86 K/uL  Auto Eosinophil # : 0.00 K/uL  Auto Basophil # : 0.00 K/uL  Auto Neutrophil % : 83.3 %  Auto Lymphocyte % : 5.5 %  Auto Monocyte % : 9.5 %  Auto Eosinophil % : 0.0 %  Auto Basophil % : 0.0 %  05-05 Na144 mmol/L Glu 182 mg/dL<H> K+ 4.2 mmol/L Cr  0.77 mg/dL BUN 38.0 mg/dL<H> Phos 3.2 mg/dL Alb 2.8 g/dL<L> PAB n/a       Skin: no skin breakdown noted    Current Nutrition Diagnosis: Pt remains at high nutrition risk secondary to mild protein calorie malnutrition (acute) related to inability to meet sufficient protein-energy in setting of acute hypoxic respiratory failure due to COVID-19 requiring prolonged intubation, now extubated with concern for upper airway obstruction (vocal cord paralysis/dysfunction) as evidenced by meeting <75% estimated nutrient needs >/5 days and mild edema.  Aware swallow eval performed, however pt was desatting with attempt to feed and therefore remains NPO at this time.  Pt is currently tolerating continuous feeds of Glucerna.    Recommendations:   1. Consider increasing Glucerna 1.5 to goal rate of 60ml/hr (x20hrs) daily, as tolerated to provide 1200ml, 1800kcal, 99g protein, 910ml free water  2. Repeat swallow eval when respiratory status improves  3. RX: MVI and Vit C 500mg daily   4. Monitor daily wts     Monitoring and Evaluation:   [ ] PO intake [x ] Tolerance to diet prescription [X] Weights  [X] Follow up per protocol [X] Labs:

## 2020-05-05 NOTE — PROGRESS NOTE ADULT - ASSESSMENT
69 yo M w/ acute hypoxemic  respiratory failure 2/2 COVID pneumonia. Extubated 5/1.    Covid pneumonia with hypoxic resp failure.  Status post vent, extubated 5/1  Concern for vocal cord paralysis/dysfunction.  NGTube feeding   Speech path eval - NPO as desat with attempt to feed.     MARIYA. ATN + pre-renal.  Resolved on iv hydration     Diabetes Mellitus  Insulin sliding scale     Pseudomonas in sputum/Bacterial pneumonia possibly ventilator associated    Meropenem 1g iv q8    vocal cord paralysis  Likely from prolonged vent intubation  Speech pathology eval  Steroid     Hypertension  Labetalol 100mg po q8  Clonidine 0.1mg po bid   HCTZ 25mg po daily     Disp: Continue inhospital PT.  OOB.  Swallow evaluation and eventual dc of NGtube for feeding.  Rehab.

## 2020-05-05 NOTE — CONSULT NOTE ADULT - ASSESSMENT
ASSESSMENT/PLAN    70y Male with functional deficits after acute hypoxic respiratory failure secondary to COVID pneumonia requiring intubation and prolonged critical care stay. Course complicated by superimposed ventilator associated pneumonia, dysphagia/ stridor due to vocal cord paralysis.     Medical Conditions: managed by primary team     AHRF 2/2 COVID - s/p Plaquenil, Steroids, Tocalizumab. Currently weaning on VentiriMask.      VAP - Currently on Meropenem for 7 day course, ID following     MARIYA - Resolved with IV hydration     DM2- on sliding scale     HTN - on PO meds    Dysphagia   - seen by ENT s/p laryngoscopy showing vocal cord paralysis and edema. On steroids. Ongoing speech assessment.    Nutrition   - at time of evaluation was NPO with NGT feeds. Attempted PO trials with speech, however with desaturation to 80s. Ongoing speech therapy for diet upgrade.  Goal would be for patient to be able to tolerate diet with some sort of liquid.     DVT PPX - SCDs, Lovenox BID aggressive ppx per primary    Rehab:   - May benefit from ACUTE INPATIENT REHAB pending medical optimizations, barriers include diet and current high O2 requirement.   - Continue bedside therapy as well as OOB throughout the day with mobilization by staff to maintain cardiopulmonary function and prevention of secondary complications related to debility.   - Will continue to follow for ongoing rehab needs and recommendations.   - Functional progress will determine ongoing rehab dispo recommendations, which may change.    At the time of this notation, as per administration at [facility], admission to Acute Inpatient Rehabilitation Facility of a COVID19+ patient must meet the following criteria:    1. Be at least 7 days post-first COVID + test  2. Be without FEVER or USE OF ANTIPYRETICS for  >72 hours  3. Be able to perform transfers or bed mobility with at most 50% assist (i.e. moderate assistance)  4. Be medically stable with resolution of primary symptoms and can tolerate 3 hours of therapy   5. Have a discharge plan from rehab is to home upon completion of AR    The above requirements may change and will be updated accordingly. ASSESSMENT/PLAN    70y Male with functional deficits after acute hypoxic respiratory failure secondary to COVID pneumonia requiring intubation and prolonged critical care stay. Course complicated by superimposed ventilator associated pneumonia, dysphagia/ stridor due to vocal cord paralysis.     Medical Conditions: managed by primary team     AHRF 2/2 COVID - s/p Plaquenil, Steroids, Tocalizumab. Currently weaning on VentiriMask.      VAP - Currently on Meropenem for 7 day course, ID following     MARIYA - Resolved with IV hydration     DM2- on sliding scale     HTN - on PO meds    Dysphagia   - seen by ENT s/p laryngoscopy showing vocal cord paralysis and edema. On steroids. Ongoing speech assessment.    Nutrition   - at time of evaluation was NPO with NGT feeds. Attempted PO trials with speech, however with desaturation to 80s. Ongoing speech therapy for diet upgrade.  Goal would be for patient to be able to tolerate diet with some sort of liquid.     Cognition: discontinued Zyprexa 5/5. Started Melatonin.     Pain: discontinued IV dilaudid.     DVT PPX - SCDs, Lovenox BID aggressive ppx per primary    Rehab:   - May benefit from ACUTE INPATIENT REHAB pending medical optimizations, barriers include diet and current high O2 requirement.   - Continue bedside therapy as well as OOB throughout the day with mobilization by staff to maintain cardiopulmonary function and prevention of secondary complications related to debility.   - Will continue to follow for ongoing rehab needs and recommendations.   - Functional progress will determine ongoing rehab dispo recommendations, which may change.    At the time of this notation, as per administration at [facility], admission to Acute Inpatient Rehabilitation Facility of a COVID19+ patient must meet the following criteria:    1. Be at least 7 days post-first COVID + test  2. Be without FEVER or USE OF ANTIPYRETICS for  >72 hours  3. Be able to perform transfers or bed mobility with at most 50% assist (i.e. moderate assistance)  4. Be medically stable with resolution of primary symptoms and can tolerate 3 hours of therapy   5. Have a discharge plan from rehab is to home upon completion of AR    The above requirements may change and will be updated accordingly. 70y Male with functional deficits after acute hypoxic respiratory failure secondary to COVID pneumonia requiring intubation and prolonged critical care stay. Course complicated by superimposed ventilator associated pneumonia, dysphagia/ stridor due to vocal cord paralysis.     Medical Conditions: managed per primary team  *AHRF 2/2 COVID - s/p Plaquenil, Steroids, Tocalizumab. Currently weaning on VentiriMask.   *VAP - Currently on Meropenem for 7 day course, ID following  *MARIYA - Resolved with IV hydration  *DM2- on sliding scale  *HTN - on PO meds  *Dysphagia  - seen by ENT s/p laryngoscopy showing vocal cord paralysis and edema. On steroids. Ongoing speech assessment.  *Nutrition - at time of evaluation was NPO with NGT feeds. Attempted PO trials with speech, however with desaturation to 80s. Ongoing speech therapy for diet upgrade.  Goal would be for patient to be able to tolerate diet with some sort of liquid prior to d/c to rehab.   *Cognition: discontinued Zyprexa 5/5. Started Melatonin.     Sleep: Melatonin added (5/5)  Pain: discontinued IV dilaudid.   DVT PPX - SCDs, Lovenox BID; aggressive ppx per primary    Rehab:   - May benefit from ACUTE INPATIENT REHAB pending medical optimization.  - Continue bedside therapy as well as OOB throughout the day with mobilization by staff to maintain cardiopulmonary function and prevention of secondary complications related to debility.   - Will continue to follow for ongoing rehab needs and recommendations.   - Functional progress will determine ongoing rehab dispo recommendations, which may change.  - Barriers to discharge to rehab: diet and O2 requirement    At the time of this notation, as per administration at , admission to Acute Inpatient Rehabilitation Facility of a COVID19+ patient must meet the following criteria:    1. Be at least 7 days post-first COVID + test  2. Be without FEVER or USE OF ANTIPYRETICS for  >72 hours  3. Be able to perform transfers or bed mobility with at most 50% assist (i.e. moderate assistance)  4. Be medically stable with resolution of primary symptoms and can tolerate 3 hours of therapy   5. Have a discharge plan from rehab is to home upon completion of AR    The above requirements may change and will be updated accordingly.

## 2020-05-05 NOTE — CONSULT NOTE ADULT - SUBJECTIVE AND OBJECTIVE BOX
70y Male was admitted on 04-11    Patient is a 70y old  Male who presents with a chief complaint of SOB (04 May 2020 15:50)      HPI:  71 yo M w/ PMH of HTN and DM on insulin and Metformin, and HLD presented with complaint of SOB, HA, mild CP, and weakness. Also with nonproductive cough, w/ congestion and feeling cold for 8 days prior to admission. Upon arrival to the ED his SpO2 was 84% on RA with RR of 28 which improved to 99% on NRB. He was then downtitrated to 6L NC and is saturating around 90-93%. He was admitted for acute hypoxic respiratory failure secondary to COVID pneumonia.  Course was complicated by worsening hypoxia developing ARDS requiring intubation (4/16). Status post experimental therapies with Plaquenil, steroids and treated for superimposed ventilator associated pneumonia with pseudomonas (with course of Meropenem).  He was then extubated on 5/1.  Post extubation, stridor was noted and found to have dysphagia with bedside swallow evaluation. ENT was consulted, performed laryngoscopy which demonstrated granulation tissue and evidence of vocal cord paralysis with mild edema.  Steroids were recommended for vocal cord edema.     INTERVAL HISTORY:  Patient seen and evaluated by PM&R with [place]  ([id number])  SLP following for PO trials due to stridor and dysphagia  Recommended NPO per last assessment on 5/4.   Currently on NRB satting 100 percent and weaning top Ventiri mask.         REVIEW OF SYSTEMS:   Constitutional - No fever, No weight loss, No fatigue  HEENT - No eye pain, No visual disturbances, No difficulty hearing  Respiratory - +cough, +dyspnea with movement  Cardiovascular - No chest pain, No palpitations  Gastrointestinal - No abdominal pain, No nausea, No vomiting, No constipation  Genitourinary - No dysuria, + incontinence  Neurological - No headaches, + loss of strength, No numbness, No tremors  Skin - No itching, No rashes, No lesions   Musculoskeletal - No joint pain, No joint swelling, No muscle pain  Psychiatric - + depression, No anxiety    PAST MEDICAL & SURGICAL HISTORY  Hyperlipidemia  Type 2 diabetes mellitus without complication, with long-term current use of insulin  Essential hypertension  No significant past surgical history      SOCIAL HISTORY  Smoking - Denied  EtOH - Denied   Drugs - Denied    PRIOR FUNCTIONAL HISTORY  Ambulation: independent  ADLs: independent  Previous Home Equipment: denies DME use    HOME ENVIRONMENT:  Type of Home: private house  Living With: Spouse who is able to assist    Special Needs or Precautions: Aspiration  Weight bearing status: no restrictions    FAMILY HISTORY   No pertinent family history in first degree relatives      RECENT LABS/IMAGING  CBC Full  -  ( 05 May 2020 06:53 )  WBC Count : 9.08 K/uL  RBC Count : 3.27 M/uL  Hemoglobin : 9.7 g/dL  Hematocrit : 30.7 %  Platelet Count - Automated : 273 K/uL  Mean Cell Volume : 93.9 fl  Mean Cell Hemoglobin : 29.7 pg  Mean Cell Hemoglobin Concentration : 31.6 gm/dL  Auto Neutrophil # : 7.57 K/uL  Auto Lymphocyte # : 0.50 K/uL  Auto Monocyte # : 0.86 K/uL  Auto Eosinophil # : 0.00 K/uL  Auto Basophil # : 0.00 K/uL  Auto Neutrophil % : 83.3 %  Auto Lymphocyte % : 5.5 %  Auto Monocyte % : 9.5 %  Auto Eosinophil % : 0.0 %  Auto Basophil % : 0.0 %    05-05    144  |  103  |  38.0<H>  ----------------------------<  182<H>  4.2   |  30.0<H>  |  0.77    Ca    8.2<L>      05 May 2020 06:53  Phos  3.2     05-05  Mg     2.4     05-05    TPro  5.6<L>  /  Alb  2.8<L>  /  TBili  0.3<L>  /  DBili  x   /  AST  59<H>  /  ALT  58<H>  /  AlkPhos  176<H>  05-05        VITALS  T(C): 36.6 (05-05-20 @ 08:30), Max: 36.9 (05-04-20 @ 15:25)  HR: 68 (05-05-20 @ 08:30) (68 - 78)  BP: 143/73 (05-05-20 @ 08:30) (143/73 - 183/79)  RR: 20 (05-05-20 @ 08:30) (19 - 20)  SpO2: 100% (05-05-20 @ 08:30) (95% - 100%) on NRB  Wt(kg): --    ALLERGIES  No Known Allergies      MEDICATIONS   acetaminophen   Tablet .. 650 milliGRAM(s) Oral every 6 hours PRN  ALBUTerol    90 MICROgram(s) HFA Inhaler 1 Puff(s) Inhalation every 4 hours PRN  ALPRAZolam 0.5 milliGRAM(s) Oral every 6 hours PRN  cloNIDine 0.1 milliGRAM(s) Oral every 12 hours  dextrose 40% Gel 15 Gram(s) Oral once PRN  dextrose 5%. 1000 milliLiter(s) IV Continuous <Continuous>  dextrose 50% Injectable 12.5 Gram(s) IV Push once  dextrose 50% Injectable 25 Gram(s) IV Push once  dextrose 50% Injectable 25 Gram(s) IV Push once  dextrose 50% Injectable 25 Gram(s) IV Push once  dextrose 50% Injectable 12.5 Gram(s) IV Push once  enoxaparin Injectable 40 milliGRAM(s) SubCutaneous every 12 hours  gabapentin 300 milliGRAM(s) Oral every 8 hours  glucagon  Injectable 1 milliGRAM(s) IntraMuscular once PRN  hydrALAZINE Injectable 10 milliGRAM(s) IV Push every 2 hours PRN  hydrochlorothiazide 25 milliGRAM(s) Oral daily  HYDROmorphone  Injectable 2 milliGRAM(s) IV Push every 3 hours PRN  insulin glargine Injectable (LANTUS) 12 Unit(s) SubCutaneous at bedtime  insulin lispro (HumaLOG) corrective regimen sliding scale   SubCutaneous Before meals and at bedtime  insulin lispro Injectable (HumaLOG) 2 Unit(s) SubCutaneous every 6 hours  labetalol 100 milliGRAM(s) Oral every 8 hours  meropenem  IVPB 1000 milliGRAM(s) IV Intermittent every 8 hours  neomycin/bacitracin/polymyxin Topical Ointment 1 Application(s) Topical two times a day  OLANZapine 10 milliGRAM(s) Oral at bedtime  pantoprazole    Tablet 40 milliGRAM(s) Oral before breakfast  sodium chloride 0.9% lock flush 10 milliLiter(s) IV Push every 1 hour PRN  sodium chloride 0.9%. 1000 milliLiter(s) IV Continuous <Continuous>      ----------------------------------------------------------------------------------------  PHYSICAL EXAM -- Limited in attempt to limit exposure due to COVID. Also, patient unable to perform all commands accurately.   Constitutional - NAD, Comfortable but dyspneic with activity  HEENT - NCAT, EOMI  Neck - Supple, No limited ROM  Chest - conversationally dyspneic, requiring supplemental oxygen through NRB  Cardiovascular - slighlty tachycardic, regular rhythm   Abdomen - BS+, Soft, NTND  Extremities - No C/C/E, No calf tenderness   Neurologic Exam -                    Cognitive - Awake, Alert, AAO to self, place, date, year, situation - however requires cuing     Communication / speech - hypophonic speech. Slow processing, some word finding difficulty      Cranial Nerves - CN 2-12 grossly intact, no facial asymmetry, tongue midline, facial sensation intact, EOMI, no ptosis, shoulder shrug intact, hearing intact     Motor -                     LEFT    UE - ShAB 4/5, EF 4/5, EE 4/5, WE 4/5,  diminished                     RIGHT UE - ShAB 4/5, EF 4/5, EE 4/5, WE 4/5,  diminished                     LEFT    LE - HF 3/5, KE 4/5, DF 4/5, PF 5/5                    RIGHT LE - HF 3/5, KE 4/5, DF 4/5, PF 5/5        Sensory - Intact to LT     Coordination - unable to perform correctly, however attempts     OculoVestibular - No nystagmus  Psychiatric - Mood stable, Affect flat    CURRENT FUNCTIONAL STATUS:  Bed Mobility: Rolling/Turning:     · Level of [place]	moderate assist (50% patients effort)	  · Physical Assist/Nonphysical Assist	1 person assist	    Bed Mobility: Sit to Supine:     · Level of [place]	moderate assist (50% patients effort)	  · Physical Assist/Nonphysical Assist	1 person assist	    Bed Mobility: Supine to Sit:     · Level of [place]	moderate assist (50% patients effort)	  · Physical Assist/Nonphysical Assist	1 person assist	    Transfer: Bed to Chair:     Transfer Skill: Bed to Chair   · Level of [place]	unable to perform	    Transfer: Chair to Bed:     · Level of [place]	unable to perform	    Transfer: Sit to Stand:     · Level of [place]	moderate assist (50% patients effort)	  · Physical Assist/Nonphysical Assist	1 person assist	  · Weight-Bearing Restrictions	full weight-bearing	  · Assistive Device	rolling walker	    Transfer: Stand to Sit:     · Level of [place]	moderate assist (50% patients effort)	  · Physical Assist/Nonphysical Assist	1 person assist	  · Weight-Bearing Restrictions	full weight-bearing	  · Assistive Device	rolling walker	    Gait Skills:     · Level of [place]	moderate assist (50% patients effort)	  · Physical Assist/Nonphysical Assist	1 person assist	  · Weight-Bearing Restrictions	full weight-bearing	  · Assistive Device	rolling walker	  · Gait Distance	4 steps at bedside	    Gait Analysis:     · Gait Pattern Used	3-point gait	  · Gait Deviations Noted	decreased kenia	  · Impairments Contributing to Gait Deviations	impaired balance; impaired coordination; impaired motor control; decreased strength; narrow base of support	    Stair Negotiation:     · Level of [place]	unable to perform	      ----------------------------------------------------------------------------------------

## 2020-05-06 NOTE — PROGRESS NOTE ADULT - SUBJECTIVE AND OBJECTIVE BOX
CC: COVID/Deconditioning/dysphagia    INTERVAL HPI/OVERNIGHT EVENTS: Patient seen and examined. Awake, alert. Now on nasal canula. Tolerating po diet. Denies chest pain, nausea, vomiting, fever, chills. Wants to sit in chair.     Vital Signs Last 24 Hrs  T(C): 36.6 (06 May 2020 08:07), Max: 36.6 (05 May 2020 16:03)  T(F): 97.8 (06 May 2020 08:07), Max: 97.8 (05 May 2020 16:03)  HR: 68 (06 May 2020 08:07) (66 - 85)  BP: 162/71 (06 May 2020 08:07) (146/77 - 178/70)  BP(mean): --  RR: 20 (06 May 2020 08:07) (20 - 96)  SpO2: 100% (06 May 2020 08:13) (94% - 100%)        PHYSICAL EXAM:  GENERAL: NAD, well-groomed  HEENT: PERRL, +EOMI, anicteric, no [place]  NECK: Supple, No JVD   CHEST/LUNG: CTA bilaterally; Normal effort  HEART: S1S2 Normal intensity, no murmurs, gallops or rubs noted  ABDOMEN: Soft, BS Normoactive, NT, ND, no HSM noted  EXTREMITIES:  2+ radial and DP pulses noted, no clubbing, cyanosis, or edema noted  SKIN: No rashes or lesions noted  NEURO: No focal deficits  PSYCH: Calm and cooperative      I&O's Detail    05 May 2020 07:01  -  06 May 2020 07:00  --------------------------------------------------------  IN:    sodium chloride 0.9%.: 200 mL  Total IN: 200 mL    OUT:    Voided: 650 mL  Total OUT: 650 mL    Total NET: -450 mL                                    10.6   6.55  )-----------( 312      ( 06 May 2020 06:09 )             34.0     06 May 2020 06:09    148    |  106    |  30.0   ----------------------------<  78     4.4     |  33.0   |  0.73     Ca    8.4        06 May 2020 06:09  Phos  3.0       06 May 2020 06:09  Mg     2.4       06 May 2020 06:09    TPro  5.6    /  Alb  3.0    /  TBili  0.4    /  DBili  x      /  AST  63     /  ALT  75     /  AlkPhos  161    06 May 2020 06:09      CAPILLARY BLOOD GLUCOSE      POCT Blood Glucose.: 84 mg/dL (06 May 2020 08:04)  POCT Blood Glucose.: 189 mg/dL (05 May 2020 21:52)  POCT Blood Glucose.: 149 mg/dL (05 May 2020 17:27)  POCT Blood Glucose.: 110 mg/dL (05 May 2020 12:51)    LIVER FUNCTIONS - ( 06 May 2020 06:09 )  Alb: 3.0 g/dL / Pro: 5.6 g/dL / ALK PHOS: 161 U/L / ALT: 75 U/L / AST: 63 U/L / GGT: x               MEDICATIONS  (STANDING):  cloNIDine 0.1 milliGRAM(s) Oral every 12 hours  dextrose 5%. 1000 milliLiter(s) (50 mL/Hr) IV Continuous <Continuous>  dextrose 50% Injectable 12.5 Gram(s) IV Push once  dextrose 50% Injectable 25 Gram(s) IV Push once  dextrose 50% Injectable 25 Gram(s) IV Push once  dextrose 50% Injectable 25 Gram(s) IV Push once  dextrose 50% Injectable 12.5 Gram(s) IV Push once  enoxaparin Injectable 40 milliGRAM(s) SubCutaneous every 12 hours  gabapentin 300 milliGRAM(s) Oral every 8 hours  hydrALAZINE 25 milliGRAM(s) Oral three times a day  insulin glargine Injectable (LANTUS) 6 Unit(s) SubCutaneous at bedtime  insulin lispro (HumaLOG) corrective regimen sliding scale   SubCutaneous Before meals and at bedtime  labetalol 100 milliGRAM(s) Oral every 8 hours  melatonin 5 milliGRAM(s) Oral at bedtime  meropenem  IVPB 1000 milliGRAM(s) IV Intermittent every 8 hours  neomycin/bacitracin/polymyxin Topical Ointment 1 Application(s) Topical two times a day  pantoprazole    Tablet 40 milliGRAM(s) Oral before breakfast  sodium chloride 0.9%. 1000 milliLiter(s) (50 mL/Hr) IV Continuous <Continuous>    MEDICATIONS  (PRN):  acetaminophen   Tablet .. 650 milliGRAM(s) Oral every 6 hours PRN Temp greater or equal to 38C (100.4F)  ALBUTerol    90 MICROgram(s) HFA Inhaler 1 Puff(s) Inhalation every 4 hours PRN Shortness of Breath and/or Wheezing  ALPRAZolam 0.5 milliGRAM(s) Oral every 6 hours PRN Agitation  dextrose 40% Gel 15 Gram(s) Oral once PRN Blood Glucose LESS THAN 70 milliGRAM(s)/deciliter  glucagon  Injectable 1 milliGRAM(s) IntraMuscular once PRN Glucose LESS THAN 70 milligrams/deciliter  hydrALAZINE Injectable 10 milliGRAM(s) IV Push every 6 hours PRN SBP >160  sodium chloride 0.9% lock flush 10 milliLiter(s) IV Push every 1 hour PRN Pre/post blood products, medications, blood draw, and to maintain line patency      RADIOLOGY & ADDITIONAL TESTS:

## 2020-05-06 NOTE — PROGRESS NOTE ADULT - SUBJECTIVE AND OBJECTIVE BOX
Patient doing well today.  He is fatigued.  Making progress with diet and function.  On IVF for hydration, for no liquid diet.     REVIEW OF SYSTEMS  Constitutional - No fever,  +fatigue  Neurological - +loss of strength    FUNCTIONAL PROGRESS  5/4  Bed Mobility: Rolling/Turning:     · Level of [place]	moderate assist (50% patients effort)	  · Physical Assist/Nonphysical Assist	1 person assist	    Bed Mobility: Sit to Supine:     · Level of [place]	moderate assist (50% patients effort)	  · Physical Assist/Nonphysical Assist	1 person assist	    Bed Mobility: Supine to Sit:     · Level of [place]	moderate assist (50% patients effort)	  · Physical Assist/Nonphysical Assist	1 person assist	    Transfer: Bed to Chair:     Transfer Skill: Bed to Chair   · Level of [place]	unable to perform	    Transfer: Chair to Bed:     · Level of [place]	unable to perform	    Transfer: Sit to Stand:     · Level of [place]	moderate assist (50% patients effort)	  · Physical Assist/Nonphysical Assist	1 person assist	  · Weight-Bearing Restrictions	full weight-bearing	  · Assistive Device	rolling walker	    Transfer: Stand to Sit:     · Level of [place]	moderate assist (50% patients effort)	  · Physical Assist/Nonphysical Assist	1 person assist	  · Weight-Bearing Restrictions	full weight-bearing	  · Assistive Device	rolling walker	    Gait Skills:     · Level of [place]	moderate assist (50% patients effort)	  · Physical Assist/Nonphysical Assist	1 person assist	  · Weight-Bearing Restrictions	full weight-bearing	  · Assistive Device	rolling walker	  · Gait Distance	4 steps at bedside	    Gait Analysis:     · Gait Pattern Used	3-point gait	  · Gait Deviations Noted	decreased kenia	  · Impairments Contributing to Gait Deviations	impaired balance; impaired coordination; impaired motor control; decreased strength; narrow base of support	    Stair Negotiation:     · Level of [place]	unable to perform	      VITALS  T(C): 36.6 (05-06-20 @ 08:07), Max: 36.6 (05-05-20 @ 16:03)  HR: 68 (05-06-20 @ 08:07) (66 - 85)  BP: 162/71 (05-06-20 @ 08:07) (146/77 - 178/70)  RR: 20 (05-06-20 @ 08:07) (20 - 96)  SpO2: 98% (05-06-20 @ 12:07) (94% - 100%)  Wt(kg): --    MEDICATIONS   acetaminophen   Tablet .. 650 milliGRAM(s) every 6 hours PRN  ALBUTerol    90 MICROgram(s) HFA Inhaler 1 Puff(s) every 4 hours PRN  ALPRAZolam 0.5 milliGRAM(s) every 6 hours PRN  cloNIDine 0.1 milliGRAM(s) every 12 hours  dextrose 40% Gel 15 Gram(s) once PRN  dextrose 5%. 1000 milliLiter(s) <Continuous>  dextrose 50% Injectable 12.5 Gram(s) once  dextrose 50% Injectable 25 Gram(s) once  dextrose 50% Injectable 25 Gram(s) once  dextrose 50% Injectable 25 Gram(s) once  dextrose 50% Injectable 12.5 Gram(s) once  enoxaparin Injectable 40 milliGRAM(s) every 12 hours  gabapentin 300 milliGRAM(s) every 8 hours  glucagon  Injectable 1 milliGRAM(s) once PRN  hydrALAZINE 25 milliGRAM(s) three times a day  hydrALAZINE Injectable 10 milliGRAM(s) every 6 hours PRN  insulin glargine Injectable (LANTUS) 6 Unit(s) at bedtime  insulin lispro (HumaLOG) corrective regimen sliding scale   Before meals and at bedtime  labetalol 100 milliGRAM(s) every 8 hours  melatonin 5 milliGRAM(s) at bedtime  meropenem  IVPB 1000 milliGRAM(s) every 8 hours  neomycin/bacitracin/polymyxin Topical Ointment 1 Application(s) two times a day  pantoprazole    Tablet 40 milliGRAM(s) before breakfast  sodium chloride 0.9% lock flush 10 milliLiter(s) every 1 hour PRN  sodium chloride 0.9%. 1000 milliLiter(s) <Continuous>      RECENT LABS - Reviewed                        10.6   6.55  )-----------( 312      ( 06 May 2020 06:09 )             34.0     05-06    148<H>  |  106  |  30.0<H>  ----------------------------<  78  4.4   |  33.0<H>  |  0.73    Ca    8.4<L>      06 May 2020 06:09  Phos  3.0     05-06  Mg     2.4     05-06    TPro  5.6<L>  /  Alb  3.0<L>  /  TBili  0.4  /  DBili  x   /  AST  63<H>  /  ALT  75<H>  /  AlkPhos  161<H>  05-06      --------------------------------------------------------------------------  PHYSICAL EXAM   Constitutional - NAD, Comfortable    Extremities - No C/C/E, No calf tenderness   Neurologic Exam -                    Cognitive - AAOx self, part of situation, Delayed processing     Communication - Hypophonia     Motor - Proximal weakness     Coordination - Impaired  Psychiatric - Mood stable, Fatigued	  ------------------------------------------------------------------------------------------------  ASSESSMENT/PLAN  70yMale with functional deficits after COVID related infection  COVID 19 - Proventil  Pseudomonas - Meropenem  DM2 - Lantus, ISS  Cardiac - Hydralazine IV & PO, Labetalol, Clonidine  Wakefulness - Amantadine 100mg BID (5/6), Melatonin 5mg HS (5/5)  Mood - Xanax  Pain - Tylenol, Neurontin  DVT PPX - SCDs, Lovenox  Rehab - Medically being optimized, on NO LIQUIDS. Eventually will need ACUTE inpatient rehabilitation for the functional deficits consisting of 3 hours of therapy/day & 24 hour RN/daily PMR physician for comorbid medical management. Will continue to follow for ongoing rehab needs and recommendations. Patient will be able to tolerate 3 hours a day.    Continue bedside therapy as well as OOB throughout the day with mobilization throughout the day with staff to maintain cardiopulmonary function and prevention of secondary complications related to debility.      At the time of this notation, as per administration at [facility], admission to Acute Inpatient Rehabilitation Facility of a COVID19+ patient must meet the following criteria:    1. Be at least 7 days post-first COVID + test  2. Be without FEVER or USE OF ANTIPYRETICS for  >72 hours  3. Be able to perform transfers or bed mobility with at most 50% assist (i.e. moderate assistance)  4. Be medically stable with resolution of primary symptoms and can tolerate 3 hours of therapy   5. Have a discharge plan from rehab is to home upon completion of AR    The above requirements may change and will be updated accordingly.

## 2020-05-06 NOTE — PROGRESS NOTE ADULT - ASSESSMENT
69 yo M w/ acute hypoxemic  respiratory failure 2/2 COVID pneumonia. Extubated 5/1.    Covid pneumonia with hypoxic resp failure.  Status post vent, extubated 5/1  + vocal cord paralysis/dysfunction.  Seen by SLP recommend pureed with no liquids, will continue IV hydration for now      MARIYA. ATN + pre-renal.  Resolved on iv hydration     Diabetes Mellitus  Insulin sliding scale   Decrease Lantus     Pseudomonas in sputum/Bacterial pneumonia possibly ventilator associated    Meropenem 1g iv q8    vocal cord paralysis   seen by ENT s/p laryngoscopy showing vocal cord paralysis and edema.  Likely from prolonged vent intubation  SLP following  Steroid completed    Hypertension  Labetalol 100mg po q8  Clonidine 0.1mg po bid   Hydralazine 25 mg TID    Dispo: PT/OT/SLP recommend ACUTE rehab when medically stable.

## 2020-05-06 NOTE — PROGRESS NOTE ADULT - ATTENDING COMMENTS
Covid pnuemonia .   Hypoxic resp failure is resolving. Tolerating oxygen via nasal cannula  Pseudomonas pneumonia  On meropenem.   PT  OOB   Supportive care.

## 2020-05-07 NOTE — PROGRESS NOTE ADULT - ASSESSMENT
69 yo M w/ acute hypoxemic  respiratory failure 2/2 COVID pneumonia. Extubated 5/1.    Covid pneumonia with hypoxic resp failure.  Status post vent, extubated 5/1  + vocal cord paralysis/dysfunction. Dysphonia is Resolving.  Talking clear.    Regular consistency diet.     MARIYA. ATN + pre-renal.  Resolved on iv hydration     Diabetes Mellitus  Insulin sliding scale   Lantus     Pseudomonas in sputum/Bacterial pneumonia possibly ventilator associated    Meropenem 1g iv q8    vocal cord paralysis   seen by ENT s/p laryngoscopy showing vocal cord paralysis and edema.  Likely from prolonged vent intubation  Steroid completed.  Dysphonia resolving. Much improved.     Hypertension  Labetalol 100mg po q8  Clonidine 0.1mg po bid   Hydralazine 25 mg TID    Dispo: PT/OT/ recommend ACUTE rehab when medically stable.    OOB   Likely dc to rehab in 1-2 days .

## 2020-05-07 NOTE — PROGRESS NOTE ADULT - ATTENDING COMMENTS
Patient seen and examined. Case discussed with resident. Agree with recommendations.     Medically being optimized.    Eventual AR recommended.

## 2020-05-07 NOTE — PROGRESS NOTE ADULT - SUBJECTIVE AND OBJECTIVE BOX
Patient doing well today. Reports he is breathing better  Making progress with diet and function.  On IVF for hydration, for no liquid diet.     REVIEW OF SYSTEMS  Constitutional - No fever,  +fatigue  Neurological - +loss of strength    FUNCTIONAL PROGRESS    5/6 SLP note:  Overall Progress Summary    Progress Summary: Chart reviewed. Pt seen to re-assess swallow. Pt received & seen seated upright in bed, +awake/alert, +fair-good ability for simple command following, +NGT, +O2 4L NC, +Language line used for [place] interpretation, 0/10 pain. Pt given trials of thin, nectar & honey thick fluids. Oral stage WFL. Pharyngeal dysphagia suspected for assessed fluids: throat clear & cough immediately post swallow, Pt left NAD, 0/10 pain. RN Miky aware. SLP to follow as schedule permits     Recommendations      Speech Language Pathology Recommendations: 1. Continue PUREE ONLY, NO LIQUIDS; short-term non-oral means of hydration, as per Pt/family wishes 2. Oral care3. Aspiration precautions: if overt s/s observed, please discontinue PO & resume NPO status  4. Will follow, as schedule permits     5/4  Bed Mobility: Rolling/Turning:     · Level of [place]	moderate assist (50% patients effort)	  · Physical Assist/Nonphysical Assist	1 person assist	    Bed Mobility: Sit to Supine:     · Level of [place]	moderate assist (50% patients effort)	  · Physical Assist/Nonphysical Assist	1 person assist	    Bed Mobility: Supine to Sit:     · Level of [place]	moderate assist (50% patients effort)	  · Physical Assist/Nonphysical Assist	1 person assist	    Transfer: Bed to Chair:     Transfer Skill: Bed to Chair   · Level of [place]	unable to perform	    Transfer: Chair to Bed:     · Level of [place]	unable to perform	    Transfer: Sit to Stand:     · Level of [place]	moderate assist (50% patients effort)	  · Physical Assist/Nonphysical Assist	1 person assist	  · Weight-Bearing Restrictions	full weight-bearing	  · Assistive Device	rolling walker	    Transfer: Stand to Sit:     · Level of [place]	moderate assist (50% patients effort)	  · Physical Assist/Nonphysical Assist	1 person assist	  · Weight-Bearing Restrictions	full weight-bearing	  · Assistive Device	rolling walker	    Gait Skills:     · Level of [place]	moderate assist (50% patients effort)	  · Physical Assist/Nonphysical Assist	1 person assist	  · Weight-Bearing Restrictions	full weight-bearing	  · Assistive Device	rolling walker	  · Gait Distance	4 steps at bedside	    Gait Analysis:     · Gait Pattern Used	3-point gait	  · Gait Deviations Noted	decreased kenia	  · Impairments Contributing to Gait Deviations	impaired balance; impaired coordination; impaired motor control; decreased strength; narrow base of support	    Stair Negotiation:     · Level of [place]	unable to perform	      VITALS  T(C): 36.6 (05-06-20 @ 08:07), Max: 36.6 (05-05-20 @ 16:03)  HR: 68 (05-06-20 @ 08:07) (66 - 85)  BP: 162/71 (05-06-20 @ 08:07) (146/77 - 178/70)  RR: 20 (05-06-20 @ 08:07) (20 - 96)  SpO2: 98% (05-06-20 @ 12:07) (94% - 100%)  Wt(kg): --    MEDICATIONS   acetaminophen   Tablet .. 650 milliGRAM(s) every 6 hours PRN  ALBUTerol    90 MICROgram(s) HFA Inhaler 1 Puff(s) every 4 hours PRN  ALPRAZolam 0.5 milliGRAM(s) every 6 hours PRN  cloNIDine 0.1 milliGRAM(s) every 12 hours  dextrose 40% Gel 15 Gram(s) once PRN  dextrose 5%. 1000 milliLiter(s) <Continuous>  dextrose 50% Injectable 12.5 Gram(s) once  dextrose 50% Injectable 25 Gram(s) once  dextrose 50% Injectable 25 Gram(s) once  dextrose 50% Injectable 25 Gram(s) once  dextrose 50% Injectable 12.5 Gram(s) once  enoxaparin Injectable 40 milliGRAM(s) every 12 hours  gabapentin 300 milliGRAM(s) every 8 hours  glucagon  Injectable 1 milliGRAM(s) once PRN  hydrALAZINE 25 milliGRAM(s) three times a day  hydrALAZINE Injectable 10 milliGRAM(s) every 6 hours PRN  insulin glargine Injectable (LANTUS) 6 Unit(s) at bedtime  insulin lispro (HumaLOG) corrective regimen sliding scale   Before meals and at bedtime  labetalol 100 milliGRAM(s) every 8 hours  melatonin 5 milliGRAM(s) at bedtime  meropenem  IVPB 1000 milliGRAM(s) every 8 hours  neomycin/bacitracin/polymyxin Topical Ointment 1 Application(s) two times a day  pantoprazole    Tablet 40 milliGRAM(s) before breakfast  sodium chloride 0.9% lock flush 10 milliLiter(s) every 1 hour PRN  sodium chloride 0.9%. 1000 milliLiter(s) <Continuous>      RECENT LABS - Reviewed                        10.6   6.55  )-----------( 312      ( 06 May 2020 06:09 )             34.0     05-06    148<H>  |  106  |  30.0<H>  ----------------------------<  78  4.4   |  33.0<H>  |  0.73    Ca    8.4<L>      06 May 2020 06:09  Phos  3.0     05-06  Mg     2.4     05-06    TPro  5.6<L>  /  Alb  3.0<L>  /  TBili  0.4  /  DBili  x   /  AST  63<H>  /  ALT  75<H>  /  AlkPhos  161<H>  05-06      --------------------------------------------------------------------------  PHYSICAL EXAM   Constitutional - NAD, Comfortable    Extremities - No C/C/E, No calf tenderness   Neurologic Exam -                    Cognitive - AAOx self, part of situation, Delayed processing     Communication - Hypophonia     Motor - Proximal weakness     Coordination - Impaired  Psychiatric - Mood stable, Fatigued	  ------------------------------------------------------------------------------------------------  ASSESSMENT/PLAN  70yMale with functional deficits after COVID related infection    COVID 19 - Proventil  Pseudomonas - Meropenem  Vocal cord paralysis and dysphagia - currently on pureed with no liquids. IV hydration. Ongoing SLP assessment.   DM2 - Lantus, ISS  Cardiac - Hydralazine IV & PO, Labetalol, Clonidine  Wakefulness - Amantadine 100mg BID (5/6), Melatonin 5mg HS (5/5)  Mood - Xanax  Pain - Tylenol, Neurontin  DVT PPX - SCDs, Lovenox  Rehab - Medically being optimized, on NO LIQUIDS. Eventually will need ACUTE inpatient rehabilitation for the functional deficits consisting of 3 hours of therapy/day & 24 hour RN/daily PMR physician for comorbid medical management. Will continue to follow for ongoing rehab needs and recommendations. Patient will be able to tolerate 3 hours a day.    Continue bedside therapy as well as OOB throughout the day with mobilization throughout the day with staff to maintain cardiopulmonary function and prevention of secondary complications related to debility.      At the time of this notation, as per administration at [facility], admission to Acute Inpatient Rehabilitation Facility of a COVID19+ patient must meet the following criteria:    1. Be at least 7 days post-first COVID + test  2. Be without FEVER or USE OF ANTIPYRETICS for  >72 hours  3. Be able to perform transfers or bed mobility with at most 50% assist (i.e. moderate assistance)  4. Be medically stable with resolution of primary symptoms and can tolerate 3 hours of therapy   5. Have a discharge plan from rehab is to home upon completion of AR    The above requirements may change and will be updated accordingly.

## 2020-05-07 NOTE — PROGRESS NOTE ADULT - SUBJECTIVE AND OBJECTIVE BOX
CC: Covid pneumonia.  Recent extubation for resp failure. Hypoxic resp failure resolving. Now tolerating 2L NC.  Weakness deconditioning.   HPI:  69 yo M w/ PMH of HTN and DM on insulin and Metformin, and HLD presented with complaint of SOB, HA, mild CP, and weakness. He states he had a nonproductive cough, w/ congestion and feeling cold for raghav last 8 days. He also complained of a fever but did not check his temperature. Upon arrival to the ED his SpO2 was 84% on RA with RR of 28 which improved to 99% on NRB. He was then downtitrated to 6L NC and is saturating around 90-93%. He states since he has been in the ED and put on the oxygen he feels a lot better.   Currently he denies any Dizziness, HA, CP, SOB, Abd pain, N/V/D/C, dysuria, recent travel or sick contacts. (11 Apr 2020 16:00)    REVIEW OF SYSTEMS:    Patient denied fever, chills, abdominal pain, nausea, vomiting, cough, shortness of breath, chest pain or palpitations    Vital Signs Last 24 Hrs  T(C): 36.4 (07 May 2020 07:23), Max: 36.9 (06 May 2020 20:52)  T(F): 97.5 (07 May 2020 07:23), Max: 98.5 (06 May 2020 20:52)  HR: 60 (07 May 2020 07:23) (58 - 67)  BP: 129/69 (07 May 2020 07:23) (118/79 - 166/71)  BP(mean): --  RR: 20 (07 May 2020 07:23) (18 - 20)  SpO2: 95% (07 May 2020 07:23) (94% - 100%)I&O's Summary    06 May 2020 07:01  -  07 May 2020 07:00  --------------------------------------------------------  IN: 950 mL / OUT: 700 mL / NET: 250 mL      PHYSICAL EXAM:  GENERAL: NAD, well-groomed  HEENT: PERRL, +EOMI, anicteric, no [place]  NECK: Supple, No JVD   CHEST/LUNG: CTA bilaterally; Normal effort  HEART: S1S2 Normal intensity, no murmurs, gallops or rubs noted  ABDOMEN: Soft, BS Normoactive, NT, ND, no HSM noted  EXTREMITIES:  2+ radial and DP pulses noted, no clubbing, cyanosis, or edema noted, Limited mobility   SKIN: No rashes or lesions noted  NEURO: A&Ox3, no focal deficits noted, CN II-XII intact  PSYCH: normal mood and affect; insight/judgement appropriate  LABS:                        9.7    5.82  )-----------( 281      ( 07 May 2020 06:35 )             30.7     05-07    142  |  105  |  22.0<H>  ----------------------------<  81  4.0   |  33.0<H>  |  0.74    Ca    7.7<L>      07 May 2020 06:35  Phos  3.0     05-07  Mg     2.0     05-07    TPro  4.8<L>  /  Alb  2.5<L>  /  TBili  0.3<L>  /  DBili  x   /  AST  43<H>  /  ALT  58<H>  /  AlkPhos  135<H>  05-07        RADIOLOGY & ADDITIONAL TESTS:    MEDICATIONS:  MEDICATIONS  (STANDING):  amantadine 100 milliGRAM(s) Oral <User Schedule>  cloNIDine 0.1 milliGRAM(s) Oral every 12 hours  dextrose 5%. 1000 milliLiter(s) (50 mL/Hr) IV Continuous <Continuous>  dextrose 50% Injectable 12.5 Gram(s) IV Push once  dextrose 50% Injectable 25 Gram(s) IV Push once  dextrose 50% Injectable 25 Gram(s) IV Push once  dextrose 50% Injectable 25 Gram(s) IV Push once  dextrose 50% Injectable 12.5 Gram(s) IV Push once  enoxaparin Injectable 40 milliGRAM(s) SubCutaneous every 12 hours  gabapentin 300 milliGRAM(s) Oral every 8 hours  hydrALAZINE 25 milliGRAM(s) Oral three times a day  insulin glargine Injectable (LANTUS) 6 Unit(s) SubCutaneous at bedtime  insulin lispro (HumaLOG) corrective regimen sliding scale   SubCutaneous Before meals and at bedtime  labetalol 100 milliGRAM(s) Oral every 8 hours  melatonin 5 milliGRAM(s) Oral at bedtime  meropenem  IVPB 1000 milliGRAM(s) IV Intermittent every 8 hours  neomycin/bacitracin/polymyxin Topical Ointment 1 Application(s) Topical two times a day  pantoprazole    Tablet 40 milliGRAM(s) Oral before breakfast  sodium chloride 0.9%. 1000 milliLiter(s) (50 mL/Hr) IV Continuous <Continuous>    MEDICATIONS  (PRN):  acetaminophen   Tablet .. 650 milliGRAM(s) Oral every 6 hours PRN Temp greater or equal to 38C (100.4F)  ALBUTerol    90 MICROgram(s) HFA Inhaler 1 Puff(s) Inhalation every 4 hours PRN Shortness of Breath and/or Wheezing  dextrose 40% Gel 15 Gram(s) Oral once PRN Blood Glucose LESS THAN 70 milliGRAM(s)/deciliter  glucagon  Injectable 1 milliGRAM(s) IntraMuscular once PRN Glucose LESS THAN 70 milligrams/deciliter  hydrALAZINE Injectable 10 milliGRAM(s) IV Push every 6 hours PRN SBP >160  sodium chloride 0.9% lock flush 10 milliLiter(s) IV Push every 1 hour PRN Pre/post blood products, medications, blood draw, and to maintain line patency

## 2020-05-08 NOTE — PROGRESS NOTE ADULT - ASSESSMENT
71 yo M w/ acute hypoxemic  respiratory failure 2/2 COVID pneumonia. Extubated 5/1.    Covid pneumonia with hypoxic resp failure.  Status post vent, extubated 5/1  + vocal cord paralysis/dysfunction. Dysphonia is Resolving.  Talking clear.    Soft diet with honey thick liquids    MARIYA. ATN + pre-renal.  Resolved on iv hydration     Diabetes Mellitus  Insulin sliding scale   BG low this am, dc Lantus and monitor      Pseudomonas in sputum/Bacterial pneumonia possibly ventilator associated    Meropenem 1g iv q8 completed    vocal cord paralysis   seen by ENT s/p laryngoscopy showing vocal cord paralysis and edema.  Likely from prolonged vent intubation  Steroid completed.  Dysphonia resolving. Much improved.     Hypertension- not at goal  Labetalol 100mg po q8  Clonidine 0.1mg po bid   Increase Hydralazine 25 mg QID    Dispo: PT/OT/ recommend ACUTE. discussed with case management, dc when auth obtained  OOB 71 yo M w/ acute hypoxemic  respiratory failure 2/2 COVID pneumonia. Extubated 5/1.    Covid pneumonia with hypoxic resp failure.  Status post vent, extubated 5/1  + vocal cord paralysis/dysfunction. Dysphonia is Resolving.  Talking clear.    Soft diet with honey thick liquids    Urinary retention  Insert preciado  Add flomax  Avoid constipation  TOV after 2-3 days on flomax    MARIYA. ATN + pre-renal.  Resolved on iv hydration     Diabetes Mellitus  Insulin sliding scale   BG low this am, dc Lantus and monitor      Pseudomonas in sputum/Bacterial pneumonia possibly ventilator associated    Meropenem 1g iv q8 completed    vocal cord paralysis   seen by ENT s/p laryngoscopy showing vocal cord paralysis and edema.  Likely from prolonged vent intubation  Steroid completed.  Dysphonia resolving. Much improved.     Hypertension- not at goal  Labetalol 100mg po q8  Clonidine 0.1mg po bid   Increase Hydralazine 25 mg QID    Dispo: PT/OT/ recommend ACUTE. discussed with case management, dc when auth obtained  OOB

## 2020-05-08 NOTE — H&P ADULT - ASSESSMENT
ASSESSMENT/PLAN  70y Male with PMH of presenting with acute hypoxic respiratory failure secondary to COVID pneumonia requiring intubation and prolonged critical care stay with hospital course complicated by superimposed ventilator associated pneumonia, dysphagia/ stridor due to vocal cord paralysis with functional deficits     COMORBIDITES/ACTIVE MEDICAL ISSUES     Gait Instability, ADL impairments and Functional impairments: start Comprehensive Rehab Program of PT/OT     #COVID/VAP (Pseudomonas)  -completed meropenam  -inhaler    #Vocal cord paralysis  -seen by ENT s/p laryngoscope with vocal cord paralysis and edema likely 2/2 to intubation  -completed steroid course    #HTN/HLD  -labetolol, clonidine, hydralazine  -statin    #Wakefulness  -amantidine  -melatonin at bedtime    #MARIYA – pre-renal  -Improved with IVF hydration  -monitor renal indices    #DM2  -ISS  -Lantus 20  -possible outpatient therapy with metformin 1000 bid    #Pain: Tylenol prn, gabapentin    -GI/Bowel Mgmt -  Continent c/w  Senna,  Dulcolax PRN, Miralax ,  -Bladder management: COntinent, PVR q 8 hours (SC if > 400)  - DVT: Lovenox, SCDs, TEDs     #Skin:  -No active issues at this time    FEN   - Diet - Regular + Thins  [CCHO, DASH/TLC]    - Dysphagia  SLP - evaluation and treatment    Precautions / PROPHYLAXIS:   - Falls, Cardiac, Sternal, Spinal, Seizure   - ortho: Weight bearing status: WBAT   - Lungs: Aspiration, Incentive Spirometer   - Pressure injury/Skin: Turn Q2hrs while in bed, OOB to Chair, PT/OT        MEDICAL PROGNOSIS: GOOD            REHAB POTENTIAL: GOOD             ESTIMATED DISPOSITION: HOME WITH HOME CARE            ELOS: 10-14 Days   EXPECTED THERAPY:     P.T. 1hr/day       O.T. 1hr/day      S.L.P. 1hr/day     P&O Unnecessary     EXP FREQUENCY: 5 days per 7 day period     PRESCREEN COMPARISION:   I have reviewed the prescreen information and I have found no relevant changes between the preadmission screening and my post admission evaluation     RATIONALE FOR INPATIENT ADMISSION - Patient demonstrates the following: (check all that apply)  [X] Medically appropriate for rehabilitation admission  [X] Has attainable rehab goals with an appropriate initial discharge plan  [X] Has rehabilitation potential (expected to make a significant improvement within a reasonable period of time)   [X] Requires close medical management by a rehab physician, rehab nursing care, Hospitalist and comprehensive interdisciplinary team (including PT, OT, & or SLP, Prosthetics and Orthotics) ASSESSMENT/PLAN  70y Male with PMH of presenting with acute hypoxic respiratory failure secondary to COVID pneumonia requiring intubation and prolonged critical care stay with hospital course complicated by superimposed ventilator associated pneumonia, dysphagia/ stridor due to vocal cord paralysis with functional deficits     COMORBIDITES/ACTIVE MEDICAL ISSUES     Gait Instability, ADL impairments and Functional impairments: start Comprehensive Rehab Program of PT/OT     #COVID/VAP (Pseudomonas)  -completed meropenam  -inhaler    #Vocal cord paralysis  -seen by ENT s/p laryngoscope with vocal cord paralysis and edema likely 2/2 to intubation  -completed steroid course    #HTN/HLD  - on admission, asymptomatic, patient due for medication, to follow  -labetolol, clonidine, hydralazine  -statin    #Wakefulness  -amantidine  -melatonin at bedtime    #MARIYA – pre-renal  -Improved with IVF hydration  -monitor renal indices    #DM2  -ISS  -Lantus 20  -possible outpatient therapy with metformin 1000 bid    #Pain: Tylenol prn, gabapentin    -GI/Bowel Mgmt -  Continent c/w  Senna,  Dulcolax PRN, Miralax ,  -Bladder management: preciado, flomax  - DVT: Lovenox, SCDs, TEDs     FEN   - Diet - Regular + Thins  [CCHO, DASH/TLC]    - Dysphagia  SLP - evaluation and treatment    Precautions / PROPHYLAXIS:   - Falls, Cardiac, Sternal, Spinal, Seizure   - ortho: Weight bearing status: WBAT   - Lungs: Aspiration, Incentive Spirometer   - Pressure injury/Skin: Turn Q2hrs while in bed, OOB to Chair, PT/OT        MEDICAL PROGNOSIS: GOOD            REHAB POTENTIAL: GOOD             ESTIMATED DISPOSITION: HOME WITH HOME CARE            ELOS: 10-14 Days   EXPECTED THERAPY:     P.T. 1hr/day       O.T. 1hr/day      S.L.P. 1hr/day     P&O Unnecessary     EXP FREQUENCY: 5 days per 7 day period     PRESCREEN COMPARISION:   I have reviewed the prescreen information and I have found no relevant changes between the preadmission screening and my post admission evaluation     RATIONALE FOR INPATIENT ADMISSION - Patient demonstrates the following: (check all that apply)  [X] Medically appropriate for rehabilitation admission  [X] Has attainable rehab goals with an appropriate initial discharge plan  [X] Has rehabilitation potential (expected to make a significant improvement within a reasonable period of time)   [X] Requires close medical management by a rehab physician, rehab nursing care, Hospitalist and comprehensive interdisciplinary team (including PT, OT, & or SLP, Prosthetics and Orthotics)

## 2020-05-08 NOTE — PROGRESS NOTE ADULT - ATTENDING COMMENTS
Patient seen and examined. Case discussed with resident. Agree with recommendations.     Medically being optimized.    Eventual AR recommended. Patient seen and examined. Case discussed with resident. Agree with recommendations.     Now on diet. Recommend AR.

## 2020-05-08 NOTE — H&P ADULT - NSHPREVIEWOFSYSTEMS_GEN_ALL_CORE
REVIEW OF SYSTEMS  Constitutional: No fever, No Chills, No fatigue  HEENT: No eye pain, No visual disturbances, No difficulty hearing  Pulm: No cough,  No shortness of breath  Cardio: No chest pain, No palpitations  GI:  No abdominal pain, No nausea, No vomiting, No diarrhea, No constipation  : No dysuria, No frequency, No hematuria  Neuro: No headaches, No memory loss, No loss of strength, No numbness, No tremors  Skin: No itching, No rashes, No lesions   Endo: No temperature intolerance  MSK: No joint pain, No joint swelling, No muscle pain, No Neck or back pain  Psych:  No depression, No anxiety REVIEW OF SYSTEMS  Constitutional: No fever, No Chills, No fatigue  HEENT: No eye pain, No visual disturbances, No difficulty hearing  Pulm: No cough,  No shortness of breath, (+) stable cough   Cardio: No chest pain, No palpitations  GI:  No abdominal pain, No nausea, No vomiting, No diarrhea, No constipation  : (+) retention with preciado  Neuro: No headaches, No memory loss, No loss of strength, No numbness, No tremors  MSK: No joint pain, No joint swelling, No muscle pain, No Neck or back pain  Psych:  No depression, No anxiety

## 2020-05-08 NOTE — DISCHARGE NOTE PROVIDER - NSDCCPCAREPLAN_GEN_ALL_CORE_FT
PRINCIPAL DISCHARGE DIAGNOSIS  Diagnosis: Multifocal pneumonia  Assessment and Plan of Treatment:       SECONDARY DISCHARGE DIAGNOSES  Diagnosis: Hyperkalemia  Assessment and Plan of Treatment:     Diagnosis: Hyperglycemia  Assessment and Plan of Treatment:     Diagnosis: Hyponatremia  Assessment and Plan of Treatment:     Diagnosis: Hypoxia  Assessment and Plan of Treatment:

## 2020-05-08 NOTE — DISCHARGE NOTE NURSING/CASE MANAGEMENT/SOCIAL WORK - PATIENT PORTAL LINK FT
You can access the FollowMyHealth Patient Portal offered by [facility] by registering at the following website: [email or web address]. By joining [employer] [facility]’s FollowMyHealth portal, you will also be able to view your health information using other applications (apps) compatible with our system.

## 2020-05-08 NOTE — PROGRESS NOTE ADULT - SUBJECTIVE AND OBJECTIVE BOX
CC: SOB/VOVID/Myopathy    INTERVAL HPI/OVERNIGHT EVENTS: Patient seen and examined. Assisted to chair this am, able support own weight. Asking for coffee. Seen by SLP and diet upgrade to honey thick liquids. Denies chest pain, nausea, vomiting, fever, chills. +ORTIZ with minimal exertion.    Vital Signs Last 24 Hrs  T(C): 36.4 (08 May 2020 07:30), Max: 37 (07 May 2020 14:00)  T(F): 97.5 (08 May 2020 07:30), Max: 98.6 (07 May 2020 14:00)  HR: 59 (08 May 2020 07:30) (59 - 76)  BP: 160/80 (08 May 2020 07:30) (145/74 - 180/96)  BP(mean): --  RR: 21 (08 May 2020 07:30) (18 - 21)  SpO2: 100% (08 May 2020 12:12) (91% - 100%)    PHYSICAL EXAM:  GENERAL: NAD, well-groomed  HEENT: PERRL, +EOMI, anicteric, no [place]  NECK: Supple, No JVD   CHEST/LUNG: CTA bilaterally; Normal effort  HEART: S1S2 Normal intensity, no murmurs, gallops or rubs noted  ABDOMEN: Soft, BS Normoactive, NT, ND, no HSM noted  EXTREMITIES:  2+ radial and DP pulses noted, no clubbing, cyanosis, or edema noted, Limited mobility   SKIN: No rashes or lesions noted  NEURO: A&Ox3, no focal deficits noted, CN II-XII intact  PSYCH: normal mood and affect; insight/judgement appropriate    I&O's Detail    07 May 2020 07:01  -  08 May 2020 07:00  --------------------------------------------------------  IN:    sodium chloride 0.9%.: 650 mL    Solution: 100 mL  Total IN: 750 mL    OUT:    Intermittent Catheterization - Urethral: 920 mL    Voided: 200 mL  Total OUT: 1120 mL    Total NET: -370 mL                                    9.7    8.00  )-----------( 274      ( 08 May 2020 06:37 )             30.2     08 May 2020 06:37    139    |  102    |  19.0   ----------------------------<  67     4.2     |  31.0   |  0.65     Ca    8.0        08 May 2020 06:37  Phos  3.0       08 May 2020 06:37  Mg     1.7       08 May 2020 06:37    TPro  4.8    /  Alb  2.4    /  TBili  0.3    /  DBili  x      /  AST  160    /  ALT  168    /  AlkPhos  192    08 May 2020 06:37      CAPILLARY BLOOD GLUCOSE      POCT Blood Glucose.: 251 mg/dL (08 May 2020 12:53)  POCT Blood Glucose.: 122 mg/dL (08 May 2020 09:08)  POCT Blood Glucose.: 370 mg/dL (07 May 2020 21:33)  POCT Blood Glucose.: 316 mg/dL (07 May 2020 18:44)  POCT Blood Glucose.: 230 mg/dL (07 May 2020 17:25)    LIVER FUNCTIONS - ( 08 May 2020 06:37 )  Alb: 2.4 g/dL / Pro: 4.8 g/dL / ALK PHOS: 192 U/L / ALT: 168 U/L / AST: 160 U/L / GGT: x               MEDICATIONS  (STANDING):  amantadine 100 milliGRAM(s) Oral <User Schedule>  cloNIDine 0.1 milliGRAM(s) Oral every 12 hours  dextrose 5%. 1000 milliLiter(s) (50 mL/Hr) IV Continuous <Continuous>  dextrose 50% Injectable 12.5 Gram(s) IV Push once  dextrose 50% Injectable 25 Gram(s) IV Push once  dextrose 50% Injectable 25 Gram(s) IV Push once  enoxaparin Injectable 40 milliGRAM(s) SubCutaneous every 12 hours  gabapentin 300 milliGRAM(s) Oral every 8 hours  hydrALAZINE 25 milliGRAM(s) Oral four times a day  insulin lispro (HumaLOG) corrective regimen sliding scale   SubCutaneous three times a day before meals  insulin lispro (HumaLOG) corrective regimen sliding scale   SubCutaneous at bedtime  labetalol 100 milliGRAM(s) Oral every 8 hours  melatonin 5 milliGRAM(s) Oral at bedtime  neomycin/bacitracin/polymyxin Topical Ointment 1 Application(s) Topical two times a day  pantoprazole    Tablet 40 milliGRAM(s) Oral before breakfast  sodium chloride 0.9%. 1000 milliLiter(s) (50 mL/Hr) IV Continuous <Continuous>    MEDICATIONS  (PRN):  acetaminophen   Tablet .. 650 milliGRAM(s) Oral every 6 hours PRN Temp greater or equal to 38C (100.4F)  ALBUTerol    90 MICROgram(s) HFA Inhaler 1 Puff(s) Inhalation every 4 hours PRN Shortness of Breath and/or Wheezing  dextrose 40% Gel 15 Gram(s) Oral once PRN Blood Glucose LESS THAN 70 milliGRAM(s)/deciliter  glucagon  Injectable 1 milliGRAM(s) IntraMuscular once PRN Glucose LESS THAN 70 milligrams/deciliter  hydrALAZINE Injectable 10 milliGRAM(s) IV Push every 6 hours PRN SBP >160  sodium chloride 0.9% lock flush 10 milliLiter(s) IV Push every 1 hour PRN Pre/post blood products, medications, blood draw, and to maintain line patency      RADIOLOGY & ADDITIONAL TESTS: CC: SOB/VOVID/Myopathy    INTERVAL HPI/OVERNIGHT EVENTS: Patient seen and examined. Assisted to chair this am, able support own weight. Asking for coffee. Seen by SLP and diet upgrade to honey thick liquids. Denies chest pain, nausea, vomiting, fever, chills. +ORTIZ with minimal exertion. Informed by RN that patient not urinating. Straight cath last night >800. Retaining again>350 this morning.     Vital Signs Last 24 Hrs  T(C): 36.4 (08 May 2020 07:30), Max: 37 (07 May 2020 14:00)  T(F): 97.5 (08 May 2020 07:30), Max: 98.6 (07 May 2020 14:00)  HR: 59 (08 May 2020 07:30) (59 - 76)  BP: 160/80 (08 May 2020 07:30) (145/74 - 180/96)  BP(mean): --  RR: 21 (08 May 2020 07:30) (18 - 21)  SpO2: 100% (08 May 2020 12:12) (91% - 100%)    PHYSICAL EXAM:  GENERAL: NAD, well-groomed  HEENT: PERRL, +EOMI, anicteric, no [place]  NECK: Supple, No JVD   CHEST/LUNG: CTA bilaterally; Normal effort  HEART: S1S2 Normal intensity, no murmurs, gallops or rubs noted  ABDOMEN: Soft, BS Normoactive, NT, ND, no HSM noted  EXTREMITIES:  2+ radial and DP pulses noted, no clubbing, cyanosis, or edema noted, Limited mobility   SKIN: No rashes or lesions noted  NEURO: A&Ox3, no focal deficits noted, CN II-XII intact  PSYCH: normal mood and affect; insight/judgement appropriate    I&O's Detail    07 May 2020 07:01  -  08 May 2020 07:00  --------------------------------------------------------  IN:    sodium chloride 0.9%.: 650 mL    Solution: 100 mL  Total IN: 750 mL    OUT:    Intermittent Catheterization - Urethral: 920 mL    Voided: 200 mL  Total OUT: 1120 mL    Total NET: -370 mL                                    9.7    8.00  )-----------( 274      ( 08 May 2020 06:37 )             30.2     08 May 2020 06:37    139    |  102    |  19.0   ----------------------------<  67     4.2     |  31.0   |  0.65     Ca    8.0        08 May 2020 06:37  Phos  3.0       08 May 2020 06:37  Mg     1.7       08 May 2020 06:37    TPro  4.8    /  Alb  2.4    /  TBili  0.3    /  DBili  x      /  AST  160    /  ALT  168    /  AlkPhos  192    08 May 2020 06:37      CAPILLARY BLOOD GLUCOSE      POCT Blood Glucose.: 251 mg/dL (08 May 2020 12:53)  POCT Blood Glucose.: 122 mg/dL (08 May 2020 09:08)  POCT Blood Glucose.: 370 mg/dL (07 May 2020 21:33)  POCT Blood Glucose.: 316 mg/dL (07 May 2020 18:44)  POCT Blood Glucose.: 230 mg/dL (07 May 2020 17:25)    LIVER FUNCTIONS - ( 08 May 2020 06:37 )  Alb: 2.4 g/dL / Pro: 4.8 g/dL / ALK PHOS: 192 U/L / ALT: 168 U/L / AST: 160 U/L / GGT: x               MEDICATIONS  (STANDING):  amantadine 100 milliGRAM(s) Oral <User Schedule>  cloNIDine 0.1 milliGRAM(s) Oral every 12 hours  dextrose 5%. 1000 milliLiter(s) (50 mL/Hr) IV Continuous <Continuous>  dextrose 50% Injectable 12.5 Gram(s) IV Push once  dextrose 50% Injectable 25 Gram(s) IV Push once  dextrose 50% Injectable 25 Gram(s) IV Push once  enoxaparin Injectable 40 milliGRAM(s) SubCutaneous every 12 hours  gabapentin 300 milliGRAM(s) Oral every 8 hours  hydrALAZINE 25 milliGRAM(s) Oral four times a day  insulin lispro (HumaLOG) corrective regimen sliding scale   SubCutaneous three times a day before meals  insulin lispro (HumaLOG) corrective regimen sliding scale   SubCutaneous at bedtime  labetalol 100 milliGRAM(s) Oral every 8 hours  melatonin 5 milliGRAM(s) Oral at bedtime  neomycin/bacitracin/polymyxin Topical Ointment 1 Application(s) Topical two times a day  pantoprazole    Tablet 40 milliGRAM(s) Oral before breakfast  sodium chloride 0.9%. 1000 milliLiter(s) (50 mL/Hr) IV Continuous <Continuous>    MEDICATIONS  (PRN):  acetaminophen   Tablet .. 650 milliGRAM(s) Oral every 6 hours PRN Temp greater or equal to 38C (100.4F)  ALBUTerol    90 MICROgram(s) HFA Inhaler 1 Puff(s) Inhalation every 4 hours PRN Shortness of Breath and/or Wheezing  dextrose 40% Gel 15 Gram(s) Oral once PRN Blood Glucose LESS THAN 70 milliGRAM(s)/deciliter  glucagon  Injectable 1 milliGRAM(s) IntraMuscular once PRN Glucose LESS THAN 70 milligrams/deciliter  hydrALAZINE Injectable 10 milliGRAM(s) IV Push every 6 hours PRN SBP >160  sodium chloride 0.9% lock flush 10 milliLiter(s) IV Push every 1 hour PRN Pre/post blood products, medications, blood draw, and to maintain line patency      RADIOLOGY & ADDITIONAL TESTS:

## 2020-05-08 NOTE — PROGRESS NOTE ADULT - REASON FOR ADMISSION
SOB

## 2020-05-08 NOTE — PROGRESS NOTE ADULT - SUBJECTIVE AND OBJECTIVE BOX
Patient doing well today. Reports he is breathing better. Sitting up in chair with assistance from nursing.  Making progress with diet and function.      REVIEW OF SYSTEMS  Constitutional - No fever,  +fatigue  Neurological - +loss of strength    FUNCTIONAL PROGRESS    5/6 SLP note:  Overall Progress Summary    Progress Summary: Chart reviewed. Pt seen to re-assess swallow. Pt received & seen seated upright in bed, +awake/alert, +fair-good ability for simple command following, +NGT, +O2 4L NC, +Language line used for English interpretation, 0/10 pain. Pt given trials of thin, nectar & honey thick fluids. Oral stage WFL. Pharyngeal dysphagia suspected for assessed fluids: throat clear & cough immediately post swallow, Pt left NAD, 0/10 pain. RN Miky aware. SLP to follow as schedule permits     Recommendations      Speech Language Pathology Recommendations: 1. Continue PUREE ONLY, NO LIQUIDS; short-term non-oral means of hydration, as per Pt/family wishes 2. Oral care3. Aspiration precautions: if overt s/s observed, please discontinue PO & resume NPO status  4. Will follow, as schedule permits     5/4  Bed Mobility: Rolling/Turning:     · Level of [place]	moderate assist (50% patients effort)	  · Physical Assist/Nonphysical Assist	1 person assist	    Bed Mobility: Sit to Supine:     · Level of [place]	moderate assist (50% patients effort)	  · Physical Assist/Nonphysical Assist	1 person assist	    Bed Mobility: Supine to Sit:     · Level of [place]	moderate assist (50% patients effort)	  · Physical Assist/Nonphysical Assist	1 person assist	    Transfer: Bed to Chair:     Transfer Skill: Bed to Chair   · Level of [place]	unable to perform	    Transfer: Chair to Bed:     · Level of [place]	unable to perform	    Transfer: Sit to Stand:     · Level of [place]	moderate assist (50% patients effort)	  · Physical Assist/Nonphysical Assist	1 person assist	  · Weight-Bearing Restrictions	full weight-bearing	  · Assistive Device	rolling walker	    Transfer: Stand to Sit:     · Level of [place]	moderate assist (50% patients effort)	  · Physical Assist/Nonphysical Assist	1 person assist	  · Weight-Bearing Restrictions	full weight-bearing	  · Assistive Device	rolling walker	    Gait Skills:     · Level of [place]	moderate assist (50% patients effort)	  · Physical Assist/Nonphysical Assist	1 person assist	  · Weight-Bearing Restrictions	full weight-bearing	  · Assistive Device	rolling walker	  · Gait Distance	4 steps at bedside	    Gait Analysis:     · Gait Pattern Used	3-point gait	  · Gait Deviations Noted	decreased kenia	  · Impairments Contributing to Gait Deviations	impaired balance; impaired coordination; impaired motor control; decreased strength; narrow base of support	    Stair Negotiation:     · Level of [place]	unable to perform	      VITALS  T(C): 36.6 (05-06-20 @ 08:07), Max: 36.6 (05-05-20 @ 16:03)  HR: 68 (05-06-20 @ 08:07) (66 - 85)  BP: 162/71 (05-06-20 @ 08:07) (146/77 - 178/70)  RR: 20 (05-06-20 @ 08:07) (20 - 96)  SpO2: 98% (05-06-20 @ 12:07) (94% - 100%)  Wt(kg): --    MEDICATIONS   acetaminophen   Tablet .. 650 milliGRAM(s) every 6 hours PRN  ALBUTerol    90 MICROgram(s) HFA Inhaler 1 Puff(s) every 4 hours PRN  ALPRAZolam 0.5 milliGRAM(s) every 6 hours PRN  cloNIDine 0.1 milliGRAM(s) every 12 hours  dextrose 40% Gel 15 Gram(s) once PRN  dextrose 5%. 1000 milliLiter(s) <Continuous>  dextrose 50% Injectable 12.5 Gram(s) once  dextrose 50% Injectable 25 Gram(s) once  dextrose 50% Injectable 25 Gram(s) once  dextrose 50% Injectable 25 Gram(s) once  dextrose 50% Injectable 12.5 Gram(s) once  enoxaparin Injectable 40 milliGRAM(s) every 12 hours  gabapentin 300 milliGRAM(s) every 8 hours  glucagon  Injectable 1 milliGRAM(s) once PRN  hydrALAZINE 25 milliGRAM(s) three times a day  hydrALAZINE Injectable 10 milliGRAM(s) every 6 hours PRN  insulin glargine Injectable (LANTUS) 6 Unit(s) at bedtime  insulin lispro (HumaLOG) corrective regimen sliding scale   Before meals and at bedtime  labetalol 100 milliGRAM(s) every 8 hours  melatonin 5 milliGRAM(s) at bedtime  meropenem  IVPB 1000 milliGRAM(s) every 8 hours  neomycin/bacitracin/polymyxin Topical Ointment 1 Application(s) two times a day  pantoprazole    Tablet 40 milliGRAM(s) before breakfast  sodium chloride 0.9% lock flush 10 milliLiter(s) every 1 hour PRN  sodium chloride 0.9%. 1000 milliLiter(s) <Continuous>      RECENT LABS - Reviewed                        10.6   6.55  )-----------( 312      ( 06 May 2020 06:09 )             34.0     05-06    148<H>  |  106  |  30.0<H>  ----------------------------<  78  4.4   |  33.0<H>  |  0.73    Ca    8.4<L>      06 May 2020 06:09  Phos  3.0     05-06  Mg     2.4     05-06    TPro  5.6<L>  /  Alb  3.0<L>  /  TBili  0.4  /  DBili  x   /  AST  63<H>  /  ALT  75<H>  /  AlkPhos  161<H>  05-06      --------------------------------------------------------------------------  PHYSICAL EXAM   Constitutional - NAD, Comfortable    Extremities - No C/C/E, No calf tenderness   Neurologic Exam -                    Cognitive - AAOx self, part of situation, Delayed processing     Communication - Hypophonia improving     Motor - Proximal weakness     Coordination - Impaired  Psychiatric - Mood stable, Fatigued	  ------------------------------------------------------------------------------------------------  ASSESSMENT/PLAN  70yMale with functional deficits after COVID related infection    COVID 19 - Proventil  Pseudomonas - Meropenem  Vocal cord paralysis and dysphagia - currently on pureed with no liquids. IV hydration. Ongoing SLP assessment for advancement   TRESA - Lantus, ISS  Cardiac - Hydralazine IV PRN & PO, Labetalol, Clonidine  Wakefulness - Amantadine 100mg BID (5/6), Melatonin 5mg HS (5/5)  Mood - Xanax  Pain - Tylenol, Neurontin  DVT PPX - SCDs, Lovenox    Rehab - Medically being optimized, on NO LIQUIDS. Eventually will need ACUTE inpatient rehabilitation for the functional deficits consisting of 3 hours of therapy/day & 24 hour RN/daily PMR physician for comorbid medical management. Will continue to follow for ongoing rehab needs and recommendations. Patient will be able to tolerate 3 hours a day.    Continue bedside therapy as well as OOB throughout the day with mobilization throughout the day with staff to maintain cardiopulmonary function and prevention of secondary complications related to debility.      At the time of this notation, as per administration at [facility], admission to Acute Inpatient Rehabilitation Facility of a COVID19+ patient must meet the following criteria:    1. Be at least 7 days post-first COVID + test  2. Be without FEVER or USE OF ANTIPYRETICS for  >72 hours  3. Be able to perform transfers or bed mobility with at most 50% assist (i.e. moderate assistance)  4. Be medically stable with resolution of primary symptoms and can tolerate 3 hours of therapy   5. Have a discharge plan from rehab is to home upon completion of AR    The above requirements may change and will be updated accordingly. Patient doing well today. Reports he is breathing better. Sitting up in chair with assistance from nursing.  Making progress with diet and function.      REVIEW OF SYSTEMS  Constitutional - No fever,  +fatigue  Neurological - +loss of strength    FUNCTIONAL PROGRESS    5/6 SLP note:  Overall Progress Summary    Progress Summary: Chart reviewed. Pt seen to re-assess swallow. Pt received & seen seated upright in bed, +awake/alert, +fair-good ability for simple command following, +NGT, +O2 4L NC, +Language line used for [place] interpretation, 0/10 pain. Pt given trials of thin, nectar & honey thick fluids. Oral stage WFL. Pharyngeal dysphagia suspected for assessed fluids: throat clear & cough immediately post swallow, Pt left NAD, 0/10 pain. RN Miky aware. SLP to follow as schedule permits     Recommendations      Speech Language Pathology Recommendations: 1. Continue PUREE ONLY, NO LIQUIDS; short-term non-oral means of hydration, as per Pt/family wishes 2. Oral care3. Aspiration precautions: if overt s/s observed, please discontinue PO & resume NPO status  4. Will follow, as schedule permits     5/4  Bed Mobility: Rolling/Turning:     · Level of [place]	moderate assist (50% patients effort)	  · Physical Assist/Nonphysical Assist	1 person assist	    Bed Mobility: Sit to Supine:     · Level of [place]	moderate assist (50% patients effort)	  · Physical Assist/Nonphysical Assist	1 person assist	    Bed Mobility: Supine to Sit:     · Level of [place]	moderate assist (50% patients effort)	  · Physical Assist/Nonphysical Assist	1 person assist	    Transfer: Bed to Chair:     Transfer Skill: Bed to Chair   · Level of [place]	unable to perform	    Transfer: Chair to Bed:     · Level of [place]	unable to perform	    Transfer: Sit to Stand:     · Level of [place]	moderate assist (50% patients effort)	  · Physical Assist/Nonphysical Assist	1 person assist	  · Weight-Bearing Restrictions	full weight-bearing	  · Assistive Device	rolling walker	    Transfer: Stand to Sit:     · Level of [place]	moderate assist (50% patients effort)	  · Physical Assist/Nonphysical Assist	1 person assist	  · Weight-Bearing Restrictions	full weight-bearing	  · Assistive Device	rolling walker	    Gait Skills:     · Level of [place]	moderate assist (50% patients effort)	  · Physical Assist/Nonphysical Assist	1 person assist	  · Weight-Bearing Restrictions	full weight-bearing	  · Assistive Device	rolling walker	  · Gait Distance	4 steps at bedside	    Gait Analysis:     · Gait Pattern Used	3-point gait	  · Gait Deviations Noted	decreased kenia	  · Impairments Contributing to Gait Deviations	impaired balance; impaired coordination; impaired motor control; decreased strength; narrow base of support	    Stair Negotiation:     · Level of [place]	unable to perform	      VITALS  T(C): 36.6 (05-06-20 @ 08:07), Max: 36.6 (05-05-20 @ 16:03)  HR: 68 (05-06-20 @ 08:07) (66 - 85)  BP: 162/71 (05-06-20 @ 08:07) (146/77 - 178/70)  RR: 20 (05-06-20 @ 08:07) (20 - 96)  SpO2: 98% (05-06-20 @ 12:07) (94% - 100%)  Wt(kg): --    MEDICATIONS   acetaminophen   Tablet .. 650 milliGRAM(s) every 6 hours PRN  ALBUTerol    90 MICROgram(s) HFA Inhaler 1 Puff(s) every 4 hours PRN  ALPRAZolam 0.5 milliGRAM(s) every 6 hours PRN  cloNIDine 0.1 milliGRAM(s) every 12 hours  dextrose 40% Gel 15 Gram(s) once PRN  dextrose 5%. 1000 milliLiter(s) <Continuous>  dextrose 50% Injectable 12.5 Gram(s) once  dextrose 50% Injectable 25 Gram(s) once  dextrose 50% Injectable 25 Gram(s) once  dextrose 50% Injectable 25 Gram(s) once  dextrose 50% Injectable 12.5 Gram(s) once  enoxaparin Injectable 40 milliGRAM(s) every 12 hours  gabapentin 300 milliGRAM(s) every 8 hours  glucagon  Injectable 1 milliGRAM(s) once PRN  hydrALAZINE 25 milliGRAM(s) three times a day  hydrALAZINE Injectable 10 milliGRAM(s) every 6 hours PRN  insulin glargine Injectable (LANTUS) 6 Unit(s) at bedtime  insulin lispro (HumaLOG) corrective regimen sliding scale   Before meals and at bedtime  labetalol 100 milliGRAM(s) every 8 hours  melatonin 5 milliGRAM(s) at bedtime  meropenem  IVPB 1000 milliGRAM(s) every 8 hours  neomycin/bacitracin/polymyxin Topical Ointment 1 Application(s) two times a day  pantoprazole    Tablet 40 milliGRAM(s) before breakfast  sodium chloride 0.9% lock flush 10 milliLiter(s) every 1 hour PRN  sodium chloride 0.9%. 1000 milliLiter(s) <Continuous>      RECENT LABS - Reviewed                        10.6   6.55  )-----------( 312      ( 06 May 2020 06:09 )             34.0     05-06    148<H>  |  106  |  30.0<H>  ----------------------------<  78  4.4   |  33.0<H>  |  0.73    Ca    8.4<L>      06 May 2020 06:09  Phos  3.0     05-06  Mg     2.4     05-06    TPro  5.6<L>  /  Alb  3.0<L>  /  TBili  0.4  /  DBili  x   /  AST  63<H>  /  ALT  75<H>  /  AlkPhos  161<H>  05-06      --------------------------------------------------------------------------  PHYSICAL EXAM   Constitutional - NAD, Comfortable    Extremities - No C/C/E, No calf tenderness   Neurologic Exam -                    Cognitive - AAOx self, part of situation, Delayed processing     Communication - Hypophonia improving     Motor - Proximal weakness     Coordination - Impaired  Psychiatric - Mood stable, Fatigued	  ------------------------------------------------------------------------------------------------  ASSESSMENT/PLAN  70yMale with functional deficits after COVID related infection    COVID 19 - Proventil  Pseudomonas - Meropenem  Oropharyngeal dysphagia - Soft/Nectar  DM2 - Lantus, ISS  Cardiac - Hydralazine IV PRN & PO, Labetalol, Clonidine  Wakefulness - Amantadine 100mg BID (5/6), Melatonin 5mg HS (5/5)  Mood - Xanax  Pain - Tylenol, Neurontin  DVT PPX - SCDs, Lovenox    Rehab - Recommend ACUTE inpatient rehabilitation for the functional deficits consisting of 3 hours of therapy/day & 24 hour RN/daily PMR physician for comorbid medical management. Will continue to follow for ongoing rehab needs and recommendations. Patient will be able to tolerate 3 hours a day.    Continue bedside therapy as well as OOB throughout the day with mobilization throughout the day with staff to maintain cardiopulmonary function and prevention of secondary complications related to debility.        At the time of this notation, as per administration at [facility], admission to Acute Inpatient Rehabilitation Facility of a COVID19+ patient must meet the following criteria:    1. Be at least 7 days post-first COVID + test  2. Be without FEVER or USE OF ANTIPYRETICS for  >72 hours  3. Be able to perform transfers or bed mobility with at most 50% assist (i.e. moderate assistance)  4. Be medically stable with resolution of primary symptoms and can tolerate 3 hours of therapy   5. Have a discharge plan from rehab is to home upon completion of AR    The above requirements may change and will be updated accordingly.

## 2020-05-08 NOTE — H&P ADULT - NSHPSOCIALHISTORY_GEN_ALL_CORE
SOCIAL HISTORY  Smoking - Denied  EtOH - Denied   Drugs - Denied    PRIOR FUNCTIONAL HISTORY  Ambulation: independent  ADLs: independent  Previous Home Equipment: denies DME use    HOME ENVIRONMENT:  Type of Home: private house  Living With: Spouse who is able to assist    Current Functional status  5/6 SLP note:  Overall Progress Summary    Progress Summary: Chart reviewed. Pt seen to re-assess swallow. Pt received & seen seated upright in bed, +awake/alert, +fair-good ability for simple command following, +NGT, +O2 4L NC, +Language line used for Armenian interpretation, 0/10 pain. Pt given trials of thin, nectar & honey thick fluids. Oral stage WFL. Pharyngeal dysphagia suspected for assessed fluids: throat clear & cough immediately post swallow, Pt left NAD, 0/10 pain. RN Miky aware. SLP to follow as schedule permits     5/4  Bed Mobility: Rolling/Turning:  moderate assist (50% patients effort)    Transfer: Sit to Stand:  moderate assist (50% patients effort)    Gait Skills:   moderate assist (50% patients effort)  • Physical Assist/Nonphysical Assist	1 person assist   • Assistive Device	rolling walker 4 steps at bedside SOCIAL HISTORY  Smoking - Denied  EtOH - Denied   Drugs - Denied    PRIOR FUNCTIONAL HISTORY  Ambulation: independent  ADLs: independent  Previous Home Equipment: denies DME use  works as     HOME ENVIRONMENT:  Type of Home: private house [address] with rail  Living With: Spouse who is able to assist    Current Functional status  5/6 SLP note:  Overall Progress Summary    Progress Summary: Chart reviewed. Pt seen to re-assess swallow. Pt received & seen seated upright in bed, +awake/alert, +fair-good ability for simple command following, +NGT, +O2 4L NC, +Language line used for Citizen of [place] interpretation, 0/10 pain. Pt given trials of thin, nectar & honey thick fluids. Oral stage WFL. Pharyngeal dysphagia suspected for assessed fluids: throat clear & cough immediately post swallow, Pt left NAD, 0/10 pain. RN Miky aware. SLP to follow as schedule permits     5/4  Bed Mobility: Rolling/Turning:  moderate assist (50% patients effort)    Transfer: Sit to Stand:  moderate assist (50% patients effort)    Gait Skills:   moderate assist (50% patients effort)  • Physical Assist/Nonphysical Assist	1 person assist   • Assistive Device	rolling walker 4 steps at bedside

## 2020-05-08 NOTE — DISCHARGE NOTE PROVIDER - NSDCMRMEDTOKEN_GEN_ALL_CORE_FT
acetaminophen 325 mg oral tablet: 2 tab(s) orally every 6 hours, As needed, Temp greater or equal to 38C (100.4F)  albuterol 90 mcg/inh inhalation aerosol: 1 puff(s) inhaled every 4 hours, As needed, Shortness of Breath and/or Wheezing  atorvastatin 40 mg oral tablet: 1 tab(s) orally once a day  cloNIDine 0.1 mg oral tablet: 1 tab(s) orally every 12 hours  gabapentin 300 mg oral capsule: 1 cap(s) orally once a day  hydrALAZINE 25 mg oral tablet: 1 tab(s) orally 4 times a day  labetalol 100 mg oral tablet: 1 tab(s) orally every 8 hours  Lantus 100 units/mL subcutaneous solution: 20 unit(s) subcutaneous once a day (at bedtime)  metFORMIN 1000 mg oral tablet: 1 tab(s) orally 2 times a day  polyethylene glycol 3350 oral powder for reconstitution: 17 gram(s) orally once a day  tamsulosin 0.4 mg oral capsule: 1 cap(s) orally once a day (at bedtime)

## 2020-05-08 NOTE — H&P ADULT - NSHPPHYSICALEXAM_GEN_ALL_CORE
PHYSICAL EXAMINATION   VItals: T(C): 36.4 (05-08-20 @ 16:05), Max: 37 (05-07-20 @ 21:22)  HR: 69 (05-08-20 @ 16:05) (59 - 76)  BP: 162/73 (05-08-20 @ 16:05) (145/74 - 170/77)  RR: 20 (05-08-20 @ 16:05) (18 - 21)  SpO2: 94% (05-08-20 @ 16:05) (91% - 100%)    General: NAD, Resting Comfortable,                                  HEENT: NC/AT, EOM I, PERRLA, Normal Conjunctivae  Cardio: RRR, Normal S1-S2, No M/G/R                              Pulm: No Respiratory Distress,  Lungs CTAB                        Abdomen: ND/NT, Soft, BS+                                                MSK: No joint swelling, Full ROM                                         Ext: No C/C/E, Pulses 2+ throughout, No calf tenderness    Skin:  all skin intact                                                                 Wounds: none  Decubitus Ulcers: None Present     Neurological Examination    Cognitive: AAO x 3                                                                         Attention: Intact   Judgment: Good evidence of judgement                               Memory: Recall 3 objects immediate and 3 min later      Mood/Affect: wnl                                                                           Communication:  Fluent,  No dysarthria   Swallow: intact  CN II - XII  intact  Coordination: FTN/HTS intact                                                                              Sensory: Intact to light touch, PP and Vibration                                                                                             Tone: normal Throughout   Balance: impaired    Motor    LEFT    UE: SF [5/5], EF [5/5], EE [5/5], WE [5/5],  [wnl]  RIGHT UE: SF [5/5], EF [5/5], EE [5/5], WE [5/5],  [wnl]  LEFT    LE:  HF [5/5], KE [5/5], DF [5/5], EHL [5/5],  PF [5/5]  RIGHT LE:  HF [5/5], KE [5/5], DF [5/5], EHL [5/5],  PF [5/5]      Reflex:  2 + thoroughout, [place]/Babinski negative PHYSICAL EXAMINATION   VItals:   T(C): 98.2 F   HR:  69  BP: 190/69  RR: 16  SpO2:  95 %on 2 L NC    General: NAD, Resting Comfortable, on NC 2  NC                                   HEENT: NC/AT, EOM I, Normal Conjunctivae  Cardio: RRR                           Pulm: No Respiratory Distress,                         Abdomen: ND/NT, Soft, BS+                                                MSK: No joint swelling, Full ROM                                         Ext: Mild B/l LE edema, Pulses 2+ throughout, No calf tenderness      Neurological Examination    Cognitive: AAO x 3                                                                         Attention: Intact   Judgment: Good evidence of judgement                               Memory: Recall 3 objects immediate but not  3 min later      Mood/Affect: wnl                                                                           Communication:  Fluent,  No dysarthria   Swallow: intact  CN II - XII  intact                                                                    Sensory: Intact to light touch                                                                                          Motor    LEFT    UE: SF [5/5], EF [5/5], EE [5/5], WE [5/5],  [wnl]  RIGHT UE: SF [5/5], EF [5/5], EE [5/5], WE [5/5],  [wnl]  LEFT    LE:  HF [5/5], KE [5/5], DF [5/5], EHL [5/5],  PF [5/5]  RIGHT LE:  HF [5/5], KE [5/5], DF [5/5], EHL [5/5],  PF [5/5]

## 2020-05-08 NOTE — H&P ADULT - HISTORY OF PRESENT ILLNESS
69 yo M w/ PMH of HTN and DM on insulin and Metformin, and HLD presented to [facility] on 4/11 with complaint of SOB, HA, mild CP, and weakness with nonproductive cough, w/ congestion and feeling cold for 8 days prior to admission. Upon arrival to the ED his SpO2 was 84% on RA with RR of 28 which improved to 99% on NRB. He was then downtitrated to 6L NC and is saturating around 90-93%. Patient  was admitted for acute hypoxic respiratory failure secondary to COVID pneumonia.  Course was complicated by worsening hypoxia developing ARDS requiring intubation (4/16). Status post experimental therapies with Plaquenil, steroids, actrema x 1 and treated for superimposed ventilator associated pneumonia with pseudomonas (with course of Meropenem).  He was then extubated on 5/1.  Post extubation, stridor was noted and found to have dysphagia with bedside swallow evaluation. ENT was consulted, performed laryngoscopy which demonstrated granulation tissue and evidence of vocal cord paralysis with mild edema.  Steroids were recommended for vocal cord edema. Patient was deemed medically stable and discharged to acute [facility] on 5/8.

## 2020-05-08 NOTE — DISCHARGE NOTE PROVIDER - HOSPITAL COURSE
69 yo M w/ acute hypoxemic  respiratory failure 2/2 COVID pneumonia. Extubated 5/1.        Covid pneumonia with hypoxic resp failure.    Status post vent, extubated 5/1    + vocal cord paralysis/dysfunction. Dysphonia is Resolving.  Talking clear.      Soft diet with honey thick liquids        Urinary retention    Insert preciado    Add flomax    Avoid constipation    TOV after 2-3 days on flomax        MARIYA. ATN + pre-renal.    Resolved on iv hydration         Diabetes Mellitus    Insulin sliding scale     BG low this am, dc Lantus and monitor            Pseudomonas in sputum/Bacterial pneumonia possibly ventilator associated      Meropenem 1g iv q8 completed        vocal cord paralysis     seen by ENT s/p laryngoscopy showing vocal cord paralysis and edema.    Likely from prolonged vent intubation    Steroid completed.  Dysphonia resolving. Much improved.         Hypertension- not at goal    Labetalol 100mg po q8    Clonidine 0.1mg po bid     Increase Hydralazine 25 mg QID        Dispo: PT/OT/ recommend ACUTE. discussed with case management, dc when auth obtained    OOB

## 2020-05-09 PROBLEM — E78.5 HYPERLIPIDEMIA, UNSPECIFIED: Chronic | Status: ACTIVE | Noted: 2020-01-01

## 2020-05-09 PROBLEM — I10 ESSENTIAL (PRIMARY) HYPERTENSION: Chronic | Status: ACTIVE | Noted: 2020-01-01

## 2020-05-09 PROBLEM — E11.9 TYPE 2 DIABETES MELLITUS WITHOUT COMPLICATIONS: Chronic | Status: ACTIVE | Noted: 2020-01-01

## 2020-05-09 NOTE — PROGRESS NOTE ADULT - SUBJECTIVE AND OBJECTIVE BOX
Pt. seen and examined at bedside.  No overnight events.        REVIEW OF SYSTEMS  Constitutional - No fever,  No fatigue  Neurological - No headaches, No loss of strength  Musculoskeletal - No joint pain, No joint swelling, No muscle pain    VITALS  T(C): 36.8 (05-09-20 @ 09:56), Max: 36.8 (05-08-20 @ 21:30)  HR: 66 (05-09-20 @ 09:56) (66 - 76)  BP: 153/71 (05-09-20 @ 09:56) (148/64 - 190/82)  RR: 17 (05-09-20 @ 09:56) (17 - 20)  SpO2: 98% (05-09-20 @ 09:56) (94% - 98%)  Wt(kg): --       MEDICATIONS   acetaminophen   Tablet .. 650 milliGRAM(s) every 6 hours PRN  ALBUTerol    90 MICROgram(s) HFA Inhaler 1 Puff(s) every 4 hours PRN  amantadine 100 milliGRAM(s) <User Schedule>  bisacodyl Suppository 10 milliGRAM(s) daily PRN  cloNIDine 0.1 milliGRAM(s) every 12 hours  dextrose 40% Gel 15 Gram(s) once PRN  dextrose 5%. 1000 milliLiter(s) <Continuous>  dextrose 50% Injectable 12.5 Gram(s) once  dextrose 50% Injectable 25 Gram(s) once  dextrose 50% Injectable 25 Gram(s) once  enoxaparin Injectable 40 milliGRAM(s) every 12 hours  gabapentin 300 milliGRAM(s) every 8 hours  glucagon  Injectable 1 milliGRAM(s) once PRN  hydrALAZINE 50 milliGRAM(s) every 8 hours  insulin glargine Injectable (LANTUS) 15 Unit(s) at bedtime  insulin lispro (HumaLOG) corrective regimen sliding scale   three times a day before meals  insulin lispro (HumaLOG) corrective regimen sliding scale   at bedtime  insulin lispro Injectable (HumaLOG) 3 Unit(s) three times a day before meals  labetalol 100 milliGRAM(s) every 8 hours  magnesium oxide 400 milliGRAM(s) two times a day with meals  melatonin 6 milliGRAM(s) at bedtime  pantoprazole    Tablet 40 milliGRAM(s) before breakfast  polyethylene glycol 3350 17 Gram(s) daily  senna 2 Tablet(s) at bedtime PRN  tamsulosin 0.4 milliGRAM(s) at bedtime      RECENT LABS/IMAGING                        9.7    8.00  )-----------( 274      ( 08 May 2020 06:37 )             30.2     05-08    139  |  102  |  19.0  ----------------------------<  67<L>  4.2   |  31.0<H>  |  0.65    Ca    8.0<L>      08 May 2020 06:37  Phos  3.0     05-08  Mg     1.7     05-08    TPro  4.8<L>  /  Alb  2.4<L>  /  TBili  0.3<L>  /  DBili  x   /  AST  160<H>  /  ALT  168<H>  /  AlkPhos  192<H>  05-08                POCT Blood Glucose.: 251 mg/dL (05-09-20 @ 12:05)  POCT Blood Glucose.: 228 mg/dL (05-09-20 @ 08:10)  POCT Blood Glucose.: 228 mg/dL (05-08-20 @ 23:18)  POCT Blood Glucose.: 233 mg/dL (05-08-20 @ 17:14)  POCT Blood Glucose.: 251 mg/dL (05-08-20 @ 12:53)    ---------  PHYSICAL EXAM  Constitutional - NAD, Comfortable  Pulm - Breathing comfortably, No wheezing  Abd - Soft, NTND  Extremities - No edema, No calf tenderness  Neurologic Exam -                    Cognitive - Awake, Alert     Communication - Fluent     Motor - No focal deficits     Sensory - Intact to LT  Psychiatric - Mood WNL, Affect WNL    ASSESSMENT/PLAN  70y Male with functional deficits s/p COVID RESPIRATORY FAILURE + SUPERIMPOSED PNA  Continue current medical management  Pain - Tylenol PRN  DVT PPX - enoxaparin Injectable 40 milliGRAM(s) every 12 hours  Active issues - HTN (on clonidine, hydralazine, labetalol) and elevated liver enzymes - needs hospitalist f/u  Continue 3hrs a day of comprehensive rehab program.

## 2020-05-09 NOTE — CONSULT NOTE ADULT - SUBJECTIVE AND OBJECTIVE BOX
HPI:  71 yo M w/ PMH of HTN and DM on insulin and Metformin, and HLD presented to [facility] on 4/11 with complaint of SOB, HA, mild CP, and weakness with nonproductive cough, w/ congestion and feeling cold for 8 days prior to admission. Upon arrival to the ED his SpO2 was 84% on RA with RR of 28 which improved to 99% on NRB. He was then downtitrated to 6L NC and is saturating around 90-93%. Patient  was admitted for acute hypoxic respiratory failure secondary to COVID pneumonia.  Course was complicated by worsening hypoxia developing ARDS requiring intubation (4/16). Status post experimental therapies with Plaquenil, steroids, acemtra x 1 and treated for superimposed ventilator associated pneumonia with pseudomonas (with course of Meropenem).  He was then extubated on 5/1.  Post extubation, stridor was noted and found to have dysphagia with bedside swallow evaluation. ENT was consulted, performed laryngoscopy which demonstrated granulation tissue and evidence of vocal cord paralysis with mild edema.  Steroids were recommended for vocal cord edema. Patient was deemed medically stable and discharged to acute [facility] on 5/8. (08 May 2020 16:44)    PAST MEDICAL & SURGICAL HISTORY:  Hyperlipidemia  Type 2 diabetes mellitus without complication, with long-term current use of insulin  Essential hypertension  No significant past surgical history    FAMILY HISTORY:  No pertinent family history in first degree relatives    SOCIAL HISTORY  Smoking - Denied  EtOH - Denied   Drugs - Denied    MEDICATIONS  (STANDING):  amantadine 100 milliGRAM(s) Oral <User Schedule>  atorvastatin 40 milliGRAM(s) Oral at bedtime  cloNIDine 0.1 milliGRAM(s) Oral every 12 hours  dextrose 5%. 1000 milliLiter(s) (50 mL/Hr) IV Continuous <Continuous>  dextrose 50% Injectable 12.5 Gram(s) IV Push once  dextrose 50% Injectable 25 Gram(s) IV Push once  dextrose 50% Injectable 25 Gram(s) IV Push once  enoxaparin Injectable 40 milliGRAM(s) SubCutaneous every 12 hours  gabapentin 300 milliGRAM(s) Oral every 8 hours  hydrALAZINE 25 milliGRAM(s) Oral four times a day  insulin lispro (HumaLOG) corrective regimen sliding scale   SubCutaneous three times a day before meals  insulin lispro (HumaLOG) corrective regimen sliding scale   SubCutaneous at bedtime  labetalol 100 milliGRAM(s) Oral every 8 hours  melatonin 6 milliGRAM(s) Oral at bedtime  pantoprazole    Tablet 40 milliGRAM(s) Oral before breakfast  polyethylene glycol 3350 17 Gram(s) Oral daily  tamsulosin 0.4 milliGRAM(s) Oral at bedtime    MEDICATIONS  (PRN):  acetaminophen   Tablet .. 650 milliGRAM(s) Oral every 6 hours PRN Temp greater or equal to 38C (100.4F), Mild Pain (1 - 3), Moderate Pain (4 - 6), Severe Pain (7 - 10)  ALBUTerol    90 MICROgram(s) HFA Inhaler 1 Puff(s) Inhalation every 4 hours PRN Shortness of Breath and/or Wheezing  bisacodyl Suppository 10 milliGRAM(s) Rectal daily PRN Constipation  dextrose 40% Gel 15 Gram(s) Oral once PRN Blood Glucose LESS THAN 70 milliGRAM(s)/deciliter  glucagon  Injectable 1 milliGRAM(s) IntraMuscular once PRN Glucose LESS THAN 70 milligrams/deciliter  senna 2 Tablet(s) Oral at bedtime PRN Constipation  REVIEW OF SYSTEMS      Review of Systems: REVIEW OF SYSTEMS  Constitutional: No fever, No Chills, No fatigue  HEENT: No eye pain, No visual disturbances, No difficulty hearing  Pulm: No cough,  No shortness of breath, (+) stable cough   Cardio: No chest pain, No palpitations  GI:  No abdominal pain, No nausea, No vomiting, No diarrhea, No constipation  : (+) retention with preciado  Neuro: No headaches, No memory loss, No loss of strength, No numbness, No tremors  MSK: No joint pain, No joint swelling, No muscle pain, No Neck or back pain Psych:  No depression, No anxiety	  	    Vital Signs Last 24 Hrs  T(C): 36.8 (09 May 2020 09:56), Max: 36.8 (08 May 2020 21:30)  T(F): 98.2 (09 May 2020 09:56), Max: 98.2 (08 May 2020 21:30)  HR: 66 (09 May 2020 09:56) (66 - 76)  BP: 153/71 (09 May 2020 09:56) (148/64 - 190/82)  BP(mean): --  RR: 17 (09 May 2020 09:56) (17 - 20)  SpO2: 98% (09 May 2020 09:56) (94% - 100%)PHYSICAL EXAM:        CBC Full  -  ( 08 May 2020 06:37 )  WBC Count : 8.00 K/uL  RBC Count : 3.30 M/uL  Hemoglobin : 9.7 g/dL  Hematocrit : 30.2 %  Platelet Count - Automated : 274 K/uL  Mean Cell Volume : 91.5 fl  Mean Cell Hemoglobin : 29.4 pg  Mean Cell Hemoglobin Concentration : 32.1 gm/dL  Auto Neutrophil # : 5.16 K/uL  Auto Lymphocyte # : 1.20 K/uL  Auto Monocyte # : 0.90 K/uL  Auto Eosinophil # : 0.59 K/uL  Auto Basophil # : 0.01 K/uL  Auto Neutrophil % : 64.4 %  Auto Lymphocyte % : 15.0 %  Auto Monocyte % : 11.3 %  Auto Eosinophil % : 7.4 %  Auto Basophil % : 0.1 %  05-08    139  |  102  |  19.0  ----------------------------<  67<L>  4.2   |  31.0<H>  |  0.65    Ca    8.0<L>      08 May 2020 06:37  Phos  3.0     05-08  Mg     1.7     05-08    TPro  4.8<L>  /  Alb  2.4<L>  /  TBili  0.3<L>  /  DBili  x   /  AST  160<H>  /  ALT  168<H>  /  AlkPhos  192<H>  05-08  CAPILLARY BLOOD GLUCOSE      POCT Blood Glucose.: 228 mg/dL (09 May 2020 08:10)  POCT Blood Glucose.: 228 mg/dL (08 May 2020 23:18)  POCT Blood Glucose.: 233 mg/dL (08 May 2020 17:14)  POCT Blood Glucose.: 251 mg/dL (08 May 2020 12:53)  LIVER FUNCTIONS - ( 08 May 2020 06:37 )  Alb: 2.4 g/dL / Pro: 4.8 g/dL / ALK PHOS: 192 U/L / ALT: 168 U/L / AST: 160 U/L / GGT: x           HEALTH ISSUES - PROBLEM Dx:    Deconditioning sec to COVID 19  PT/OT per rehab    HTN, uncontrolled  Inc hydralzine to 50 TID, cont clonidine/Labetalol  ACEI/ARB as outpt given h/o DM    DM2, a1c 11.7(04/20)  Add Lantus and premeal humalog. Cont humalog correction    Elevated LFT's  Hold Statin  Viral Hep panel neg in April     Urinary retention  preciado  Flomax    DVT ppx  Lovenox

## 2020-05-09 NOTE — CHART NOTE - NSCHARTNOTEFT_GEN_A_CORE
Nutrition Initial Assessment    Nutrition Consult Received: Yes [ x  ]     Reason for Initial Nutrition Assessment: Nutritional assessment    Source of Information: Unable to conduct a fact to face interview due to limited contact restrictions related to pt's medical condition and isolation precautions. Information obtained from a phone call with the pt and from the EMR.    Admitting Diagnosis: 71 yo M w/ PMH of HTN and DM on insulin and Metformin, and HLD presented to [facility] on 4/11 with complaint of SOB, HA, mild CP, and weakness with nonproductive cough, w/ congestion and feeling cold for 8 days prior to admission. Upon arrival to the ED his SpO2 was 84% on RA with RR of 28 which improved to 99% on NRB. He was then downtitrated to 6L NC and is saturating around 90-93%. Patient  was admitted for acute hypoxic respiratory failure secondary to COVID pneumonia.  Course was complicated by worsening hypoxia developing ARDS requiring intubation (4/16). Status post experimental therapies with Plaquenil, steroids, acemtra x 1 and treated for superimposed ventilator associated pneumonia with pseudomonas (with course of Meropenem).  He was then extubated on 5/1.  Post extubation, stridor was noted and found to have dysphagia with bedside swallow evaluation. ENT was consulted, performed laryngoscopy which demonstrated granulation tissue and evidence of vocal cord paralysis with mild edema.  Steroids were recommended for vocal cord edema. Patient was deemed medically stable and discharged to acute [facility] on 5/8. (08 May 2020 16:44)    PAST MEDICAL & SURGICAL HISTORY:  Hyperlipidemia  Type 2 diabetes mellitus without complication, with long-term current use of insulin  Essential hypertension  No significant past surgical history    Subjective Information: Interview conducted in Rwandan (Pt's preferred language)    Pt reports consuming a regular diet at home. Tries to consume low amount of rice, pasta, breads and potatoes to avoid high glucose levels. Pt  reports at breakfast ate eggs w/ plantains, for lunch rice, chicken, and salad, and for dinner same type of foods. Pt reports good appetite since admission. PO intake >75% . Pt denies N/V/D/C. Last BM: 05/08. NKFA. Edema noted in lower extremities +1. Skin is WDL except R lower back w/ skin abrasion. Pt reports UBW: 187 Lbs. CW: 203 Lbs. Pt gained 16 Lbs since hospitalization. Requested new wts. Labs noted w/ high glucose levels and HgA1C 11.7 on 4/12. Diabetes Diet Education provided verbally and in written form.     Current Nutrition Order:  PO Intake:   Good (%) [ x ]    Skin Integrity: WDL except R lower back w/ skin abrasion    Labs:   05-08 Na139 mmol/L Glu 67 mg/dL<L> K+ 4.2 mmol/L Cr  0.65 mg/dL BUN 19.0 mg/dL Phos 3.0 mg/dL Alb 2.4 g/dL<L> PAB n/a       Hemoglobin A1C, Whole Blood: 11.7 % (04-12-20 @ 14:10)    POCT Blood Glucose.: 251 mg/dL (05-09-20 @ 12:05)  POCT Blood Glucose.: 228 mg/dL (05-09-20 @ 08:10)  POCT Blood Glucose.: 228 mg/dL (05-08-20 @ 23:18)  POCT Blood Glucose.: 233 mg/dL (05-08-20 @ 17:14)    Medications:  MEDICATIONS  (STANDING):  amantadine 100 milliGRAM(s) Oral <User Schedule>  cloNIDine 0.1 milliGRAM(s) Oral every 12 hours  dextrose 5%. 1000 milliLiter(s) (50 mL/Hr) IV Continuous <Continuous>  dextrose 50% Injectable 12.5 Gram(s) IV Push once  dextrose 50% Injectable 25 Gram(s) IV Push once  dextrose 50% Injectable 25 Gram(s) IV Push once  enoxaparin Injectable 40 milliGRAM(s) SubCutaneous every 12 hours  gabapentin 300 milliGRAM(s) Oral every 8 hours  hydrALAZINE 50 milliGRAM(s) Oral every 8 hours  insulin glargine Injectable (LANTUS) 15 Unit(s) SubCutaneous at bedtime  insulin lispro (HumaLOG) corrective regimen sliding scale   SubCutaneous three times a day before meals  insulin lispro (HumaLOG) corrective regimen sliding scale   SubCutaneous at bedtime  insulin lispro Injectable (HumaLOG) 3 Unit(s) SubCutaneous three times a day before meals  labetalol 100 milliGRAM(s) Oral every 8 hours  magnesium oxide 400 milliGRAM(s) Oral two times a day with meals  melatonin 6 milliGRAM(s) Oral at bedtime  pantoprazole    Tablet 40 milliGRAM(s) Oral before breakfast  polyethylene glycol 3350 17 Gram(s) Oral daily  tamsulosin 0.4 milliGRAM(s) Oral at bedtime    MEDICATIONS  (PRN):  acetaminophen   Tablet .. 650 milliGRAM(s) Oral every 6 hours PRN Temp greater or equal to 38C (100.4F), Mild Pain (1 - 3), Moderate Pain (4 - 6), Severe Pain (7 - 10)  ALBUTerol    90 MICROgram(s) HFA Inhaler 1 Puff(s) Inhalation every 4 hours PRN Shortness of Breath and/or Wheezing  bisacodyl Suppository 10 milliGRAM(s) Rectal daily PRN Constipation  dextrose 40% Gel 15 Gram(s) Oral once PRN Blood Glucose LESS THAN 70 milliGRAM(s)/deciliter  glucagon  Injectable 1 milliGRAM(s) IntraMuscular once PRN Glucose LESS THAN 70 milligrams/deciliter  senna 2 Tablet(s) Oral at bedtime PRN Constipation    Admitted Anthropometrics:      Nutrition Focused Physical Exam: Unable to complete due to limited isolation contact precautions at this time.     Estimated Energy Needs (_25_ kcal/kg- 30___ kcal/kg): 6077-1926 calories  Estimated Protein Needs (1.2__ g/kg- 1.5___ g/kg): 111-138 gm  Based on weight of: 92.2 Kg    [ x] Nutrition Diagnosis: Diet, education    -Increased nutritional needs related to acute hypercatabolic dx (Coviv19+) w/ hypoxia requiring intubation for 15 days, AEB pt is debilitated, has edema in extremities, shows moderate fat loss in orbitals, buccal and muscle loss in interosseous, biceps and triceps.      Goal: - Pt will meet at least 75% of the estimated nutritional needs during hospitalization.     -Knowledge deficit related to diet and dx related to lack of nutritional education exposure AEB pt could not identify all the CHO foods groups and has HgA1C 11.7.     Goal: Pt will teach back 3 points at f/u interview.     Nutrition Interventions: Diet, Therapeutic Diet Diabetes Education    Recommendations:    [x] Continue w/ current diet: Consistent Carbohydrates (No snacks) DASH/TLC, Dysphagia 3 soft, Nectar fluids diet.   [x] Add 2 x protein at breakfast.   [x] Honor pt's food preferences as feasible with prescribed diet.   [x] Continue w/ Diabetes Education at f/u     Will continue monitor food and fluids intake, weights, GI symptoms, labs, skin integrity.     RD to follow-up per protocol. Nutrition Initial Assessment    Nutrition Consult Received: Yes [ x  ]     Reason for Initial Nutrition Assessment: Nutritional assessment    Source of Information: Unable to conduct a fact to face interview due to limited contact restrictions related to pt's medical condition and isolation precautions. Information obtained from a phone call with the pt and from the EMR.    Admitting Diagnosis: 69 yo M w/ PMH of HTN and DM on insulin and Metformin, and HLD presented to [facility] on 4/11 with complaint of SOB, HA, mild CP, and weakness with nonproductive cough, w/ congestion and feeling cold for 8 days prior to admission. Upon arrival to the ED his SpO2 was 84% on RA with RR of 28 which improved to 99% on NRB. He was then downtitrated to 6L NC and is saturating around 90-93%. Patient  was admitted for acute hypoxic respiratory failure secondary to COVID pneumonia.  Course was complicated by worsening hypoxia developing ARDS requiring intubation (4/16). Status post experimental therapies with Plaquenil, steroids, acemtra x 1 and treated for superimposed ventilator associated pneumonia with pseudomonas (with course of Meropenem).  He was then extubated on 5/1.  Post extubation, stridor was noted and found to have dysphagia with bedside swallow evaluation. ENT was consulted, performed laryngoscopy which demonstrated granulation tissue and evidence of vocal cord paralysis with mild edema.  Steroids were recommended for vocal cord edema. Patient was deemed medically stable and discharged to acute [facility] on 5/8. (08 May 2020 16:44)    PAST MEDICAL & SURGICAL HISTORY:  Hyperlipidemia  Type 2 diabetes mellitus without complication, with long-term current use of insulin  Essential hypertension  No significant past surgical history    Subjective Information: Interview conducted in Tristanian (Pt's preferred language)    Pt reports consuming a regular diet at home. Tries to consume low amount of rice, pasta, breads and potatoes to avoid high glucose levels. Pt  reports at breakfast ate eggs w/ plantains, for lunch rice, chicken, and salad, and for dinner same type of foods. Pt reports good appetite since admission. PO intake >75% . Pt denies N/V/D/C. Last BM: 05/08. NKFA. Edema noted in lower extremities +1. Skin is WDL except R lower back w/ skin abrasion. Pt reports UBW: 187 Lbs. Height: 5'3". CW: 203 Lbs. Pt gained 16 Lbs since hospitalization. Requested new wts. Labs noted w/ high glucose levels and HgA1C 11.7 on 4/12. Diabetes Diet Education provided verbally and in written form.     Current Nutrition Order:  PO Intake:   Good (%) [ x ]    Skin Integrity: WDL except R lower back w/ skin abrasion    Labs:   05-08 Na139 mmol/L Glu 67 mg/dL<L> K+ 4.2 mmol/L Cr  0.65 mg/dL BUN 19.0 mg/dL Phos 3.0 mg/dL Alb 2.4 g/dL<L> PAB n/a       Hemoglobin A1C, Whole Blood: 11.7 % (04-12-20 @ 14:10)    POCT Blood Glucose.: 251 mg/dL (05-09-20 @ 12:05)  POCT Blood Glucose.: 228 mg/dL (05-09-20 @ 08:10)  POCT Blood Glucose.: 228 mg/dL (05-08-20 @ 23:18)  POCT Blood Glucose.: 233 mg/dL (05-08-20 @ 17:14)    Medications:  MEDICATIONS  (STANDING):  amantadine 100 milliGRAM(s) Oral <User Schedule>  cloNIDine 0.1 milliGRAM(s) Oral every 12 hours  dextrose 5%. 1000 milliLiter(s) (50 mL/Hr) IV Continuous <Continuous>  dextrose 50% Injectable 12.5 Gram(s) IV Push once  dextrose 50% Injectable 25 Gram(s) IV Push once  dextrose 50% Injectable 25 Gram(s) IV Push once  enoxaparin Injectable 40 milliGRAM(s) SubCutaneous every 12 hours  gabapentin 300 milliGRAM(s) Oral every 8 hours  hydrALAZINE 50 milliGRAM(s) Oral every 8 hours  insulin glargine Injectable (LANTUS) 15 Unit(s) SubCutaneous at bedtime  insulin lispro (HumaLOG) corrective regimen sliding scale   SubCutaneous three times a day before meals  insulin lispro (HumaLOG) corrective regimen sliding scale   SubCutaneous at bedtime  insulin lispro Injectable (HumaLOG) 3 Unit(s) SubCutaneous three times a day before meals  labetalol 100 milliGRAM(s) Oral every 8 hours  magnesium oxide 400 milliGRAM(s) Oral two times a day with meals  melatonin 6 milliGRAM(s) Oral at bedtime  pantoprazole    Tablet 40 milliGRAM(s) Oral before breakfast  polyethylene glycol 3350 17 Gram(s) Oral daily  tamsulosin 0.4 milliGRAM(s) Oral at bedtime    MEDICATIONS  (PRN):  acetaminophen   Tablet .. 650 milliGRAM(s) Oral every 6 hours PRN Temp greater or equal to 38C (100.4F), Mild Pain (1 - 3), Moderate Pain (4 - 6), Severe Pain (7 - 10)  ALBUTerol    90 MICROgram(s) HFA Inhaler 1 Puff(s) Inhalation every 4 hours PRN Shortness of Breath and/or Wheezing  bisacodyl Suppository 10 milliGRAM(s) Rectal daily PRN Constipation  dextrose 40% Gel 15 Gram(s) Oral once PRN Blood Glucose LESS THAN 70 milliGRAM(s)/deciliter  glucagon  Injectable 1 milliGRAM(s) IntraMuscular once PRN Glucose LESS THAN 70 milligrams/deciliter  senna 2 Tablet(s) Oral at bedtime PRN Constipation    Admitted Anthropometrics:      Nutrition Focused Physical Exam: Unable to complete due to limited isolation contact precautions at this time.     Estimated Energy Needs (_25_ kcal/kg- 30___ kcal/kg): 5615-4083 calories  Estimated Protein Needs (1.2__ g/kg- 1.5___ g/kg): 111-138 gm  Based on weight of: 92.2 Kg  Height: 5'3"  BMI: 36.0    [ x] Nutrition Diagnosis: Diet, education    -Increased nutritional needs related to acute hypercatabolic dx (Coviv19+) w/ hypoxia requiring intubation for 15 days, AEB pt is debilitated, has edema in extremities, shows moderate fat loss in orbitals, buccal and muscle loss in interosseous, biceps and triceps.      Goal: - Pt will meet at least 75% of the estimated nutritional needs during hospitalization.     -Knowledge deficit related to diet and dx related to lack of nutritional education exposure AEB pt could not identify all the CHO foods groups and has HgA1C 11.7.     Goal: Pt will teach back 3 points at f/u interview.     Nutrition Interventions: Diet, Therapeutic Diet Diabetes Education    Recommendations:    [x] Continue w/ current diet: Consistent Carbohydrates (No snacks) DASH/TLC, Dysphagia 3 soft, Nectar fluids diet.   [x] Add 2 x protein at breakfast.   [x] Honor pt's food preferences as feasible with prescribed diet.   [x] Continue w/ Diabetes Education at f/u     Will continue monitor food and fluids intake, weights, GI symptoms, labs, skin integrity.     RD to follow-up per protocol.

## 2020-05-10 NOTE — PROGRESS NOTE ADULT - PROVIDER SPECIALTY LIST ADULT
"Subjective:       Patient ID: Miles Vazquez is a 66 y.o. male.    Chief Complaint: Follow-up (Four week f/u; Labs done 8/15/2019; angiogram done 8/14/19) and Chest Pain (Right sided pain - f/u)  Underwent abdominal angiogram with run off by Dr. Florian at [facility]. D/C instruction read, pt reports unaware of post op f/u and noting in the computer thus will assist with scheduling. Dr. Florian's note also viewed prior to procedure with indication of left carotid bruit and carotid US ordered yet not seen in the system. Patient reports no change in leg pain thus educated no intervention was done, he will need to talk with Anival for plan of care. Hx updated at AAA 3.2 cm per CTA, will follow.    C/o unresolved right rib pain since fall, cxr neg for fracture yet reports pain worse at night and unable to lay on the right side or get comfortable.     Past Medical History:   Diagnosis Date    Abdominal aortic aneurysm (AAA) 05/2019    3.2 cm per CTA    Abnormal findings on esophagogastroduodenoscopy (EGD) 04/2016    "intestinal metaplasia" which is a "risk factor" for gastric cancer.      Bursitis of both hips     COPD (chronic obstructive pulmonary disease) 2016    Coronary artery calcification seen on CAT scan 4/24/2013    Depression 2016    Accompanied drinking problem    HTN (hypertension) 1/7/2013    Hyperlipidemia 2019    Lumbar disc disease 06/2019    grade 1 spondylolisthesis of L5 on S1    Macrocytic anemia 3/27/2016    Personal history of colonic polyps 2017    Recovering alcoholic 03/16/2016    RLS (restless legs syndrome) 2014    Sleep apnea 2 years ago    does not use cpap       Past Surgical History:   Procedure Laterality Date    ANGIOGRAPHY OF LOWER EXTREMITY  08/14/2019    AORTOGRAM, WITH RUNOFF N/A 8/14/2019    [facility] CATH[facility]EP LAB    APPENDECTOMY N/A 9/26/2018    [facility] at [facility] OR    COLONOSCOPY  2017    with polyps repeat 5 " [facility] WARREN Gutierrez    EGD (ESOPHAGOGASTRODUODENOSCOPY) N/A 9/26/2018    Performed by [facility] at [facility] OR    INJECTION-STEROID-EPIDURAL-TRANSFORAMINAL Bilateral 11/9/2015    Performed by [facility] at [facility] PAIN MGT    Insertion,central venous access device N/A 9/26/2018    Performed by Ramírez Horner [facility] at [facility] OR    PEG TUBE PLACEMENT/REPLACEMENT N/A 4/12/2016    Performed by Andres Luong [facility] at [facility] ENDO (2ND FLR)    REPAIR, HERNIA N/A 9/26/2018    Performed by Ramírez Horner [facility] at [facility] OR    SMALL INTESTINE SURGERY  09/2018        Social History     Socioeconomic History    Marital status: Single     Spouse name: Not on file    Number of children: Not on file    Years of education: Not on file    Highest education level: Not on file   Occupational History    Not on file   Social Needs    Financial resource strain: Not on file    Food insecurity:     Worry: Not on file     Inability: Not on file    Transportation needs:     Medical: Not on file     Non-medical: Not on file   Tobacco Use    Smoking status: Current Some Day Smoker     Years: 50.00     Types: Cigarettes    Smokeless tobacco: Never Used    Tobacco comment: 1 or 2 cigarettes a day    Substance and Sexual Activity    Alcohol use: Not Currently     Frequency: Never     Comment: Quit drinking in March 2016    Drug use: Not Currently     Types: Marijuana     Comment: In the past.    Sexual activity: Not Currently     Partners: Female   Lifestyle    Physical activity:     Days per week: Not on file     Minutes per session: Not on file    Stress: Not on file   Relationships    Social connections:     Talks on phone: Not on file     Gets together: Not on file     Attends [employer] service: Not on file     Active member of club or organization: Not on file     Attends meetings of clubs or organizations: Not on file     Relationship status: Not on file   Other Topics Concern     Not on file   Social History Narrative    Not on file       Family History   Problem Relation Age of Onset    Tuberculosis Maternal Grandmother     Stomach cancer Neg Hx     Colon cancer Neg Hx        Review of patient's allergies indicates:   Allergen Reactions    Seroquel [quetiapine] Other (See Comments)     RESTLESS LEGS          Current Outpatient Medications:     albuterol 90 mcg/actuation inhaler, Inhale 2 puffs into the lungs every 6 (six) hours as needed for Wheezing., Disp: 1 Inhaler, Rfl: 3    aspirin 81 MG Chew, Take 81 mg by mouth once daily., Disp: , Rfl:     atorvastatin (LIPITOR) 40 MG tablet, Take 40 mg by mouth every evening., Disp: , Rfl:     benazepril (LOTENSIN) 20 MG tablet, Take 1 tablet (20 mg total) by mouth once daily., Disp: 90 tablet, Rfl: 1    cilostazol (PLETAL) 100 MG Tab, Take 1 tablet (100 mg total) by mouth 2 (two) times daily before meals., Disp: 60 tablet, Rfl: 11    cyanocobalamin (VITAMIN B-12) 1000 MCG tablet, Take 0.5 tablets (500 mcg total) by mouth once daily., Disp: 60 tablet, Rfl: 12    ferrous sulfate (FEOSOL) 325 mg (65 mg iron) Tab tablet, Take 1 tablet (325 mg total) by mouth 2 (two) times daily. Try on empty stomach first, Disp: 60 tablet, Rfl: 11    multivit-min/FA/lycopen/lutein (CENTRUM SILVER MEN ORAL), Take by mouth., Disp: , Rfl:     rOPINIRole (REQUIP) 1 MG tablet, Take 1 tablet (1 mg total) by mouth every evening., Disp: 30 tablet, Rfl: 3    temazepam (RESTORIL) 15 mg Cap, Take 1 capsule (15 mg total) by mouth nightly as needed., Disp: 30 capsule, Rfl: 2    HYDROcodone-acetaminophen (NORCO) 5-325 mg per tablet, Take 1 tablet by mouth every 8 (eight) hours as needed for Pain., Disp: 30 tablet, Rfl: 0    HPI  Review of Systems   Constitutional: Negative.    HENT: Negative.    Eyes: Negative.    Respiratory: Negative.    Cardiovascular: Negative.    Gastrointestinal: Negative.    Endocrine: Negative.    Genitourinary: Negative.    Musculoskeletal:  Negative.         Bilateral leg pain and right rib pain   Skin: Negative.    Allergic/Immunologic: Negative.    Neurological: Negative.    Hematological: Negative.    Psychiatric/Behavioral: Negative.        Objective:      Physical Exam   Constitutional: He is oriented to person, place, and time. He appears well-developed and well-nourished.   HENT:   Head: Normocephalic and atraumatic.   Mouth/Throat: Oropharynx is clear and moist.   Eyes: Pupils are equal, round, and reactive to light. Conjunctivae are normal. No scleral icterus.   Neck: Normal range of motion. Neck supple. No thyromegaly present.   Left carotid bruit   Cardiovascular: Normal rate and regular rhythm.   No murmur heard.  Pulmonary/Chest: Effort normal. No respiratory distress.   Abdominal: Soft. Bowel sounds are normal. He exhibits no distension. There is no tenderness.   Musculoskeletal: Normal range of motion. He exhibits no edema.   Tenderness noted to right lateral    Neurological: He is alert and oriented to person, place, and time. No sensory deficit. He exhibits normal muscle tone.   Skin: Skin is warm. Capillary refill takes less than 2 seconds. No rash noted.   Psychiatric: He has a normal mood and affect. His behavior is normal. Judgment and thought content normal.   Nursing note and vitals reviewed.      Assessment:       1. Restless leg syndrome    2. Fall, sequela    3. Rib pain on right side    4. Primary insomnia    5. Left carotid bruit    6. Vascular disease        Plan:       1- meds sent to pharmacy  2- assist patient with appt for f/u with Dr. Florian  3- follow up with Dr. BHUPINDER Whitten for colonoscopy d/t h/o polyps and no records  4- bilateral carotid US ordered after the patient left yet will be notified.     ----------------------------------------------------  11/9/19- Dr. Florian's received. Right Carotid 60%-repeat 12/2019, JOEL WNL- Aortogram shows only isolated left pop stenosis which does not account for s/s- referred NSGY  DfJayy Foote. F/u 6mo  Restless leg syndrome    Fall, sequela  -     HYDROcodone-acetaminophen (NORCO) 5-325 mg per tablet; Take 1 tablet by mouth every 8 (eight) hours as needed for Pain.  Dispense: 30 tablet; Refill: 0    Rib pain on right side  -     HYDROcodone-acetaminophen (NORCO) 5-325 mg per tablet; Take 1 tablet by mouth every 8 (eight) hours as needed for Pain.  Dispense: 30 tablet; Refill: 0    Primary insomnia  -     temazepam (RESTORIL) 15 mg Cap; Take 1 capsule (15 mg total) by mouth nightly as needed.  Dispense: 30 capsule; Refill: 2    Left carotid bruit  -     US Carotid Bilateral; Future; Expected date: 09/09/2019    Vascular disease  -     US Carotid Bilateral; Future; Expected date: 09/09/2019        Risks, benefits, and side effects were discussed with the patient. All questions were answered to the fullest satisfaction of the patient, and pt verbalized understanding and agreement to treatment plan. Pt was to call with any new or worsening symptoms, or present to the ER.

## 2020-05-10 NOTE — PROGRESS NOTE ADULT - SUBJECTIVE AND OBJECTIVE BOX
HPI:  69 yo M w/ PMH of HTN and DM on insulin and Metformin, and HLD presented to [facility] on 4/11 with complaint of SOB, HA, mild CP, and weakness with nonproductive cough, w/ congestion and feeling cold for 8 days prior to admission. Upon arrival to the ED his SpO2 was 84% on RA with RR of 28 which improved to 99% on NRB. He was then downtitrated to 6L NC and is saturating around 90-93%. Patient  was admitted for acute hypoxic respiratory failure secondary to COVID pneumonia.  Course was complicated by worsening hypoxia developing ARDS requiring intubation (4/16). Status post experimental therapies with Plaquenil, steroids, actrema x 1 and treated for superimposed ventilator associated pneumonia with pseudomonas (with course of Meropenem).  He was then extubated on 5/1.  Post extubation, stridor was noted and found to have dysphagia with bedside swallow evaluation. ENT was consulted, performed laryngoscopy which demonstrated granulation tissue and evidence of vocal cord paralysis with mild edema.  Steroids were recommended for vocal cord edema. Patient was deemed medically stable and discharged to acute [facility] on 5/8. (08 May 2020 16:44)      Subjective    On 3L. No complaints        PAST MEDICAL & SURGICAL HISTORY:  Hyperlipidemia  Type 2 diabetes mellitus without complication, with long-term current use of insulin  Essential hypertension  No significant past surgical history      MedsMEDICATIONS  (STANDING):  amantadine 100 milliGRAM(s) Oral <User Schedule>  cloNIDine 0.1 milliGRAM(s) Oral every 12 hours  dextrose 5%. 1000 milliLiter(s) (50 mL/Hr) IV Continuous <Continuous>  dextrose 50% Injectable 12.5 Gram(s) IV Push once  dextrose 50% Injectable 25 Gram(s) IV Push once  dextrose 50% Injectable 25 Gram(s) IV Push once  enoxaparin Injectable 40 milliGRAM(s) SubCutaneous every 12 hours  gabapentin 300 milliGRAM(s) Oral every 8 hours  hydrALAZINE 50 milliGRAM(s) Oral every 8 hours  insulin glargine Injectable (LANTUS) 15 Unit(s) SubCutaneous at bedtime  insulin lispro (HumaLOG) corrective regimen sliding scale   SubCutaneous three times a day before meals  insulin lispro (HumaLOG) corrective regimen sliding scale   SubCutaneous at bedtime  insulin lispro Injectable (HumaLOG) 3 Unit(s) SubCutaneous three times a day before meals  labetalol 100 milliGRAM(s) Oral every 8 hours  magnesium oxide 400 milliGRAM(s) Oral two times a day with meals  melatonin 6 milliGRAM(s) Oral at bedtime  pantoprazole    Tablet 40 milliGRAM(s) Oral before breakfast  polyethylene glycol 3350 17 Gram(s) Oral daily  tamsulosin 0.4 milliGRAM(s) Oral at bedtime    MEDICATIONS  (PRN):  acetaminophen   Tablet .. 650 milliGRAM(s) Oral every 6 hours PRN Temp greater or equal to 38C (100.4F), Mild Pain (1 - 3), Moderate Pain (4 - 6), Severe Pain (7 - 10)  ALBUTerol    90 MICROgram(s) HFA Inhaler 1 Puff(s) Inhalation every 4 hours PRN Shortness of Breath and/or Wheezing  bisacodyl Suppository 10 milliGRAM(s) Rectal daily PRN Constipation  dextrose 40% Gel 15 Gram(s) Oral once PRN Blood Glucose LESS THAN 70 milliGRAM(s)/deciliter  glucagon  Injectable 1 milliGRAM(s) IntraMuscular once PRN Glucose LESS THAN 70 milligrams/deciliter  senna 2 Tablet(s) Oral at bedtime PRN Constipation      Vital Signs Last 24 Hrs  T(C): 36.6 (09 May 2020 19:58), Max: 36.6 (09 May 2020 19:58)  T(F): 97.9 (09 May 2020 19:58), Max: 97.9 (09 May 2020 19:58)  HR: 82 (10 May 2020 09:27) (69 - 82)  BP: 160/83 (10 May 2020 09:27) (159/74 - 166/78)  BP(mean): --  RR: 17 (10 May 2020 09:27) (15 - 17)  SpO2: 94% (10 May 2020 09:27) (92% - 95%)  I&O's Summary    09 May 2020 07:01  -  10 May 2020 07:00  --------------------------------------------------------  IN: 400 mL / OUT: 1600 mL / NET: -1200 mL        PHYSICAL EXAM:  GENERAL: NAD  NECK: Supple  NERVOUS SYSTEM:  awake and alert  HEART: S1s2 NL , RRR  CHEST/LUNG: Dec bs  bilaterally  ABDOMEN: Soft, Nontender, Nondistended; Bowel sounds present  EXTREMITIES:  No edema    POCT Blood Glucose.: 248 mg/dL (10 May 2020 11:33)  POCT Blood Glucose.: 164 mg/dL (10 May 2020 07:57)  POCT Blood Glucose.: 200 mg/dL (09 May 2020 21:18)  POCT Blood Glucose.: 222 mg/dL (09 May 2020 17:40)      Imaging Personally Reviewed:  [ ] YES  [ ] NO        Care Discussed with Consultants/Other Providers [ x] YES  [ ] NO    Deconditioning sec to COVID 19  PT/OT per rehab  Titrate oxygen    HTN, uncontrolled  Inc hydralzine to 75 TID, cont clonidine/Labetalol  ACEI/ARB as outpt given h/o DM    DM2, a1c 11.7(04/20)  inc Lantus and inc premeal humalog. Cont humalog correction    Elevated LFT's  Hold Statin  Viral Hep panel neg in April  CMP in AM    Urinary retention  preciado  Flomax    DVT ppx  Lovenox

## 2020-05-10 NOTE — PROGRESS NOTE ADULT - SUBJECTIVE AND OBJECTIVE BOX
Pt. seen and examined at bedside.  No overnight events.      REVIEW OF SYSTEMS  Constitutional - No fever,  + fatigue  Neurological - No headaches, + loss of strength  Musculoskeletal - No joint pain, No joint swelling, No muscle pain    VITALS  T(C): 36.6 (05-09-20 @ 19:58), Max: 36.6 (05-09-20 @ 19:58)  HR: 82 (05-10-20 @ 09:27) (69 - 82)  BP: 160/83 (05-10-20 @ 09:27) (159/74 - 166/78)  RR: 17 (05-10-20 @ 09:27) (15 - 17)  SpO2: 94% (05-10-20 @ 09:27) (92% - 95%)  Wt(kg): --       MEDICATIONS   acetaminophen   Tablet .. 650 milliGRAM(s) every 6 hours PRN  ALBUTerol    90 MICROgram(s) HFA Inhaler 1 Puff(s) every 4 hours PRN  amantadine 100 milliGRAM(s) <User Schedule>  bisacodyl Suppository 10 milliGRAM(s) daily PRN  cloNIDine 0.1 milliGRAM(s) every 12 hours  dextrose 40% Gel 15 Gram(s) once PRN  dextrose 5%. 1000 milliLiter(s) <Continuous>  dextrose 50% Injectable 12.5 Gram(s) once  dextrose 50% Injectable 25 Gram(s) once  dextrose 50% Injectable 25 Gram(s) once  enoxaparin Injectable 40 milliGRAM(s) every 12 hours  gabapentin 300 milliGRAM(s) every 8 hours  glucagon  Injectable 1 milliGRAM(s) once PRN  hydrALAZINE 50 milliGRAM(s) every 8 hours  insulin glargine Injectable (LANTUS) 15 Unit(s) at bedtime  insulin lispro (HumaLOG) corrective regimen sliding scale   three times a day before meals  insulin lispro (HumaLOG) corrective regimen sliding scale   at bedtime  insulin lispro Injectable (HumaLOG) 3 Unit(s) three times a day before meals  labetalol 100 milliGRAM(s) every 8 hours  magnesium oxide 400 milliGRAM(s) two times a day with meals  melatonin 6 milliGRAM(s) at bedtime  pantoprazole    Tablet 40 milliGRAM(s) before breakfast  polyethylene glycol 3350 17 Gram(s) daily  senna 2 Tablet(s) at bedtime PRN  tamsulosin 0.4 milliGRAM(s) at bedtime      RECENT LABS/IMAGING                      POCT Blood Glucose.: 164 mg/dL (05-10-20 @ 07:57)  POCT Blood Glucose.: 200 mg/dL (05-09-20 @ 21:18)  POCT Blood Glucose.: 222 mg/dL (05-09-20 @ 17:40)  POCT Blood Glucose.: 251 mg/dL (05-09-20 @ 12:05)    ---------  PHYSICAL EXAM  Constitutional - NAD, Comfortable  Pulm - Breathing comfortably, No wheezing  Abd - Soft, NTND  Extremities - No edema, No calf tenderness  Neurologic Exam -                    Cognitive - Awake, Alert     Communication - Fluent     Motor - No focal deficits     Sensory - Intact to LT  Psychiatric - Mood WNL, Affect WNL    ASSESSMENT/PLAN  70y Male with functional deficits after COVID RELATED RESPIRATORY FAILURE  Continue current medical management  Pain - Tylenol PRN; ELBA;   DVT PPX - enoxaparin Injectable 40 milliGRAM(s) every 12 hours  Active issues - HTN on 4 meds (clonidine, hydralazine, labetalol, tamsulosin) -> BP STILL NOT CONTROLLED -> NEEDS HOSPITALIST F/U +/- CARDIOLOGY  Continue 3hrs a day of comprehensive rehab program.

## 2020-05-11 NOTE — CONSULT NOTE ADULT - SUBJECTIVE AND OBJECTIVE BOX
Pt is a 71 y/o -handed male with PMHx of HTN and DM on insulin and Metformin, and HLD presented to [facility] on 4/11 with complaint of SOB, HA, mild CP, and weakness with nonproductive cough, with congestion and feeling cold for 8 days prior to admission. Upon arrival to the ED his SpO2 was 84% on RA with RR of 28 which improved to 99% on NRB. He was then downtitrated to 6L NC and is saturating around 90-93%. Pt  was admitted for acute hypoxic respiratory failure secondary to COVID pneumonia.  Course was complicated by worsening hypoxia developing ARDS requiring intubation (4/16). Status post experimental therapies with Plaquenil, steroids, Actrema x 1 and treated for superimposed ventilator associated pneumonia with pseudomonas (with course of Meropenem).  He was then extubated on 5/1.  Post extubation, stridor was noted and found to have dysphagia with bedside swallow evaluation. ENT was consulted, performed laryngoscopy which demonstrated granulation tissue and evidence of vocal cord paralysis with mild edema.  Steroids were recommended for vocal cord edema. Pt was deemed medically stable and discharged to acute [facility] on 5/8.    PMHx:   . Current meds: Please see list in Pt’s chart. Social Hx:   Findings: Pt was seen for an initial assessment of his/her cognitive and emotional functioning. MoCA was administered; Pt’s results (/30) were in the range. His/Her scores in mood measures suggested levels of anxiety and depression (GAD7 = /21; PHQ9 = /27). Pt was alert,  Ox3, and cooperative.  Attn/Conc: Simple auditory attention - . Sustained auditory attention - . Concentration - . Working memory for calculations – ( /5 serial 7s).	 Language: Pt’s speech was of  . Naming - . Sentence repetition - . Phonemic fluency - .	Memory: Encoding of 5 words was (/5); delayed verbal recall – (/5, improving to /5 with cueing). LTM was for US presidents (/4, improving to /4 with cueing). Visual memory –. Visuospatial: Visuomotor integration – for copy of a 3D figure, was noted. Executive Functions: Set-shifting - . Organizational skills - . Abstract reasoning - . Initiation - . Self-awareness - . Verbal problem solving – .   Emotional functioning: Affect - 	. Mood - ; Pt reported experiencing . On mood measures s/he additionally reported .  Thought processes were.  No abnormal thought contents were observed.  Pt denied any AH/VH. Pt also denied SI/HI/I/P. Insight - WFL. Judgment - fair. Pt is a 71 y/o right-handed male with PMHx of HTN and DM on insulin and Metformin, and HLD presented to [facility] on 4/11 with complaint of SOB, HA, mild CP, and weakness with nonproductive cough, with congestion and feeling cold for 8 days prior to admission. Upon arrival to the ED his SpO2 was 84% on RA with RR of 28 which improved to 99% on NRB. He was then downtitrated to 6L NC and is saturating around 90-93%. Pt  was admitted for acute hypoxic respiratory failure secondary to COVID pneumonia.  Course was complicated by worsening hypoxia developing ARDS requiring intubation (4/16). Status post experimental therapies with Plaquenil, steroids, Actrema x 1 and treated for superimposed ventilator associated pneumonia with pseudomonas (with course of Meropenem).  He was then extubated on 5/1.  Post extubation, stridor was noted and found to have dysphagia with bedside swallow evaluation. ENT was consulted, performed laryngoscopy which demonstrated granulation tissue and evidence of vocal cord paralysis with mild edema.  Steroids were recommended for vocal cord edema. Pt was deemed medically stable and discharged to acute [facility] on 5/8. PMHx: HTN, DM, HLD. Current meds: Please see list in Pt’s chart. Social Hx: Pt is  and has three adult sons. He completed 9th grade in [employer]. He is a medicine . Pt lacks hx of mental illness, and has remote hx of alcohol use. Pt is [employer]. He enjoys going out with his wife.     Findings: Pt was seen for an initial assessment of his cognitive and emotional functioning; session was conducted in [place]ian language. MoCA [place]ian version was administered; Pt’s results (23/30) were in the Mildly Impaired range. His scores in mood measures suggested minimal levels of anxiety and mild of depression (GAD7 = 4/21; PHQ9 = 5/27). Pt was alert, closely Ox3 (disoriented to city), and cooperative. Attn/Conc: Simple auditory attention - intact. Sustained auditory attention - impaired. Concentration - impaired. Working memory for calculations – mildly impaired (3/5 serial 7s). Language: Pt’s speech was of  normal volume and pitch, with frequent delay response. Naming - intact. Sentence repetition - mildly impaired. Phonemic fluency - moderately impaired.	Memory: Encoding of 5 words was closely intact  (4/5); delayed verbal recall – closely intact (4/5, improving to 5/5 with cueing). LTM was mildly impaired for US presidents (3/4, improving to 4/4 with cueing). Visual memory – intact. Visuospatial: Visuomotor integration – intact for copy of a 3D figure. Executive Functions: Set-shifting - intact. Organizational skills - intact. Abstract reasoning - mildly impaired. Initiation - moderately impaired. Verbal problem solving – mildly impaired. Emotional functioning: Affect - constricted, calm. Mood - dysphoric ("not too good"); Pt reported experiencing sadness, worry, helplessness/hopelessness, poor sleep, decreased appetite. On mood measures he additionally reported occasional worry, difficulty relaxing, restlessness, irritability, and decreased interest/activity and frequent. fatigue. Thought processes were goal-directe. No abnormal thought contents were observed.  Pt denied any AH/VH. Pt also denied SI/HI/I/P. Insight - WFL. Judgment - fair.

## 2020-05-11 NOTE — PROGRESS NOTE ADULT - COMMENTS
Patient seen in PT with KATE pa. Patient looks well. Had some SOB with exertion in PT and requires O2 via NC 3l/min. Otherwise, no CP, no palpitations, no SOB , no chest pressure. Denies H/A or dizziness. Mood appears stable.  No constipation +preciado in place

## 2020-05-11 NOTE — CONSULT NOTE ADULT - ASSESSMENT
Assessment:    FIM scores: Social Interaction ; Problem Solving ; Memory .  Plan: Individual supportive psychotherapy to monitor cognition, affect/mood, and behavior. Cognitive remediation during speech therapy sessions. Participation in recreation/art therapy in order to have pleasure and mastery experiences and regain/reestablish a sense of routine. 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 Difficulties were noted in concentration, sustained attention, working memory for calculations, and aspects of language (repetition, fluency) and executive functions (abstract reasoning, initiation, problem solving). Mild difficulties were noticed in short- and long-term memory. His affect was constricted and calm, and he reported a few adjustment symptoms including sad mood, helplessness/hopelessness, worry, tension, restlessness, irritability, anhedonia, and low energy/fatigue. FIM scores: Social Interaction 5; Problem Solving 4; Memory 5.  Plan: Individual supportive psychotherapy is recommended. Cognitive remediation during speech therapy sessions is also recommended. Participation in recreation/art therapy in order to have pleasure and mastery experiences and regain/reestablish a sense of routine.

## 2020-05-11 NOTE — PROGRESS NOTE ADULT - ASSESSMENT
ASSESSMENT/PLAN  70y Male with PMH of presenting with acute hypoxic respiratory failure secondary to COVID pneumonia requiring intubation and prolonged critical care stay with hospital course complicated by superimposed ventilator associated pneumonia, dysphagia/ stridor due to left vocal cord paralysis with functional deficits     #COVID/VAP (Pseudomonas)  -completed meropenem  -continue comprehensive rehab program OT/PT/SLP  -inhaler  -pulmonary rehab therapy orderd 5/11  O2 via NC 3l/min keep O2 sat >90%  -Precautions: airborne/contact, fall    #Vocal cord paralysis  -seen by ENT s/p laryngoscope with vocal cord paralysis and edema likely 2/2 to intubation  ·	Mild left vocal fold paresis, mild bilateral vocal cord edema and mild (left  greater than right) vocal process granulation tissue formation  -completed steroid course    #HTN/HLD  - on admission  -labetolol, clonidine, hydralazine  (134/60 - 167/84) 5/11 improved today  -will increase clonidine to 0.1 q8 if not improved (hydralazine just increased 75 q8 5/10)  -statin    #Wakefulness  -amantidine  -melatonin at bedtime    #MARIYA – pre-renal  -Improved with IVF hydration  -monitor renal indices BUn/Cr 15/0.83 5/11    #DM2: uncontrolled  -ISS  -Lantus 18 units  lispro 5 units premeal  -possible outpatient therapy with metformin 1000 bid  POCT Blood Glucose.: 324 mg/dL (11 May 2020 11:56)  POCT Blood Glucose.: 112 mg/dL (11 May 2020 07:31) not controlled  -diabetic educator 5/11  hospitalist consult f/u    #Pain: Tylenol prn, gabapentin    #GI/Bowel Mgmt   -  Continent c/w  Senna,  Dulcolax PRN, Miralax ,    #Bladder management: Urinary retention   preciado,: patient denies difficulty urinating PTA. Will dc preciado, monitor bladder scans. SC for PVR >350 ml 5/11. Discussed with patient who is agreeable  -continue  flomax    # DVT PPX:   Lovenox, SCDs, TEDs     FEN   - Diet - Regular + Thins  [CCHO, DASH/TLC]        LABS  bladder scan  monitor FS  monitor BP  diabetic educator

## 2020-05-11 NOTE — PROGRESS NOTE ADULT - SUBJECTIVE AND OBJECTIVE BOX
Patient is a 70y old  Male who presents with a chief complaint of Covid-19 (11 May 2020 09:08)      HPI:  71 yo M w/ PMH of HTN and DM on insulin and Metformin, and HLD presented to [facility] on 4/11 with complaint of SOB, HA, mild CP, and weakness with nonproductive cough, w/ congestion and feeling cold for 8 days prior to admission. Upon arrival to the ED his SpO2 was 84% on RA with RR of 28 which improved to 99% on NRB. He was then downtitrated to 6L NC and is saturating around 90-93%. Patient  was admitted for acute hypoxic respiratory failure secondary to COVID pneumonia.  Course was complicated by worsening hypoxia developing ARDS requiring intubation (4/16). Status post experimental therapies with Plaquenil, steroids, actrema x 1 and treated for superimposed ventilator associated pneumonia with pseudomonas (with course of Meropenem).  He was then extubated on 5/1.  Post extubation, stridor was noted and found to have dysphagia with bedside swallow evaluation. ENT was consulted, performed laryngoscopy which demonstrated granulation tissue and evidence of vocal cord paralysis with mild edema.  Steroids were recommended for vocal cord edema. Patient was deemed medically stable and discharged to acute [facility] on 5/8. (08 May 2020 16:44)      PAST MEDICAL & SURGICAL HISTORY:  Hyperlipidemia  Type 2 diabetes mellitus without complication, with long-term current use of insulin  Essential hypertension  No significant past surgical history      MEDICATIONS  (STANDING):  amantadine 100 milliGRAM(s) Oral <User Schedule>  cloNIDine 0.1 milliGRAM(s) Oral every 12 hours  dextrose 5%. 1000 milliLiter(s) (50 mL/Hr) IV Continuous <Continuous>  dextrose 50% Injectable 12.5 Gram(s) IV Push once  dextrose 50% Injectable 25 Gram(s) IV Push once  dextrose 50% Injectable 25 Gram(s) IV Push once  enoxaparin Injectable 40 milliGRAM(s) SubCutaneous every 12 hours  gabapentin 300 milliGRAM(s) Oral every 8 hours  hydrALAZINE 75 milliGRAM(s) Oral three times a day  insulin glargine Injectable (LANTUS) 18 Unit(s) SubCutaneous at bedtime  insulin lispro (HumaLOG) corrective regimen sliding scale   SubCutaneous three times a day before meals  insulin lispro (HumaLOG) corrective regimen sliding scale   SubCutaneous at bedtime  insulin lispro Injectable (HumaLOG) 5 Unit(s) SubCutaneous three times a day before meals  labetalol 100 milliGRAM(s) Oral every 8 hours  magnesium oxide 400 milliGRAM(s) Oral two times a day with meals  melatonin 6 milliGRAM(s) Oral at bedtime  pantoprazole    Tablet 40 milliGRAM(s) Oral before breakfast  polyethylene glycol 3350 17 Gram(s) Oral daily  tamsulosin 0.4 milliGRAM(s) Oral at bedtime    MEDICATIONS  (PRN):  acetaminophen   Tablet .. 650 milliGRAM(s) Oral every 6 hours PRN Temp greater or equal to 38C (100.4F), Mild Pain (1 - 3), Moderate Pain (4 - 6), Severe Pain (7 - 10)  ALBUTerol    90 MICROgram(s) HFA Inhaler 1 Puff(s) Inhalation every 4 hours PRN Shortness of Breath and/or Wheezing  bisacodyl Suppository 10 milliGRAM(s) Rectal daily PRN Constipation  dextrose 40% Gel 15 Gram(s) Oral once PRN Blood Glucose LESS THAN 70 milliGRAM(s)/deciliter  glucagon  Injectable 1 milliGRAM(s) IntraMuscular once PRN Glucose LESS THAN 70 milligrams/deciliter  senna 2 Tablet(s) Oral at bedtime PRN Constipation      Allergies    No Known Allergies    Intolerances          VITALS  70y  Vital Signs Last 24 Hrs  T(C): 36.4 (11 May 2020 11:58), Max: 36.8 (10 May 2020 21:04)  T(F): 97.6 (11 May 2020 11:58), Max: 98.3 (10 May 2020 21:04)  HR: 62 (11 May 2020 11:58) (62 - 88)  BP: 134/60 (11 May 2020 11:58) (134/60 - 167/84)  BP(mean): --  RR: 16 (11 May 2020 11:58) (14 - 16)  SpO2: 95% (11 May 2020 11:58) (92% - 95%)  Daily     Daily         RECENT LABS:                          9.6    7.38  )-----------( 235      ( 11 May 2020 08:04 )             29.4     05-11    138  |  101  |  15  ----------------------------<  113<H>  4.1   |  31  |  0.83    Ca    8.5      11 May 2020 08:04    TPro  5.9<L>  /  Alb  2.4<L>  /  TBili  0.3  /  DBili  x   /  AST  31  /  ALT  80<H>  /  AlkPhos  184<H>  05-11    LIVER FUNCTIONS - ( 11 May 2020 08:04 )  Alb: 2.4 g/dL / Pro: 5.9 g/dL / ALK PHOS: 184 U/L / ALT: 80 U/L / AST: 31 U/L / GGT: x                   CAPILLARY BLOOD GLUCOSE      POCT Blood Glucose.: 324 mg/dL (11 May 2020 11:56)  POCT Blood Glucose.: 112 mg/dL (11 May 2020 07:31)  POCT Blood Glucose.: 228 mg/dL (10 May 2020 20:34)  POCT Blood Glucose.: 319 mg/dL (10 May 2020 16:29)

## 2020-05-12 NOTE — PROGRESS NOTE ADULT - EXTREMITIES COMMENTS
b/l pitting edema 2+ b/l pitting edema 1-2+ bilateral TEDs in place  no erythema or warmth  no proximal calf TTP

## 2020-05-12 NOTE — PROGRESS NOTE ADULT - ASSESSMENT
70y Male with PMH of presenting with acute hypoxic respiratory failure secondary to COVID pneumonia requiring intubation and prolonged critical care stay with hospital course complicated by superimposed ventilator associated pneumonia, dysphagia/ stridor due to left vocal cord paralysis with functional deficits- pt/ot/dvt ppx     #COVID/VAP (Pseudomonas)  -completed meropenem  -inhaler  -pulmonary rehab therapy   O2 via NC prn    #Vocal cord paralysis-seen by ENT s/p laryngoscope with vocal cord paralysis and edema likely 2/2 to intubation  Mild left vocal fold paresis, mild bilateral vocal cord edema and mild (left  greater than right) vocal process granulation tissue formation  -completed steroid course    #HTN - bp noted, d/w rehab resident dr. conley, advised investigate about home meds, ? acei in the past- labetalol clonidine, hydralazine    #HLD - statin    #Wakefulness  amantadine    #insomnia-melatonin at bedtime    #MARIYA – pre-renal  -Improved with IVF hydration  -monitor renal indices BUn/Cr       #DM2: uncontrolled  -ISS  -Lantus , iss, premeal  Hemoglobin A1C, Whole Blood: 11.7 % (04.12.20 @ 14:10)    #Bladder management: Urinary retention- flomax    # DVT PPX- Lovenox    will follow 70y Male with PMH of presenting with acute hypoxic respiratory failure secondary to COVID pneumonia requiring intubation and prolonged critical care stay with hospital course complicated by superimposed ventilator associated pneumonia, dysphagia/ stridor due to left vocal cord paralysis with functional deficits- pt/ot/dvt ppx     #COVID/VAP (Pseudomonas)  -completed meropenem  -inhaler  -pulmonary rehab therapy   O2 via NC prn    #Vocal cord paralysis-seen by ENT s/p laryngoscope with vocal cord paralysis and edema likely 2/2 to intubation  Mild left vocal fold paresis, mild bilateral vocal cord edema and mild (left  greater than right) vocal process granulation tissue formation, completed steroid course    #HTN - bp noted, patient was on acei, which was d/c when patient was intubated / MARIYA,  now on  labetalol clonidine, hydralazine, mariya resolved, clinically stable, Can restart ACEI, WILL D/C CLONIDINE and HYDRALAZINE, monitor bp     #HLD - statin    #Wakefulness  amantadine    #insomnia-melatonin at bedtime    #MARIYA – pre-renal  -Improved with IVF hydration  -monitor renal indices BUn/Cr       #DM2: uncontrolled  -ISS  -Lantus , iss, premeal  Hemoglobin A1C, Whole Blood: 11.7 % (04.12.20 @ 14:10)    #Bladder management: Urinary retention- Flomax    # DVT PPX- Lovenox    will follow

## 2020-05-12 NOTE — PROGRESS NOTE ADULT - SUBJECTIVE AND OBJECTIVE BOX
Patient is a 70y old  Male who presents with a chief complaint of covid (12 May 2020 10:39)      HPI:  71 yo M w/ PMH of HTN and DM on insulin and Metformin, and HLD presented to [facility] on 4/11 with complaint of SOB, HA, mild CP, and weakness with nonproductive cough, w/ congestion and feeling cold for 8 days prior to admission. Upon arrival to the ED his SpO2 was 84% on RA with RR of 28 which improved to 99% on NRB. He was then downtitrated to 6L NC and is saturating around 90-93%. Patient  was admitted for acute hypoxic respiratory failure secondary to COVID pneumonia.  Course was complicated by worsening hypoxia developing ARDS requiring intubation (4/16). Status post experimental therapies with Plaquenil, steroids, actrema x 1 and treated for superimposed ventilator associated pneumonia with pseudomonas (with course of Meropenem).  He was then extubated on 5/1.  Post extubation, stridor was noted and found to have dysphagia with bedside swallow evaluation. ENT was consulted, performed laryngoscopy which demonstrated granulation tissue and evidence of vocal cord paralysis with mild edema.  Steroids were recommended for vocal cord edema. Patient was deemed medically stable and discharged to acute [facility] on 5/8. (08 May 2020 16:44)      PAST MEDICAL & SURGICAL HISTORY:  Hyperlipidemia  Type 2 diabetes mellitus without complication, with long-term current use of insulin  Essential hypertension  No significant past surgical history      MEDICATIONS  (STANDING):  amantadine 100 milliGRAM(s) Oral <User Schedule>  carbamide peroxide Otic Solution 5 Drop(s) Left Ear two times a day  cloNIDine 0.1 milliGRAM(s) Oral every 12 hours  dextrose 5%. 1000 milliLiter(s) (50 mL/Hr) IV Continuous <Continuous>  dextrose 50% Injectable 12.5 Gram(s) IV Push once  dextrose 50% Injectable 25 Gram(s) IV Push once  dextrose 50% Injectable 25 Gram(s) IV Push once  enoxaparin Injectable 40 milliGRAM(s) SubCutaneous every 12 hours  gabapentin 300 milliGRAM(s) Oral every 8 hours  hydrALAZINE 75 milliGRAM(s) Oral three times a day  insulin glargine Injectable (LANTUS) 18 Unit(s) SubCutaneous at bedtime  insulin lispro (HumaLOG) corrective regimen sliding scale   SubCutaneous three times a day before meals  insulin lispro (HumaLOG) corrective regimen sliding scale   SubCutaneous at bedtime  insulin lispro Injectable (HumaLOG) 7 Unit(s) SubCutaneous three times a day before meals  labetalol 100 milliGRAM(s) Oral every 8 hours  magnesium oxide 400 milliGRAM(s) Oral two times a day with meals  melatonin 6 milliGRAM(s) Oral at bedtime  pantoprazole    Tablet 40 milliGRAM(s) Oral before breakfast  polyethylene glycol 3350 17 Gram(s) Oral daily  tamsulosin 0.4 milliGRAM(s) Oral at bedtime    MEDICATIONS  (PRN):  acetaminophen   Tablet .. 650 milliGRAM(s) Oral every 6 hours PRN Temp greater or equal to 38C (100.4F), Mild Pain (1 - 3), Moderate Pain (4 - 6), Severe Pain (7 - 10)  ALBUTerol    90 MICROgram(s) HFA Inhaler 1 Puff(s) Inhalation every 4 hours PRN Shortness of Breath and/or Wheezing  bisacodyl Suppository 10 milliGRAM(s) Rectal daily PRN Constipation  dextrose 40% Gel 15 Gram(s) Oral once PRN Blood Glucose LESS THAN 70 milliGRAM(s)/deciliter  glucagon  Injectable 1 milliGRAM(s) IntraMuscular once PRN Glucose LESS THAN 70 milligrams/deciliter  senna 2 Tablet(s) Oral at bedtime PRN Constipation      Allergies    No Known Allergies    Intolerances      PHYSICAL EXAM  70y  Vital Signs Last 24 Hrs  T(C): 36.7 (12 May 2020 08:34), Max: 36.7 (12 May 2020 08:34)  T(F): 98.1 (12 May 2020 08:34), Max: 98.1 (12 May 2020 08:34)  HR: 88 (12 May 2020 08:34) (65 - 88)  BP: 145/72 (12 May 2020 08:34) (144/76 - 162/74)  BP(mean): --  RR: 16 (12 May 2020 08:34) (16 - 16)  SpO2: 94% (12 May 2020 08:34) (94% - 96%)  Daily     Daily       RECENT LABS:                          9.6    7.38  )-----------( 235      ( 11 May 2020 08:04 )             29.4     05-11    138  |  101  |  15  ----------------------------<  113<H>  4.1   |  31  |  0.83    Ca    8.5      11 May 2020 08:04    TPro  5.9<L>  /  Alb  2.4<L>  /  TBili  0.3  /  DBili  x   /  AST  31  /  ALT  80<H>  /  AlkPhos  184<H>  05-11    LIVER FUNCTIONS - ( 11 May 2020 08:04 )  Alb: 2.4 g/dL / Pro: 5.9 g/dL / ALK PHOS: 184 U/L / ALT: 80 U/L / AST: 31 U/L / GGT: x                   CAPILLARY BLOOD GLUCOSE      POCT Blood Glucose.: 130 mg/dL (12 May 2020 11:59)  POCT Blood Glucose.: 92 mg/dL (12 May 2020 07:37)  POCT Blood Glucose.: 220 mg/dL (11 May 2020 22:02)  POCT Blood Glucose.: 172 mg/dL (11 May 2020 17:43) Patient is a 70y old  Male who presents with a chief complaint of covid (12 May 2020 10:39)      HPI:  69 yo M w/ PMH of HTN and DM on insulin and Metformin, and HLD presented to [facility] on 4/11 with complaint of SOB, HA, mild CP, and weakness with nonproductive cough, w/ congestion and feeling cold for 8 days prior to admission. Upon arrival to the ED his SpO2 was 84% on RA with RR of 28 which improved to 99% on NRB. He was then downtitrated to 6L NC and is saturating around 90-93%. Patient  was admitted for acute hypoxic respiratory failure secondary to COVID pneumonia.  Course was complicated by worsening hypoxia developing ARDS requiring intubation (4/16). Status post experimental therapies with Plaquenil, steroids, actrema x 1 and treated for superimposed ventilator associated pneumonia with pseudomonas (with course of Meropenem).  He was then extubated on 5/1.  Post extubation, stridor was noted and found to have dysphagia with bedside swallow evaluation. ENT was consulted, performed laryngoscopy which demonstrated granulation tissue and evidence of vocal cord paralysis with mild edema.  Steroids were recommended for vocal cord edema. Patient was deemed medically stable and discharged to acute [facility] on 5/8. (08 May 2020 16:44)      PAST MEDICAL & SURGICAL HISTORY:  Hyperlipidemia  Type 2 diabetes mellitus without complication, with long-term current use of insulin  Essential hypertension  No significant past surgical history      MEDICATIONS  (STANDING):  amantadine 100 milliGRAM(s) Oral <User Schedule>  carbamide peroxide Otic Solution 5 Drop(s) Left Ear two times a day  cloNIDine 0.1 milliGRAM(s) Oral every 12 hours  dextrose 5%. 1000 milliLiter(s) (50 mL/Hr) IV Continuous <Continuous>  dextrose 50% Injectable 12.5 Gram(s) IV Push once  dextrose 50% Injectable 25 Gram(s) IV Push once  dextrose 50% Injectable 25 Gram(s) IV Push once  enoxaparin Injectable 40 milliGRAM(s) SubCutaneous every 12 hours  gabapentin 300 milliGRAM(s) Oral every 8 hours  hydrALAZINE 75 milliGRAM(s) Oral three times a day  insulin glargine Injectable (LANTUS) 18 Unit(s) SubCutaneous at bedtime  insulin lispro (HumaLOG) corrective regimen sliding scale   SubCutaneous three times a day before meals  insulin lispro (HumaLOG) corrective regimen sliding scale   SubCutaneous at bedtime  insulin lispro Injectable (HumaLOG) 7 Unit(s) SubCutaneous three times a day before meals  labetalol 100 milliGRAM(s) Oral every 8 hours  magnesium oxide 400 milliGRAM(s) Oral two times a day with meals  melatonin 6 milliGRAM(s) Oral at bedtime  pantoprazole    Tablet 40 milliGRAM(s) Oral before breakfast  polyethylene glycol 3350 17 Gram(s) Oral daily  tamsulosin 0.4 milliGRAM(s) Oral at bedtime    MEDICATIONS  (PRN):  acetaminophen   Tablet .. 650 milliGRAM(s) Oral every 6 hours PRN Temp greater or equal to 38C (100.4F), Mild Pain (1 - 3), Moderate Pain (4 - 6), Severe Pain (7 - 10)  ALBUTerol    90 MICROgram(s) HFA Inhaler 1 Puff(s) Inhalation every 4 hours PRN Shortness of Breath and/or Wheezing  bisacodyl Suppository 10 milliGRAM(s) Rectal daily PRN Constipation  dextrose 40% Gel 15 Gram(s) Oral once PRN Blood Glucose LESS THAN 70 milliGRAM(s)/deciliter  glucagon  Injectable 1 milliGRAM(s) IntraMuscular once PRN Glucose LESS THAN 70 milligrams/deciliter  senna 2 Tablet(s) Oral at bedtime PRN Constipation      Allergies    No Known Allergies    Intolerances      PHYSICAL EXAM  70y  Vital Signs Last 24 Hrs  T(C): 36.7 (12 May 2020 08:34), Max: 36.7 (12 May 2020 08:34)  T(F): 98.1 (12 May 2020 08:34), Max: 98.1 (12 May 2020 08:34)  HR: 88 (12 May 2020 08:34) (65 - 88)  BP: 145/72 (12 May 2020 08:34) (144/76 - 162/74)  BP(mean): --  RR: 16 (12 May 2020 08:34) (16 - 16)  SpO2: 94% (12 May 2020 08:34) (94% - 96%)  Daily     Daily       RECENT LABS:                          9.6    7.38  )-----------( 235      ( 11 May 2020 08:04 )             29.4     05-11    138  |  101  |  15  ----------------------------<  113<H>  4.1   |  31  |  0.83    Ca    8.5      11 May 2020 08:04    TPro  5.9<L>  /  Alb  2.4<L>  /  TBili  0.3  /  DBili  x   /  AST  31  /  ALT  80<H>  /  AlkPhos  184<H>  05-11    LIVER FUNCTIONS - ( 11 May 2020 08:04 )  Alb: 2.4 g/dL / Pro: 5.9 g/dL / ALK PHOS: 184 U/L / ALT: 80 U/L / AST: 31 U/L / GGT: x                   CAPILLARY BLOOD GLUCOSE      POCT Blood Glucose.: 130 mg/dL (12 May 2020 11:59)  POCT Blood Glucose.: 92 mg/dL (12 May 2020 07:37)  POCT Blood Glucose.: 220 mg/dL (11 May 2020 22:02)  POCT Blood Glucose.: 172 mg/dL (11 May 2020 17:43)

## 2020-05-12 NOTE — PROGRESS NOTE ADULT - COMMENTS
Patient seen and examined in the AM using [employer] interpre[employer] language line English at 10 AM. No acute events. patient states that he is able to urinate on his own after removal of preciado yesterday. Patient states that he is able to tolerate therapies using O2 via NC. States that he get SOB without supplemental o2. Patient seen and examined in the AM using [employer] interpre[employer] language line Kazakh at 10 AM. No acute events. patient states that he is able to urinate on his own after removal of preciado yesterday. Patient states that he is able to tolerate therapies using O2 via NC. States that he get SOB without supplemental o2. No dysuria or hematuria    mood overall seems stable, compliant. NAD

## 2020-05-12 NOTE — CONSULT NOTE ADULT - SUBJECTIVE AND OBJECTIVE BOX
Reason for Diabetes Education Consult:  Uncontrolled Type 2 Diabetes    HPI: 70y Male w/ PMH of HTN and DM (x 11 years) on insulin and Metformin, and HLD presented to [facility] on 4/11 with complaint of SOB, HA, mild CP, and weakness with nonproductive cough, w/ congestion and feeling cold for 8 days prior to admission. Upon arrival to the ED his SpO2 was 84% on RA with RR of 28 which improved to 99% on NRB. He was then down-titrated to 6L NC and is saturating around 90-93%. Patient  was admitted for acute hypoxic respiratory failure secondary to COVID pneumonia.  Course was complicated by worsening hypoxia developing ARDS requiring intubation (4/16). Status post experimental therapies with Plaquenil, steroids, actrema x 1 and treated for superimposed ventilator associated pneumonia with pseudomonas (with course of Meropenem).  He was then extubated on 5/1.  Post extubation, stridor was noted and found to have dysphagia with bedside swallow evaluation. ENT was consulted, performed laryngoscopy which demonstrated granulation tissue and evidence of vocal cord paralysis with mild edema.  Steroids were recommended for vocal cord edema. Patient was deemed medically stable and discharged to acute [facility] on 5/8 at [place].  Reports taking Lantus 20 units daily via vial/syringe and Metformin 1,000 mg BID.  Reports adherence with both.  Prescribed by PCP   Has BGM but does not recall name of meter.  Rarely tests - only if he was feeling dizzy or if he had a headache.  Reports readings 100s and 200s, states never in the 300s.  Denies unintentional weight loss, polyuria or polydipsia.  Lives with spouse.  Drinks milk, coffee and water - no juice or soda.  Works in a  medication factory.    Episodes of Hypoglycemia:  denies    Outpatient Endocrinologist?  no    PAST MEDICAL & SURGICAL HISTORY:  Hyperlipidemia  Type 2 diabetes mellitus without complication, with long-term current use of insulin  Essential hypertension  No significant past surgical history    FAMILY HISTORY:  No pertinent family history in first degree relatives      Home Medications:  acetaminophen 325 mg oral tablet: 2 tab(s) orally every 6 hours, As needed, Temp greater or equal to 38C (100.4F) (08 May 2020 16:03)  albuterol 90 mcg/inh inhalation aerosol: 1 puff(s) inhaled every 4 hours, As needed, Shortness of Breath and/or Wheezing (08 May 2020 16:03)  atorvastatin 40 mg oral tablet: 1 tab(s) orally once a day (05 May 2020 14:03)  cloNIDine 0.1 mg oral tablet: 1 tab(s) orally every 12 hours (08 May 2020 16:03)  gabapentin 300 mg oral capsule: 1 cap(s) orally once a day (05 May 2020 14:03)  hydrALAZINE 25 mg oral tablet: 1 tab(s) orally 4 times a day (08 May 2020 16:03)  labetalol 100 mg oral tablet: 1 tab(s) orally every 8 hours (08 May 2020 16:03)  Lantus 100 units/mL subcutaneous solution: 20 unit(s) subcutaneous once a day (at bedtime) (05 May 2020 14:03)  metFORMIN 1000 mg oral tablet: 1 tab(s) orally 2 times a day (05 May 2020 14:03)  polyethylene glycol 3350 oral powder for reconstitution: 17 gram(s) orally once a day (08 May 2020 16:03)      Current (Non-Endocrine) Meds:  acetaminophen   Tablet .. 650 milliGRAM(s) Oral every 6 hours PRN  ALBUTerol    90 MICROgram(s) HFA Inhaler 1 Puff(s) Inhalation every 4 hours PRN  amantadine 100 milliGRAM(s) Oral <User Schedule>  AQUAPHOR (petrolatum Ointment) 1 Application(s) Topical three times a day  bisacodyl Suppository 10 milliGRAM(s) Rectal daily PRN  carbamide peroxide Otic Solution 5 Drop(s) Left Ear two times a day  dextrose 5%. 1000 milliLiter(s) IV Continuous <Continuous>  enoxaparin Injectable 40 milliGRAM(s) SubCutaneous every 12 hours  gabapentin 300 milliGRAM(s) Oral every 8 hours  labetalol 100 milliGRAM(s) Oral every 8 hours  lisinopril 5 milliGRAM(s) Oral two times a day  magnesium oxide 400 milliGRAM(s) Oral two times a day with meals  melatonin 6 milliGRAM(s) Oral at bedtime  pantoprazole    Tablet 40 milliGRAM(s) Oral before breakfast  polyethylene glycol 3350 17 Gram(s) Oral daily  senna 2 Tablet(s) Oral at bedtime PRN  tamsulosin 0.4 milliGRAM(s) Oral at bedtime      Current Endocrine Meds:   dextrose 40% Gel 15 Gram(s) Oral once PRN  dextrose 50% Injectable 12.5 Gram(s) IV Push once  dextrose 50% Injectable 25 Gram(s) IV Push once  dextrose 50% Injectable 25 Gram(s) IV Push once  glucagon  Injectable 1 milliGRAM(s) IntraMuscular once PRN  insulin glargine Injectable (LANTUS) 18 Unit(s) SubCutaneous at bedtime  insulin lispro (HumaLOG) corrective regimen sliding scale   SubCutaneous three times a day before meals  insulin lispro (HumaLOG) corrective regimen sliding scale   SubCutaneous at bedtime  insulin lispro Injectable (HumaLOG) 7 Unit(s) SubCutaneous three times a day before meals      History of Medication Compliance:    Allergies:  No Known Allergies      Height, weight BMI      Vital Signs Last 24 Hrs  T(C): 36.7 (12 May 2020 08:34), Max: 36.7 (12 May 2020 08:34)  T(F): 98.1 (12 May 2020 08:34), Max: 98.1 (12 May 2020 08:34)  HR: 71 (12 May 2020 13:21) (65 - 88)  BP: 155/71 (12 May 2020 13:21) (144/76 - 162/74)  BP(mean): --  RR: 16 (12 May 2020 08:34) (16 - 16)  SpO2: 94% (12 May 2020 08:34) (94% - 96%)    Ext: no edema, no ulcers, pedal pulses palpable bilaterally  Feet:  monofilament sensation intact in feet  Psychiatric: A&O x 3, normal affect/mood.    POCT Blood Glucose.: 130 mg/dL (05-12-20 @ 11:59)  POCT Blood Glucose.: 92 mg/dL (05-12-20 @ 07:37)  POCT Blood Glucose.: 220 mg/dL (05-11-20 @ 22:02)  POCT Blood Glucose.: 172 mg/dL (05-11-20 @ 17:43)  POCT Blood Glucose.: 324 mg/dL (05-11-20 @ 11:56)  POCT Blood Glucose.: 112 mg/dL (05-11-20 @ 07:31)  POCT Blood Glucose.: 228 mg/dL (05-10-20 @ 20:34)  POCT Blood Glucose.: 319 mg/dL (05-10-20 @ 16:29)  POCT Blood Glucose.: 248 mg/dL (05-10-20 @ 11:33)  POCT Blood Glucose.: 164 mg/dL (05-10-20 @ 07:57)  POCT Blood Glucose.: 200 mg/dL (05-09-20 @ 21:18)  POCT Blood Glucose.: 222 mg/dL (05-09-20 @ 17:40)    LABS:                        9.6    7.38  )-----------( 235      ( 11 May 2020 08:04 )             29.4     05-11    138  |  101  |  15  ----------------------------<  113<H>  4.1   |  31  |  0.83    Ca    8.5      11 May 2020 08:04    TPro  5.9<L>  /  Alb  2.4<L>  /  TBili  0.3  /  DBili  x   /  AST  31  /  ALT  80<H>  /  AlkPhos  184<H>  05-11      Hemoglobin A1C, Whole Blood: 11.7 (04-12 @ 14:10)                           Spoke with pt via telephone via [employer]  #[id number]  [place]    Educated on impact of current A1C on long and short term complications of Diabetes.   Pt would like to try to use an insulin pen instead of vial/syringe.  RN to instruct.  If pt more comfortable with pen, should be sent home on insulin pen if covered by insurance.  Right now is paying around $70/month for insulin vial.      Recommendations:   For now:  Would like to restart metformin but pt still having swallowing difficulties so will hold off for now.    Decrease Glargine from 18 to 14 units QHS to avoid morning hypo  Continue Lispro 7 units three times a day before meals   Increase lispro correction from low to moderate  Will continue to monitor     At discharge, recommend:  Lantus dose TBD, restart metformin if swallowing ability improved      Pt can follow up at discharge with:  PCP

## 2020-05-12 NOTE — PROGRESS NOTE ADULT - ASSESSMENT
ASSESSMENT/PLAN  70y Male with PMH of presenting with acute hypoxic respiratory failure secondary to COVID pneumonia requiring intubation and prolonged critical care stay with hospital course complicated by superimposed ventilator associated pneumonia, dysphagia/ stridor due to left vocal cord paralysis with functional deficits     #COVID/VAP (Pseudomonas)  -completed meropenem  -continue comprehensive rehab program OT/PT/SLP  -inhaler  -pulmonary rehab therapy orderd 5/11  -O2 via NC 3l/min keep O2 sat >90% attempt to wean during course  -Precautions: airborne/contact, fall    #Vocal cord paralysis - improved  -seen by ENT s/p laryngoscope with vocal cord paralysis and edema likely 2/2 to intubation: Mild left vocal fold paresis, mild bilateral vocal cord edema and mild (left  greater than right) vocal process granulation tissue formation  -completed steroid course    #HTN/HLD  - on admission  -labetolol, clonidine, hydralazine  - elevated pressures in the evening time, d/w hospitalist, to confirm home medication regimen  -consider increase clonidine to 0.1 q8 vs starting ACE-I  -statin    #Wakefulness  -amantidine  -melatonin at bedtime    #MARIYA – pre-renal  -Improved with IVF hydration  -monitor renal indices BUn/Cr 15/0.83 5/11    #DM2: uncontrolled  -ISS, added betime snack for low am values  -Lantus 18 units  - lispro 5-> units premeal  -possible outpatient therapy with metformin 1000 bid  -diabetic educator 5/11  hospitalist consult f/u    #Pain: Tylenol prn, gabapentin    #GI/Bowel Mgmt   -  Continent c/w  Senna,  Dulcolax PRN, Miralax ,    #Bladder management: Urinary retention - TOV 5/11, BS low, will monitor for 1 more day and discontinue  -continue  flomax    # DVT PPX:   Lovenox, SCDs, TEDs     FEN   - Diet - Regular + Thins  [CCHO, DASH/TLC]        LABS  bladder scan  monitor FS  monitor BP  diabetic educator ASSESSMENT/PLAN  70y Male with PMH of presenting with acute hypoxic respiratory failure secondary to COVID pneumonia requiring intubation and prolonged critical care stay with hospital course complicated by superimposed ventilator associated pneumonia, dysphagia/ stridor due to left vocal cord paralysis with functional deficits     #COVID/VAP (Pseudomonas)  -completed meropenem  -continue comprehensive rehab program OT/PT/SLP  -inhaler  -incentive spirometry  -pulmonary rehab therapy orderd 5/11  -O2 via NC 3l/min keep O2 sat >90% attempt to wean during course  -Precautions: airborne/contact, fall    #Vocal cord paralysis - improved  -seen by ENT s/p laryngoscope with vocal cord paralysis and edema likely 2/2 to intubation: Mild left vocal fold paresis, mild bilateral vocal cord edema and mild (left  greater than right) vocal process granulation tissue formation  -completed steroid course    #HTN/HLD  - on admission  -labetolol, clonidine, hydralazine  - elevated pressures in the evening time, d/w hospitalist, to confirm home medication regimen  -consider increase clonidine to 0.1 q8 vs starting ACE-I  -statin    #Wakefulness  -amantidine  -melatonin at bedtime    #MARIYA – pre-renal  -Improved with IVF hydration  -monitor renal indices BUn/Cr 15/0.83 5/11    #DM2: uncontrolled  -ISS, added betime snack for low am values  -Lantus 18 units  - lispro 5-> units premeal  -possible outpatient therapy with metformin 1000 bid  -diabetic educator   hospitalist consult f/u    #Pain: Tylenol prn, gabapentin    #GI/Bowel Mgmt   -  Continent c/w  Senna,  Dulcolax PRN, Miralax ,    #Bladder management: Urinary retention - TOV 5/11  - Bladder scans low, will monitor for 1 more day and discontinue if stable  -continue  flomax    # DVT PPX:   Lovenox, SCDs, TEDs     #FEN   - Diet - Regular + Thins  [CCHO, DASH/TLC]      #IDT rounds 5/13:      LABS  bladder scan  monitor FS  monitor BP  diabetic educator ASSESSMENT/PLAN  70y Male with PMH of presenting with acute hypoxic respiratory failure secondary to COVID pneumonia requiring intubation and prolonged critical care stay with hospital course complicated by superimposed ventilator associated pneumonia, dysphagia/ stridor due to left vocal cord paralysis with functional deficits     #COVID/VAP (Pseudomonas)  -completed meropenem  -continue comprehensive rehab program OT/PT/SLP  -inhaler  -incentive spirometry  -pulmonary rehab therapy orderd 5/11  -O2 via NC 3l/min keep O2 sat >90% attempt to wean during course  -Precautions: airborne/contact, fall    #Vocal cord paralysis - improved  -seen by ENT s/p laryngoscope with vocal cord paralysis and edema likely 2/2 to intubation: Mild left vocal fold paresis, mild bilateral vocal cord edema and mild (left  greater than right) vocal process granulation tissue formation  -completed steroid course    #HTN/HLD  - on admission  -labetolol, clonidine, hydralazine  - elevated pressures in the evening time, d/w hospitalist, to confirm home medication regimen  -consider increase clonidine to 0.1 q8 vs starting ACE-I  -statin    #Wakefulness  -amantidine  -melatonin at bedtime    #MARIYA – pre-renal  -Improved with IVF hydration  -monitor renal indices BUn/Cr 15/0.83 5/11    #DM2: uncontrolled  -ISS, added betime snack for low am values  -Lantus 18 units  - lispro 5-> units premeal  -possible outpatient therapy with metformin 1000 bid  -diabetic educator   hospitalist consult f/u    #Pain: Tylenol prn, gabapentin    #GI/Bowel Mgmt   -  Continent c/w  Senna,  Dulcolax PRN, Miralax ,    #Bladder management: Urinary retention - TOV 5/11  - Bladder scans low, will monitor for 1 more day and discontinue if stable  -continue  flomax    # DVT PPX:   Lovenox, SCDs, TEDs     #FEN   - Diet - Regular + Thins  [CCHO, DASH/TLC]      #IDT rounds 5/13:      LABS  bladder scan  monitor FS  monitor BP  diabetic educator    addendum: patient had desaturation O2 during therapy to low 80s. Improved quickly with rest. D-dimer admission 525.  -will order CTPA, doppler Le. Repeat D dimer in AM

## 2020-05-12 NOTE — PROGRESS NOTE ADULT - SUBJECTIVE AND OBJECTIVE BOX
70y Male with PMH of presenting with acute hypoxic respiratory failure secondary to COVID pneumonia requiring intubation and prolonged critical care stay with hospital course complicated by superimposed ventilator associated pneumonia, dysphagia/ stridor due to left vocal cord paralysis     seen at the bedside, no new complaints, no n/v, no sob, no fever, no events    Vital Signs Last 24 Hrs  T(C): 36.7 (12 May 2020 08:34), Max: 36.7 (12 May 2020 08:34)  T(F): 98.1 (12 May 2020 08:34), Max: 98.1 (12 May 2020 08:34)  HR: 88 (12 May 2020 08:34) (62 - 88)  BP: 145/72 (12 May 2020 08:34) (134/60 - 162/74)  BP(mean): --  RR: 16 (12 May 2020 08:34) (16 - 16)  SpO2: 94% (12 May 2020 08:34) (94% - 96%)    GENERAL- NAD  EAR/NOSE/MOUTH/THROAT - no pharyngeal exudates, no oral lesions  MMM  EYES- IRIS, conjunctiva and Sclera clear  NECK- supple  RESPIRATORY-  clear to auscultation bilaterally  CARDIOVASCULAR - SIS2, RRR  GI - soft NT BS present  EXTREMITIES- no pedal edema  NEUROLOGY- no gross focal deficits  SKIN- no rashes, warm to touch  PSYCHIATRY- AAO X 3  MUSCULOSKELETAL- ROM normal                9.6                  138  | 31   | 15           7.38  >-----------< 235     ------------------------< 113                   29.4                 4.1  | 101  | 0.83                                         Ca 8.5   Mg x     Ph x        CAPILLARY BLOOD GLUCOSE      POCT Blood Glucose.: 92 mg/dL (12 May 2020 07:37)  POCT Blood Glucose.: 220 mg/dL (11 May 2020 22:02)  POCT Blood Glucose.: 172 mg/dL (11 May 2020 17:43)  POCT Blood Glucose.: 324 mg/dL (11 May 2020 11:56)    Hemoglobin A1C, Whole Blood: 11.7 % (04.12.20 @ 14:10)      MEDICATIONS  (STANDING):  amantadine 100 milliGRAM(s) Oral <User Schedule>  carbamide peroxide Otic Solution 5 Drop(s) Left Ear two times a day  cloNIDine 0.1 milliGRAM(s) Oral every 12 hours  dextrose 5%. 1000 milliLiter(s) (50 mL/Hr) IV Continuous <Continuous>  dextrose 50% Injectable 12.5 Gram(s) IV Push once  dextrose 50% Injectable 25 Gram(s) IV Push once  dextrose 50% Injectable 25 Gram(s) IV Push once  enoxaparin Injectable 40 milliGRAM(s) SubCutaneous every 12 hours  gabapentin 300 milliGRAM(s) Oral every 8 hours  hydrALAZINE 75 milliGRAM(s) Oral three times a day  insulin glargine Injectable (LANTUS) 18 Unit(s) SubCutaneous at bedtime  insulin lispro (HumaLOG) corrective regimen sliding scale   SubCutaneous three times a day before meals  insulin lispro (HumaLOG) corrective regimen sliding scale   SubCutaneous at bedtime  insulin lispro Injectable (HumaLOG) 7 Unit(s) SubCutaneous three times a day before meals  labetalol 100 milliGRAM(s) Oral every 8 hours  magnesium oxide 400 milliGRAM(s) Oral two times a day with meals  melatonin 6 milliGRAM(s) Oral at bedtime  pantoprazole    Tablet 40 milliGRAM(s) Oral before breakfast  polyethylene glycol 3350 17 Gram(s) Oral daily  tamsulosin 0.4 milliGRAM(s) Oral at bedtime        POCT Blood Glucose.: 324 mg/dL (11 May 2020 11:56)  POCT Blood Glucose.: 112 mg/dL (11 May 2020 07:31)  POCT Blood Glucose.: 228 mg/dL (10 May 2020 20:34)  POCT Blood Glucose.: 319 mg/dL (10 May 2020 16:29) 70y Male with PMH of presenting with acute hypoxic respiratory failure secondary to COVID pneumonia requiring intubation and prolonged critical care stay with hospital course complicated by superimposed ventilator associated pneumonia, dysphagia/ stridor due to left vocal cord paralysis     seen at the bedside, no new complaints, no n/v, no sob, no fever, no events    Vital Signs Last 24 Hrs  T(C): 36.7 (12 May 2020 08:34), Max: 36.7 (12 May 2020 08:34)  T(F): 98.1 (12 May 2020 08:34), Max: 98.1 (12 May 2020 08:34)  HR: 88 (12 May 2020 08:34) (62 - 88)  BP: 145/72 (12 May 2020 08:34) (134/60 - 162/74)  BP(mean): --  RR: 16 (12 May 2020 08:34) (16 - 16)  SpO2: 94% (12 May 2020 08:34) (94% - 96%)    GENERAL- NAD  EAR/NOSE/MOUTH/THROAT - no pharyngeal exudates, no oral lesions  MMM  EYES- IRIS, conjunctiva and Sclera clear  NECK- supple  RESPIRATORY-  clear to auscultation bilaterally  CARDIOVASCULAR - SIS2, RRR  GI - soft NT BS present  EXTREMITIES- mild pedal edema  NEUROLOGY- no gross focal deficits  SKIN- no rashes, warm to touch  PSYCHIATRY- AAO X 3  MUSCULOSKELETAL- ROM normal                9.6                  138  | 31   | 15           7.38  >-----------< 235     ------------------------< 113                   29.4                 4.1  | 101  | 0.83                                         Ca 8.5   Mg x     Ph x        CAPILLARY BLOOD GLUCOSE      POCT Blood Glucose.: 92 mg/dL (12 May 2020 07:37)  POCT Blood Glucose.: 220 mg/dL (11 May 2020 22:02)  POCT Blood Glucose.: 172 mg/dL (11 May 2020 17:43)  POCT Blood Glucose.: 324 mg/dL (11 May 2020 11:56)    Hemoglobin A1C, Whole Blood: 11.7 % (04.12.20 @ 14:10)      MEDICATIONS  (STANDING):  amantadine 100 milliGRAM(s) Oral <User Schedule>  carbamide peroxide Otic Solution 5 Drop(s) Left Ear two times a day  cloNIDine 0.1 milliGRAM(s) Oral every 12 hours  dextrose 5%. 1000 milliLiter(s) (50 mL/Hr) IV Continuous <Continuous>  dextrose 50% Injectable 12.5 Gram(s) IV Push once  dextrose 50% Injectable 25 Gram(s) IV Push once  dextrose 50% Injectable 25 Gram(s) IV Push once  enoxaparin Injectable 40 milliGRAM(s) SubCutaneous every 12 hours  gabapentin 300 milliGRAM(s) Oral every 8 hours  hydrALAZINE 75 milliGRAM(s) Oral three times a day  insulin glargine Injectable (LANTUS) 18 Unit(s) SubCutaneous at bedtime  insulin lispro (HumaLOG) corrective regimen sliding scale   SubCutaneous three times a day before meals  insulin lispro (HumaLOG) corrective regimen sliding scale   SubCutaneous at bedtime  insulin lispro Injectable (HumaLOG) 7 Unit(s) SubCutaneous three times a day before meals  labetalol 100 milliGRAM(s) Oral every 8 hours  magnesium oxide 400 milliGRAM(s) Oral two times a day with meals  melatonin 6 milliGRAM(s) Oral at bedtime  pantoprazole    Tablet 40 milliGRAM(s) Oral before breakfast  polyethylene glycol 3350 17 Gram(s) Oral daily  tamsulosin 0.4 milliGRAM(s) Oral at bedtime        POCT Blood Glucose.: 324 mg/dL (11 May 2020 11:56)  POCT Blood Glucose.: 112 mg/dL (11 May 2020 07:31)  POCT Blood Glucose.: 228 mg/dL (10 May 2020 20:34)  POCT Blood Glucose.: 319 mg/dL (10 May 2020 16:29)

## 2020-05-13 NOTE — PROGRESS NOTE ADULT - RS GEN PE MLT RESP DETAILS PC
good air movement/normal/respirations non-labored
respirations non-labored/clear to auscultation bilaterally
no wheezes

## 2020-05-13 NOTE — PROGRESS NOTE ADULT - ASSESSMENT
ASSESSMENT/PLAN  70y Male with PMH of presenting with acute hypoxic respiratory failure secondary to COVID pneumonia requiring intubation and prolonged critical care stay with hospital course complicated by superimposed ventilator associated pneumonia, dysphagia/ stridor due to left vocal cord paralysis with functional deficits     #COVID/VAP (Pseudomonas)  -completed meropenem  -continue comprehensive rehab program OT/PT/SLP  -inhaler  -incentive spirometry  -pulmonary rehab therapy orderd 5/11  -O2 via NC 3l/min keep O2 sat >90% attempt to wean during course  -Precautions: airborne/contact, fall    #Vocal cord paralysis - improved  -seen by ENT s/p laryngoscope with vocal cord paralysis and edema likely 2/2 to intubation: Mild left vocal fold paresis, mild bilateral vocal cord edema and mild (left  greater than right) vocal process granulation tissue formation  -completed steroid course    #HTN/HLD  - on admission  -labetolol, clonidine, hydralazine  - elevated pressures in the evening time, d/w hospitalist, to confirm home medication regimen  -consider increase clonidine to 0.1 q8 vs starting ACE-I  -statin    #Wakefulness  -amantidine  -melatonin at bedtime    #MARIYA – pre-renal  -Improved with IVF hydration  -monitor renal indices BUn/Cr 15/0.83 5/11    #DM2: uncontrolled  -possible outpatient therapy with metformin 1000 bid  -diabetic educator 5/12 appreciated  Decrease Glargine from 18 to 14 units QHS to avoid morning hypo  Continue Lispro 7 units three times a day before meals   Increase lispro correction from low to moderate  Will continue to monitor     hospitalist consult f/u    #Pain: Tylenol prn, gabapentin    #GI/Bowel Mgmt   -  Continent c/w  Senna,  Dulcolax PRN, Miralax ,    #Bladder management: Urinary retention - TOV 5/11  - Bladder scans low, will monitor for 1 more day and discontinue if stable  -continue  flomax    # DVT PPX:   Lovenox, SCDs, TEDs     #FEN   - Diet - upgraded to SOFT and THIN 5/13    #IDT rounds 5/13:      LABS  bladder scan  monitor FS  monitor BP  diabetic educator    addendum: patient had desaturation O2 during therapy to low 80s. Improved quickly with rest. D-dimer admission 525.  -will order CTPA, doppler Le. Repeat D dimer in AM ASSESSMENT/PLAN  70y Male with PMH of presenting with acute hypoxic respiratory failure secondary to COVID pneumonia requiring intubation and prolonged critical care stay with hospital course complicated by superimposed ventilator associated pneumonia, dysphagia/ stridor due to left vocal cord paralysis with functional deficits     #COVID/VAP (Pseudomonas)  -completed meropenem  -continue comprehensive rehab program OT/PT/SLP  -inhaler  -incentive spirometry  -pulmonary rehab therapy orderd 5/11  -O2 via NC 3l/min keep O2 sat >90% attempt to wean during course  -Precautions: airborne/contact, fall    #Atypical PNA on CT  -will start on levaquin 500 mg daily x 7 days Day #1 5/13  florastor    #Vocal cord paralysis - improved  -seen by ENT s/p laryngoscope with vocal cord paralysis and edema likely 2/2 to intubation: Mild left vocal fold paresis, mild bilateral vocal cord edema and mild (left  greater than right) vocal process granulation tissue formation  -completed steroid course    #HTN/HLD  - on admission  -labetolol, clonidine, hydralazine  - elevated pressures in the evening time, d/w hospitalist, to confirm home medication regimen  -consider increase clonidine to 0.1 q8 vs starting ACE-I  -statin    #Wakefulness  -amantidine  -melatonin at bedtime    #MARIYA – pre-renal  -Improved with IVF hydration  -monitor renal indices BUn/Cr 15/0.83 5/11    #DM2: uncontrolled  -possible outpatient therapy with metformin 1000 bid  -diabetic educator 5/12 appreciated  Decrease Glargine from 18 to 14 units QHS to avoid morning hypo  Continue Lispro 7 units three times a day before meals   Increase lispro correction from low to moderate  Will continue to monitor     hospitalist consult f/u    #Pain: Tylenol prn, gabapentin    #GI/Bowel Mgmt   -  Continent c/w  Senna,  Dulcolax PRN, Miralax ,    #Bladder management: Urinary retention - TOV 5/11  - Bladder scans low, will monitor for 1 more day and discontinue if stable  -continue  flomax    # DVT PPX:   Lovenox, SCDs, TEDs   -doppler negative DVT    #FEN   - Diet - upgraded to SOFT and THIN 5/13    #IDT rounds 5/13:      LABS  bladder scan  monitor FS  monitor BP  diabetic educator    addendum: patient had desaturation O2 during therapy to low 80s. Improved quickly with rest. D-dimer admission 525.  -will order CTPA, doppler Le. Repeat D dimer in AM ASSESSMENT/PLAN  70y Male with PMH of presenting with acute hypoxic respiratory failure secondary to COVID pneumonia requiring intubation and prolonged critical care stay with hospital course complicated by superimposed ventilator associated pneumonia, dysphagia/ stridor due to left vocal cord paralysis with functional deficits     #COVID/VAP (Pseudomonas)  -completed meropenem  -continue comprehensive rehab program OT/PT/SLP  -inhaler  -incentive spirometry  -pulmonary rehab therapy ordered 5/11  -O2 via NC 3l/min keep O2 sat >90% attempt to wean during course  D dimer 289 5/13  Last CXR with % opacification bilateral lungs  CT PA 5/12 with extensive heterogeneous airway disease c/w COVID PNA  -Precautions: airborne/contact, fall    #Atypical PNA on CT  -will start on levaquin 500 mg daily x 7 days Day #1 5/13 as recommended by hospitalist, appreciated  florastor x 5 days  O2 PRN    #Vocal cord paralysis - improved  -seen by ENT s/p laryngoscope with vocal cord paralysis and edema likely 2/2 to intubation: Mild left vocal fold paresis, mild bilateral vocal cord edema and mild (left  greater than right) vocal process granulation tissue formation  -completed steroid course    #HTN/HLD  - on admission  -labetolol, 100 q 8  lisinopril 5 qd  - dc hydralazine and clonidine  -statin  -hospitalist appreciated    #Wakefulness  -amantidine  -melatonin at bedtime    #MARIYA – pre-renal  -Improved with IVF hydration  -monitor renal indices BUn/Cr 15/0.83 5/11    #anemia  Hgb 9.1 monitor    #DM2: uncontrolled  -possible outpatient therapy with metformin 1000 bid  -diabetic educator 5/12 appreciated  Decrease Glargine from 18 to 14 units QHS to avoid morning hypo  Continue Lispro 7 units three times a day before meals   Increase lispro correction from low to moderate  Will continue to monitor       #Pain: Tylenol prn, gabapentin    #GI/Bowel Mgmt   -  Continent c/w  Senna,  Dulcolax PRN, Miralax ,    #Bladder management:  urinary retention resolved  dc bladder scan 5/13  -continue  flomax    # DVT PPX:   Lovenox, SCDs, TEDs   -doppler negative DVT 5/12    #FEN   - Diet - upgraded to SOFT and THIN 5/13    #IDT rounds 5/13:  Supervision eating/grooming, supervision transfers. supervision ambulation and stairs with reduced endurance and episodes desaturation 85% on 2l  -goals for supervision ambulation and supervision stairs, mod indepnedent transfers  -target dc home 5/22 with home PT, OT and caregiver support    LABS  [facility] CMP 5/14

## 2020-05-13 NOTE — PROGRESS NOTE ADULT - COMMENTS
Patient denies CP however continues to have periods of desaturation during activities and exertion. Has been seen by SLP and cleraed for upgrade diet to SOFT solids and THIN liquids. No wet cough.     mood stable . continues to requirs O2, on 3-4 liters (desats without_

## 2020-05-13 NOTE — PROVIDER CONTACT NOTE (OTHER) - ASSESSMENT
FS was 57, pt was given 4oz of applejuice and lunch. on recheck was 93. Given another 4oz of apple juice. recheck was 129mg/dl

## 2020-05-13 NOTE — PROGRESS NOTE ADULT - SUBJECTIVE AND OBJECTIVE BOX
Patient is a 70y old  Male who presents with a chief complaint of covid (12 May 2020 15:45)      HPI:  71 yo M w/ PMH of HTN and DM on insulin and Metformin, and HLD presented to [facility] on 4/11 with complaint of SOB, HA, mild CP, and weakness with nonproductive cough, w/ congestion and feeling cold for 8 days prior to admission. Upon arrival to the ED his SpO2 was 84% on RA with RR of 28 which improved to 99% on NRB. He was then downtitrated to 6L NC and is saturating around 90-93%. Patient  was admitted for acute hypoxic respiratory failure secondary to COVID pneumonia.  Course was complicated by worsening hypoxia developing ARDS requiring intubation (4/16). Status post experimental therapies with Plaquenil, steroids, actrema x 1 and treated for superimposed ventilator associated pneumonia with pseudomonas (with course of Meropenem).  He was then extubated on 5/1.  Post extubation, stridor was noted and found to have dysphagia with bedside swallow evaluation. ENT was consulted, performed laryngoscopy which demonstrated granulation tissue and evidence of vocal cord paralysis with mild edema.  Steroids were recommended for vocal cord edema. Patient was deemed medically stable and discharged to acute [facility] on 5/8. (08 May 2020 16:44)      PAST MEDICAL & SURGICAL HISTORY:  Hyperlipidemia  Type 2 diabetes mellitus without complication, with long-term current use of insulin  Essential hypertension  No significant past surgical history      MEDICATIONS  (STANDING):  amantadine 100 milliGRAM(s) Oral <User Schedule>  AQUAPHOR (petrolatum Ointment) 1 Application(s) Topical three times a day  carbamide peroxide Otic Solution 5 Drop(s) Left Ear two times a day  dextrose 5%. 1000 milliLiter(s) (50 mL/Hr) IV Continuous <Continuous>  dextrose 50% Injectable 12.5 Gram(s) IV Push once  dextrose 50% Injectable 25 Gram(s) IV Push once  dextrose 50% Injectable 25 Gram(s) IV Push once  enoxaparin Injectable 40 milliGRAM(s) SubCutaneous every 12 hours  gabapentin 300 milliGRAM(s) Oral every 8 hours  insulin glargine Injectable (LANTUS) 14 Unit(s) SubCutaneous at bedtime  insulin lispro (HumaLOG) corrective regimen sliding scale   SubCutaneous three times a day before meals  insulin lispro (HumaLOG) corrective regimen sliding scale   SubCutaneous at bedtime  insulin lispro Injectable (HumaLOG) 7 Unit(s) SubCutaneous three times a day before meals  labetalol 100 milliGRAM(s) Oral every 8 hours  lisinopril 5 milliGRAM(s) Oral two times a day  magnesium oxide 400 milliGRAM(s) Oral two times a day with meals  melatonin 6 milliGRAM(s) Oral at bedtime  pantoprazole    Tablet 40 milliGRAM(s) Oral before breakfast  polyethylene glycol 3350 17 Gram(s) Oral daily  tamsulosin 0.4 milliGRAM(s) Oral at bedtime    MEDICATIONS  (PRN):  acetaminophen   Tablet .. 650 milliGRAM(s) Oral every 6 hours PRN Temp greater or equal to 38C (100.4F), Mild Pain (1 - 3), Moderate Pain (4 - 6), Severe Pain (7 - 10)  ALBUTerol    90 MICROgram(s) HFA Inhaler 1 Puff(s) Inhalation every 4 hours PRN Shortness of Breath and/or Wheezing  bisacodyl Suppository 10 milliGRAM(s) Rectal daily PRN Constipation  dextrose 40% Gel 15 Gram(s) Oral once PRN Blood Glucose LESS THAN 70 milliGRAM(s)/deciliter  glucagon  Injectable 1 milliGRAM(s) IntraMuscular once PRN Glucose LESS THAN 70 milligrams/deciliter  senna 2 Tablet(s) Oral at bedtime PRN Constipation      Allergies    No Known Allergies    Intolerances          VITALS  70y  Vital Signs Last 24 Hrs  T(C): 36.6 (13 May 2020 08:16), Max: 36.6 (13 May 2020 08:16)  T(F): 97.8 (13 May 2020 08:16), Max: 97.8 (13 May 2020 08:16)  HR: 80 (13 May 2020 08:16) (71 - 82)  BP: 130/70 (13 May 2020 08:16) (130/70 - 162/79)  BP(mean): --  RR: 136 (13 May 2020 08:16) (16 - 136)  SpO2: 94% (13 May 2020 08:16) (94% - 99%)  Daily     Daily         RECENT LABS:                          9.1    6.70  )-----------( 206      ( 13 May 2020 07:22 )             27.5     05-13    138  |  101  |  15  ----------------------------<  91  3.8   |  32<H>  |  0.92    Ca    8.3<L>      13 May 2020 07:22                CAPILLARY BLOOD GLUCOSE      POCT Blood Glucose.: 101 mg/dL (13 May 2020 08:08)  POCT Blood Glucose.: 170 mg/dL (12 May 2020 21:39)  POCT Blood Glucose.: 131 mg/dL (12 May 2020 16:27)  POCT Blood Glucose.: 130 mg/dL (12 May 2020 11:59) Patient is a 70y old  Male who presents with a chief complaint of covid (12 May 2020 15:45)      HPI:  71 yo M w/ PMH of HTN and DM on insulin and Metformin, and HLD presented to [facility] on 4/11 with complaint of SOB, HA, mild CP, and weakness with nonproductive cough, w/ congestion and feeling cold for 8 days prior to admission. Upon arrival to the ED his SpO2 was 84% on RA with RR of 28 which improved to 99% on NRB. He was then downtitrated to 6L NC and is saturating around 90-93%. Patient  was admitted for acute hypoxic respiratory failure secondary to COVID pneumonia.  Course was complicated by worsening hypoxia developing ARDS requiring intubation (4/16). Status post experimental therapies with Plaquenil, steroids, actrema x 1 and treated for superimposed ventilator associated pneumonia with pseudomonas (with course of Meropenem).  He was then extubated on 5/1.  Post extubation, stridor was noted and found to have dysphagia with bedside swallow evaluation. ENT was consulted, performed laryngoscopy which demonstrated granulation tissue and evidence of vocal cord paralysis with mild edema.  Steroids were recommended for vocal cord edema. Patient was deemed medically stable and discharged to acute [facility] on 5/8. (08 May 2020 16:44)      PAST MEDICAL & SURGICAL HISTORY:  Hyperlipidemia  Type 2 diabetes mellitus without complication, with long-term current use of insulin  Essential hypertension  No significant past surgical history      MEDICATIONS  (STANDING):  amantadine 100 milliGRAM(s) Oral <User Schedule>  AQUAPHOR (petrolatum Ointment) 1 Application(s) Topical three times a day  carbamide peroxide Otic Solution 5 Drop(s) Left Ear two times a day  dextrose 5%. 1000 milliLiter(s) (50 mL/Hr) IV Continuous <Continuous>  dextrose 50% Injectable 12.5 Gram(s) IV Push once  dextrose 50% Injectable 25 Gram(s) IV Push once  dextrose 50% Injectable 25 Gram(s) IV Push once  enoxaparin Injectable 40 milliGRAM(s) SubCutaneous every 12 hours  gabapentin 300 milliGRAM(s) Oral every 8 hours  insulin glargine Injectable (LANTUS) 14 Unit(s) SubCutaneous at bedtime  insulin lispro (HumaLOG) corrective regimen sliding scale   SubCutaneous three times a day before meals  insulin lispro (HumaLOG) corrective regimen sliding scale   SubCutaneous at bedtime  insulin lispro Injectable (HumaLOG) 7 Unit(s) SubCutaneous three times a day before meals  labetalol 100 milliGRAM(s) Oral every 8 hours  lisinopril 5 milliGRAM(s) Oral two times a day  magnesium oxide 400 milliGRAM(s) Oral two times a day with meals  melatonin 6 milliGRAM(s) Oral at bedtime  pantoprazole    Tablet 40 milliGRAM(s) Oral before breakfast  polyethylene glycol 3350 17 Gram(s) Oral daily  tamsulosin 0.4 milliGRAM(s) Oral at bedtime    MEDICATIONS  (PRN):  acetaminophen   Tablet .. 650 milliGRAM(s) Oral every 6 hours PRN Temp greater or equal to 38C (100.4F), Mild Pain (1 - 3), Moderate Pain (4 - 6), Severe Pain (7 - 10)  ALBUTerol    90 MICROgram(s) HFA Inhaler 1 Puff(s) Inhalation every 4 hours PRN Shortness of Breath and/or Wheezing  bisacodyl Suppository 10 milliGRAM(s) Rectal daily PRN Constipation  dextrose 40% Gel 15 Gram(s) Oral once PRN Blood Glucose LESS THAN 70 milliGRAM(s)/deciliter  glucagon  Injectable 1 milliGRAM(s) IntraMuscular once PRN Glucose LESS THAN 70 milligrams/deciliter  senna 2 Tablet(s) Oral at bedtime PRN Constipation      Allergies    No Known Allergies    Intolerances          VITALS  70y  Vital Signs Last 24 Hrs  T(C): 36.6 (13 May 2020 08:16), Max: 36.6 (13 May 2020 08:16)  T(F): 97.8 (13 May 2020 08:16), Max: 97.8 (13 May 2020 08:16)  HR: 80 (13 May 2020 08:16) (71 - 82)  BP: 130/70 (13 May 2020 08:16) (130/70 - 162/79)  BP(mean): --  RR: 136 (13 May 2020 08:16) (16 - 136)  SpO2: 94% (13 May 2020 08:16) (94% - 99%)  Daily     Daily         RECENT LABS:                          9.1    6.70  )-----------( 206      ( 13 May 2020 07:22 )             27.5     05-13    138  |  101  |  15  ----------------------------<  91  3.8   |  32<H>  |  0.92    Ca    8.3<L>      13 May 2020 07:22            D-dimer 209    CAPILLARY BLOOD GLUCOSE      POCT Blood Glucose.: 101 mg/dL (13 May 2020 08:08)  POCT Blood Glucose.: 170 mg/dL (12 May 2020 21:39)  POCT Blood Glucose.: 131 mg/dL (12 May 2020 16:27)  POCT Blood Glucose.: 130 mg/dL (12 May 2020 11:59)          EXAM:  CT ANGIO CHEST (W)AW IC      PROCEDURE DATE:  05/12/2020        INTERPRETATION:  CLINICAL INFORMATION: Acute respiratory disease    COMPARISON: None.    PROCEDURE:   CT Angiography of the Chest.  90 ml of Omnipaque 350 was injected intravenously. 10 ml were discarded.  Sagittal and coronal reformats were performed as well as 3D (MIP) reconstructions.      FINDINGS:    LUNGS AND AIRWAYS: Patent central airways.  Evidence of extensive bilateral heterogeneous airspace disease concerning for covid 19 pneumonia. The dependent lower lobe atelectasis.    PLEURA: Mild left and mild to moderate right pleural effusion    MEDIASTINUM AND CELESTE: No lymphadenopathy.    VESSELS: No evidence of pulmonary embolism. Evaluation of smaller branches in the lower lobes is limited due to the presence of airspace disease. Unremarkable thoracic aorta. Coronary artery calcification.    HEART: Heart size is normal. No pericardial effusion.    CHEST WALL AND LOWER NECK: Within normal limits.    VISUALIZED UPPER ABDOMEN: Cholecystectomy.    BONES: Within normal limits.    IMPRESSION:     Pattern of GGO suggests infection including atypical pneumonia/viral infection from atypical agents including COVID-19 (C19V-1).    No evidence of pulmonary embolism.                          JENNIFER ARIZA M.D.,ATTENDING RADIOLOGIST  This document has been electronically signed. May 12 2020  7:21PM            EXAM:  US DPLX LWR EXT VEINS COMPL BI      PROCEDURE DATE:  05/12/2020        INTERPRETATION:  CLINICAL INDICATION: History of left soleal clot 4/20/2020, assess progression.    TECHNIQUE: Grayscale, color Doppler and spectral Doppler ultrasound was utilized to evaluate bilateral lower extremity deep venous system.      COMPARISON: 4/26/2020.    FINDINGS: There is no thrombosis in bilateral common femoral veins, femoral veins or popliteal veins. Visualized calf veins are patent. Previously noted left soleal vein thrombosis is not seen on this study.    IMPRESSION:     No deep vein thrombosis in either lower extremity.                  TIA CORNELL M.D., ATTENDING RADIOLOGIST  This document has been electronically signed. May 12 2020 11:10PM Patient is a 70y old  Male who presents with a chief complaint of covid (12 May 2020 15:45)      HPI:  71 yo M w/ PMH of HTN and DM on insulin and Metformin, and HLD presented to [facility] on 4/11 with complaint of SOB, HA, mild CP, and weakness with nonproductive cough, w/ congestion and feeling cold for 8 days prior to admission. Upon arrival to the ED his SpO2 was 84% on RA with RR of 28 which improved to 99% on NRB. He was then downtitrated to 6L NC and is saturating around 90-93%. Patient  was admitted for acute hypoxic respiratory failure secondary to COVID pneumonia.  Course was complicated by worsening hypoxia developing ARDS requiring intubation (4/16). Status post experimental therapies with Plaquenil, steroids, actrema x 1 and treated for superimposed ventilator associated pneumonia with pseudomonas (with course of Meropenem).  He was then extubated on 5/1.  Post extubation, stridor was noted and found to have dysphagia with bedside swallow evaluation. ENT was consulted, performed laryngoscopy which demonstrated granulation tissue and evidence of vocal cord paralysis with mild edema.  Steroids were recommended for vocal cord edema. Patient was deemed medically stable and discharged to acute [facility] on 5/8. (08 May 2020 16:44)      PAST MEDICAL & SURGICAL HISTORY:  Hyperlipidemia  Type 2 diabetes mellitus without complication, with long-term current use of insulin  Essential hypertension  No significant past surgical history      MEDICATIONS  (STANDING):  amantadine 100 milliGRAM(s) Oral <User Schedule>  AQUAPHOR (petrolatum Ointment) 1 Application(s) Topical three times a day  carbamide peroxide Otic Solution 5 Drop(s) Left Ear two times a day  dextrose 5%. 1000 milliLiter(s) (50 mL/Hr) IV Continuous <Continuous>  dextrose 50% Injectable 12.5 Gram(s) IV Push once  dextrose 50% Injectable 25 Gram(s) IV Push once  dextrose 50% Injectable 25 Gram(s) IV Push once  enoxaparin Injectable 40 milliGRAM(s) SubCutaneous every 12 hours  gabapentin 300 milliGRAM(s) Oral every 8 hours  insulin glargine Injectable (LANTUS) 14 Unit(s) SubCutaneous at bedtime  insulin lispro (HumaLOG) corrective regimen sliding scale   SubCutaneous three times a day before meals  insulin lispro (HumaLOG) corrective regimen sliding scale   SubCutaneous at bedtime  insulin lispro Injectable (HumaLOG) 7 Unit(s) SubCutaneous three times a day before meals  labetalol 100 milliGRAM(s) Oral every 8 hours  lisinopril 5 milliGRAM(s) Oral two times a day  magnesium oxide 400 milliGRAM(s) Oral two times a day with meals  melatonin 6 milliGRAM(s) Oral at bedtime  pantoprazole    Tablet 40 milliGRAM(s) Oral before breakfast  polyethylene glycol 3350 17 Gram(s) Oral daily  tamsulosin 0.4 milliGRAM(s) Oral at bedtime    MEDICATIONS  (PRN):  acetaminophen   Tablet .. 650 milliGRAM(s) Oral every 6 hours PRN Temp greater or equal to 38C (100.4F), Mild Pain (1 - 3), Moderate Pain (4 - 6), Severe Pain (7 - 10)  ALBUTerol    90 MICROgram(s) HFA Inhaler 1 Puff(s) Inhalation every 4 hours PRN Shortness of Breath and/or Wheezing  bisacodyl Suppository 10 milliGRAM(s) Rectal daily PRN Constipation  dextrose 40% Gel 15 Gram(s) Oral once PRN Blood Glucose LESS THAN 70 milliGRAM(s)/deciliter  glucagon  Injectable 1 milliGRAM(s) IntraMuscular once PRN Glucose LESS THAN 70 milligrams/deciliter  senna 2 Tablet(s) Oral at bedtime PRN Constipation      Allergies    No Known Allergies    Intolerances          VITALS  70y  Vital Signs Last 24 Hrs  T(C): 36.6 (13 May 2020 08:16), Max: 36.6 (13 May 2020 08:16)  T(F): 97.8 (13 May 2020 08:16), Max: 97.8 (13 May 2020 08:16)  HR: 80 (13 May 2020 08:16) (71 - 82)  BP: 130/70 (13 May 2020 08:16) (130/70 - 162/79)  BP(mean): --  RR: 136 (13 May 2020 08:16) (16 - 136)  SpO2: 94% (13 May 2020 08:16) (94% - 99%)  Daily     Daily         RECENT LABS:                          9.1    6.70  )-----------( 206      ( 13 May 2020 07:22 )             27.5     05-13    138  |  101  |  15  ----------------------------<  91  3.8   |  32<H>  |  0.92    Ca    8.3<L>      13 May 2020 07:22            D-dimer 289    CAPILLARY BLOOD GLUCOSE      POCT Blood Glucose.: 101 mg/dL (13 May 2020 08:08)  POCT Blood Glucose.: 170 mg/dL (12 May 2020 21:39)  POCT Blood Glucose.: 131 mg/dL (12 May 2020 16:27)  POCT Blood Glucose.: 130 mg/dL (12 May 2020 11:59)          EXAM:  CT ANGIO CHEST (W)AW IC      PROCEDURE DATE:  05/12/2020        INTERPRETATION:  CLINICAL INFORMATION: Acute respiratory disease    COMPARISON: None.    PROCEDURE:   CT Angiography of the Chest.  90 ml of Omnipaque 350 was injected intravenously. 10 ml were discarded.  Sagittal and coronal reformats were performed as well as 3D (MIP) reconstructions.      FINDINGS:    LUNGS AND AIRWAYS: Patent central airways.  Evidence of extensive bilateral heterogeneous airspace disease concerning for covid 19 pneumonia. The dependent lower lobe atelectasis.    PLEURA: Mild left and mild to moderate right pleural effusion    MEDIASTINUM AND CELESTE: No lymphadenopathy.    VESSELS: No evidence of pulmonary embolism. Evaluation of smaller branches in the lower lobes is limited due to the presence of airspace disease. Unremarkable thoracic aorta. Coronary artery calcification.    HEART: Heart size is normal. No pericardial effusion.    CHEST WALL AND LOWER NECK: Within normal limits.    VISUALIZED UPPER ABDOMEN: Cholecystectomy.    BONES: Within normal limits.    IMPRESSION:     Pattern of GGO suggests infection including atypical pneumonia/viral infection from atypical agents including COVID-19 (C19V-1).    No evidence of pulmonary embolism.                          JENNIFER ARIZA M.D.,ATTENDING RADIOLOGIST  This document has been electronically signed. May 12 2020  7:21PM            EXAM:  US DPLX LWR EXT VEINS COMPL BI      PROCEDURE DATE:  05/12/2020        INTERPRETATION:  CLINICAL INDICATION: History of left soleal clot 4/20/2020, assess progression.    TECHNIQUE: Grayscale, color Doppler and spectral Doppler ultrasound was utilized to evaluate bilateral lower extremity deep venous system.      COMPARISON: 4/26/2020.    FINDINGS: There is no thrombosis in bilateral common femoral veins, femoral veins or popliteal veins. Visualized calf veins are patent. Previously noted left soleal vein thrombosis is not seen on this study.    IMPRESSION:     No deep vein thrombosis in either lower extremity.                  TIA CORNELL M.D., ATTENDING RADIOLOGIST  This document has been electronically signed. May 12 2020 11:10PM

## 2020-05-13 NOTE — PROGRESS NOTE ADULT - ASSESSMENT
70y Male with PMH of presenting with acute hypoxic respiratory failure secondary to COVID pneumonia requiring intubation and prolonged critical care stay with hospital course complicated by superimposed ventilator associated pneumonia, dysphagia/ stridor due to left vocal cord paralysis with functional deficits- pt/ot/dvt ppx     hypoxia/sob- c/w levaquin    #COVID/VAP (Pseudomonas)  -completed meropenem  -inhaler  -pulmonary rehab therapy   O2 via NC prn      #Vocal cord paralysis-seen by ENT s/p laryngoscope with vocal cord paralysis and edema likely 2/2 to intubation  Mild left vocal fold paresis, mild bilateral vocal cord edema and mild (left  greater than right) vocal process granulation tissue formation, completed steroid course    #HTN - bp noted, patient was on acei, which was d/c when patient was intubated / MARIYA,  now changed to labetalol and ACEI, mariya resolved, clinically stable,  D/C'd CLONIDINE and HYDRALAZINE 5/12/20, monitor bp     #HLD - statin    #Wakefulness  amantadine    #insomnia-melatonin at bedtime    #MARIYA – pre-renal  -Improved with IVF hydration  -monitor renal indices BUn/Cr       #DM2: Accu-Cheks low, decrease Lantus to 10 , iss, d/c premeal  Hemoglobin A1C, Whole Blood: 11.7 % (04.12.20 @ 14:10)    #Bladder management: Urinary retention- Flomax    # DVT PPX- Lovenox    will follow

## 2020-05-13 NOTE — PROGRESS NOTE ADULT - SUBJECTIVE AND OBJECTIVE BOX
70y Male with PMH of presenting with acute hypoxic respiratory failure secondary to COVID pneumonia requiring intubation and prolonged critical care stay with hospital course complicated by superimposed ventilator associated pneumonia, dysphagia/ stridor due to left vocal cord paralysis     seen at the bedside, no new complaints this am, noted hypoxia yesterday, cta done no PE, on Levaquin for infiltrates, no n/v, no sob, no fever, no events      Vital Signs Last 24 Hrs  T(C): 36.6 (13 May 2020 08:16), Max: 36.6 (13 May 2020 08:16)  T(F): 97.8 (13 May 2020 08:16), Max: 97.8 (13 May 2020 08:16)  HR: 80 (13 May 2020 08:16) (71 - 82)  BP: 130/70 (13 May 2020 08:16) (130/70 - 162/79)  BP(mean): --  RR: 136 (13 May 2020 08:16) (16 - 136)  SpO2: 94% (13 May 2020 08:16) (94% - 99%)    GENERAL- NAD  EAR/NOSE/MOUTH/THROAT - no pharyngeal exudates, no oral lesions  MMM  EYES- IRIS, conjunctiva and Sclera clear  NECK- supple  RESPIRATORY-  clear to auscultation bilaterally  CARDIOVASCULAR - SIS2, RRR  GI - soft NT BS present  EXTREMITIES- mild pedal edema  NEUROLOGY- no gross focal deficits  SKIN- no rashes, warm to touch  PSYCHIATRY- AAO X 3  MUSCULOSKELETAL- ROM normal                    9.1                  138  | 32   | 15           6.70  >-----------< 206     ------------------------< 91                    27.5                 3.8  | 101  | 0.92                                         Ca 8.3   Mg x     Ph x        < from: US Duplex Venous Lower Ext Complete, Bilateral (05.12.20 @ 22:50) >    IMPRESSION:     No deep vein thrombosis in either lower extremity.    < end of copied text >    < from: CT Angio Chest w/ IV Cont (05.12.20 @ 19:05) >  IMPRESSION:     Pattern of GGO suggests infection including atypical pneumonia/viral infection from atypical agents including COVID-19 (C19V-1).    No evidence of pulmonary embolism.    < end of copied text >    CAPILLARY BLOOD GLUCOSE      POCT Blood Glucose.: 57 mg/dL (13 May 2020 12:09)  POCT Blood Glucose.: 60 mg/dL (13 May 2020 12:05)  POCT Blood Glucose.: 101 mg/dL (13 May 2020 08:08)  POCT Blood Glucose.: 170 mg/dL (12 May 2020 21:39)  POCT Blood Glucose.: 131 mg/dL (12 May 2020 16:27)    MEDICATIONS  (STANDING):  amantadine 100 milliGRAM(s) Oral <User Schedule>  AQUAPHOR (petrolatum Ointment) 1 Application(s) Topical three times a day  carbamide peroxide Otic Solution 5 Drop(s) Left Ear two times a day  dextrose 5%. 1000 milliLiter(s) (50 mL/Hr) IV Continuous <Continuous>  dextrose 50% Injectable 12.5 Gram(s) IV Push once  dextrose 50% Injectable 25 Gram(s) IV Push once  dextrose 50% Injectable 25 Gram(s) IV Push once  enoxaparin Injectable 40 milliGRAM(s) SubCutaneous every 12 hours  gabapentin 300 milliGRAM(s) Oral every 8 hours  insulin glargine Injectable (LANTUS) 10 Unit(s) SubCutaneous at bedtime  insulin lispro (HumaLOG) corrective regimen sliding scale   SubCutaneous three times a day before meals  insulin lispro (HumaLOG) corrective regimen sliding scale   SubCutaneous at bedtime  labetalol 100 milliGRAM(s) Oral every 8 hours  levoFLOXacin  Tablet 500 milliGRAM(s) Oral every 24 hours  lisinopril 5 milliGRAM(s) Oral two times a day  magnesium oxide 400 milliGRAM(s) Oral two times a day with meals  melatonin 6 milliGRAM(s) Oral at bedtime  pantoprazole    Tablet 40 milliGRAM(s) Oral before breakfast  polyethylene glycol 3350 17 Gram(s) Oral daily  saccharomyces boulardii 250 milliGRAM(s) Oral two times a day  tamsulosin 0.4 milliGRAM(s) Oral at bedtime    MEDICATIONS  (PRN):  acetaminophen   Tablet .. 650 milliGRAM(s) Oral every 6 hours PRN Temp greater or equal to 38C (100.4F), Mild Pain (1 - 3), Moderate Pain (4 - 6), Severe Pain (7 - 10)  ALBUTerol    90 MICROgram(s) HFA Inhaler 1 Puff(s) Inhalation every 4 hours PRN Shortness of Breath and/or Wheezing  bisacodyl Suppository 10 milliGRAM(s) Rectal daily PRN Constipation  dextrose 40% Gel 15 Gram(s) Oral once PRN Blood Glucose LESS THAN 70 milliGRAM(s)/deciliter  glucagon  Injectable 1 milliGRAM(s) IntraMuscular once PRN Glucose LESS THAN 70 milligrams/deciliter  senna 2 Tablet(s) Oral at bedtime PRN Constipation

## 2020-05-14 NOTE — PROGRESS NOTE ADULT - ASSESSMENT
ASSESSMENT/PLAN  70y Male with PMH of presenting with acute hypoxic respiratory failure secondary to COVID pneumonia requiring intubation and prolonged critical care stay with hospital course complicated by superimposed ventilator associated pneumonia, dysphagia/ stridor due to left vocal cord paralysis with functional deficits     #COVID/VAP (Pseudomonas)  -completed meropenem  -continue comprehensive rehab program OT/PT/SLP  -inhaler  -incentive spirometry  -pulmonary rehab therapy ordered 5/11. Breathing exercises  -O2 via NC 3l/min keep O2 sat >90% . May increase as needed during exertional activities (has been up to 6l in therapy)  D dimer 289 5/13  Last CXR with % opacification bilateral lungs  CT PA 5/12 with extensive heterogeneous airway disease c/w COVID PNA  -Precautions: airborne/contact, fall    #Atypical PNA on CT  -will start on levaquin 500 mg daily x 7 days Day #2 5/14   florastor x 5 days  O2 PRN  -add prednisone 40 mg daily x 4 days 5/14, discussed with hsoptialist    #Vocal cord paralysis - improved  -seen by ENT s/p laryngoscope with vocal cord paralysis and edema likely 2/2 to intubation: Mild left vocal fold paresis, mild bilateral vocal cord edema and mild (left  greater than right) vocal process granulation tissue formation  -completed steroid course    #HTN/HLD  - on admission  -labetolol, 100 q 8  lisinopril 5 qd  - Off hydralazine and clonidine  -statin      #Wakefulness  -amantidine  -melatonin at bedtime    #MARIYA – pre-renal  -Improved with IVF hydration  -monitor renal indices BUn/Cr 15/0.83 5/11    #anemia  Hgb 9.1 monitor    #DM2:  -possible outpatient therapy with metformin 1000 bid  -diabetic educator 5/12 appreciated  Decrease Glargine to 10 units  Increase lispro correction from low to moderate  Will continue to monitor       #Pain: Tylenol prn, gabapentin    #GI/Bowel Mgmt   -  Continent c/w  Senna,  Dulcolax PRN, Miralax ,    #Bladder management:  urinary retention resolved  -continue  flomax    # DVT PPX:   Lovenox, SCDs, TEDs   -doppler negative DVT 5/12    #FEN   - Diet - upgraded to SOFT and THIN 5/13    #IDT rounds 5/13:  Supervision eating/grooming, supervision transfers. supervision ambulation and stairs with reduced endurance and episodes desaturation 85% on 2l  -goals for supervision ambulation and supervision stairs, mod indepnedent transfers  -target dc home 5/22 with home PT, OT and caregiver support    LABS  CBc CMP 5/14 stable. H/H iomproved  CBC CMP 5/18 ASSESSMENT/PLAN  70y Male with PMH of presenting with acute hypoxic respiratory failure secondary to COVID pneumonia requiring intubation and prolonged critical care stay with hospital course complicated by superimposed ventilator associated pneumonia, dysphagia/ stridor due to left vocal cord paralysis with functional deficits     #COVID/VAP (Pseudomonas)  -completed meropenem  -continue comprehensive rehab program OT/PT/SLP  -inhaler  -incentive spirometry  -pulmonary rehab therapy ordered 5/11. Breathing exercises  -O2 via NC 3l/min keep O2 sat >90% . May increase as needed during exertional activities (has been up to 6l in therapy)  -ventimask 40% during therapies PRN  D dimer 289 5/13  Last CXR with % opacification bilateral lungs  CT PA 5/12 with extensive heterogeneous airway disease c/w COVID PNA  -Precautions: airborne/contact, fall    #Atypical PNA on CT  -will start on levaquin 500 mg daily x 7 days Day #2 5/14   florastor x 5 days  O2 PRN  -add prednisone 40 mg daily x 4 days 5/14, discussed with hsoptialist    #Vocal cord paralysis - improved  -seen by ENT s/p laryngoscope with vocal cord paralysis and edema likely 2/2 to intubation: Mild left vocal fold paresis, mild bilateral vocal cord edema and mild (left  greater than right) vocal process granulation tissue formation  -completed steroid course    #HTN/HLD  - on admission  -labetolol, 100 q 8  lisinopril 5 qd  - Off hydralazine and clonidine  -statin      #Wakefulness  -amantidine  -melatonin at bedtime    #MARIYA – pre-renal  -Improved with IVF hydration  -monitor renal indices BUn/Cr 15/0.83 5/11    #anemia  Hgb 9.1 monitor    #DM2:  -possible outpatient therapy with metformin 1000 bid  -diabetic educator 5/12 appreciated  Decrease Glargine to 10 units  Increase lispro correction from low to moderate  Will continue to monitor       #Pain: Tylenol prn, gabapentin    #GI/Bowel Mgmt   -  Continent c/w  Senna,  Dulcolax PRN, Miralax ,    #Bladder management:  urinary retention resolved  -continue  flomax    # DVT PPX:   Lovenox, SCDs, TEDs   -doppler negative DVT 5/12    #FEN   - Diet - upgraded to SOFT and THIN 5/13    #IDT rounds 5/13:  Supervision eating/grooming, supervision transfers. supervision ambulation and stairs with reduced endurance and episodes desaturation 85% on 2l  -goals for supervision ambulation and supervision stairs, mod indepnedent transfers  -target dc home 5/22 with home PT, OT and caregiver support    LABS  CBc CMP 5/14 stable. H/H iomproved  CBC CMP 5/18

## 2020-05-14 NOTE — CHART NOTE - NSCHARTNOTEFT_GEN_A_CORE
Notified by nursing and therapy that patient was in session , minimal exertion and desaturation [id number]. Has been on O2 up to 6liters, with additional order of ventimask 40% during therapies. With addition of ventimask, O2 sat only came up to 91% so placed on NRB mask.    Patient seen with RNs, both translating in Uzbek. Patient reports that he feels ok with NRB mask and that he still does not feel the desperation for air that he had previously. Later came up to 94%    Discussed with hospitalist. Recent imaging from last 24 hours shows some interstitial edema  -will order ECHO  will give dose of lasix  pulmonary consult    Case discussed with tonya Snow [phone]            EXAM:  CT ANGIO CHEST (W)AW IC      PROCEDURE DATE:  05/12/2020        INTERPRETATION:  CLINICAL INFORMATION: Acute respiratory disease    COMPARISON: None.    PROCEDURE:   CT Angiography of the Chest.  90 ml of Omnipaque 350 was injected intravenously. 10 ml were discarded.  Sagittal and coronal reformats were performed as well as 3D (MIP) reconstructions.      FINDINGS:    LUNGS AND AIRWAYS: Patent central airways.  Evidence of extensive bilateral heterogeneous airspace disease concerning for covid 19 pneumonia. The dependent lower lobe atelectasis.    PLEURA: Mild left and mild to moderate right pleural effusion    MEDIASTINUM AND CELESTE: No lymphadenopathy.    VESSELS: No evidence of pulmonary embolism. Evaluation of smaller branches in the lower lobes is limited due to the presence of airspace disease. Unremarkable thoracic aorta. Coronary artery calcification.    HEART: Heart size is normal. No pericardial effusion.    CHEST WALL AND LOWER NECK: Within normal limits.    VISUALIZED UPPER ABDOMEN: Cholecystectomy.    BONES: Within normal limits.    IMPRESSION:     Pattern of GGO suggests infection including atypical pneumonia/viral infection from atypical agents including COVID-19 (C19V-1).    No evidence of pulmonary embolism.              JENNIFER ARIZA M.D.,ATTENDING RADIOLOGIST  This document has been electronically signed. May 12 2020  7:21PM Notified by nursing and therapy that patient was in session , minimal exertion and desaturation [id number]. Has been on O2 up to 6liters, with additional order of ventimask 40% during therapies. With addition of ventimask, O2 sat only came up to 91% so placed on NRB mask.    Patient seen with RNs, both translating in Occitan. Patient reports that he feels ok with NRB mask and that he still does not feel the desperation for air that he had previously. Later came up to 94%    Discussed with hospitalist. Recent imaging from last 24 hours shows some interstitial edema  1. will order ECHO  2. will give dose of lasix  3. pulmonary consult  4. NPO tonight except meds and small sips water  5. MBs in AM      Case discussed with tonya Snow [phone]      addendum:  pulmonary consult appreciated:  - Oxygen support to maintain saturation >90%  - At this time saturating 100% on NRB mask, can consider titrating down oxygen support  - Agree with diuresis with lasix and aim for negative fluid balance  - ?RV failure given prolonged hypoxia  - Would benefit from Echo to evaluate right and left LV function      EXAM:  CT ANGIO CHEST (W)AW IC      PROCEDURE DATE:  05/12/2020        INTERPRETATION:  CLINICAL INFORMATION: Acute respiratory disease    COMPARISON: None.    PROCEDURE:   CT Angiography of the Chest.  90 ml of Omnipaque 350 was injected intravenously. 10 ml were discarded.  Sagittal and coronal reformats were performed as well as 3D (MIP) reconstructions.      FINDINGS:    LUNGS AND AIRWAYS: Patent central airways.  Evidence of extensive bilateral heterogeneous airspace disease concerning for covid 19 pneumonia. The dependent lower lobe atelectasis.    PLEURA: Mild left and mild to moderate right pleural effusion    MEDIASTINUM AND CELESTE: No lymphadenopathy.    VESSELS: No evidence of pulmonary embolism. Evaluation of smaller branches in the lower lobes is limited due to the presence of airspace disease. Unremarkable thoracic aorta. Coronary artery calcification.    HEART: Heart size is normal. No pericardial effusion.    CHEST WALL AND LOWER NECK: Within normal limits.    VISUALIZED UPPER ABDOMEN: Cholecystectomy.    BONES: Within normal limits.    IMPRESSION:     Pattern of GGO suggests infection including atypical pneumonia/viral infection from atypical agents including COVID-19 (C19V-1).    No evidence of pulmonary embolism.              JENNIFER ARIZA M.D.,ATTENDING RADIOLOGIST  This document has been electronically signed. May 12 2020  7:21PM

## 2020-05-14 NOTE — PROVIDER CONTACT NOTE (OTHER) - SITUATION
On morning rounds, patients oxygen saturation was 87% on 3L NC. Pt denied any chest discomfort  or SOB

## 2020-05-14 NOTE — PROGRESS NOTE ADULT - SUBJECTIVE AND OBJECTIVE BOX
70y Male with PMH of presenting with acute hypoxic respiratory failure secondary to COVID pneumonia requiring intubation and prolonged critical care stay with hospital course complicated by superimposed ventilator associated pneumonia, dysphagia/ stridor due to left vocal cord paralysis     seen at the bedside, no new complaints this am, noted hypoxia yesterday, cta done no PE, on Levaquin for infiltrates, no n/v, no sob, no fever      Vital Signs Last 24 Hrs  T(C): 36.9 (14 May 2020 09:58), Max: 36.9 (14 May 2020 09:58)  T(F): 98.4 (14 May 2020 09:58), Max: 98.4 (14 May 2020 09:58)  HR: 86 (14 May 2020 09:58) (86 - 98)  BP: 139/72 (14 May 2020 09:58) (124/73 - 168/81)  BP(mean): --  RR: 18 (14 May 2020 09:58) (16 - 18)  SpO2: 94% (14 May 2020 09:58) (91% - 94%)    GENERAL- NAD  EAR/NOSE/MOUTH/THROAT - no pharyngeal exudates, no oral lesions  MMM  EYES- IRIS, conjunctiva and Sclera clear  NECK- supple  RESPIRATORY-  clear to auscultation bilaterally  CARDIOVASCULAR - SIS2, RRR  GI - soft NT BS present  EXTREMITIES- mild pedal edema  NEUROLOGY- no gross focal deficits  SKIN- no rashes, warm to touch  PSYCHIATRY- AAO X 3  MUSCULOSKELETAL- ROM normal                9.8                  134  | 26   | 19           10.44 >-----------< 217     ------------------------< 142                   30.1                 4.3  | 99   | 1.18                                         Ca 8.6   Mg x     Ph x        CAPILLARY BLOOD GLUCOSE      POCT Blood Glucose.: 285 mg/dL (14 May 2020 12:30)  POCT Blood Glucose.: 146 mg/dL (14 May 2020 07:43)  POCT Blood Glucose.: 128 mg/dL (13 May 2020 21:33)  POCT Blood Glucose.: 287 mg/dL (13 May 2020 16:28)  POCT Blood Glucose.: 129 mg/dL (13 May 2020 13:12)  POCT Blood Glucose.: 93 mg/dL (13 May 2020 12:43)      MEDICATIONS  (STANDING):  amantadine 100 milliGRAM(s) Oral <User Schedule>  AQUAPHOR (petrolatum Ointment) 1 Application(s) Topical three times a day  dextrose 5%. 1000 milliLiter(s) (50 mL/Hr) IV Continuous <Continuous>  dextrose 50% Injectable 12.5 Gram(s) IV Push once  dextrose 50% Injectable 25 Gram(s) IV Push once  dextrose 50% Injectable 25 Gram(s) IV Push once  enoxaparin Injectable 40 milliGRAM(s) SubCutaneous every 12 hours  gabapentin 300 milliGRAM(s) Oral every 8 hours  insulin glargine Injectable (LANTUS) 10 Unit(s) SubCutaneous at bedtime  insulin lispro (HumaLOG) corrective regimen sliding scale   SubCutaneous three times a day before meals  insulin lispro (HumaLOG) corrective regimen sliding scale   SubCutaneous at bedtime  labetalol 100 milliGRAM(s) Oral every 8 hours  levoFLOXacin  Tablet 500 milliGRAM(s) Oral every 24 hours  lisinopril 5 milliGRAM(s) Oral two times a day  magnesium oxide 400 milliGRAM(s) Oral two times a day with meals  melatonin 6 milliGRAM(s) Oral at bedtime  pantoprazole    Tablet 40 milliGRAM(s) Oral before breakfast  polyethylene glycol 3350 17 Gram(s) Oral daily  saccharomyces boulardii 250 milliGRAM(s) Oral two times a day  tamsulosin 0.4 milliGRAM(s) Oral at bedtime    MEDICATIONS  (PRN):  acetaminophen   Tablet .. 650 milliGRAM(s) Oral every 6 hours PRN Temp greater or equal to 38C (100.4F), Mild Pain (1 - 3), Moderate Pain (4 - 6), Severe Pain (7 - 10)  ALBUTerol    90 MICROgram(s) HFA Inhaler 1 Puff(s) Inhalation every 4 hours PRN Shortness of Breath and/or Wheezing  bisacodyl Suppository 10 milliGRAM(s) Rectal daily PRN Constipation  dextrose 40% Gel 15 Gram(s) Oral once PRN Blood Glucose LESS THAN 70 milliGRAM(s)/deciliter  glucagon  Injectable 1 milliGRAM(s) IntraMuscular once PRN Glucose LESS THAN 70 milligrams/deciliter  senna 2 Tablet(s) Oral at bedtime PRN Constipation

## 2020-05-14 NOTE — PROGRESS NOTE ADULT - ASSESSMENT
70y Male with PMH of presenting with acute hypoxic respiratory failure secondary to COVID pneumonia requiring intubation and prolonged critical care stay with hospital course complicated by superimposed ventilator associated pneumonia, dysphagia/ stridor due to left vocal cord paralysis with functional deficits- pt/ot/dvt ppx     hypoxia/sob- c/w levaquin, can add prednisone, d/w dr. galicia    #COVID/VAP (Pseudomonas)  -completed meropenem  -inhaler  -pulmonary rehab therapy   O2 via NC prn      #Vocal cord paralysis-seen by ENT s/p laryngoscope with vocal cord paralysis and edema likely 2/2 to intubation  Mild left vocal fold paresis, mild bilateral vocal cord edema and mild (left  greater than right) vocal process granulation tissue formation, completed steroid course    #HTN - bp noted, patient was on acei, which was d/c when patient was intubated / MARIYA,  now changed to labetalol and ACEI, mariya resolved, clinically stable,  D/C'd CLONIDINE and HYDRALAZINE 5/12/20, monitor bp     #HLD - statin    #Wakefulness  amantadine    #insomnia-melatonin at bedtime    #MARIYA – pre-renal  -Improved with IVF hydration  -monitor renal indices BUn/Cr       #DM2: Accu-Cheks low, c/w Lantus to 10 , reassess in am,  iss, d/c premeal due to low Accu-Cheks  Hemoglobin A1C, Whole Blood: 11.7 % (04.12.20 @ 14:10)    #Bladder management: Urinary retention- Flomax    # DVT PPX- Lovenox    will follow

## 2020-05-14 NOTE — CONSULT NOTE ADULT - ASSESSMENT
Assessment:  1. Acute hypoxic respiratory failure  2. Viral pneumonia secondary to COVID19  3. Bilateral pleural effusions  4. Hypertension  5. Diabetes mellitus    Plan  - Oxygen support to maintain saturation >90%  - At this time saturating 100% on NRB mask, can consider titrating down oxygen support  - Agree with diuresis with lasix and aim for negative fluid balance  - ?RV failure given prolonged hypoxia  - Would benefit from Echo to evaluate right and left LV function  - BP control  - Airborne and Contact isolation  - Monitor urine output  - DVT and Stress Ulcer prophylaxis   -Will follow

## 2020-05-14 NOTE — PROGRESS NOTE ADULT - SUBJECTIVE AND OBJECTIVE BOX
Patient is a 70y old  Male who presents with a chief complaint of covid (14 May 2020 12:38)      HPI:  69 yo M w/ PMH of HTN and DM on insulin and Metformin, and HLD presented to [facility] on 4/11 with complaint of SOB, HA, mild CP, and weakness with nonproductive cough, w/ congestion and feeling cold for 8 days prior to admission. Upon arrival to the ED his SpO2 was 84% on RA with RR of 28 which improved to 99% on NRB. He was then downtitrated to 6L NC and is saturating around 90-93%. Patient  was admitted for acute hypoxic respiratory failure secondary to COVID pneumonia.  Course was complicated by worsening hypoxia developing ARDS requiring intubation (4/16). Status post experimental therapies with Plaquenil, steroids, actrema x 1 and treated for superimposed ventilator associated pneumonia with pseudomonas (with course of Meropenem).  He was then extubated on 5/1.  Post extubation, stridor was noted and found to have dysphagia with bedside swallow evaluation. ENT was consulted, performed laryngoscopy which demonstrated granulation tissue and evidence of vocal cord paralysis with mild edema.  Steroids were recommended for vocal cord edema. Patient was deemed medically stable and discharged to acute [facility] on 5/8. (08 May 2020 16:44)      PAST MEDICAL & SURGICAL HISTORY:  Hyperlipidemia  Type 2 diabetes mellitus without complication, with long-term current use of insulin  Essential hypertension  No significant past surgical history      MEDICATIONS  (STANDING):  amantadine 100 milliGRAM(s) Oral <User Schedule>  AQUAPHOR (petrolatum Ointment) 1 Application(s) Topical three times a day  dextrose 5%. 1000 milliLiter(s) (50 mL/Hr) IV Continuous <Continuous>  dextrose 50% Injectable 12.5 Gram(s) IV Push once  dextrose 50% Injectable 25 Gram(s) IV Push once  dextrose 50% Injectable 25 Gram(s) IV Push once  enoxaparin Injectable 40 milliGRAM(s) SubCutaneous every 12 hours  gabapentin 300 milliGRAM(s) Oral every 8 hours  insulin glargine Injectable (LANTUS) 10 Unit(s) SubCutaneous at bedtime  insulin lispro (HumaLOG) corrective regimen sliding scale   SubCutaneous three times a day before meals  insulin lispro (HumaLOG) corrective regimen sliding scale   SubCutaneous at bedtime  labetalol 100 milliGRAM(s) Oral every 8 hours  levoFLOXacin  Tablet 500 milliGRAM(s) Oral every 24 hours  lisinopril 5 milliGRAM(s) Oral two times a day  magnesium oxide 400 milliGRAM(s) Oral two times a day with meals  melatonin 6 milliGRAM(s) Oral at bedtime  pantoprazole    Tablet 40 milliGRAM(s) Oral before breakfast  polyethylene glycol 3350 17 Gram(s) Oral daily  saccharomyces boulardii 250 milliGRAM(s) Oral two times a day  tamsulosin 0.4 milliGRAM(s) Oral at bedtime    MEDICATIONS  (PRN):  acetaminophen   Tablet .. 650 milliGRAM(s) Oral every 6 hours PRN Temp greater or equal to 38C (100.4F), Mild Pain (1 - 3), Moderate Pain (4 - 6), Severe Pain (7 - 10)  ALBUTerol    90 MICROgram(s) HFA Inhaler 1 Puff(s) Inhalation every 4 hours PRN Shortness of Breath and/or Wheezing  bisacodyl Suppository 10 milliGRAM(s) Rectal daily PRN Constipation  dextrose 40% Gel 15 Gram(s) Oral once PRN Blood Glucose LESS THAN 70 milliGRAM(s)/deciliter  glucagon  Injectable 1 milliGRAM(s) IntraMuscular once PRN Glucose LESS THAN 70 milligrams/deciliter  senna 2 Tablet(s) Oral at bedtime PRN Constipation      Allergies    No Known Allergies    Intolerances          VITALS  70y  Vital Signs Last 24 Hrs  T(C): 36.9 (14 May 2020 09:58), Max: 36.9 (14 May 2020 09:58)  T(F): 98.4 (14 May 2020 09:58), Max: 98.4 (14 May 2020 09:58)  HR: 86 (14 May 2020 09:58) (86 - 98)  BP: 139/72 (14 May 2020 09:58) (124/73 - 168/81)  BP(mean): --  RR: 18 (14 May 2020 09:58) (16 - 18)  SpO2: 94% (14 May 2020 09:58) (91% - 94%)  Daily     Daily         RECENT LABS:                          9.8    10.44 )-----------( 217      ( 14 May 2020 08:00 )             30.1     05-14    134<L>  |  99  |  19  ----------------------------<  142<H>  4.3   |  26  |  1.18    Ca    8.6      14 May 2020 08:00    TPro  6.9  /  Alb  2.4<L>  /  TBili  0.5  /  DBili  x   /  AST  30  /  ALT  52<H>  /  AlkPhos  188<H>  05-14    LIVER FUNCTIONS - ( 14 May 2020 08:00 )  Alb: 2.4 g/dL / Pro: 6.9 g/dL / ALK PHOS: 188 U/L / ALT: 52 U/L / AST: 30 U/L / GGT: x                   CAPILLARY BLOOD GLUCOSE      POCT Blood Glucose.: 285 mg/dL (14 May 2020 12:30)  POCT Blood Glucose.: 146 mg/dL (14 May 2020 07:43)  POCT Blood Glucose.: 128 mg/dL (13 May 2020 21:33)  POCT Blood Glucose.: 287 mg/dL (13 May 2020 16:28)        Review of Systems:   · Additional ROS  Patient seen wtih assistance of language line  in [facility] at 9 30 AM. Patient states he is comfortable with O2; that he feels some SOB with exertin b ut he's not "desperate" for air    Breathing has been improving overall,. Denies feeling SOB while sleeping or while eating. Mood appears stable. Mild cough not wet sounding      Physical Exam:   · Constitutional  detailed exam    · Constitutional Details  no distress    · Constitutional Comments  alert pleasant O x 3 . no distress on O2, fair+ vocal volume    · Respiratory  detailed exam    · Respiratory Details  no wheezes    · Respiratory Comments  poor inspiratory effort reduced BS bases. sahllow respirations. needs encouragement for slow deep bretahs    · Cardiovascular  detailed exam    · Cardiovascular Comments  HR 86-98    · Gastrointestinal  detailed exam    · GI Normal  soft; nontender    · Extremities  detailed exam     · Extremities Comments  +bilateral LE swelling 1-2+ stable  TEDs in place  +soft, NT no erythema or warmth

## 2020-05-14 NOTE — CONSULT NOTE ADULT - SUBJECTIVE AND OBJECTIVE BOX
Initial HPI on admission:  HPI:  69 yo M w/ PMH of HTN and DM on insulin and Metformin, and HLD presented to [facility] on 4/11 with complaint of SOB, HA, mild CP, and weakness with nonproductive cough, w/ congestion and feeling cold for 8 days prior to admission. Upon arrival to the ED his SpO2 was 84% on RA with RR of 28 which improved to 99% on NRB. He was then downtitrated to 6L NC and is saturating around 90-93%. Patient  was admitted for acute hypoxic respiratory failure secondary to COVID pneumonia.  Course was complicated by worsening hypoxia developing ARDS requiring intubation (4/16). Status post experimental therapies with Plaquenil, steroids, actrema x 1 and treated for superimposed ventilator associated pneumonia with pseudomonas (with course of Meropenem).  He was then extubated on 5/1.  Post extubation, stridor was noted and found to have dysphagia with bedside swallow evaluation. ENT was consulted, performed laryngoscopy which demonstrated granulation tissue and evidence of vocal cord paralysis with mild edema.  Steroids were recommended for vocal cord edema. Patient was deemed medically stable and discharged to [facility] on 5/8. (08 May 2020 16:44)      BRIEF HOSPITAL COURSE: As per patient had difficulty breathing when exerting during rehab. Reported to have desaturated down to the 70s as per bedside nurse so patient placed on NRB mask. At time of evaluation appears comfortable. No chest pain, no palpitations no shortness of breath. Denies any history of smoking or pulmonary disease. No fevers or chills reported. CT scan of the chest noted with bilateral pleural effusions, no PE noted.     PAST MEDICAL & SURGICAL HISTORY:  Hyperlipidemia  Type 2 diabetes mellitus without complication, with long-term current use of insulin  Essential hypertension  No significant past surgical history    Allergies    No Known Allergies    Intolerances    FAMILY HISTORY:  No pertinent family history in first degree relatives    Social history:      Smoking: Denies     Drinking: Denies      Drug use: Denies    Review of Systems:  CONSTITUTIONAL: No fever, No chills, No fatigue  EYES: No eye pain, No visual disturbances, No discharge  ENMT:  No difficulty hearing, No tinnitus, No vertigo; No sinus or throat pain  NECK: No pain or stiffness  RESPIRATORY: Per above  CARDIOVASCULAR: No chest pain, No palpitations, No dizziness, or + leg swelling with compression stockings on  GASTROINTESTINAL: No abdominal or epigastric pain. No nausea, No vomiting, No hematemesis; No diarrhea or constipation. No melena, No hematochezia.  GENITOURINARY: No dysuria, No frequency, No hematuria, No incontinence  NEUROLOGICAL: No headaches, No memory loss, No loss of strength, No numbness, No tremors  SKIN: No itching, No burning, No rashes, No lesions   MUSCULOSKELETAL: No joint pain or swelling; No muscle, back, No extremity pain  PSYCHIATRIC: No depression, No anxiety, No mood swings, No difficulty sleeping    Medications:  acetaminophen   Tablet .. 650 milliGRAM(s) Oral every 6 hours PRN Temp greater or equal to 38C (100.4F), Mild Pain (1 - 3), Moderate Pain (4 - 6), Severe Pain (7 - 10)  ALBUTerol    90 MICROgram(s) HFA Inhaler 1 Puff(s) Inhalation every 4 hours PRN Shortness of Breath and/or Wheezing  amantadine 100 milliGRAM(s) Oral <User Schedule>  AQUAPHOR (petrolatum Ointment) 1 Application(s) Topical three times a day  bisacodyl Suppository 10 milliGRAM(s) Rectal daily PRN Constipation  dextrose 40% Gel 15 Gram(s) Oral once PRN Blood Glucose LESS THAN 70 milliGRAM(s)/deciliter  dextrose 5%. 1000 milliLiter(s) (50 mL/Hr) IV Continuous <Continuous>  dextrose 50% Injectable 12.5 Gram(s) IV Push once  dextrose 50% Injectable 25 Gram(s) IV Push once  dextrose 50% Injectable 25 Gram(s) IV Push once  enoxaparin Injectable 40 milliGRAM(s) SubCutaneous every 12 hours  furosemide   Injectable 40 milliGRAM(s) IV Push once  furosemide   Injectable 40 milliGRAM(s) IV Push daily  gabapentin 300 milliGRAM(s) Oral every 8 hours  glucagon  Injectable 1 milliGRAM(s) IntraMuscular once PRN Glucose LESS THAN 70 milligrams/deciliter  insulin glargine Injectable (LANTUS) 10 Unit(s) SubCutaneous at bedtime  insulin lispro (HumaLOG) corrective regimen sliding scale   SubCutaneous three times a day before meals  insulin lispro (HumaLOG) corrective regimen sliding scale   SubCutaneous at bedtime  labetalol 100 milliGRAM(s) Oral every 8 hours  levoFLOXacin  Tablet 500 milliGRAM(s) Oral every 24 hours  lisinopril 5 milliGRAM(s) Oral two times a day  magnesium oxide 400 milliGRAM(s) Oral two times a day with meals  melatonin 6 milliGRAM(s) Oral at bedtime  pantoprazole    Tablet 40 milliGRAM(s) Oral before breakfast  polyethylene glycol 3350 17 Gram(s) Oral daily  saccharomyces boulardii 250 milliGRAM(s) Oral two times a day  senna 2 Tablet(s) Oral at bedtime PRN Constipation  tamsulosin 0.4 milliGRAM(s) Oral at bedtime    MEDICATIONS  (STANDING):  amantadine 100 milliGRAM(s) Oral <User Schedule>  AQUAPHOR (petrolatum Ointment) 1 Application(s) Topical three times a day  dextrose 5%. 1000 milliLiter(s) (50 mL/Hr) IV Continuous <Continuous>  dextrose 50% Injectable 12.5 Gram(s) IV Push once  dextrose 50% Injectable 25 Gram(s) IV Push once  dextrose 50% Injectable 25 Gram(s) IV Push once  enoxaparin Injectable 40 milliGRAM(s) SubCutaneous every 12 hours  furosemide   Injectable 40 milliGRAM(s) IV Push once  furosemide   Injectable 40 milliGRAM(s) IV Push daily  gabapentin 300 milliGRAM(s) Oral every 8 hours  insulin glargine Injectable (LANTUS) 10 Unit(s) SubCutaneous at bedtime  insulin lispro (HumaLOG) corrective regimen sliding scale   SubCutaneous three times a day before meals  insulin lispro (HumaLOG) corrective regimen sliding scale   SubCutaneous at bedtime  labetalol 100 milliGRAM(s) Oral every 8 hours  levoFLOXacin  Tablet 500 milliGRAM(s) Oral every 24 hours  lisinopril 5 milliGRAM(s) Oral two times a day  magnesium oxide 400 milliGRAM(s) Oral two times a day with meals  melatonin 6 milliGRAM(s) Oral at bedtime  pantoprazole    Tablet 40 milliGRAM(s) Oral before breakfast  polyethylene glycol 3350 17 Gram(s) Oral daily  saccharomyces boulardii 250 milliGRAM(s) Oral two times a day  tamsulosin 0.4 milliGRAM(s) Oral at bedtime    MEDICATIONS  (PRN):  acetaminophen   Tablet .. 650 milliGRAM(s) Oral every 6 hours PRN Temp greater or equal to 38C (100.4F), Mild Pain (1 - 3), Moderate Pain (4 - 6), Severe Pain (7 - 10)  ALBUTerol    90 MICROgram(s) HFA Inhaler 1 Puff(s) Inhalation every 4 hours PRN Shortness of Breath and/or Wheezing  bisacodyl Suppository 10 milliGRAM(s) Rectal daily PRN Constipation  dextrose 40% Gel 15 Gram(s) Oral once PRN Blood Glucose LESS THAN 70 milliGRAM(s)/deciliter  glucagon  Injectable 1 milliGRAM(s) IntraMuscular once PRN Glucose LESS THAN 70 milligrams/deciliter  senna 2 Tablet(s) Oral at bedtime PRN Constipation    Home Medications:  Last Order Reconciliation Date: 05-08-20 @ 16:51 (Admission Reconciliation)  acetaminophen 325 mg oral tablet: 2 tab(s) orally every 6 hours, As needed, Temp greater or equal to 38C (100.4F) (05-08-20 @ 16:03)  albuterol 90 mcg/inh inhalation aerosol: 1 puff(s) inhaled every 4 hours, As needed, Shortness of Breath and/or Wheezing (05-08-20 @ 16:03)  atorvastatin 40 mg oral tablet: 1 tab(s) orally once a day (05-05-20 @ 14:03)  cloNIDine 0.1 mg oral tablet: 1 tab(s) orally every 12 hours (05-08-20 @ 16:03)  gabapentin 300 mg oral capsule: 1 cap(s) orally once a day (05-05-20 @ 14:03)  hydrALAZINE 25 mg oral tablet: 1 tab(s) orally 4 times a day (05-08-20 @ 16:03)  labetalol 100 mg oral tablet: 1 tab(s) orally every 8 hours (05-08-20 @ 16:03)  Lantus 100 units/mL subcutaneous solution: 20 unit(s) subcutaneous once a day (at bedtime) (05-05-20 @ 14:03)  metFORMIN 1000 mg oral tablet: 1 tab(s) orally 2 times a day (05-05-20 @ 14:03)  polyethylene glycol 3350 oral powder for reconstitution: 17 gram(s) orally once a day (05-08-20 @ 16:03)  tamsulosin 0.4 mg oral capsule: 1 cap(s) orally once a day (at bedtime) (05-08-20 @ 16:05)      LABS:                        9.8    10.44 )-----------( 217      ( 14 May 2020 08:00 )             30.1     05-14    134<L>  |  99  |  19  ----------------------------<  142<H>  4.3   |  26  |  1.18    Ca    8.6      14 May 2020 08:00    TPro  6.9  /  Alb  2.4<L>  /  TBili  0.5  /  DBili  x   /  AST  30  /  ALT  52<H>  /  AlkPhos  188<H>  05-14    HIT ab -- 05-13 @ 07:22  HIT ab EIA --  D Dimer -289  HIT ab -- 05-08 @ 06:37  HIT ab EIA --  D Dimer -525    VITALS:  T(C): 36.9 (05-14-20 @ 09:58), Max: 36.9 (05-14-20 @ 09:58)  T(F): 98.4 (05-14-20 @ 09:58), Max: 98.4 (05-14-20 @ 09:58)  HR: 86 (05-14-20 @ 09:58) (86 - 98)  BP: 139/72 (05-14-20 @ 09:58) (124/73 - 168/81)  BP(mean): --  ABP: --  ABP(mean): --  RR: 18 (05-14-20 @ 09:58) (16 - 18)  SpO2: 94% (05-14-20 @ 09:58) (91% - 94%)  CVP(mm Hg): --  CVP(cm H2O): --    Physical Examination:  GENERAL:               Alert, Oriented, No acute distress.    HEENT:                    Pupils equal, reactive to light.  Symmetric. No JVD, Moist MM  PULM:                     Bilateral air entry, + basilar Rales, No Rhonchi, No Wheezing  CVS:                         S1, S2,  No Murmur  ABD:                        Soft, nondistended, nontender, normoactive bowel sounds,   EXT:                         No edema, nontender, No Cyanosis or Clubbing   Vascular:                 +1 edema of bilateral lower extremities, Warm Extremities, Normal Capillary refill, Normal Distal Pulses  SKIN:                       Warm and well perfused, no rashes noted.   NEURO:                  Alert, oriented, interactive, nonfocal, follows commands  PSYC:                      Calm, + Insight.

## 2020-05-15 NOTE — PROGRESS NOTE ADULT - GASTROINTESTINAL DETAILS
bowel sounds normal/soft/nontender
soft/nontender/no distention/normal
nontender/soft
nontender/soft

## 2020-05-15 NOTE — DISCHARGE NOTE PROVIDER - NSDCCPCAREPLAN_GEN_ALL_CORE_FT
PRINCIPAL DISCHARGE DIAGNOSIS  Diagnosis: Acute respiratory failure due to COVID-19  Assessment and Plan of Treatment: continue albuterol, prednisone, NRB mask. Transfer to telemetry bed under medical service for further management, pulmonary consult      SECONDARY DISCHARGE DIAGNOSES  Diagnosis: Diabetes  Assessment and Plan of Treatment: continue lantus 10 units qhs.    Diagnosis: Interstitial edema  Assessment and Plan of Treatment: continue lasix, telemetry monitoring    Diagnosis: Bacterial pneumonia  Assessment and Plan of Treatment: infiltrates on CXR, continue course levaquin and prednisone

## 2020-05-15 NOTE — PROGRESS NOTE ADULT - ASSESSMENT
ASSESSMENT/PLAN  70y Male with PMH of presenting with acute hypoxic respiratory failure secondary to COVID pneumonia requiring intubation and prolonged critical care stay with hospital course complicated by superimposed ventilator associated pneumonia, dysphagia/ stridor due to left vocal cord paralysis with functional deficits     #COVID/VAP (Pseudomonas)  -completed meropenem  -medical hold  -iplaced on 100% NRB  D dimer 289 5/13  Last CXR with % opacification bilateral lungs  CT PA 5/12 with extensive heterogeneous airway disease c/w COVID PNA  -Precautions: airborne/contact, fall    #Atypical PNA on CT  -will start on levaquin 500 mg daily x 7 days Day #3 5/15   florastor x 5 days  NRB mask  -add prednisone 40 mg daily x 4 days 5/14, day #2    #Vocal cord paralysis - improved  -seen by ENT s/p laryngoscope with vocal cord paralysis and edema likely 2/2 to intubation: Mild left vocal fold paresis, mild bilateral vocal cord edema and mild (left  greater than right) vocal process granulation tissue formation      #HTN/HLD  - on admission  -labetolol, 100 q 8  lisinopril 5 qd  - Off hydralazine and clonidine  -statin  currently controlled 5/15      #Wakefulness  -amantidine  -dc melatonin 5/15    #MARIYA – pre-renal  -Improved with IVF hydration  -monitor renal indices BUn/Cr 15/0.83 5/11    #anemia  Hgb 9.1 monitor    #DM2:  -possible outpatient therapy with metformin 1000 bid  -diabetic educator 5/12 appreciated  Decrease Glargine to 10 units  Increase lispro correction from low to moderate  Will continue to monitor especially as patient is NPO      #Pain: Tylenol prn, gabapentin    #GI/Bowel Mgmt   -  Continent c/w  Senna,  Dulcolax PRN, Miralax ,    #Bladder management:  urinary retention resolved  -continue  flomax    # DVT PPX:   Lovenox, SCDs, TEDs   -doppler negative DVT 5/12    #FEN   - Diet - upgraded to SOFT and THIN 5/13    #Patient unable to tolerate therapies due to desaturation even on NRB mask. CXR prelim with increased interstitial markings despite lasix. Will transfer to telemetry pulmonary and medicine appreciated. Family notified, tonya Snow [phone]

## 2020-05-15 NOTE — PROGRESS NOTE ADULT - RESPIRATORY COMMENTS
shallow respirations, tachypneic
mild tachypneic after PT activities
poor inspiratory effort reduced BS bases
fair effort

## 2020-05-15 NOTE — CHART NOTE - NSCHARTNOTEFT_GEN_A_CORE
patient noted to be hypoxic to 70" while getting out of bed, still requiring 100% o2, cxr worse, le mildly worse,   will tranfer to tele bed in [place]  d/w dr. smith and dr. galicia

## 2020-05-15 NOTE — PROGRESS NOTE ADULT - SUBJECTIVE AND OBJECTIVE BOX
HPI: 70M COVID 19 +, per report/chart had prolonged stay/intubation at [facility], was extubated, complicated by vocal cord edema/paralysis. Ws then transferred to [facility] re[facility] facility. WHile doing rehab patient was noted to become hypoxic with movement/PT and was thus transferred to [address] [place] [address] ([place]) unit. This evening, while nurse wa sin room iwht patient he was noted to become hypoxic and then un-responsive, some quesion of eyelid fluttering, ??? seizure, but no seizure Hx and nothing confirmed. Patient had brief round of compressions, ambu bag, no medications given. Puylse was palpable, and he ws noted to regain consciousness, and was to converse with NA at bedside who spoke [place], however he did remain hypoxic. Patient was transferred to ICU on 100% NRB, and wsa quickly proned. SInce proning, he has had improved O2 sat and is currently at a SaO2 of 94%, proned, on 100% NRB mask.       Current Vent Settings:    100% NRB mask, in prone position      Vitalsigns/reviewed and physical exam performed where pertinent and urgently required.    Lab/radiology studies/ABG/Meds reviewed and interpreted into the assesment and treatment plan.    Assessment/Plan/Therapeutic interventions      Neuro: currently proned, will hold sedation at this time. Will evaluate mental status.     Cor:  Currently HD stable, sinus rhythm     Pulm:  prolonged course of intubation at [place] with mild vocal cord edema noted o laryngoscoy post extubation.  -noted de-saturation and decompensation this eveing while on [address] [place] [address]. Now requiring proning and 100% NRB mask. Will keeped proned as tolerated tonight, goal SAt >90%. If further decompensation will trail movement to negative pressure room with use of BiPAP before intubating. given vocal cord issues last intubation. Dose of decadron now.   GI:  PPI  Enteric feeds as tolerated in tandem with NMB and prone ventilation    Renal: was receiving diuresis with lasix for pulm edema/effusions. WIll follow lytes, goal candelario negative as BP tolerates    Heme:  Pharmacologic DVT P in addition to SCD's, lovenox 40 BID, d-dimer has trneded down     ID: was started on levaquin recently, will complte course and had ID evaluate.     Endo Aggressive glycemic control to limit FS glucose to < 180mg/dl.        COVID 19 specific considerations and therapeuric options based on the available and rapidly changing literature    Goals of care considerations:  Ongoing assessment for patient specific treatment options based on progression or decline.  I have involved the family with updates and requests in guidance for medical decision making.      45 minutes of critical care time spent in the management of this critically ill COVID-19 patient/PUI patient with continuous assessments and interventions based on the interpretation of multiple databases.

## 2020-05-15 NOTE — PROGRESS NOTE ADULT - SUBJECTIVE AND OBJECTIVE BOX
Follow-up Pulmonary Progress Note  Chief Complaint : COVID-Jen    Reported this morning desaturated again with activity when getting out of bed to chair. Denies any pain, cough related to his symptoms. No chest pain or palpitations reported. At this time feels comfortable.     Allergies :No Known Allergies    PAST MEDICAL & SURGICAL HISTORY:  Hyperlipidemia  Type 2 diabetes mellitus without complication, with long-term current use of insulin  Essential hypertension  No significant past surgical history    Medications:  MEDICATIONS  (STANDING):  amantadine 100 milliGRAM(s) Oral <User Schedule>  AQUAPHOR (petrolatum Ointment) 1 Application(s) Topical three times a day  dextrose 5%. 1000 milliLiter(s) (50 mL/Hr) IV Continuous <Continuous>  dextrose 50% Injectable 12.5 Gram(s) IV Push once  dextrose 50% Injectable 25 Gram(s) IV Push once  dextrose 50% Injectable 25 Gram(s) IV Push once  enoxaparin Injectable 40 milliGRAM(s) SubCutaneous every 12 hours  furosemide   Injectable 40 milliGRAM(s) IV Push daily  gabapentin 300 milliGRAM(s) Oral every 8 hours  insulin glargine Injectable (LANTUS) 10 Unit(s) SubCutaneous at bedtime  insulin lispro (HumaLOG) corrective regimen sliding scale   SubCutaneous three times a day before meals  insulin lispro (HumaLOG) corrective regimen sliding scale   SubCutaneous at bedtime  labetalol 100 milliGRAM(s) Oral every 8 hours  levoFLOXacin  Tablet 500 milliGRAM(s) Oral every 24 hours  lisinopril 5 milliGRAM(s) Oral two times a day  magnesium oxide 400 milliGRAM(s) Oral two times a day with meals  melatonin 6 milliGRAM(s) Oral at bedtime  pantoprazole    Tablet 40 milliGRAM(s) Oral before breakfast  polyethylene glycol 3350 17 Gram(s) Oral daily  saccharomyces boulardii 250 milliGRAM(s) Oral two times a day  tamsulosin 0.4 milliGRAM(s) Oral at bedtime    MEDICATIONS  (PRN):  acetaminophen   Tablet .. 650 milliGRAM(s) Oral every 6 hours PRN Temp greater or equal to 38C (100.4F), Mild Pain (1 - 3), Moderate Pain (4 - 6), Severe Pain (7 - 10)  ALBUTerol    90 MICROgram(s) HFA Inhaler 1 Puff(s) Inhalation every 4 hours PRN Shortness of Breath and/or Wheezing  bisacodyl Suppository 10 milliGRAM(s) Rectal daily PRN Constipation  dextrose 40% Gel 15 Gram(s) Oral once PRN Blood Glucose LESS THAN 70 milliGRAM(s)/deciliter  glucagon  Injectable 1 milliGRAM(s) IntraMuscular once PRN Glucose LESS THAN 70 milligrams/deciliter  senna 2 Tablet(s) Oral at bedtime PRN Constipation    LABS:                      9.8    10.44 )-----------( 217      ( 14 May 2020 08:00 )             30.1     05-14    134<L>  |  99  |  19  ----------------------------<  142<H>  4.3   |  26  |  1.18    Ca    8.6      14 May 2020 08:00    TPro  6.9  /  Alb  2.4<L>  /  TBili  0.5  /  DBili  x   /  AST  30  /  ALT  52<H>  /  AlkPhos  188<H>  05-14    HIT ab -- 05-13 @ 07:22  HIT ab EIA --  D Dimer -289    VITALS:  T(C): 36.9 (05-15-20 @ 09:01), Max: 36.9 (05-15-20 @ 09:01)  T(F): 98.4 (05-15-20 @ 09:01), Max: 98.4 (05-15-20 @ 09:01)  HR: 89 (05-15-20 @ 09:01) (79 - 98)  BP: 145/70 (05-15-20 @ 09:01) (145/70 - 176/79)  BP(mean): --  ABP: --  ABP(mean): --  RR: 18 (05-15-20 @ 09:01) (18 - 22)  SpO2: 99% (05-15-20 @ 09:01) (85% - 100%)  CVP(mm Hg): --  CVP(cm H2O): --    Ins and Outs     05-15-20 @ 07:01  -  05-15-20 @ 11:04  --------------------------------------------------------  IN: 0 mL / OUT: 1000 mL / NET: -1000 mL                I&O's Detail    15 May 2020 07:01  -  15 May 2020 11:04  --------------------------------------------------------  IN:  Total IN: 0 mL    OUT:    Voided: 1000 mL  Total OUT: 1000 mL    Total NET: -1000 mL          Physical Examination:  GENERAL:               Alert, Oriented, No acute distress.    HEENT:                    Pupils equal, reactive to light.  Symmetric. No JVD, Moist MM  PULM:                     Bilateral air entry, Clear to auscultation bilaterally, no significant sputum production, No Rales, No Rhonchi, No Wheezing  CVS:                         S1, S2,  No Murmur  ABD:                        Soft, nondistended, nontender, normoactive bowel sounds,   EXT:                         No edema, nontender, No Cyanosis or Clubbing   Vascular:                Warm Extremities, Normal Capillary refill, Normal Distal Pulses  SKIN:                       Warm and well perfused, no rashes noted.   NEURO:                  Alert, oriented, interactive, nonfocal, follows commands  PSYC:                      Calm, + Insight.

## 2020-05-15 NOTE — H&P ADULT - PROBLEM SELECTOR PLAN 1
sob/hypoxia ON EXERTION secondary to acute hypoxic respiratory failure / acute interstitial edema secondary to covid infection with likely superimposed bacterial infection, elevated probnp, being treated for superimposed bacterial pna with Levaquin/ r/o ACS    admit to tele bed  o2 NRB  AM LABS  STS LABS SENT FROM REHAB- REVIEWED  C/w levaquin will change to iv  cxr in am  lasix iv  echo done in rehab- pending results  icu /pulm aware, d/w dr. luis Levin [facility]

## 2020-05-15 NOTE — PROGRESS NOTE ADULT - CONSTITUTIONAL COMMENTS
alert, , however limited verbalization due to mask and dyspnea. O x 3
pleasant, alert O x 3 . good simple and sustained attention no impulsivity noted no facial droop
alert pleasant O x 3 grossly O2 via NC mildly fatigue while sitting
alert NAD no dyspnea but using O2 via NC

## 2020-05-15 NOTE — H&P ADULT - NSHPPHYSICALEXAM_GEN_ALL_CORE
Vital Signs Last 24 Hrs  T(C): 36.9 (15 May 2020 09:01), Max: 36.9 (15 May 2020 09:01)  T(F): 98.4 (15 May 2020 09:01), Max: 98.4 (15 May 2020 09:01)  HR: 88 (15 May 2020 10:55) (79 - 98)  BP: 148/79 (15 May 2020 10:55) (145/70 - 176/79)  BP(mean): --  RR: 18 (15 May 2020 09:01) (18 - 22)  SpO2: 99% (15 May 2020 09:01) (85% - 100%)    GENERAL- NAD  EAR/NOSE/MOUTH/THROAT - no pharyngeal exudates, no oral lesions,  MMM  EYES- IRIS, conjunctiva and Sclera clear  NECK- supple  RESPIRATORY-  rales bilaterally  CARDIOVASCULAR - SIS2, RRR  GI - soft NT BS present  EXTREMITIES- B/L LE edema  NEUROLOGY- no gross focal deficits  SKIN- no rashes, warm to touch  PSYCHIATRY- AAO X 3  MUSCULOSKELETAL- ROM normal

## 2020-05-15 NOTE — PROGRESS NOTE ADULT - SUBJECTIVE AND OBJECTIVE BOX
Patient is a 70y old  Male who presents with a chief complaint of covid (14 May 2020 15:20)      HPI:  71 yo M w/ PMH of HTN and DM on insulin and Metformin, and HLD presented to [facility] on 4/11 with complaint of SOB, HA, mild CP, and weakness with nonproductive cough, w/ congestion and feeling cold for 8 days prior to admission. Upon arrival to the ED his SpO2 was 84% on RA with RR of 28 which improved to 99% on NRB. He was then downtitrated to 6L NC and is saturating around 90-93%. Patient  was admitted for acute hypoxic respiratory failure secondary to COVID pneumonia.  Course was complicated by worsening hypoxia developing ARDS requiring intubation (4/16). Status post experimental therapies with Plaquenil, steroids, actrema x 1 and treated for superimposed ventilator associated pneumonia with pseudomonas (with course of Meropenem).  He was then extubated on 5/1.  Post extubation, stridor was noted and found to have dysphagia with bedside swallow evaluation. ENT was consulted, performed laryngoscopy which demonstrated granulation tissue and evidence of vocal cord paralysis with mild edema.  Steroids were recommended for vocal cord edema. Patient was deemed medically stable and discharged to acute [facility] on 5/8. (08 May 2020 16:44)      PAST MEDICAL & SURGICAL HISTORY:  Hyperlipidemia  Type 2 diabetes mellitus without complication, with long-term current use of insulin  Essential hypertension  No significant past surgical history      MEDICATIONS  (STANDING):  amantadine 100 milliGRAM(s) Oral <User Schedule>  AQUAPHOR (petrolatum Ointment) 1 Application(s) Topical three times a day  dextrose 5%. 1000 milliLiter(s) (50 mL/Hr) IV Continuous <Continuous>  dextrose 50% Injectable 12.5 Gram(s) IV Push once  dextrose 50% Injectable 25 Gram(s) IV Push once  dextrose 50% Injectable 25 Gram(s) IV Push once  enoxaparin Injectable 40 milliGRAM(s) SubCutaneous every 12 hours  furosemide   Injectable 40 milliGRAM(s) IV Push daily  furosemide   Injectable 40 milliGRAM(s) IV Push once  gabapentin 300 milliGRAM(s) Oral every 8 hours  insulin glargine Injectable (LANTUS) 10 Unit(s) SubCutaneous at bedtime  insulin lispro (HumaLOG) corrective regimen sliding scale   SubCutaneous three times a day before meals  insulin lispro (HumaLOG) corrective regimen sliding scale   SubCutaneous at bedtime  labetalol 100 milliGRAM(s) Oral every 8 hours  levoFLOXacin  Tablet 500 milliGRAM(s) Oral every 24 hours  lisinopril 5 milliGRAM(s) Oral two times a day  magnesium oxide 400 milliGRAM(s) Oral two times a day with meals  melatonin 6 milliGRAM(s) Oral at bedtime  pantoprazole    Tablet 40 milliGRAM(s) Oral before breakfast  polyethylene glycol 3350 17 Gram(s) Oral daily  saccharomyces boulardii 250 milliGRAM(s) Oral two times a day  tamsulosin 0.4 milliGRAM(s) Oral at bedtime    MEDICATIONS  (PRN):  acetaminophen   Tablet .. 650 milliGRAM(s) Oral every 6 hours PRN Temp greater or equal to 38C (100.4F), Mild Pain (1 - 3), Moderate Pain (4 - 6), Severe Pain (7 - 10)  ALBUTerol    90 MICROgram(s) HFA Inhaler 1 Puff(s) Inhalation every 4 hours PRN Shortness of Breath and/or Wheezing  bisacodyl Suppository 10 milliGRAM(s) Rectal daily PRN Constipation  dextrose 40% Gel 15 Gram(s) Oral once PRN Blood Glucose LESS THAN 70 milliGRAM(s)/deciliter  glucagon  Injectable 1 milliGRAM(s) IntraMuscular once PRN Glucose LESS THAN 70 milligrams/deciliter  senna 2 Tablet(s) Oral at bedtime PRN Constipation      Allergies    No Known Allergies    Intolerances          VITALS  70y  Vital Signs Last 24 Hrs  T(C): 36.9 (15 May 2020 09:01), Max: 36.9 (15 May 2020 09:01)  T(F): 98.4 (15 May 2020 09:01), Max: 98.4 (15 May 2020 09:01)  HR: 89 (15 May 2020 09:01) (79 - 98)  BP: 145/70 (15 May 2020 09:01) (145/70 - 176/79)  BP(mean): --  RR: 18 (15 May 2020 09:01) (18 - 22)  SpO2: 99% (15 May 2020 09:01) (85% - 100%)  Daily     Daily         RECENT LABS:                          9.8    10.44 )-----------( 217      ( 14 May 2020 08:00 )             30.1     05-14    134<L>  |  99  |  19  ----------------------------<  142<H>  4.3   |  26  |  1.18    Ca    8.6      14 May 2020 08:00    TPro  6.9  /  Alb  2.4<L>  /  TBili  0.5  /  DBili  x   /  AST  30  /  ALT  52<H>  /  AlkPhos  188<H>  05-14    LIVER FUNCTIONS - ( 14 May 2020 08:00 )  Alb: 2.4 g/dL / Pro: 6.9 g/dL / ALK PHOS: 188 U/L / ALT: 52 U/L / AST: 30 U/L / GGT: x                   CAPILLARY BLOOD GLUCOSE      POCT Blood Glucose.: 109 mg/dL (15 May 2020 08:02)  POCT Blood Glucose.: 134 mg/dL (14 May 2020 21:33)  POCT Blood Glucose.: 156 mg/dL (14 May 2020 17:44)  POCT Blood Glucose.: 285 mg/dL (14 May 2020 12:30)

## 2020-05-15 NOTE — H&P ADULT - HISTORY OF PRESENT ILLNESS
71 yo M w/ PMH of HTN and DM on insulin and Metformin, and HLD presented to [facility] on 4/11 with complaint of SOB, HA, mild CP, and weakness with nonproductive cough, w/ congestion and feeling cold for 8 days prior to admission. Upon arrival to the ED his SpO2 was 84% on RA with RR of 28 which improved to 99% on NRB. He was then downtitrated to 6L NC and is saturating around 90-93%. Patient  was admitted for acute hypoxic respiratory failure secondary to COVID pneumonia.  Course was complicated by worsening hypoxia developing ARDS requiring intubation (4/16). Status post experimental therapies with Plaquenil, steroids, actrema x 1 and treated for superimposed ventilator associated pneumonia with pseudomonas (with course of Meropenem).  He was then extubated on 5/1.  Post extubation, stridor was noted and found to have dysphagia with bedside swallow evaluation. ENT was consulted, performed laryngoscopy which demonstrated granulation tissue and evidence of vocal cord paralysis with mild edema.  Steroids were recommended for vocal cord edema. Patient was deemed medically stable and discharged to acute [facility] on 5/8.    seen at the bedside, noted to be hypoxic since yesterday, worse now, sats to 70's this am , requiring 100% o2 since last night, associated with sob,  no n/v, no fever, no chest pain, no dysuria  was on Levaquin for pna, in rehab, started on 5/13/20 for 7 day course for mild hypoxia, thought to be 2/2 superimposed bacterial infection.   cxr this am noted worse today with interstitial edema ? chf exacerbation, seen by pulm in rehab, dr. smith aware, probnp elevated, per dr. smith also will evaluate for remdesivir.  c/w lasix, cxr in am  cta chest , doppler negative for pe and dvt on 5/12/20  echo done in rehab 69 yo M w/ PMH of HTN and DM on insulin and Metformin, and HLD presented to [facility] on 4/11 with complaint of SOB, HA, mild CP, and weakness with nonproductive cough, w/ congestion and feeling cold for 8 days prior to admission. Upon arrival to the ED his SpO2 was 84% on RA with RR of 28 which improved to 99% on NRB. He was then downtitrated to 6L NC and is saturating around 90-93%. Patient  was admitted for acute hypoxic respiratory failure secondary to COVID pneumonia.  Course was complicated by worsening hypoxia developing ARDS requiring intubation (4/16). Status post experimental therapies with Plaquenil, steroids, actrema x 1 and treated for superimposed ventilator associated pneumonia with pseudomonas (with course of Meropenem).  He was then extubated on 5/1.  Post extubation, stridor was noted and found to have dysphagia with bedside swallow evaluation. ENT was consulted, performed laryngoscopy which demonstrated granulation tissue and evidence of vocal cord paralysis with mild edema.  Steroids were recommended for vocal cord edema. Patient was deemed medically stable and discharged to acute [facility] on 5/8.    seen at the bedside, noted to be hypoxic since yesterday, worse now, sats to 70's this am , requiring 100% o2 since last night, associated with sob,  no n/v, no fever, no chest pain, no dysuria, on Levaquin for pna, in rehab, started on 5/13/20 for 7 day course for mild hypoxia, thought to be 2/2 superimposed bacterial infection.     cxr this am noted worse today with interstitial edema ? chf exacerbation, probnp elevated, seen by pulm dr. smith aware, will evaluate for remdesivir.    c/w lasix, repeat cxr in am  cta chest , doppler negative for pe and dvt on 5/12/20  echo noted

## 2020-05-15 NOTE — PROGRESS NOTE ADULT - ASSESSMENT
Physical Examination:  GENERAL:               Alert, Oriented, No acute distress.    HEENT:                    Pupils equal, reactive to light.  Symmetric. No JVD, Moist MM  PULM:                     Bilateral air entry, + basilar Rales, No Rhonchi, No Wheezing  CVS:                         S1, S2,  No Murmur  ABD:                        Soft, nondistended, nontender, normoactive bowel sounds,   EXT:                         nontender, No Cyanosis or Clubbing   Vascular:                 +2 edema of bilateral lower extremities, Warm Extremities, Normal Capillary refill, Normal Distal Pulses  SKIN:                       Warm and well perfused, no rashes noted.   NEURO:                  Alert, oriented, interactive, nonfocal, follows commands  PSYC:                      Calm, + Insight.      Assessment:  1. Acute hypoxic respiratory failure  2. Viral pneumonia secondary to COVID19  3. Bilateral pleural effusions  4. Hypertension  5. Diabetes mellitus    Plan  - Oxygen support to maintain saturation >90%  - Lower extremities noted with worsening edema compared to yesterday  - Echo pending  - Agree with diuresis with lasix and aim for negative fluid balance  - Check Daily weights  - ?RV failure given prolonged hypoxia  - Would benefit from Echo to evaluate right and left LV function  - BP control  - Obtain EKG, Cardiac markers including Pro BNP  - Repeat COVID19 and check biomarkers  - Airborne and Contact isolation  - Monitor urine output  - DVT and Stress Ulcer prophylaxis   -Will follow

## 2020-05-15 NOTE — PROGRESS NOTE ADULT - COMMENTS
Patient seen with assistance of language line  Romeo at 8:30 AM. Patient unable to come off NRB mask last night, tried venti mask 50% but saturations dropped to low 80s. Dyspneic, less conversational    Unable to perform MBS due to NRB. Given lasix 40 mg IVP x 2 this AM; patient states minimal response to lasix yesterday. discussed with hospitalist, will work to transfer to telemetry

## 2020-05-15 NOTE — DISCHARGE NOTE PROVIDER - NSDCMRMEDTOKEN_GEN_ALL_CORE_FT
acetaminophen 325 mg oral tablet: 2 tab(s) orally every 6 hours, As needed, Temp greater or equal to 38C (100.4F), Mild Pain (1 - 3), Moderate Pain (4 - 6), Severe Pain (7 - 10)  albuterol 90 mcg/inh inhalation aerosol: 1 puff(s) inhaled every 4 hours, As needed, Shortness of Breath and/or Wheezing  amantadine 100 mg oral capsule: 1 cap(s) orally   bisacodyl 10 mg rectal suppository: 1 suppository(ies) rectal once a day, As needed, Constipation  enoxaparin: 40 milligram(s) subcutaneous 2 times a day  furosemide 100 mg/100 mL-0.9% intravenous solution: 40 milliliter(s) intravenous once a day  gabapentin 300 mg oral capsule: 1 cap(s) orally every 8 hours  insulin glargine: 10 unit(s) subcutaneous once a day (at bedtime)  labetalol 100 mg oral tablet: 1 tab(s) orally every 8 hours  levoFLOXacin 500 mg oral tablet: 1 tab(s) orally every 24 hours  lisinopril 5 mg oral tablet: 1 tab(s) orally 2 times a day  magnesium oxide 400 mg (241.3 mg elemental magnesium) oral tablet: 1 tab(s) orally 2 times a day (with meals)  pantoprazole 40 mg oral delayed release tablet: 1 tab(s) orally once a day (before a meal)  petrolatum topical ointment: 1 application topically 3 times a day  polyethylene glycol 3350 oral powder for reconstitution: 17 gram(s) orally once a day  saccharomyces boulardii lyo 250 mg oral capsule: 1 cap(s) orally 2 times a day  senna oral tablet: 2 tab(s) orally once a day (at bedtime), As needed, Constipation  tamsulosin 0.4 mg oral capsule: 1 cap(s) orally once a day (at bedtime)

## 2020-05-15 NOTE — H&P ADULT - ASSESSMENT
69 yo M w/ PMH of HTN and DM on insulin and Metformin, and HLD presented to [facility] on  presenting with acute hypoxic respiratory failure secondary to COVID pneumonia requiring intubation and prolonged critical care stay with hospital course complicated by superimposed ventilator associated pneumonia, dysphagia/ stridor due to left vocal cord paralysis, was in rehab since 20, IN REHAB noted to be hypoxic to 70% on RA, requiring 100% o2 since last night, cxr worse this am.    transfer to [facility] for sob/hypoxia secondary to acute hypoxic respiratory failure / acute interstitial edema secondary to covid infection, elevated probnp, being treated for superimposed bacterial pna with levaquin    CAPRINI SCORE [CLOT]    AGE RELATED RISK FACTORS                                                       MOBILITY RELATED FACTORS  [ ] Age 41-60 years                                            (1 Point)                  [X ] Bed rest                                                        (1 Point)  [X ] Age: 61-74 years                                           (2 Points)                 [ ] Plaster cast                                                   (2 Points)  [ ] Age= 75 years                                              (3 Points)                 [ ] Bed bound for more than 72 hours                 (2 Points)    DISEASE RELATED RISK FACTORS                                               GENDER SPECIFIC FACTORS  [ X] Edema in the lower extremities                       (1 Point)                  [ ] Pregnancy                                                     (1 Point)  [ ] Varicose veins                                               (1 Point)                  [ ] Post-partum < 6 weeks                                   (1 Point)             [ ] BMI > 25 Kg/m2                                            (1 Point)                  [ ] Hormonal therapy  or oral contraception          (1 Point)                 [ ] Sepsis (in the previous month)                        (1 Point)                  [ ] History of pregnancy complications                 (1 point)  [ ] Pneumonia or serious lung disease                                               [ ] Unexplained or recurrent                     (1 Point)           (in the previous month)                               (1 Point)  [ ] Abnormal pulmonary function test                     (1 Point)                 SURGERY RELATED RISK FACTORS  [ ] Acute myocardial infarction                              (1 Point)                 [ ]  Section                                             (1 Point)  [ ] Congestive heart failure (in the previous month)  (1 Point)               [ ] Minor surgery                                                  (1 Point)   [ ] Inflammatory bowel disease                             (1 Point)                 [ ] Arthroscopic surgery                                        (2 Points)  [ ] Central venous access                                      (2 Points)                [ ] General surgery lasting more than 45 minutes   (2 Points)       [ ] Stroke (in the previous month)                          (5 Points)               [ ] Elective arthroplasty                                         (5 Points)                                                                                                                                               HEMATOLOGY RELATED FACTORS                                                 TRAUMA RELATED RISK FACTORS  [ ] Prior episodes of VTE                                     (3 Points)                 [ ] Fracture of the hip, pelvis, or leg                       (5 Points)  [ ] Positive family history for VTE                         (3 Points)                 [ ] Acute spinal cord injury (in the previous month)  (5 Points)  [ ] Prothrombin 40943 A                                     (3 Points)                 [ ] Paralysis  (less than 1 month)                             (5 Points)  [ ] Factor V Leiden                                             (3 Points)                  [ ] Multiple Trauma within 1 month                        (5 Points)  [ ] Lupus anticoagulants                                     (3 Points)                                                           [ ] Anticardiolipin antibodies                               (3 Points)                                                       [ ] High homocysteine in the blood                      (3 Points)                                             [ ] Other congenital or acquired thrombophilia      (3 Points)                                                [ ] Heparin induced thrombocytopenia                  (3 Points)                                          Total Score [  4        ]

## 2020-05-15 NOTE — DISCHARGE NOTE PROVIDER - HOSPITAL COURSE
69 yo M w/ PMH of HTN and DM on insulin and Metformin, and HLD presented to [facility] on 4/11 with complaint of SOB, HA, mild CP, and weakness with nonproductive cough, w/ congestion and feeling cold for 8 days prior to admission. Upon arrival to the ED his SpO2 was 84% on RA with RR of 28 which improved to 99% on NRB. He was then downtitrated to 6L NC and is saturating around 90-93%. Patient  was admitted for acute hypoxic respiratory failure secondary to COVID pneumonia.  Course was complicated by worsening hypoxia developing ARDS requiring intubation (4/16). Status post experimental therapies with Plaquenil, steroids, actrema x 1 and treated for superimposed ventilator associated pneumonia with pseudomonas (with course of Meropenem).  He was then extubated on 5/1.  Post extubation, stridor was noted and found to have dysphagia with bedside swallow evaluation. ENT was consulted, performed laryngoscopy which demonstrated granulation tissue and evidence of vocal cord paralysis with mild edema.  Steroids were recommended for vocal cord edema. Patient was deemed medically stable and discharged to acute [facility] on 5/8. (08 May 2020 16:44)            Patient had periods of desaturation during therapy and exertion, initially requiring 6l O2 during physical activities. However, on 5/12, became progressively dyspneic, and on 5/14, had O2 sats remaining in high 80s even on 6l; placed on venti mask. Seen by hospitalist and pulmonary, CT 5/12 had shown bilateral heterogeneous airway disease c/w COVID PNA. CXR were performed which showed bilateral infiltrates, including interstitial. Patient had been started o n levaquin and prednisone 5/12 following CT results, but with decline in respiratory status and inability to participate in program, is transferred to telemetry for further workup and management. Made NPO due to respiratory status        COMPARISON: None.        PROCEDURE:     CT Angiography of the Chest.    90 ml of Omnipaque 350 was injected intravenously. 10 ml were discarded.    Sagittal and coronal reformats were performed as well as 3D (MIP) reconstructions.            FINDINGS:        LUNGS AND AIRWAYS: Patent central airways.  Evidence of extensive bilateral heterogeneous airspace disease concerning for covid 19 pneumonia. The dependent lower lobe atelectasis.        PLEURA: Mild left and mild to moderate right pleural effusion        MEDIASTINUM AND CELESTE: No lymphadenopathy.        VESSELS: No evidence of pulmonary embolism. Evaluation of smaller branches in the lower lobes is limited due to the presence of airspace disease. Unremarkable thoracic aorta. Coronary artery calcification.        HEART: Heart size is normal. No pericardial effusion.        RECENT LABS        Vital Signs Last 24 Hrs    T(C): 36.9 (15 May 2020 09:01), Max: 36.9 (15 May 2020 09:01)    T(F): 98.4 (15 May 2020 09:01), Max: 98.4 (15 May 2020 09:01)    HR: 89 (15 May 2020 09:01) (79 - 98)    BP: 145/70 (15 May 2020 09:01) (145/70 - 176/79)    BP(mean): --    RR: 18 (15 May 2020 09:01) (18 - 22)    SpO2: 99% (15 May 2020 09:01) (85% - 100%)                                9.8      10.44 )-----------( 217      ( 14 May 2020 08:00 )               30.1         05-14        134<L>  |  99  |  19    ----------------------------<  142<H>    4.3   |  26  |  1.18        Ca    8.6      14 May 2020 08:00        TPro  6.9  /  Alb  2.4<L>  /  TBili  0.5  /  DBili  x   /  AST  30  /  ALT  52<H>  /  AlkPhos  188<H>  05-14                CAPILLARY BLOOD GLUCOSE            POCT Blood Glucose.: 109 mg/dL (15 May 2020 08:02)    POCT Blood Glucose.: 134 mg/dL (14 May 2020 21:33)    POCT Blood Glucose.: 156 mg/dL (14 May 2020 17:44)    POCT Blood Glucose.: 285 mg/dL (14 May 2020 12:30)                            CHEST WALL AND LOWER NECK: Within normal limits.        VISUALIZED UPPER ABDOMEN: Cholecystectomy.        BONES: Within normal limits.        IMPRESSION:         Pattern of GGO suggests infection including atypical pneumonia/viral infection from atypical agents including COVID-19 (C19V-1).        No evidence of pulmonary embolism.

## 2020-05-15 NOTE — H&P ADULT - NSHPLABSRESULTS_GEN_ALL_CORE
9.6                  134  | 32   | 17           12.61 >-----------< 186     ------------------------< 121                   29.2                 3.7  | 96   | 1.22                                         Ca 8.6   Mg x     Ph x        D-Dimer Assay, Quantitative: 528 ng/mL DDU (05.15.20 @ 11:15)  Serum Pro-Brain Natriuretic Peptide: 2299 pg/mL (05.15.20 @ 11:15)  Troponin I, Serum: <.017 ng/mL (05.15.20 @ 11:15)      Blood Gas Profile - Arterial (05.15.20 @ 10:40)    pH, Arterial: 7.45    pCO2, Arterial: 44 mmHg    pO2, Arterial: 75 mmHg    Oxygen Saturation, Arterial: 95 %    Total CO2, Arterial: 31 mmol/l    FIO2, Arterial: 100%    Blood Gas Source Arterial: Arterial: drawn@1040        Labs reviewed:     CXR personally reviewed:     ECG reviewed and interpreted: SINUS  AT 86      < from: Xray Chest 1 View-PORTABLE IMMEDIATE (05.15.20 @ 10:57) >    IMPRESSION: Bilateral lung parenchymal airspace opacities, without significant interval change.        DISCRETE X-RAY DATA:  Percent of LEFT lung opacification: 34-66%  Percent of RIGHT lung opacification: 34-66%  Change in lung opacification from most recent x-ray (<=3 days): Stable  Change from prior dated 3 or more days (same admission): Stable    < end of copied text >    < from: CT Angio Chest w/ IV Cont (05.12.20 @ 19:05) >      IMPRESSION:     Pattern of GGO suggests infection including atypical pneumonia/viral infection from atypical agents including COVID-19 (C19V-1).    No evidence of pulmonary embolism.    < end of copied text >    < from: US Duplex Venous Lower Ext Complete, Bilateral (05.12.20 @ 22:50) >      IMPRESSION:     No deep vein thrombosis in either lower extremity.    < end of copied text >

## 2020-05-16 NOTE — PROCEDURE NOTE - NSINDICATIONS_GEN_A_CORE
cardiac arrest
emergency venous access/critical illness
resp. failure
critical patient/monitoring purposes

## 2020-05-16 NOTE — PROCEDURE NOTE - NSPROCDETAILS_GEN_ALL_CORE
orogastric
difficult/crash intubation
sterile dressing applied/guidewire recovered/lumen(s) aspirated and flushed/Ultrasound utilized throughout procedure. First to find adequate vessel and ensure vein was compressible. Second to visualize catheter tip entered vessel. Third to visualize wire entering vessel. Lastly, it was used to confirm that injected saline did not extravasate from vessel/sterile technique, catheter placed/ultrasound guidance
positive blood return obtained via catheter/sutured in place/location identified, draped/prepped, sterile technique used, needle inserted/introduced/connected to a pressurized flush line

## 2020-05-16 NOTE — DISCHARGE NOTE FOR THE EXPIRED PATIENT - HOSPITAL COURSE
70M COVID 19 +, per report/chart had prolonged stay/intubation at [facility], was extubated, complicated by vocal cord edema/paralysis. Ws then transferred to [place] rehab facility. WHile doing rehab patient was noted to become hypoxic with movement/PT and was thus transferred to [address] [place] [address] (heather) unit. This evening, while nurse wa sin room iw patient he was noted to become hypoxic and then un-responsive, some quesion of eyelid fluttering, ??? seizure, but no seizure Hx and nothing confirmed. Patient had brief round of compressions, ambu bag, no medications given. Puylse was palpable, and he ws noted to regain consciousness, and was to converse with NA at bedside who spoke Sudanese, however he did remain hypoxic. Patient was transferred to ICU on 100% NRB, and wsa quickly proned. SInce proning, he has had improved O2 sat and is currently at a SaO2 of 94%, proned, on 100% NRB mask. However patient decompensated further, noted to become unresponsive, required urgent intubation.   -Patient then noted to develop severe and refractory shock. CArdiac arrest noted, able to achieve ROSC x 1, however went into cardiac arrest again, and dspite max dose levo and epi drips and epi IVPs, patient remained in severe and refractory shock. Bedside U/S revealed large/dilated, and immobile RV. Decision with ICU attending made to trial IV tPA as PE seemed likely given U/S findings and shock state.    ROSC however not achieved.  TOD: 9378 70M COVID 19 +, per report/chart had prolonged stay/intubation at [facility], was extubated, complicated by vocal cord edema/paralysis. Ws then transferred to [place] rehab facility. WHile doing rehab patient was noted to become hypoxic with movement/PT and was thus transferred to [address] [place] [address] (heather) unit. This evening, while nurse was in room iwht patient he was noted to become hypoxic and then un-responsive, some quesion of eyelid fluttering, ??? seizure, but no seizure Hx and nothing confirmed. Patient had brief round of compressions, ambu bag, no medications given. Puylse was palpable, and he ws noted to regain consciousness, and was to converse with NA at bedside who spoke Uzbek, however he did remain hypoxic. Patient was transferred to ICU on 100% NRB, and wsa quickly proned. SInce proning, he has had improved O2 sat and is currently at a SaO2 of 94%, proned, on 100% NRB mask. However patient decompensated further, noted to become unresponsive, required urgent intubation.   -Patient then noted to develop severe and refractory shock. CArdiac arrest noted, able to achieve ROSC x 1, however went into cardiac arrest again, and dspite max dose levo and epi drips and epi IVPs, patient remained in severe and refractory shock. Bedside U/S revealed large/dilated, and immobile RV. Decision with ICU attending made to trial IV tPA as PE seemed likely given U/S findings and shock state.    ROSC however not achieved.  TOD: 4736    I called the patient's son Edy at his request.  The patient's wife came to the hospital post-mortem and was able to see him. 70M COVID 19 +, per report/chart had prolonged stay/intubation at [facility], was extubated, complicated by vocal cord edema/paralysis. Ws then transferred to [facility] re[facility] facility. WHile doing rehab patient was noted to become hypoxic with movement/PT and was thus transferred to [address] [place] [address] (heather) unit. This evening, while nurse was in room iwht patient he was noted to become hypoxic and then un-responsive, some quesion of eyelid fluttering, ??? seizure, but no seizure Hx and nothing confirmed. Patient had brief round of compressions, ambu bag, no medications given. Puylse was palpable, and he ws noted to regain consciousness, and was to converse with NA at bedside who spoke Vincentian, however he did remain hypoxic. Patient was transferred to ICU on 100% NRB, and wsa quickly proned. SInce proning, he has had improved O2 sat and is currently at a SaO2 of 94%, proned, on 100% NRB mask. However patient decompensated further, noted to become unresponsive, required urgent intubation.   -Patient then noted to develop severe and refractory shock. CArdiac arrest noted, able to achieve ROSC x 1, however went into cardiac arrest again, and dspite max dose levo and epi drips and epi IVPs, patient remained in severe and refractory shock. Bedside U/S revealed large/dilated, and immobile RV. Decision with ICU attending made to trial IV tPA as PE seemed likely given U/S findings and shock state.    ROSC however not achieved.  TOD: 0243  For full account of hospital course please refer to actual medical records. As this is a brief summery.   I called the patient's son Edy at his request.  The patient's wife came to the hospital post-mortem and was able to see him.

## 2020-05-16 NOTE — CHART NOTE - NSCHARTNOTEFT_GEN_A_CORE
-have called and updated patient's wife of change in condition and need for re-intubation.   -there was Latvian/english speaking individual in home who was able to transslate for wife and ask/answer questions    -I have provided them with phone # to ICU and instructed them to call with any further issues/concerns/questions

## 2020-05-16 NOTE — PROVIDER CONTACT NOTE (EICU) - SITUATION
E-alerted earlier in night for pt who underwent cardiac arrest. ROSC was achieved and bedside team recommended epinephrine infusion as well as sodium bicarb IVP.

## 2020-05-16 NOTE — PROCEDURE NOTE - ADDITIONAL PROCEDURE DETAILS
Time spent on procedure independent of Critical Care time spent at the bedside  Indication for procedure: COIVD-19, hypoxemic resp. failure, ARDS  Dx Code: U07.1, J96.01, J80

## 2020-07-26 NOTE — OCCUPATIONAL THERAPY INITIAL EVALUATION ADULT - MANUAL MUSCLE TESTING RESULTS, REHAB EVAL
[facility]  PROGRESS NOTE      Patient:  Omar Bravo Date of Service:  7/26/2020   YOB: 1965 Admission Date:  7/25/2020   MRN:  [id number] Attending:  Tahir Weiner MD   PCP:  No Pcp   Hospital Day:  Hospital Day: 2     PRIMARY CARDIOLOGIST:  Dr Weiner (new)    CHIEF COMPLAINT:  Chief Complaint   Patient presents with   • Chest Pain Adult     HISTORY OF PRESENT ILLNESS:   Omar Bravo is a 54 year old male with history of dyslipidemia, reactive airway disease, previous duodenal ulcer who presents with troponin elevation and diagnosis of NSTEMI.  Taken to cath lab urgently for ongoing chest pain.  Abbreviated history obtained revealing 3 years of exertional angina with activities such as mowing his lawn.  Over the last few months symptoms have been getting worse until the last 3 days when chest pain became much worse.  Particularly yesterday when he was at a grocery store when pain acutely worsened and he felt like he would pass out. Symptoms improve with rest and worsen with exertion. Pain is severe and described as tightness.     Does not take any medications but has a family history of CAD with MI in his father.     INTERVAL EVENTS:  Patient with no CP. No dizziness, near-syncope. Slept well and in-tune with the medical plan devised for him.    Tele O/N:  Reviewed and reassuring    CURRENT AND PREVIOUS CARDIAC STUDIES:     EKG:   Date:  7/25  NSR, Nonspecific lateral ST change        PRIOR CARDIAC CATH AND INTERVENTIONS:   Date:  7/25  · Mid RCA lesion with 100% stenosis.  · Mid Cx lesion with 100% stenosis.  · Prox LAD lesion with 75% stenosis.  · Prox Cx lesion with 50% stenosis.     Cardiac cath showed multivessel CAD including 100% mid LCX , 100% mid RCA subacute occlusion with distal left-to-right collaterals, 75% mid LAD & 50% proximal LXC stenosis.  LVEF 50% with inferior severe hypokinesis.  LVEDP of 10.     Plan:  Will recommend CTS consultation for urgent  CABG.  Continue ASA 81mg PO QD (will hold off on administering any P2Y12 agents in anticipation of surgery), maximally tolerated statin therapy & uptitrate GDMT.  Admit to CCU.      REVIEW OF SYSTEMS:     Negative unless stated in the HPI or as follows   MSK - back pain and neck pain associated with chest pain    PAST HISTORY:     PAST MEDICAL HISTORY:   Past Medical History:   Diagnosis Date   • Arthritis    • Coronary artery disease    • Duodenal ulcer    • Gastroesophageal reflux disease    • High cholesterol    • Myocardial infarction (CMS/HCC)    • RAD (reactive airway disease)     exercise-induced   • Sinusitis, chronic      There are no active problems to display for this patient.      HOME MEDICATIONS:  No current facility-administered medications on file prior to encounter.   aspirin 81 MG chewable tablet  oxymetazoline (AFRIN) 0.05 % nasal spray        ALLERGIES  ALLERGIES:  No Known Allergies    FAMILY HISTORY:  family history includes Cancer in his maternal grandfather, mother, and paternal grandfather; Diabetes in his mother; Heart disease in his mother; Kidney disease in his mother; Myocardial Infarction in his father; Stroke in his mother.    SOCIAL HISTORY:   reports that he has never smoked. He has never used smokeless tobacco. He reports current alcohol use of about 1.0 standard drinks of alcohol per week. He reports that he does not use drugs.  Works as an . .    PHYSICAL EXAM:   Blood pressure (!) 152/85, pulse 77, temperature 98.4 °F (36.9 °C), temperature source Oral, resp. rate 13, height 6' (1.829 m), weight 93.7 kg, SpO2 93 %.    Intake/Output Summary (Last 24 hours) at 7/26/2020 0833  Last data filed at 7/26/2020 0700  Gross per 24 hour   Intake 1685 ml   Output 1900 ml   Net -215 ml     PHYSICAL EXAM:  General:  Awake, alert and oriented and in no acute distress.  Eyes:  No xanthelasma, EOM intact, anicteric sclerae, not injected.  ENT/Mouth:  No ear discharge.  Mucous  membranes moist.  Neck:  Spontaneous full range of motion, trachea midline.  Cardiovascular system:  Sinus tachycardia, regular  Respiratory:  Non labored, no respiratory distress  Abdomen:  Soft, nontender.  No hepatomegaly   Extremities:  Well perfused distally - Right radial cath entry site C/D/I with no hematoma.   Psychiatric:  Appearance and behavior appropriate for setting.  Skin:  Warm and dry, no rashes visualized. Well perfused distally.  Neurological:  CN (cranial nerves) II-XII grossly intact.  No gross motor deficit appreciated  Musculoskeletal:  No significant limb deformity noted or large joint swelling.     CURRENT MEDICATIONS:   Scheduled:  • potassium CHLORIDE  20 mEq Oral Daily with breakfast   • sodium chloride (PF)  2 mL Intracatheter 2 times per day   • aspirin  81 mg Oral Daily   • atorvastatin  80 mg Oral Nightly   • metoPROLOL tartrate  25 mg Oral On Call to Procedure   • AMIODarone  400 mg Oral TID   • AMIODARONE - PHARMACIST MONITORED   Does not apply See Admin Instructions   • pneumococcal 23-valent vaccine  0.5 mL Intramuscular Once       Infusions:  • heparin (porcine) 25,000 units/250 mL in dextrose 5 % infusion 11 Units/kg/hr (07/26/20 0741)   • lactated ringers infusion     • sodium chloride 0.9% infusion 10 mL/hr at 07/26/20 0741       PRN:  oxymetazoline, heparin (porcine), heparin (porcine), cloNIDine, hydrALAZINE, sodium chloride, sodium chloride, atropine, lidocaine, morphine injection, MIDAZolam, nitroGLYcerin, nitroGLYcerin, lidocaine, lidocaine-sodium bicarbonate 0.9-8.4% **OR** lidocaine, metoPROLOL tartrate, dextrose, dextrose, insulin regular (human), sodium chloride    LABS:     CARDIAC MARKERS:    Recent Labs   Lab 07/25/20  0819   5TROPI 6.82*       NT proBNP:    Recent Labs   Lab 07/25/20  0814   NTPROB 658*       CBC:    Recent Labs   Lab 07/26/20  0330 07/25/20  0814   WBC 15.1* 15.7*   HGB 14.3 17.1*   HCT 41.7 49.4    241       CMP:    Recent Labs   Lab  07/26/20  0330 07/25/20  0820 07/25/20  0814   SODIUM 138  --  139   POTASSIUM 3.8  --  4.2   CHLORIDE 105  --  100   CO2 26  --  27   BUN 19  --  15   CREATININE 0.89 1.00 0.97   GLUCOSE 137*  --  145*   ALBUMIN  --   --  4.6   GPT  --   --  72*   ALKPT  --   --  83   BILIRUBIN  --   --  0.6   MG 2.1  --   --        LIPID PANEL:    Recent Labs   Lab 07/25/20  0814   CHOLESTEROL 279*   TRIGLYCERIDE 157*   HDL 51   CALCLDL 197*       HbA1c:    Hemoglobin A1C (%)   Date Value   07/25/2020 6.2 (H)        COAGULATION STUDIES:    Recent Labs   Lab 07/26/20  0330 07/25/20 2053 07/25/20  1517 07/25/20  1516   PT  --   --  11.1 11.1   PTT 48* 62* 31  --    INR  --   --  1.0 1.0       BEST PRACTICE BOX     AMI WITH Heart Failure with Reduced LVEF (<40%)?    no  AMI?  Yes   LVEF assessment post AMI: yes - EF%: 50 Date: 7/25  No results found for: EF   * EF% must be from CURRENT hospitalization and closest to Discharge  ACE/ARB for LVEF<40%: not indicated EF greater than 40%  Beta Blocker Therapy: yes - will be ordered at discharge  Aldosterone blocking agent/Antagonist prescribed for LVEF < or equal to 40%; history of diabetes; or heart failure as history, on presentation, or as event: not indicated EF > 40%  Aspirin therapy given within 24 hours pre/post admission: Yes  Aspirin therapy prescribed: Yes  LDL resulted during current admission: no - ordered this admission, awaiting results  Statin Therapy: yes - statin ordered this hospitalization  ADP receptor inhibitor prescribed: no: urgent CV surgery  Cardiac Rehab Referral: No, medically unstable/inappropriate    Heart Failure with Reduced LVEF (< 40%)?  No    ASSESSMENT, PROBLEM LIST, AND PLAN:   Omar Bravo is a 54 year old male with history of hyperlipidemia and family history of CAD admitted with NSTEMI with report of ongoing chest pain and significant troponin elevation, subtle ST changes. Found on cath to have severe multivessel disease and chest pain free by end  of procedure (received nitro paste).     # NSTEMI:   # Severe multivessel CAD:     CABG today (7/26/2020) - MD Dayami at 1230PM tentatively.  ASA 81 mg daily  Atorvastatin 80 mg daily  Heparin gtt  Start beta blocker after surgery   Cardiac rehab referral after srugery    Patient seen with Dr. Weiner, who formulated the plan.    Varinder Alexis MD   Cardiology Fellow PGY6  7/26/2020 8:33 AM  Pager [phone]    COVERAGE (AFTER 5:00 PM ON WEEKDAYS AND ON WEEKENDS)  Monday-Thursday (5:00pm to 7:00am):  Please page on call cardiology fellow at [phone].     If patient had a cardiac procedure today, page on call cath fellow for procedure related issues from 5:00pm to 7:00am.    Patient was seen and examined with fellow/resident. Note was confirmed. Assessment and plan formulated together.  No chest pain.  Surgery later today.  Physical exam:  No acute distress, regular rate and rhythm, clear to auscultation, no edema.  Plan:  Proceed with surgery later today.           Tahir Weiner MD, FACC, [facility]  Clinical Adjunct   Department of Medicine  [facility] [employer]  [facility]      bilat UEs grossly functional

## 2024-06-10 NOTE — PROCEDURE NOTE - NSTRACHEETSIZE_RESP_A_CORE
No protocol for requested medication.    Medication:     tiZANidine (ZANAFLEX) 4 MG tablet 10 tablet 0 6/3/2024 --    Sig - Route: Take 1 tablet by mouth every 8 hours as needed for lower back pain/spasms. - Oral      Last office visit date: 12/08/2023 ; 07/16/2024  Pharmacy: [id number] [place], [place] - [phone]    Order pended, routed to clinician for review.    7.5 mm
